# Patient Record
Sex: FEMALE | Race: WHITE | Employment: FULL TIME | URBAN - NONMETROPOLITAN AREA
[De-identification: names, ages, dates, MRNs, and addresses within clinical notes are randomized per-mention and may not be internally consistent; named-entity substitution may affect disease eponyms.]

---

## 2017-01-05 RX ORDER — QUINAPRIL 20 MG/1
TABLET ORAL
Qty: 30 TABLET | Refills: 11 | Status: SHIPPED | OUTPATIENT
Start: 2017-01-05 | End: 2017-10-24 | Stop reason: SDUPTHER

## 2017-02-15 ENCOUNTER — OFFICE VISIT (OUTPATIENT)
Dept: PRIMARY CARE CLINIC | Age: 47
End: 2017-02-15
Payer: COMMERCIAL

## 2017-02-15 VITALS
SYSTOLIC BLOOD PRESSURE: 130 MMHG | BODY MASS INDEX: 35.44 KG/M2 | HEIGHT: 63 IN | RESPIRATION RATE: 22 BRPM | HEART RATE: 110 BPM | OXYGEN SATURATION: 98 % | WEIGHT: 200 LBS | DIASTOLIC BLOOD PRESSURE: 82 MMHG

## 2017-02-15 DIAGNOSIS — F41.9 ANXIETY: ICD-10-CM

## 2017-02-15 DIAGNOSIS — Z76.0 MEDICATION REFILL: ICD-10-CM

## 2017-02-15 DIAGNOSIS — I10 ESSENTIAL HYPERTENSION: ICD-10-CM

## 2017-02-15 DIAGNOSIS — E11.9 TYPE 2 DIABETES MELLITUS WITHOUT COMPLICATION, WITHOUT LONG-TERM CURRENT USE OF INSULIN (HCC): ICD-10-CM

## 2017-02-15 DIAGNOSIS — E03.9 ACQUIRED HYPOTHYROIDISM: ICD-10-CM

## 2017-02-15 DIAGNOSIS — G56.03 BILATERAL CARPAL TUNNEL SYNDROME: Primary | ICD-10-CM

## 2017-02-15 LAB
ALBUMIN SERPL-MCNC: 4.1 G/DL (ref 3.5–5.2)
ALP BLD-CCNC: 91 U/L (ref 35–104)
ALT SERPL-CCNC: 36 U/L (ref 5–33)
ANION GAP SERPL CALCULATED.3IONS-SCNC: 17 MMOL/L (ref 7–19)
AST SERPL-CCNC: 106 U/L (ref 5–32)
BILIRUB SERPL-MCNC: 0.5 MG/DL (ref 0.2–1.2)
BILIRUBIN URINE: NEGATIVE
BLOOD, URINE: NEGATIVE
BUN BLDV-MCNC: 10 MG/DL (ref 6–20)
CALCIUM SERPL-MCNC: 9.3 MG/DL (ref 8.6–10)
CHLORIDE BLD-SCNC: 97 MMOL/L (ref 98–111)
CLARITY: CLEAR
CO2: 24 MMOL/L (ref 22–29)
COLOR: YELLOW
CREAT SERPL-MCNC: 0.5 MG/DL (ref 0.5–0.9)
CREATININE URINE: 71.1 MG/DL (ref 4.2–622)
GFR NON-AFRICAN AMERICAN: >60
GLOBULIN: 4.2 G/DL
GLUCOSE BLD-MCNC: 306 MG/DL (ref 74–109)
GLUCOSE URINE: >=1000 MG/DL
HBA1C MFR BLD: 9.9 %
HCT VFR BLD CALC: 36.5 % (ref 37–47)
HEMOGLOBIN: 12.4 G/DL (ref 12–16)
KETONES, URINE: NEGATIVE MG/DL
LEUKOCYTE ESTERASE, URINE: NEGATIVE
MCH RBC QN AUTO: 28.5 PG (ref 27–31)
MCHC RBC AUTO-ENTMCNC: 34 G/DL (ref 33–37)
MCV RBC AUTO: 83.9 FL (ref 81–99)
NITRITE, URINE: NEGATIVE
PDW BLD-RTO: 14 % (ref 11.5–14.5)
PH UA: 5
PLATELET # BLD: 189 K/UL (ref 130–400)
PMV BLD AUTO: 10.9 FL (ref 7.4–10.4)
POTASSIUM SERPL-SCNC: 4 MMOL/L (ref 3.5–5)
PROTEIN PROTEIN: 28 MG/DL (ref 15–45)
PROTEIN UA: NEGATIVE MG/DL
RBC # BLD: 4.35 M/UL (ref 4.2–5.4)
SODIUM BLD-SCNC: 138 MMOL/L (ref 136–145)
SPECIFIC GRAVITY UA: 1.03
TOTAL PROTEIN: 8.3 G/DL (ref 6.6–8.7)
TSH SERPL DL<=0.05 MIU/L-ACNC: 0.44 UIU/ML (ref 0.27–4.2)
URIC ACID, SERUM: 5.3 MG/DL (ref 2.4–5.7)
UROBILINOGEN, URINE: 0.2 E.U./DL
WBC # BLD: 8.8 K/UL (ref 4.8–10.8)

## 2017-02-15 PROCEDURE — 99214 OFFICE O/P EST MOD 30 MIN: CPT | Performed by: INTERNAL MEDICINE

## 2017-02-15 RX ORDER — MONTELUKAST SODIUM 10 MG/1
10 TABLET ORAL DAILY
Qty: 30 TABLET | Refills: 3 | Status: SHIPPED | OUTPATIENT
Start: 2017-02-15 | End: 2017-02-22 | Stop reason: SDUPTHER

## 2017-02-15 RX ORDER — TRAZODONE HYDROCHLORIDE 100 MG/1
100 TABLET ORAL NIGHTLY
Qty: 30 TABLET | Refills: 5 | Status: SHIPPED | OUTPATIENT
Start: 2017-02-15 | End: 2017-08-07 | Stop reason: SDUPTHER

## 2017-02-15 RX ORDER — CLONAZEPAM 0.5 MG/1
TABLET ORAL
Qty: 30 TABLET | Refills: 2 | Status: SHIPPED | OUTPATIENT
Start: 2017-02-15 | End: 2018-08-03 | Stop reason: SDUPTHER

## 2017-02-15 RX ORDER — FAMOTIDINE 20 MG/1
20 TABLET, FILM COATED ORAL 2 TIMES DAILY
Qty: 30 TABLET | Refills: 3 | Status: SHIPPED | OUTPATIENT
Start: 2017-02-15 | End: 2017-07-10 | Stop reason: SDUPTHER

## 2017-02-15 RX ORDER — GLIMEPIRIDE 1 MG/1
1 TABLET ORAL
Qty: 30 TABLET | Refills: 2 | Status: SHIPPED | OUTPATIENT
Start: 2017-02-15 | End: 2017-05-10 | Stop reason: SDUPTHER

## 2017-02-15 RX ORDER — VENLAFAXINE HYDROCHLORIDE 150 MG/1
150 CAPSULE, EXTENDED RELEASE ORAL DAILY
Qty: 30 CAPSULE | Refills: 5 | Status: SHIPPED | OUTPATIENT
Start: 2017-02-15 | End: 2017-08-07 | Stop reason: SDUPTHER

## 2017-02-15 RX ORDER — AMITRIPTYLINE HYDROCHLORIDE 25 MG/1
TABLET, FILM COATED ORAL
Qty: 30 TABLET | Refills: 5 | Status: CANCELLED | OUTPATIENT
Start: 2017-02-15

## 2017-02-15 ASSESSMENT — ENCOUNTER SYMPTOMS
DIARRHEA: 0
CONSTIPATION: 0
RHINORRHEA: 1
COUGH: 0
SORE THROAT: 1
BACK PAIN: 0
SHORTNESS OF BREATH: 0
NAUSEA: 0
VOMITING: 0

## 2017-04-18 RX ORDER — ATORVASTATIN CALCIUM 20 MG/1
TABLET, FILM COATED ORAL
Qty: 30 TABLET | Refills: 5 | Status: SHIPPED | OUTPATIENT
Start: 2017-04-18 | End: 2017-09-27 | Stop reason: SDUPTHER

## 2017-04-18 RX ORDER — MELOXICAM 15 MG/1
15 TABLET ORAL DAILY
Qty: 30 TABLET | Refills: 5 | Status: SHIPPED | OUTPATIENT
Start: 2017-04-18 | End: 2017-09-27 | Stop reason: SDUPTHER

## 2017-05-10 RX ORDER — GLIMEPIRIDE 1 MG/1
1 TABLET ORAL
Qty: 30 TABLET | Refills: 5 | Status: SHIPPED | OUTPATIENT
Start: 2017-05-10 | End: 2017-08-16 | Stop reason: ALTCHOICE

## 2017-05-15 ENCOUNTER — OFFICE VISIT (OUTPATIENT)
Dept: PRIMARY CARE CLINIC | Age: 47
End: 2017-05-15
Payer: COMMERCIAL

## 2017-05-15 VITALS
RESPIRATION RATE: 18 BRPM | HEART RATE: 105 BPM | WEIGHT: 202 LBS | BODY MASS INDEX: 35.79 KG/M2 | SYSTOLIC BLOOD PRESSURE: 103 MMHG | HEIGHT: 63 IN | OXYGEN SATURATION: 98 % | DIASTOLIC BLOOD PRESSURE: 84 MMHG

## 2017-05-15 DIAGNOSIS — R30.0 DYSURIA: ICD-10-CM

## 2017-05-15 DIAGNOSIS — I10 ESSENTIAL HYPERTENSION: ICD-10-CM

## 2017-05-15 DIAGNOSIS — E11.9 TYPE 2 DIABETES MELLITUS WITHOUT COMPLICATION, WITHOUT LONG-TERM CURRENT USE OF INSULIN (HCC): Primary | ICD-10-CM

## 2017-05-15 DIAGNOSIS — E03.9 ACQUIRED HYPOTHYROIDISM: ICD-10-CM

## 2017-05-15 PROCEDURE — 99214 OFFICE O/P EST MOD 30 MIN: CPT | Performed by: INTERNAL MEDICINE

## 2017-05-15 RX ORDER — LEVOTHYROXINE SODIUM 0.12 MG/1
TABLET ORAL
Qty: 30 TABLET | Refills: 0 | Status: SHIPPED | OUTPATIENT
Start: 2017-05-15 | End: 2017-05-15 | Stop reason: SDUPTHER

## 2017-05-15 RX ORDER — PHENAZOPYRIDINE HYDROCHLORIDE 100 MG/1
100 TABLET, FILM COATED ORAL 3 TIMES DAILY PRN
Qty: 9 TABLET | Refills: 0 | Status: SHIPPED | OUTPATIENT
Start: 2017-05-15 | End: 2017-05-18

## 2017-05-15 RX ORDER — METFORMIN HYDROCHLORIDE 1000 MG/1
1000 TABLET, FILM COATED, EXTENDED RELEASE ORAL 2 TIMES DAILY WITH MEALS
Qty: 60 TABLET | Refills: 5 | Status: CANCELLED | OUTPATIENT
Start: 2017-05-15

## 2017-05-15 RX ORDER — LEVOFLOXACIN 500 MG/1
500 TABLET, FILM COATED ORAL DAILY
Qty: 3 TABLET | Refills: 0 | Status: SHIPPED | OUTPATIENT
Start: 2017-05-15 | End: 2017-05-18

## 2017-05-15 RX ORDER — LEVOTHYROXINE SODIUM 0.12 MG/1
TABLET ORAL
Qty: 30 TABLET | Refills: 5 | Status: SHIPPED | OUTPATIENT
Start: 2017-05-15 | End: 2017-06-12 | Stop reason: SDUPTHER

## 2017-05-15 ASSESSMENT — ENCOUNTER SYMPTOMS
CONSTIPATION: 0
COUGH: 0
DIARRHEA: 0
SHORTNESS OF BREATH: 0
NAUSEA: 0
BACK PAIN: 1
VOMITING: 0

## 2017-06-07 ENCOUNTER — HOSPITAL ENCOUNTER (OUTPATIENT)
Dept: PREADMISSION TESTING | Age: 47
Setting detail: OUTPATIENT SURGERY
Discharge: HOME | End: 2017-06-07

## 2017-06-07 ENCOUNTER — HOSPITAL ENCOUNTER (OUTPATIENT)
Dept: NON INVASIVE DIAGNOSTICS | Age: 47
Discharge: HOME | End: 2017-06-07
Payer: COMMERCIAL

## 2017-06-07 ENCOUNTER — HOSPITAL ENCOUNTER (OUTPATIENT)
Dept: LAB | Age: 47
Discharge: HOME | End: 2017-06-07
Payer: COMMERCIAL

## 2017-06-07 ENCOUNTER — HOSPITAL ENCOUNTER (OUTPATIENT)
Dept: GENERAL RADIOLOGY | Age: 47
End: 2017-06-07
Payer: COMMERCIAL

## 2017-06-07 DIAGNOSIS — Z76.0 MEDICATION REFILL: ICD-10-CM

## 2017-06-07 LAB
ANION GAP SERPL CALCULATED.3IONS-SCNC: 15 MMOL/L (ref 7–19)
BUN BLDV-MCNC: 13 MG/DL (ref 6–20)
CALCIUM SERPL-MCNC: 9.7 MG/DL (ref 8.6–10)
CHLORIDE BLD-SCNC: 96 MMOL/L (ref 98–111)
CO2: 26 MMOL/L (ref 22–29)
CREAT SERPL-MCNC: 0.5 MG/DL (ref 0.5–0.9)
GFR NON-AFRICAN AMERICAN: >60
GLUCOSE BLD-MCNC: 303 MG/DL (ref 74–109)
POTASSIUM SERPL-SCNC: 4.3 MMOL/L (ref 3.5–5)
SODIUM BLD-SCNC: 137 MMOL/L (ref 136–145)

## 2017-06-07 PROCEDURE — 93005 ELECTROCARDIOGRAM TRACING: CPT

## 2017-06-07 RX ORDER — MONTELUKAST SODIUM 10 MG/1
TABLET ORAL
Qty: 30 TABLET | Refills: 11 | Status: SHIPPED | OUTPATIENT
Start: 2017-06-07 | End: 2018-02-23 | Stop reason: SDUPTHER

## 2017-06-12 ENCOUNTER — ANESTHESIA EVENT (OUTPATIENT)
Dept: OPERATING ROOM | Age: 47
End: 2017-06-12

## 2017-06-12 RX ORDER — LEVOTHYROXINE SODIUM 0.12 MG/1
TABLET ORAL
Qty: 30 TABLET | Refills: 11 | Status: SHIPPED | OUTPATIENT
Start: 2017-06-12 | End: 2018-06-17 | Stop reason: SDUPTHER

## 2017-06-16 ENCOUNTER — HOSPITAL ENCOUNTER (OUTPATIENT)
Age: 47
Setting detail: OUTPATIENT SURGERY
Discharge: HOME | End: 2017-06-16
Attending: ORTHOPAEDIC SURGERY | Admitting: ORTHOPAEDIC SURGERY

## 2017-06-16 ENCOUNTER — ANESTHESIA (OUTPATIENT)
Dept: OPERATING ROOM | Age: 47
End: 2017-06-16
Payer: COMMERCIAL

## 2017-06-16 VITALS
BODY MASS INDEX: 34.96 KG/M2 | HEIGHT: 62 IN | OXYGEN SATURATION: 94 % | HEART RATE: 107 BPM | DIASTOLIC BLOOD PRESSURE: 77 MMHG | RESPIRATION RATE: 18 BRPM | WEIGHT: 190 LBS | TEMPERATURE: 97.2 F | SYSTOLIC BLOOD PRESSURE: 120 MMHG

## 2017-06-16 VITALS
RESPIRATION RATE: 4 BRPM | SYSTOLIC BLOOD PRESSURE: 126 MMHG | OXYGEN SATURATION: 98 % | DIASTOLIC BLOOD PRESSURE: 81 MMHG

## 2017-06-16 PROCEDURE — 64721 CARPAL TUNNEL SURGERY: CPT

## 2017-06-16 PROCEDURE — G8916 PT W IV AB GIVEN ON TIME: HCPCS | Performed by: NURSE PRACTITIONER

## 2017-06-16 PROCEDURE — 01810 ANES PX NRV MUSC F/ARM WRST: CPT | Performed by: NURSE ANESTHETIST, CERTIFIED REGISTERED

## 2017-06-16 PROCEDURE — G8907 PT DOC NO EVENTS ON DISCHARG: HCPCS | Performed by: NURSE PRACTITIONER

## 2017-06-16 RX ORDER — LIDOCAINE HYDROCHLORIDE 20 MG/ML
INJECTION, SOLUTION EPIDURAL; INFILTRATION; INTRACAUDAL; PERINEURAL PRN
Status: DISCONTINUED | OUTPATIENT
Start: 2017-06-16 | End: 2017-06-16 | Stop reason: SDUPTHER

## 2017-06-16 RX ORDER — CEFAZOLIN SODIUM 1 G/3ML
INJECTION, POWDER, FOR SOLUTION INTRAMUSCULAR; INTRAVENOUS PRN
Status: DISCONTINUED | OUTPATIENT
Start: 2017-06-16 | End: 2017-06-16 | Stop reason: SDUPTHER

## 2017-06-16 RX ORDER — OXYCODONE HYDROCHLORIDE 5 MG/1
TABLET ORAL
Qty: 60 TABLET | Refills: 0 | Status: SHIPPED | OUTPATIENT
Start: 2017-06-16 | End: 2017-07-14

## 2017-06-16 RX ORDER — SODIUM CHLORIDE, SODIUM LACTATE, POTASSIUM CHLORIDE, CALCIUM CHLORIDE 600; 310; 30; 20 MG/100ML; MG/100ML; MG/100ML; MG/100ML
INJECTION, SOLUTION INTRAVENOUS CONTINUOUS
Status: DISCONTINUED | OUTPATIENT
Start: 2017-06-16 | End: 2017-06-16 | Stop reason: HOSPADM

## 2017-06-16 RX ORDER — ROPIVACAINE HYDROCHLORIDE 2 MG/ML
INJECTION, SOLUTION EPIDURAL; INFILTRATION; PERINEURAL PRN
Status: DISCONTINUED | OUTPATIENT
Start: 2017-06-16 | End: 2017-06-16 | Stop reason: HOSPADM

## 2017-06-16 RX ORDER — PROMETHAZINE HYDROCHLORIDE 25 MG/1
25 TABLET ORAL EVERY 8 HOURS PRN
Qty: 25 TABLET | Refills: 0 | Status: SHIPPED | OUTPATIENT
Start: 2017-06-16 | End: 2017-06-23

## 2017-06-16 RX ORDER — PROPOFOL 10 MG/ML
INJECTION, EMULSION INTRAVENOUS PRN
Status: DISCONTINUED | OUTPATIENT
Start: 2017-06-16 | End: 2017-06-16 | Stop reason: SDUPTHER

## 2017-06-16 RX ORDER — MIDAZOLAM HYDROCHLORIDE 1 MG/ML
INJECTION INTRAMUSCULAR; INTRAVENOUS PRN
Status: DISCONTINUED | OUTPATIENT
Start: 2017-06-16 | End: 2017-06-16 | Stop reason: SDUPTHER

## 2017-06-16 RX ORDER — HYDROCODONE BITARTRATE AND ACETAMINOPHEN 5; 325 MG/1; MG/1
2 TABLET ORAL EVERY 6 HOURS PRN
Status: DISCONTINUED | OUTPATIENT
Start: 2017-06-16 | End: 2017-06-16 | Stop reason: HOSPADM

## 2017-06-16 RX ORDER — ONDANSETRON 2 MG/ML
INJECTION INTRAMUSCULAR; INTRAVENOUS PRN
Status: DISCONTINUED | OUTPATIENT
Start: 2017-06-16 | End: 2017-06-16 | Stop reason: SDUPTHER

## 2017-06-16 RX ORDER — FENTANYL CITRATE 50 UG/ML
INJECTION, SOLUTION INTRAMUSCULAR; INTRAVENOUS PRN
Status: DISCONTINUED | OUTPATIENT
Start: 2017-06-16 | End: 2017-06-16 | Stop reason: SDUPTHER

## 2017-06-16 RX ADMIN — ONDANSETRON 4 MG: 2 INJECTION INTRAMUSCULAR; INTRAVENOUS at 08:53

## 2017-06-16 RX ADMIN — FENTANYL CITRATE 100 MCG: 50 INJECTION, SOLUTION INTRAMUSCULAR; INTRAVENOUS at 08:43

## 2017-06-16 RX ADMIN — SODIUM CHLORIDE, SODIUM LACTATE, POTASSIUM CHLORIDE, CALCIUM CHLORIDE: 600; 310; 30; 20 INJECTION, SOLUTION INTRAVENOUS at 08:40

## 2017-06-16 RX ADMIN — PROPOFOL 160 MG: 10 INJECTION, EMULSION INTRAVENOUS at 08:45

## 2017-06-16 RX ADMIN — SODIUM CHLORIDE, SODIUM LACTATE, POTASSIUM CHLORIDE, CALCIUM CHLORIDE: 600; 310; 30; 20 INJECTION, SOLUTION INTRAVENOUS at 08:21

## 2017-06-16 RX ADMIN — LIDOCAINE HYDROCHLORIDE 50 MG: 20 INJECTION, SOLUTION EPIDURAL; INFILTRATION; INTRACAUDAL; PERINEURAL at 08:44

## 2017-06-16 RX ADMIN — CEFAZOLIN SODIUM 2000 MG: 1 INJECTION, POWDER, FOR SOLUTION INTRAMUSCULAR; INTRAVENOUS at 08:40

## 2017-06-16 RX ADMIN — MIDAZOLAM HYDROCHLORIDE 2 MG: 1 INJECTION INTRAMUSCULAR; INTRAVENOUS at 08:40

## 2017-06-16 RX ADMIN — HYDROCODONE BITARTRATE AND ACETAMINOPHEN 2 TABLET: 5; 325 TABLET ORAL at 10:03

## 2017-06-16 ASSESSMENT — PAIN SCALES - GENERAL: PAINLEVEL_OUTOF10: 8

## 2017-07-10 ENCOUNTER — ANESTHESIA EVENT (OUTPATIENT)
Dept: OPERATING ROOM | Age: 47
End: 2017-07-10

## 2017-07-10 RX ORDER — FAMOTIDINE 20 MG/1
20 TABLET, FILM COATED ORAL 2 TIMES DAILY
Qty: 30 TABLET | Refills: 11 | Status: SHIPPED | OUTPATIENT
Start: 2017-07-10 | End: 2018-04-06 | Stop reason: SDUPTHER

## 2017-07-14 ENCOUNTER — HOSPITAL ENCOUNTER (OUTPATIENT)
Dept: PREADMISSION TESTING | Age: 47
Setting detail: OUTPATIENT SURGERY
Discharge: HOME | End: 2017-07-14

## 2017-07-14 VITALS — WEIGHT: 198 LBS | BODY MASS INDEX: 35.08 KG/M2 | HEIGHT: 63 IN

## 2017-07-14 RX ORDER — METOCLOPRAMIDE HYDROCHLORIDE 5 MG/ML
10 INJECTION INTRAMUSCULAR; INTRAVENOUS ONCE
Status: CANCELLED | OUTPATIENT
Start: 2017-07-14 | End: 2017-07-14

## 2017-07-14 RX ORDER — SCOLOPAMINE TRANSDERMAL SYSTEM 1 MG/1
1 PATCH, EXTENDED RELEASE TRANSDERMAL
Status: CANCELLED | OUTPATIENT
Start: 2017-07-14 | End: 2017-07-17

## 2017-07-21 ENCOUNTER — HOSPITAL ENCOUNTER (OUTPATIENT)
Age: 47
Setting detail: OUTPATIENT SURGERY
Discharge: HOME | End: 2017-07-21
Attending: ORTHOPAEDIC SURGERY | Admitting: ORTHOPAEDIC SURGERY

## 2017-07-21 ENCOUNTER — ANESTHESIA (OUTPATIENT)
Dept: OPERATING ROOM | Age: 47
End: 2017-07-21
Payer: COMMERCIAL

## 2017-07-21 VITALS
RESPIRATION RATE: 1 BRPM | SYSTOLIC BLOOD PRESSURE: 136 MMHG | OXYGEN SATURATION: 96 % | DIASTOLIC BLOOD PRESSURE: 79 MMHG

## 2017-07-21 VITALS
HEART RATE: 97 BPM | RESPIRATION RATE: 18 BRPM | TEMPERATURE: 97.1 F | SYSTOLIC BLOOD PRESSURE: 130 MMHG | OXYGEN SATURATION: 96 % | DIASTOLIC BLOOD PRESSURE: 91 MMHG

## 2017-07-21 PROCEDURE — 29848 WRIST ENDOSCOPY/SURGERY: CPT

## 2017-07-21 PROCEDURE — G8916 PT W IV AB GIVEN ON TIME: HCPCS

## 2017-07-21 PROCEDURE — 01810 ANES PX NRV MUSC F/ARM WRST: CPT | Performed by: NURSE ANESTHETIST, CERTIFIED REGISTERED

## 2017-07-21 PROCEDURE — G8907 PT DOC NO EVENTS ON DISCHARG: HCPCS

## 2017-07-21 RX ORDER — HYDROMORPHONE HCL 110MG/55ML
0.5 PATIENT CONTROLLED ANALGESIA SYRINGE INTRAVENOUS EVERY 5 MIN PRN
Status: DISCONTINUED | OUTPATIENT
Start: 2017-07-21 | End: 2017-07-21 | Stop reason: HOSPADM

## 2017-07-21 RX ORDER — FAMOTIDINE 20 MG/1
20 TABLET, FILM COATED ORAL 2 TIMES DAILY
Status: DISCONTINUED | OUTPATIENT
Start: 2017-07-21 | End: 2017-07-21 | Stop reason: HOSPADM

## 2017-07-21 RX ORDER — ONDANSETRON 2 MG/ML
4 INJECTION INTRAMUSCULAR; INTRAVENOUS
Status: DISCONTINUED | OUTPATIENT
Start: 2017-07-21 | End: 2017-07-21 | Stop reason: HOSPADM

## 2017-07-21 RX ORDER — LIDOCAINE HYDROCHLORIDE 10 MG/ML
1 INJECTION, SOLUTION EPIDURAL; INFILTRATION; INTRACAUDAL; PERINEURAL
Status: DISCONTINUED | OUTPATIENT
Start: 2017-07-21 | End: 2017-07-21 | Stop reason: HOSPADM

## 2017-07-21 RX ORDER — SODIUM CHLORIDE, SODIUM LACTATE, POTASSIUM CHLORIDE, CALCIUM CHLORIDE 600; 310; 30; 20 MG/100ML; MG/100ML; MG/100ML; MG/100ML
INJECTION, SOLUTION INTRAVENOUS CONTINUOUS
Status: DISCONTINUED | OUTPATIENT
Start: 2017-07-21 | End: 2017-07-21 | Stop reason: HOSPADM

## 2017-07-21 RX ORDER — ROPIVACAINE HYDROCHLORIDE 2 MG/ML
INJECTION, SOLUTION EPIDURAL; INFILTRATION; PERINEURAL PRN
Status: DISCONTINUED | OUTPATIENT
Start: 2017-07-21 | End: 2017-07-21 | Stop reason: HOSPADM

## 2017-07-21 RX ORDER — PROPOFOL 10 MG/ML
INJECTION, EMULSION INTRAVENOUS PRN
Status: DISCONTINUED | OUTPATIENT
Start: 2017-07-21 | End: 2017-07-21 | Stop reason: SDUPTHER

## 2017-07-21 RX ORDER — MIDAZOLAM HYDROCHLORIDE 1 MG/ML
INJECTION INTRAMUSCULAR; INTRAVENOUS PRN
Status: DISCONTINUED | OUTPATIENT
Start: 2017-07-21 | End: 2017-07-21 | Stop reason: SDUPTHER

## 2017-07-21 RX ORDER — FENTANYL CITRATE 50 UG/ML
50 INJECTION, SOLUTION INTRAMUSCULAR; INTRAVENOUS EVERY 5 MIN PRN
Status: DISCONTINUED | OUTPATIENT
Start: 2017-07-21 | End: 2017-07-21 | Stop reason: HOSPADM

## 2017-07-21 RX ORDER — FENTANYL CITRATE 50 UG/ML
INJECTION, SOLUTION INTRAMUSCULAR; INTRAVENOUS PRN
Status: DISCONTINUED | OUTPATIENT
Start: 2017-07-21 | End: 2017-07-21 | Stop reason: SDUPTHER

## 2017-07-21 RX ORDER — PROMETHAZINE HYDROCHLORIDE 25 MG/ML
12.5 INJECTION, SOLUTION INTRAMUSCULAR; INTRAVENOUS
Status: COMPLETED | OUTPATIENT
Start: 2017-07-21 | End: 2017-07-21

## 2017-07-21 RX ORDER — HYDROCODONE BITARTRATE AND ACETAMINOPHEN 7.5; 325 MG/1; MG/1
TABLET ORAL
Qty: 60 TABLET | Refills: 0 | Status: SHIPPED | OUTPATIENT
Start: 2017-07-21 | End: 2018-07-06

## 2017-07-21 RX ORDER — SCOLOPAMINE TRANSDERMAL SYSTEM 1 MG/1
1 PATCH, EXTENDED RELEASE TRANSDERMAL
Status: DISCONTINUED | OUTPATIENT
Start: 2017-07-21 | End: 2017-07-21 | Stop reason: HOSPADM

## 2017-07-21 RX ORDER — LABETALOL HYDROCHLORIDE 5 MG/ML
5 INJECTION, SOLUTION INTRAVENOUS EVERY 10 MIN PRN
Status: DISCONTINUED | OUTPATIENT
Start: 2017-07-21 | End: 2017-07-21 | Stop reason: HOSPADM

## 2017-07-21 RX ORDER — HYDROMORPHONE HCL 110MG/55ML
0.5 PATIENT CONTROLLED ANALGESIA SYRINGE INTRAVENOUS EVERY 5 MIN PRN
Status: COMPLETED | OUTPATIENT
Start: 2017-07-21 | End: 2017-07-21

## 2017-07-21 RX ORDER — PROMETHAZINE HYDROCHLORIDE 25 MG/ML
18.75 INJECTION, SOLUTION INTRAMUSCULAR; INTRAVENOUS ONCE
Status: COMPLETED | OUTPATIENT
Start: 2017-07-21 | End: 2017-07-21

## 2017-07-21 RX ORDER — ONDANSETRON 2 MG/ML
INJECTION INTRAMUSCULAR; INTRAVENOUS PRN
Status: DISCONTINUED | OUTPATIENT
Start: 2017-07-21 | End: 2017-07-21 | Stop reason: SDUPTHER

## 2017-07-21 RX ORDER — CEFAZOLIN SODIUM 1 G/3ML
INJECTION, POWDER, FOR SOLUTION INTRAMUSCULAR; INTRAVENOUS PRN
Status: DISCONTINUED | OUTPATIENT
Start: 2017-07-21 | End: 2017-07-21 | Stop reason: SDUPTHER

## 2017-07-21 RX ORDER — HYDRALAZINE HYDROCHLORIDE 20 MG/ML
5 INJECTION INTRAMUSCULAR; INTRAVENOUS EVERY 10 MIN PRN
Status: DISCONTINUED | OUTPATIENT
Start: 2017-07-21 | End: 2017-07-21 | Stop reason: HOSPADM

## 2017-07-21 RX ORDER — MEPERIDINE HYDROCHLORIDE 25 MG/ML
12.5 INJECTION INTRAMUSCULAR; INTRAVENOUS; SUBCUTANEOUS EVERY 5 MIN PRN
Status: DISCONTINUED | OUTPATIENT
Start: 2017-07-21 | End: 2017-07-21 | Stop reason: HOSPADM

## 2017-07-21 RX ORDER — METOCLOPRAMIDE 10 MG/1
10 TABLET ORAL
Status: DISCONTINUED | OUTPATIENT
Start: 2017-07-21 | End: 2017-07-21 | Stop reason: HOSPADM

## 2017-07-21 RX ORDER — DEXAMETHASONE SODIUM PHOSPHATE 4 MG/ML
INJECTION, SOLUTION INTRA-ARTICULAR; INTRALESIONAL; INTRAMUSCULAR; INTRAVENOUS; SOFT TISSUE PRN
Status: DISCONTINUED | OUTPATIENT
Start: 2017-07-21 | End: 2017-07-21 | Stop reason: SDUPTHER

## 2017-07-21 RX ORDER — ONDANSETRON 4 MG/1
8 TABLET, FILM COATED ORAL ONCE
Status: COMPLETED | OUTPATIENT
Start: 2017-07-21 | End: 2017-07-21

## 2017-07-21 RX ORDER — DIPHENHYDRAMINE HYDROCHLORIDE 50 MG/ML
12.5 INJECTION INTRAMUSCULAR; INTRAVENOUS
Status: DISCONTINUED | OUTPATIENT
Start: 2017-07-21 | End: 2017-07-21 | Stop reason: HOSPADM

## 2017-07-21 RX ORDER — METOCLOPRAMIDE HYDROCHLORIDE 5 MG/ML
10 INJECTION INTRAMUSCULAR; INTRAVENOUS ONCE
Status: DISCONTINUED | OUTPATIENT
Start: 2017-07-21 | End: 2017-07-21

## 2017-07-21 RX ORDER — HYDROMORPHONE HCL 110MG/55ML
0.25 PATIENT CONTROLLED ANALGESIA SYRINGE INTRAVENOUS EVERY 5 MIN PRN
Status: DISCONTINUED | OUTPATIENT
Start: 2017-07-21 | End: 2017-07-21 | Stop reason: HOSPADM

## 2017-07-21 RX ADMIN — FAMOTIDINE 20 MG: 20 TABLET, FILM COATED ORAL at 10:03

## 2017-07-21 RX ADMIN — PROPOFOL 100 MG: 10 INJECTION, EMULSION INTRAVENOUS at 10:41

## 2017-07-21 RX ADMIN — DEXAMETHASONE SODIUM PHOSPHATE 2 MG: 4 INJECTION, SOLUTION INTRA-ARTICULAR; INTRALESIONAL; INTRAMUSCULAR; INTRAVENOUS; SOFT TISSUE at 10:51

## 2017-07-21 RX ADMIN — MIDAZOLAM HYDROCHLORIDE 1 MG: 1 INJECTION INTRAMUSCULAR; INTRAVENOUS at 10:40

## 2017-07-21 RX ADMIN — CEFAZOLIN SODIUM 2000 MG: 1 INJECTION, POWDER, FOR SOLUTION INTRAMUSCULAR; INTRAVENOUS at 10:40

## 2017-07-21 RX ADMIN — Medication 0.5 MG: at 11:42

## 2017-07-21 RX ADMIN — ONDANSETRON 8 MG: 4 TABLET, FILM COATED ORAL at 11:43

## 2017-07-21 RX ADMIN — Medication 0.5 MG: at 11:49

## 2017-07-21 RX ADMIN — PROMETHAZINE HYDROCHLORIDE 18.75 MG: 25 INJECTION, SOLUTION INTRAMUSCULAR; INTRAVENOUS at 12:10

## 2017-07-21 RX ADMIN — FENTANYL CITRATE 50 MCG: 50 INJECTION, SOLUTION INTRAMUSCULAR; INTRAVENOUS at 10:40

## 2017-07-21 RX ADMIN — PROMETHAZINE HYDROCHLORIDE 6.25 MG: 25 INJECTION, SOLUTION INTRAMUSCULAR; INTRAVENOUS at 12:07

## 2017-07-21 RX ADMIN — SODIUM CHLORIDE, SODIUM LACTATE, POTASSIUM CHLORIDE, CALCIUM CHLORIDE: 600; 310; 30; 20 INJECTION, SOLUTION INTRAVENOUS at 10:09

## 2017-07-21 RX ADMIN — METOCLOPRAMIDE 10 MG: 10 TABLET ORAL at 10:03

## 2017-07-21 RX ADMIN — FENTANYL CITRATE 50 MCG: 50 INJECTION, SOLUTION INTRAMUSCULAR; INTRAVENOUS at 10:51

## 2017-07-21 RX ADMIN — Medication 0.5 MG: at 11:37

## 2017-07-21 RX ADMIN — Medication 0.5 MG: at 11:54

## 2017-07-21 RX ADMIN — DEXAMETHASONE SODIUM PHOSPHATE 2 MG: 4 INJECTION, SOLUTION INTRA-ARTICULAR; INTRALESIONAL; INTRAMUSCULAR; INTRAVENOUS; SOFT TISSUE at 10:45

## 2017-07-21 RX ADMIN — ONDANSETRON 2 MG: 2 INJECTION INTRAMUSCULAR; INTRAVENOUS at 10:51

## 2017-07-21 RX ADMIN — PROPOFOL 90 MG: 10 INJECTION, EMULSION INTRAVENOUS at 10:42

## 2017-07-21 RX ADMIN — ONDANSETRON 2 MG: 2 INJECTION INTRAMUSCULAR; INTRAVENOUS at 10:45

## 2017-07-21 ASSESSMENT — PAIN SCALES - GENERAL
PAINLEVEL_OUTOF10: 10

## 2017-08-07 DIAGNOSIS — E03.9 ACQUIRED HYPOTHYROIDISM: ICD-10-CM

## 2017-08-07 DIAGNOSIS — E11.9 TYPE 2 DIABETES MELLITUS WITHOUT COMPLICATION, WITHOUT LONG-TERM CURRENT USE OF INSULIN (HCC): ICD-10-CM

## 2017-08-07 DIAGNOSIS — I10 ESSENTIAL HYPERTENSION: ICD-10-CM

## 2017-08-07 LAB
ALBUMIN SERPL-MCNC: 3.8 G/DL (ref 3.5–5.2)
ALP BLD-CCNC: 88 U/L (ref 35–104)
ALT SERPL-CCNC: 29 U/L (ref 5–33)
ANION GAP SERPL CALCULATED.3IONS-SCNC: 19 MMOL/L (ref 7–19)
AST SERPL-CCNC: 60 U/L (ref 5–32)
BACTERIA: ABNORMAL /HPF
BILIRUB SERPL-MCNC: 0.4 MG/DL (ref 0.2–1.2)
BILIRUBIN URINE: NEGATIVE
BLOOD, URINE: ABNORMAL
BUN BLDV-MCNC: 7 MG/DL (ref 6–20)
CALCIUM SERPL-MCNC: 9.2 MG/DL (ref 8.6–10)
CHLORIDE BLD-SCNC: 97 MMOL/L (ref 98–111)
CLARITY: ABNORMAL
CO2: 22 MMOL/L (ref 22–29)
COLOR: YELLOW
CREAT SERPL-MCNC: 0.5 MG/DL (ref 0.5–0.9)
CREATININE URINE: 116.2 MG/DL (ref 4.2–622)
EPITHELIAL CELLS, UA: 11 /HPF (ref 0–5)
GFR NON-AFRICAN AMERICAN: >60
GLUCOSE BLD-MCNC: 324 MG/DL (ref 74–109)
GLUCOSE URINE: >=1000 MG/DL
HBA1C MFR BLD: 12.3 %
HCT VFR BLD CALC: 35.5 % (ref 37–47)
HEMOGLOBIN: 11.5 G/DL (ref 12–16)
HYALINE CASTS: 11 /HPF (ref 0–8)
KETONES, URINE: ABNORMAL MG/DL
LEUKOCYTE ESTERASE, URINE: ABNORMAL
MCH RBC QN AUTO: 28.8 PG (ref 27–31)
MCHC RBC AUTO-ENTMCNC: 32.4 G/DL (ref 33–37)
MCV RBC AUTO: 88.8 FL (ref 81–99)
NITRITE, URINE: NEGATIVE
PDW BLD-RTO: 14.1 % (ref 11.5–14.5)
PH UA: 5.5
PLATELET # BLD: 136 K/UL (ref 130–400)
PMV BLD AUTO: 10.7 FL (ref 9.4–12.3)
POTASSIUM SERPL-SCNC: 4.6 MMOL/L (ref 3.5–5)
PROTEIN PROTEIN: 35 MG/DL (ref 15–45)
PROTEIN UA: NEGATIVE MG/DL
RBC # BLD: 4 M/UL (ref 4.2–5.4)
RBC UA: 2 /HPF (ref 0–4)
SODIUM BLD-SCNC: 138 MMOL/L (ref 136–145)
SPECIFIC GRAVITY UA: 1.04
TOTAL PROTEIN: 7.9 G/DL (ref 6.6–8.7)
TSH SERPL DL<=0.05 MIU/L-ACNC: 1.09 UIU/ML (ref 0.27–4.2)
UROBILINOGEN, URINE: 1 E.U./DL
WBC # BLD: 7.5 K/UL (ref 4.8–10.8)
WBC UA: 159 /HPF (ref 0–5)

## 2017-08-07 RX ORDER — VENLAFAXINE HYDROCHLORIDE 150 MG/1
150 CAPSULE, EXTENDED RELEASE ORAL DAILY
Qty: 30 CAPSULE | Refills: 5 | Status: SHIPPED | OUTPATIENT
Start: 2017-08-07 | End: 2018-01-13 | Stop reason: SDUPTHER

## 2017-08-07 RX ORDER — TRAZODONE HYDROCHLORIDE 100 MG/1
100 TABLET ORAL NIGHTLY
Qty: 30 TABLET | Refills: 5 | Status: SHIPPED | OUTPATIENT
Start: 2017-08-07 | End: 2017-12-19 | Stop reason: SDUPTHER

## 2017-08-09 LAB — URINE CULTURE, ROUTINE: NORMAL

## 2017-08-15 ENCOUNTER — OFFICE VISIT (OUTPATIENT)
Dept: PRIMARY CARE CLINIC | Age: 47
End: 2017-08-15
Payer: COMMERCIAL

## 2017-08-15 VITALS
OXYGEN SATURATION: 98 % | DIASTOLIC BLOOD PRESSURE: 86 MMHG | HEART RATE: 108 BPM | SYSTOLIC BLOOD PRESSURE: 134 MMHG | WEIGHT: 202.6 LBS | TEMPERATURE: 97.5 F | BODY MASS INDEX: 35.9 KG/M2 | RESPIRATION RATE: 18 BRPM | HEIGHT: 63 IN

## 2017-08-15 DIAGNOSIS — E11.8 TYPE 2 DIABETES MELLITUS WITH COMPLICATION, WITHOUT LONG-TERM CURRENT USE OF INSULIN (HCC): ICD-10-CM

## 2017-08-15 DIAGNOSIS — I10 ESSENTIAL HYPERTENSION: Primary | ICD-10-CM

## 2017-08-15 DIAGNOSIS — Z23 NEED FOR PROPHYLACTIC VACCINATION AGAINST STREPTOCOCCUS PNEUMONIAE (PNEUMOCOCCUS): ICD-10-CM

## 2017-08-15 DIAGNOSIS — Z13.220 SCREENING FOR HYPERLIPIDEMIA: ICD-10-CM

## 2017-08-15 DIAGNOSIS — E03.9 ACQUIRED HYPOTHYROIDISM: ICD-10-CM

## 2017-08-15 PROCEDURE — 90471 IMMUNIZATION ADMIN: CPT | Performed by: INTERNAL MEDICINE

## 2017-08-15 PROCEDURE — 82044 UR ALBUMIN SEMIQUANTITATIVE: CPT | Performed by: INTERNAL MEDICINE

## 2017-08-15 PROCEDURE — 90732 PPSV23 VACC 2 YRS+ SUBQ/IM: CPT | Performed by: INTERNAL MEDICINE

## 2017-08-15 PROCEDURE — 99214 OFFICE O/P EST MOD 30 MIN: CPT | Performed by: INTERNAL MEDICINE

## 2017-08-15 RX ORDER — LANCETS 30 GAUGE
EACH MISCELLANEOUS
Qty: 100 EACH | Refills: 3 | Status: SHIPPED | OUTPATIENT
Start: 2017-08-15 | End: 2021-07-27 | Stop reason: ALTCHOICE

## 2017-08-15 ASSESSMENT — ENCOUNTER SYMPTOMS
NAUSEA: 0
DIARRHEA: 0
BACK PAIN: 1
CONSTIPATION: 0
COUGH: 0
SHORTNESS OF BREATH: 0
VOMITING: 0

## 2017-08-15 NOTE — MR AVS SNAPSHOT
These medications were sent to [address] [facility], [address] [facility] -  [phone] [address], [place] [address]     Phone:  [phone]     [facility] BLOOD GLUCOSE METER w/Device Kit    empagliflozin 10 MG tablet    glucose blood VI test strips strip    insulin glargine 100 UNIT/ML injection pen    Lancets Misc               Your Current Medications Are              empagliflozin (JARDIANCE) 10 MG tablet Take 1 tablet by mouth daily    insulin glargine (LANTUS SOLOSTAR) 100 UNIT/ML injection pen Inject 10 Units into the skin nightly    Blood Glucose Monitoring Suppl (ACURA BLOOD GLUCOSE METER) w/Device KIT 1 Units by Does not apply route 3 times daily    Lancets MISC Use three times a day. glucose blood VI test strips (ASCENSIA AUTODISC VI;ONE TOUCH ULTRA TEST VI) strip 1 each by In Vitro route daily As needed. traZODone (DESYREL) 100 MG tablet Take 1 tablet by mouth nightly    venlafaxine (EFFEXOR XR) 150 MG extended release capsule Take 1 capsule by mouth daily    HYDROcodone-acetaminophen (NORCO) 7.5-325 MG per tablet One to two po q 4 to 6 hours prn.    famotidine (PEPCID) 20 MG tablet Take 1 tablet by mouth 2 times daily    levothyroxine (SYNTHROID) 125 MCG tablet TAKE 1 TABLET BY MOUTH DAILY    montelukast (SINGULAIR) 10 MG tablet TAKE 1 TABLET BY MOUTH EVERY DAY    metFORMIN (GLUCOPHAGE) 1000 MG tablet Take 1 tablet by mouth 2 times daily (with meals)    glimepiride (AMARYL) 1 MG tablet Take 1 tablet by mouth every morning (before breakfast)    SITagliptin (JANUVIA) 100 MG tablet TAKE 1 TABLET BY MOUTH DAILY    atorvastatin (LIPITOR) 20 MG tablet TAKE 1 TABLET BY MOUTH DAILY    meloxicam (MOBIC) 15 MG tablet Take 1 tablet by mouth daily    amitriptyline (ELAVIL) 25 MG tablet TAKE 1 TABLET BY MOUTH EVERY NIGHT AT BEDTIME    clonazePAM (KLONOPIN) 0.5 MG tablet TAKE 1 TABLET BY MOUTH TWICE DAILY AS NEEDED.

## 2017-08-15 NOTE — PROGRESS NOTES
Family History   Problem Relation Age of Onset    High Blood Pressure Mother     High Blood Pressure Father     High Cholesterol Father     Diabetes Sister     High Blood Pressure Sister     High Blood Pressure Brother        Social History   Substance Use Topics    Smoking status: Never Smoker    Smokeless tobacco: Not on file    Alcohol use No      Current Outpatient Prescriptions   Medication Sig Dispense Refill    Blood Glucose Monitoring Suppl (ACURA BLOOD GLUCOSE METER) w/Device KIT 1 Units by Does not apply route 3 times daily 1 kit 0    Lancets MISC Use three times a day. 100 each 3    traZODone (DESYREL) 100 MG tablet Take 1 tablet by mouth nightly 30 tablet 5    venlafaxine (EFFEXOR XR) 150 MG extended release capsule Take 1 capsule by mouth daily 30 capsule 5    HYDROcodone-acetaminophen (NORCO) 7.5-325 MG per tablet One to two po q 4 to 6 hours prn. 60 tablet 0    famotidine (PEPCID) 20 MG tablet Take 1 tablet by mouth 2 times daily 30 tablet 11    levothyroxine (SYNTHROID) 125 MCG tablet TAKE 1 TABLET BY MOUTH DAILY 30 tablet 11    montelukast (SINGULAIR) 10 MG tablet TAKE 1 TABLET BY MOUTH EVERY DAY 30 tablet 11    metFORMIN (GLUCOPHAGE) 1000 MG tablet Take 1 tablet by mouth 2 times daily (with meals) 60 tablet 3    SITagliptin (JANUVIA) 100 MG tablet TAKE 1 TABLET BY MOUTH DAILY 30 tablet 5    amitriptyline (ELAVIL) 25 MG tablet TAKE 1 TABLET BY MOUTH EVERY NIGHT AT BEDTIME 30 tablet 11    clonazePAM (KLONOPIN) 0.5 MG tablet TAKE 1 TABLET BY MOUTH TWICE DAILY AS NEEDED.  30 tablet 2    quinapril (ACCUPRIL) 20 MG tablet TAKE 1 TABLET BY MOUTH EVERY NIGHT AT BEDTIME 30 tablet 11    ondansetron (ZOFRAN ODT) 4 MG disintegrating tablet Take 1 tablet by mouth every 8 hours as needed for Nausea or Vomiting 10 tablet 0    vitamin D (ERGOCALCIFEROL) 90094 UNITS CAPS capsule Take 1 capsule by mouth once a week 12 capsule 3    ASPIRIN LOW DOSE 81 MG EC tablet TAKE 1 TABLET BY MOUTH DAILY 30 tablet 0    atorvastatin (LIPITOR) 20 MG tablet TAKE 1 TABLET BY MOUTH DAILY 30 tablet 5    meloxicam (MOBIC) 15 MG tablet Take 1 tablet by mouth daily 30 tablet 5    canagliflozin (INVOKANA) 100 MG TABS tablet Take 1 tablet by mouth every morning (before breakfast) 30 tablet 5    insulin glargine (LANTUS SOLOSTAR) 100 UNIT/ML injection pen Inject 30 Units into the skin nightly 5 Pen 3    Insulin Pen Needle 29G X 12.7MM MISC 1 each by Does not apply route daily 100 each 3    glucose blood VI test strips (ASCENSIA AUTODISC VI;ONE TOUCH ULTRA TEST VI) strip 1 each by In Vitro route daily As needed. 100 each 3    glucose monitoring kit (FREESTYLE) monitoring kit 1 kit by Does not apply route daily 1 kit 0    insulin lispro (HUMALOG KWIKPEN) 200 UNIT/ML SOPN pen Inject 6 Units into the skin 3 times daily (with meals) 2 Pen 3    Insulin Pen Needle ([employer] PEN NEEDLES 31G) 31G X 8 MM MISC 1 each by Does not apply route daily 100 each 3    montelukast (SINGULAIR) 10 MG tablet Take 1 tablet by mouth nightly 30 tablet 0     No current facility-administered medications for this visit. Allergies   Allergen Reactions    Claritin-D 12 Hour [Loratadine-Pseudoephedrine Er]     Demerol Hcl [Meperidine]     Percocet [Oxycodone-Acetaminophen] Rash       Health Maintenance   Topic Date Due    Diabetic retinal exam  11/02/2016    Diabetic microalbuminuria test  05/27/2017    Lipid screen  05/27/2017    Diabetic hemoglobin A1C test  11/07/2017    Diabetic foot exam  08/15/2018    Cervical cancer screen  11/02/2018    DTaP/Tdap/Td vaccine (2 - Td) 04/14/2026    Flu vaccine  Completed    Pneumococcal med risk  Completed    HIV screen  Addressed       Subjective:      Review of Systems   Constitutional: Negative for activity change and fatigue. Respiratory: Negative for cough and shortness of breath. Cardiovascular: Negative for chest pain and leg swelling.    Gastrointestinal: Negative for constipation, diarrhea, nausea and vomiting. Genitourinary: Negative for difficulty urinating and dysuria. Musculoskeletal: Positive for back pain. Negative for arthralgias. Neurological: Negative for dizziness and headaches. Objective:     Physical Exam   Constitutional: She is oriented to person, place, and time. She appears well-developed and well-nourished. HENT:   Head: Normocephalic and atraumatic. Mouth/Throat: Oropharynx is clear and moist.   Eyes: Conjunctivae are normal. Pupils are equal, round, and reactive to light. Cardiovascular: Normal rate, regular rhythm and normal heart sounds. Pulmonary/Chest: Effort normal and breath sounds normal.   Abdominal: Soft. Musculoskeletal: Normal range of motion. Neurological: She is alert and oriented to person, place, and time. Skin: Skin is warm and dry. Vitals reviewed. /86  Pulse 108  Temp 97.5 °F (36.4 °C) (Temporal)   Resp 18  Ht 5' 3\" (1.6 m)  Wt 202 lb 9.6 oz (91.9 kg)  LMP  (LMP Unknown)  SpO2 98%  BMI 35.89 kg/m2    Assessment:      1. Essential hypertension     2. Screening for hyperlipidemia   DIABETES FOOT EXAM    Microalbumin / creatinine urine ratio    POCT microalbumin    Lipid Panel   3. Need for prophylactic vaccination against Streptococcus pneumoniae (pneumococcus)   DIABETES FOOT EXAM    Microalbumin / creatinine urine ratio    POCT microalbumin    Pneumococcal polysaccharide vaccine 23-valent PPSV23   4. Acquired hypothyroidism     5. Type 2 diabetes mellitus with complication, without long-term current use of insulin ([facility]               Plan:      Return in about 3 months (around 11/15/2017). Patient Instructions   Start Jardiance 10 mg once a day. Start Lantus 10 units at bedtime. Follow up in 6 weeks. Continue the remainder of your medications.       Orders Placed This Encounter   Procedures    Pneumococcal polysaccharide vaccine 23-valent PPSV23    Microalbumin / creatinine urine ratio Standing Status:   Future     Standing Expiration Date:   8/15/2018    Lipid Panel     Standing Status:   Future     Standing Expiration Date:   8/15/2018     Order Specific Question:   Is Patient Fasting?/# of Hours     Answer:   14 hours    POCT microalbumin    HM DIABETES FOOT EXAM     Orders Placed This Encounter   Medications    DISCONTD: empagliflozin (JARDIANCE) 10 MG tablet     Sig: Take 1 tablet by mouth daily     Dispense:  30 tablet     Refill:  3    DISCONTD: insulin glargine (LANTUS SOLOSTAR) 100 UNIT/ML injection pen     Sig: Inject 10 Units into the skin nightly     Dispense:  5 Pen     Refill:  3    Blood Glucose Monitoring Suppl (ACURA BLOOD GLUCOSE METER) w/Device KIT     Si Units by Does not apply route 3 times daily     Dispense:  1 kit     Refill:  0    Lancets MISC     Sig: Use three times a day. Dispense:  100 each     Refill:  3    DISCONTD: glucose blood VI test strips (ASCENSIA AUTODISC VI;ONE TOUCH ULTRA TEST VI) strip     Si each by In Vitro route daily As needed. Dispense:  100 each     Refill:  3       Patient given educational materials - see patient instructions. Discussed use, benefit, and side effects of prescribed medications. All patient questions answered. Pt voiced understanding. Reviewed health maintenance. Instructed to continue current medications, diet and exercise. Patient agreed with treatment plan. Follow up as directed.      Electronically signed by Bony Bonilla DO on 10/1/2017 at 1:19 PM

## 2017-08-15 NOTE — PATIENT INSTRUCTIONS
Start Jardiance 10 mg once a day. Start Lantus 10 units at bedtime. Follow up in 6 weeks. Continue the remainder of your medications.

## 2017-08-15 NOTE — PROGRESS NOTES
After obtaining consent, and per orders of Dr. Virgen Seo, injection of Pneumovax 23 given in Left deltoid by Kipp Kehr. Patient instructed to remain in clinic for 20 minutes afterwards, and to report any adverse reaction to me immediately.

## 2017-08-16 ENCOUNTER — OFFICE VISIT (OUTPATIENT)
Dept: PRIMARY CARE CLINIC | Age: 47
End: 2017-08-16
Payer: COMMERCIAL

## 2017-08-16 VITALS
SYSTOLIC BLOOD PRESSURE: 116 MMHG | DIASTOLIC BLOOD PRESSURE: 74 MMHG | HEART RATE: 115 BPM | HEIGHT: 63 IN | TEMPERATURE: 98 F | WEIGHT: 201.4 LBS | BODY MASS INDEX: 35.68 KG/M2 | OXYGEN SATURATION: 98 %

## 2017-08-16 DIAGNOSIS — E11.9 TYPE 2 DIABETES MELLITUS WITHOUT COMPLICATION, WITH LONG-TERM CURRENT USE OF INSULIN (HCC): ICD-10-CM

## 2017-08-16 DIAGNOSIS — Z79.4 TYPE 2 DIABETES MELLITUS WITHOUT COMPLICATION, WITH LONG-TERM CURRENT USE OF INSULIN (HCC): ICD-10-CM

## 2017-08-16 DIAGNOSIS — R11.0 NAUSEA: ICD-10-CM

## 2017-08-16 DIAGNOSIS — R73.9 HYPERGLYCEMIA: Primary | ICD-10-CM

## 2017-08-16 LAB — GLUCOSE BLD-MCNC: 450 MG/DL

## 2017-08-16 PROCEDURE — 99214 OFFICE O/P EST MOD 30 MIN: CPT | Performed by: NURSE PRACTITIONER

## 2017-08-16 PROCEDURE — 96372 THER/PROPH/DIAG INJ SC/IM: CPT | Performed by: NURSE PRACTITIONER

## 2017-08-16 PROCEDURE — 82962 GLUCOSE BLOOD TEST: CPT | Performed by: NURSE PRACTITIONER

## 2017-08-16 RX ORDER — ONDANSETRON 2 MG/ML
4 INJECTION INTRAMUSCULAR; INTRAVENOUS ONCE
Status: COMPLETED | OUTPATIENT
Start: 2017-08-16 | End: 2017-08-16

## 2017-08-16 RX ORDER — BLOOD-GLUCOSE METER
1 KIT MISCELLANEOUS DAILY
Qty: 1 KIT | Refills: 0 | Status: SHIPPED | OUTPATIENT
Start: 2017-08-16 | End: 2019-05-22

## 2017-08-16 RX ADMIN — ONDANSETRON 4 MG: 2 INJECTION INTRAMUSCULAR; INTRAVENOUS at 15:36

## 2017-08-16 ASSESSMENT — ENCOUNTER SYMPTOMS
NAUSEA: 1
VOMITING: 0
ABDOMINAL DISTENTION: 0
RESPIRATORY NEGATIVE: 1
ABDOMINAL PAIN: 0
DIARRHEA: 0
EYES NEGATIVE: 1

## 2017-08-17 ENCOUNTER — TELEPHONE (OUTPATIENT)
Dept: PRIMARY CARE CLINIC | Age: 47
End: 2017-08-17

## 2017-08-18 ENCOUNTER — OFFICE VISIT (OUTPATIENT)
Dept: PRIMARY CARE CLINIC | Age: 47
End: 2017-08-18
Payer: COMMERCIAL

## 2017-08-18 VITALS
WEIGHT: 199.8 LBS | OXYGEN SATURATION: 98 % | SYSTOLIC BLOOD PRESSURE: 132 MMHG | DIASTOLIC BLOOD PRESSURE: 86 MMHG | HEART RATE: 109 BPM | TEMPERATURE: 96.9 F | HEIGHT: 63 IN | BODY MASS INDEX: 35.4 KG/M2

## 2017-08-18 DIAGNOSIS — Z79.4 TYPE 2 DIABETES MELLITUS WITHOUT COMPLICATION, WITH LONG-TERM CURRENT USE OF INSULIN (HCC): Primary | ICD-10-CM

## 2017-08-18 DIAGNOSIS — E11.9 TYPE 2 DIABETES MELLITUS WITHOUT COMPLICATION, WITH LONG-TERM CURRENT USE OF INSULIN (HCC): Primary | ICD-10-CM

## 2017-08-18 DIAGNOSIS — R73.9 HYPERGLYCEMIA: ICD-10-CM

## 2017-08-18 PROCEDURE — 99214 OFFICE O/P EST MOD 30 MIN: CPT | Performed by: NURSE PRACTITIONER

## 2017-08-18 ASSESSMENT — ENCOUNTER SYMPTOMS
GASTROINTESTINAL NEGATIVE: 1
EYES NEGATIVE: 1
RESPIRATORY NEGATIVE: 1

## 2017-09-11 ENCOUNTER — TELEPHONE (OUTPATIENT)
Dept: PRIMARY CARE CLINIC | Age: 47
End: 2017-09-11

## 2017-09-27 ENCOUNTER — OFFICE VISIT (OUTPATIENT)
Dept: PRIMARY CARE CLINIC | Age: 47
End: 2017-09-27
Payer: COMMERCIAL

## 2017-09-27 VITALS
OXYGEN SATURATION: 98 % | BODY MASS INDEX: 35.37 KG/M2 | TEMPERATURE: 97.8 F | HEART RATE: 104 BPM | WEIGHT: 199.6 LBS | HEIGHT: 63 IN | SYSTOLIC BLOOD PRESSURE: 126 MMHG | DIASTOLIC BLOOD PRESSURE: 80 MMHG

## 2017-09-27 DIAGNOSIS — E11.9 TYPE 2 DIABETES MELLITUS WITHOUT COMPLICATION, WITH LONG-TERM CURRENT USE OF INSULIN (HCC): Primary | ICD-10-CM

## 2017-09-27 DIAGNOSIS — Z79.4 TYPE 2 DIABETES MELLITUS WITHOUT COMPLICATION, WITH LONG-TERM CURRENT USE OF INSULIN (HCC): Primary | ICD-10-CM

## 2017-09-27 DIAGNOSIS — M54.16 LUMBAR RADICULOPATHY: ICD-10-CM

## 2017-09-27 DIAGNOSIS — E03.9 ACQUIRED HYPOTHYROIDISM: ICD-10-CM

## 2017-09-27 DIAGNOSIS — I10 ESSENTIAL HYPERTENSION: ICD-10-CM

## 2017-09-27 PROCEDURE — 90688 IIV4 VACCINE SPLT 0.5 ML IM: CPT | Performed by: INTERNAL MEDICINE

## 2017-09-27 PROCEDURE — 99214 OFFICE O/P EST MOD 30 MIN: CPT | Performed by: INTERNAL MEDICINE

## 2017-09-27 PROCEDURE — 90471 IMMUNIZATION ADMIN: CPT | Performed by: INTERNAL MEDICINE

## 2017-09-27 RX ORDER — MELOXICAM 15 MG/1
15 TABLET ORAL DAILY
Qty: 30 TABLET | Refills: 5 | Status: SHIPPED | OUTPATIENT
Start: 2017-09-27 | End: 2018-04-15 | Stop reason: SDUPTHER

## 2017-09-27 RX ORDER — ATORVASTATIN CALCIUM 20 MG/1
TABLET, FILM COATED ORAL
Qty: 30 TABLET | Refills: 5 | Status: SHIPPED | OUTPATIENT
Start: 2017-09-27 | End: 2018-02-20 | Stop reason: SDUPTHER

## 2017-09-27 ASSESSMENT — ENCOUNTER SYMPTOMS
CONSTIPATION: 0
NAUSEA: 0
SHORTNESS OF BREATH: 0
BACK PAIN: 0
DIARRHEA: 0
COUGH: 0
VOMITING: 0

## 2017-09-29 ENCOUNTER — TELEPHONE (OUTPATIENT)
Dept: PRIMARY CARE CLINIC | Age: 47
End: 2017-09-29

## 2017-10-06 NOTE — TELEPHONE ENCOUNTER
I say we check with Dr Jennings Libman when he gets back. I dont see anything in the note to support either.   Thanks

## 2017-10-24 ENCOUNTER — OFFICE VISIT (OUTPATIENT)
Dept: PRIMARY CARE CLINIC | Age: 47
End: 2017-10-24
Payer: COMMERCIAL

## 2017-10-24 VITALS
TEMPERATURE: 98 F | WEIGHT: 199 LBS | RESPIRATION RATE: 20 BRPM | HEIGHT: 63 IN | DIASTOLIC BLOOD PRESSURE: 90 MMHG | SYSTOLIC BLOOD PRESSURE: 150 MMHG | OXYGEN SATURATION: 99 % | BODY MASS INDEX: 35.26 KG/M2 | HEART RATE: 96 BPM

## 2017-10-24 DIAGNOSIS — G62.9 NEUROPATHY: Primary | ICD-10-CM

## 2017-10-24 DIAGNOSIS — E11.9 TYPE 2 DIABETES MELLITUS WITHOUT COMPLICATION, WITH LONG-TERM CURRENT USE OF INSULIN (HCC): ICD-10-CM

## 2017-10-24 DIAGNOSIS — Z79.4 TYPE 2 DIABETES MELLITUS WITHOUT COMPLICATION, WITH LONG-TERM CURRENT USE OF INSULIN (HCC): ICD-10-CM

## 2017-10-24 PROCEDURE — 99213 OFFICE O/P EST LOW 20 MIN: CPT | Performed by: INTERNAL MEDICINE

## 2017-10-24 RX ORDER — QUINAPRIL 20 MG/1
TABLET ORAL
Qty: 30 TABLET | Refills: 11 | Status: SHIPPED | OUTPATIENT
Start: 2017-10-24 | End: 2017-12-18 | Stop reason: SDUPTHER

## 2017-10-24 ASSESSMENT — ENCOUNTER SYMPTOMS
VOMITING: 0
BACK PAIN: 0
SHORTNESS OF BREATH: 0
CONSTIPATION: 0
DIARRHEA: 0
NAUSEA: 0
COUGH: 0

## 2017-10-24 NOTE — PATIENT INSTRUCTIONS
Stop Invokana. Start Jardiance 25 mg once a day. Finish Lantus. Ask pharmacy to substitute with Arneta Button. Finish Humalog. Start Humulin R to replace Humalog; same regimen.

## 2017-10-25 ENCOUNTER — TELEPHONE (OUTPATIENT)
Dept: NEUROSURGERY | Age: 47
End: 2017-10-25

## 2017-10-25 NOTE — TELEPHONE ENCOUNTER
Received a referral from Dr. Singh Mon office for this patient. I have called and left patient a VM regarding when I have her scheduled an appointment with Dr. Suki Spaulding. Left on VM the appointment kenzie/date/location.

## 2017-11-28 ENCOUNTER — TELEPHONE (OUTPATIENT)
Dept: PRIMARY CARE CLINIC | Age: 47
End: 2017-11-28

## 2017-11-28 ENCOUNTER — OFFICE VISIT (OUTPATIENT)
Dept: PRIMARY CARE CLINIC | Age: 47
End: 2017-11-28
Payer: COMMERCIAL

## 2017-11-28 VITALS
WEIGHT: 196 LBS | HEART RATE: 105 BPM | OXYGEN SATURATION: 98 % | BODY MASS INDEX: 34.73 KG/M2 | TEMPERATURE: 97.7 F | SYSTOLIC BLOOD PRESSURE: 130 MMHG | HEIGHT: 63 IN | DIASTOLIC BLOOD PRESSURE: 78 MMHG

## 2017-11-28 DIAGNOSIS — E11.9 TYPE 2 DIABETES MELLITUS WITHOUT COMPLICATION, WITH LONG-TERM CURRENT USE OF INSULIN (HCC): Primary | ICD-10-CM

## 2017-11-28 DIAGNOSIS — Z79.4 TYPE 2 DIABETES MELLITUS WITHOUT COMPLICATION, WITH LONG-TERM CURRENT USE OF INSULIN (HCC): Primary | ICD-10-CM

## 2017-11-28 PROCEDURE — 99213 OFFICE O/P EST LOW 20 MIN: CPT | Performed by: INTERNAL MEDICINE

## 2017-11-28 RX ORDER — BLOOD SUGAR DIAGNOSTIC
1 STRIP MISCELLANEOUS DAILY
Qty: 100 EACH | Refills: 3 | Status: SHIPPED | OUTPATIENT
Start: 2017-11-28 | End: 2018-04-06 | Stop reason: SDUPTHER

## 2017-11-28 NOTE — PROGRESS NOTES
Subjective:      Patient ID: Woody Bolaños is a 41 y.o. female. HPI  Cynthia Luong comes in for problem visit. She still has very difficult to control blood sugars. They're consistently in the 500 range. She denies any fever or chills. Review of Systems   Constitutional: Negative for activity change and fatigue. Respiratory: Negative for cough and shortness of breath. Cardiovascular: Negative for chest pain and leg swelling. Gastrointestinal: Negative for constipation, diarrhea, nausea and vomiting. Genitourinary: Negative for difficulty urinating and dysuria. Musculoskeletal: Negative for arthralgias and back pain. Neurological: Negative for dizziness and headaches. Objective:   Physical Exam   Constitutional: She is oriented to person, place, and time. She appears well-developed and well-nourished. HENT:   Head: Normocephalic and atraumatic. Mouth/Throat: Oropharynx is clear and moist.   Eyes: Conjunctivae are normal. Pupils are equal, round, and reactive to light. Cardiovascular: Normal rate, regular rhythm and normal heart sounds. Pulmonary/Chest: Effort normal and breath sounds normal.   Abdominal: Soft. Musculoskeletal: Normal range of motion. Neurological: She is alert and oriented to person, place, and time. Skin: Skin is warm and dry. Vitals reviewed. Assessment:      1. Type 2 diabetes mellitus without complication, with long-term current use of insulin ([facility])             Plan:      Patient Instructions   Increase Lantus to 40 units twice a day. Continue metformin and Januvia. We will try to get Jardiance approved. We will have Everardo Archer meet with you. Follow up in one month.

## 2017-11-28 NOTE — TELEPHONE ENCOUNTER
Called patient to let her know that we are changing the lantus to [place]. We will also get her set up with Ines Nunez the DM educator. She also needs some insulin needles and syringes for the humulin.   Sent those in to [facility]

## 2017-12-18 DIAGNOSIS — Z79.4 TYPE 2 DIABETES MELLITUS WITHOUT COMPLICATION, WITH LONG-TERM CURRENT USE OF INSULIN (HCC): ICD-10-CM

## 2017-12-18 DIAGNOSIS — E11.9 TYPE 2 DIABETES MELLITUS WITHOUT COMPLICATION, WITH LONG-TERM CURRENT USE OF INSULIN (HCC): ICD-10-CM

## 2017-12-18 RX ORDER — QUINAPRIL 20 MG/1
TABLET ORAL
Qty: 30 TABLET | Refills: 11 | Status: SHIPPED | OUTPATIENT
Start: 2017-12-18 | End: 2018-12-21 | Stop reason: SDUPTHER

## 2017-12-19 RX ORDER — AMITRIPTYLINE HYDROCHLORIDE 25 MG/1
TABLET, FILM COATED ORAL
Qty: 30 TABLET | Refills: 0 | Status: SHIPPED | OUTPATIENT
Start: 2017-12-19 | End: 2017-12-29 | Stop reason: SDUPTHER

## 2017-12-19 RX ORDER — TRAZODONE HYDROCHLORIDE 100 MG/1
TABLET ORAL
Qty: 30 TABLET | Refills: 0 | Status: SHIPPED | OUTPATIENT
Start: 2017-12-19 | End: 2018-01-30 | Stop reason: SDUPTHER

## 2017-12-29 ENCOUNTER — OFFICE VISIT (OUTPATIENT)
Dept: NEUROLOGY | Age: 47
End: 2017-12-29
Payer: COMMERCIAL

## 2017-12-29 VITALS
DIASTOLIC BLOOD PRESSURE: 87 MMHG | SYSTOLIC BLOOD PRESSURE: 127 MMHG | HEIGHT: 63 IN | BODY MASS INDEX: 35.08 KG/M2 | WEIGHT: 198 LBS

## 2017-12-29 DIAGNOSIS — G62.9 NEUROPATHY: ICD-10-CM

## 2017-12-29 DIAGNOSIS — R20.0 NUMBNESS IN RIGHT LEG: Primary | ICD-10-CM

## 2017-12-29 DIAGNOSIS — M79.604 PAIN OF RIGHT LOWER EXTREMITY: ICD-10-CM

## 2017-12-29 DIAGNOSIS — Z86.39 HISTORY OF DIABETES MELLITUS: ICD-10-CM

## 2017-12-29 PROCEDURE — 99204 OFFICE O/P NEW MOD 45 MIN: CPT | Performed by: PSYCHIATRY & NEUROLOGY

## 2017-12-29 RX ORDER — AMITRIPTYLINE HYDROCHLORIDE 25 MG/1
TABLET, FILM COATED ORAL
Qty: 60 TABLET | Refills: 0 | Status: SHIPPED | OUTPATIENT
Start: 2017-12-29 | End: 2018-01-21 | Stop reason: SDUPTHER

## 2017-12-29 NOTE — PROGRESS NOTES
Hyperlipidemia     Hypertension     Hypothyroidism     Neuropathy (HCC)     Osteoarthritis     PONV (postoperative nausea and vomiting)     Type 2 diabetes mellitus without complication (HCC)        Past Surgical History:   Procedure Laterality Date    APPENDECTOMY       SECTION      CHOLECYSTECTOMY      HYSTERECTOMY      OR REVISE MEDIAN N/CARPAL TUNNEL SURG Left 2017    CARPAL TUNNEL RELEASE performed by Janie Waldrop MD at [address] N/CARPAL TUNNEL SURG Right 2017    CARPAL TUNNEL RELEASE performed by Janie Waldrop MD at [address]         Family History   Problem Relation Age of Onset    High Blood Pressure Mother     High Blood Pressure Father     High Cholesterol Father     Diabetes Sister     High Blood Pressure Sister     High Blood Pressure Brother        Allergies   Allergen Reactions    Claritin-D 12 Hour [Loratadine-Pseudoephedrine Er]     Demerol Hcl [Meperidine]     Percocet [Oxycodone-Acetaminophen] Rash       Social History     Social History    Marital status:      Spouse name: N/A    Number of children: N/A    Years of education: N/A     Occupational History    Not on file.      Social History Main Topics    Smoking status: Never Smoker    Smokeless tobacco: Never Used    Alcohol use No    Drug use: No    Sexual activity: Not on file     Other Topics Concern    Not on file     Social History Narrative    No narrative on file       Review of Systems  Constitutional: []Fever []Sweats []Chills [] Recent Injury   [x] Denies all unless marked  HENT:[]Headache  [] Head Injury  [] Sore Throat  [] Ear Pain  [] Dizziness [] Hearing Loss   [x] Denies all unless marked  Spine:  [] Neck pain  [] Back pain  [] Sciaticia  [x] Denies all unless marked  Cardiovascular:[]Chest Pain []Palpitations [] Heart Disease  [x] Denies all unless marked  Pulmonary: []Shortness of Breath []Cough   [x] Denies two po q 4 to 6 hours prn. 60 tablet 0    famotidine (PEPCID) 20 MG tablet Take 1 tablet by mouth 2 times daily 30 tablet 11    levothyroxine (SYNTHROID) 125 MCG tablet TAKE 1 TABLET BY MOUTH DAILY 30 tablet 11    montelukast (SINGULAIR) 10 MG tablet TAKE 1 TABLET BY MOUTH EVERY DAY 30 tablet 11    clonazePAM (KLONOPIN) 0.5 MG tablet TAKE 1 TABLET BY MOUTH TWICE DAILY AS NEEDED. 30 tablet 2    ondansetron (ZOFRAN ODT) 4 MG disintegrating tablet Take 1 tablet by mouth every 8 hours as needed for Nausea or Vomiting 10 tablet 0    vitamin D (ERGOCALCIFEROL) 11642 UNITS CAPS capsule Take 1 capsule by mouth once a week 12 capsule 3    ASPIRIN LOW DOSE 81 MG EC tablet TAKE 1 TABLET BY MOUTH DAILY 30 tablet 0       /87   Ht 5' 3\" (1.6 m)   Wt 198 lb (89.8 kg)   LMP  (LMP Unknown)   BMI 35.07 kg/m²       Constitutional  well developed, well nourished. Eyes  conjunctiva normal.  Pupils react to light  Ear, nose, throat -hearing intact to finger rub No scars, masses, or lesions over external nose or ears, no atrophy of tongue  Neck-symmetric, no masses noted, no jugular vein distension. No bruits noted. Respiration- chest wall appears symmetric, good expansion,   normal effort without use of accessory muscles  Cardiovascular- RRR  Musculoskeletal  no significant wasting of muscles noted, no bony deformities, gait no gross ataxia  Extremities-no clubbing, cyanosis or edema  Skin  warm, dry, and intact. No rash, erythema, or pallor. Psychiatric  mood, affect, and behavior appear normal.      Neurological exam  Awake, alert, fluent oriented x 3 appropriate affect  Attention and concentration appear appropriate  Recent and remote memory appears unremarkable  Speech normal without dysarthria  No clear issues with language of fund of knowledge    Cranial Nerve Exam   CN II- Visual fields grossly unremarkable. VA adequate.  Discs sharp  CN III, IV,VI- PERRLA, EOMI, No nystagmus, conjugate eye movements, no ptosis  CN V-sensation intact to LT over face  CN VII-no facial asymmetry  CN VIII-Hearing intact   CN IX and X- Palate elevates in midline  CN XI-good shoulder shrug  CN XII-Tongue midline with no fasciculations or fibrillations    Motor Exam  V/V throughout upper and lower extremities bilaterally, no cogwheeling, normal tone    Sensory Exam  Sensation intact to light touch , PP  upper and lower extremities bilaterally; normal vibration sense in LE's. Hypersensitive to pinprick in the right thigh    Reflexes   Absent in the arms. 2+ in the legs  No clonus ankles  No Gaines's sign bilateral hands. No Babinski sign. Tremors- no tremors in hands or head noted    Gait  Normal base and speed  No ataxia. No Romberg sign    Coordination  Finger to nose and EMMIE-unremarkable    Lab Results   Component Value Date    [id number] 522 05/27/2016     Lab Results   Component Value Date    WBC 7.5 08/07/2017    HGB 11.5 (L) 08/07/2017    HCT 35.5 (L) 08/07/2017    MCV 88.8 08/07/2017     08/07/2017     Lab Results   Component Value Date     08/07/2017    K 4.6 08/07/2017    CL 97 (L) 08/07/2017    CO2 22 08/07/2017    BUN 7 08/07/2017    CREATININE 0.5 08/07/2017    GLUCOSE 450 08/16/2017    CALCIUM 9.2 08/07/2017    PROT 7.9 08/07/2017    LABALBU 3.8 08/07/2017    BILITOT 0.4 08/07/2017    ALKPHOS 88 08/07/2017    AST 60 (H) 08/07/2017    ALT 29 08/07/2017    LABGLOM >60 08/07/2017    GLOB 4.2 02/15/2017           Assessment    ICD-10-CM ICD-9-CM    1. Numbness in right leg R20.0 782.0    2. Pain of right lower extremity M79.604 729.5    3. Neuropathy (HCC) G62.9 355.9    4. History of diabetes mellitus Z86.39 V12.29        This patient appears to have an unusual/odd neuralgia in the right thigh. It is not typical for any of the known ones . Symptoms do not sound suspicious for a lumbar radiculopathy. On clinical examination she does not have much of a neuropathy either.  I suspect that she may have had some

## 2018-01-01 ASSESSMENT — ENCOUNTER SYMPTOMS
CONSTIPATION: 0
DIARRHEA: 0
VOMITING: 0
SHORTNESS OF BREATH: 0
BACK PAIN: 0
COUGH: 0
NAUSEA: 0

## 2018-01-02 ENCOUNTER — OFFICE VISIT (OUTPATIENT)
Dept: PRIMARY CARE CLINIC | Age: 48
End: 2018-01-02
Payer: COMMERCIAL

## 2018-01-02 VITALS
SYSTOLIC BLOOD PRESSURE: 120 MMHG | OXYGEN SATURATION: 99 % | TEMPERATURE: 97.2 F | BODY MASS INDEX: 35.08 KG/M2 | WEIGHT: 198 LBS | HEART RATE: 89 BPM | HEIGHT: 63 IN | DIASTOLIC BLOOD PRESSURE: 76 MMHG

## 2018-01-02 DIAGNOSIS — Z79.4 TYPE 2 DIABETES MELLITUS WITHOUT COMPLICATION, WITH LONG-TERM CURRENT USE OF INSULIN (HCC): Primary | ICD-10-CM

## 2018-01-02 DIAGNOSIS — E11.9 TYPE 2 DIABETES MELLITUS WITHOUT COMPLICATION, WITH LONG-TERM CURRENT USE OF INSULIN (HCC): Primary | ICD-10-CM

## 2018-01-02 DIAGNOSIS — I10 ESSENTIAL HYPERTENSION: ICD-10-CM

## 2018-01-02 DIAGNOSIS — E03.9 ACQUIRED HYPOTHYROIDISM: ICD-10-CM

## 2018-01-02 LAB — HBA1C MFR BLD: 10.3 %

## 2018-01-02 PROCEDURE — 99213 OFFICE O/P EST LOW 20 MIN: CPT | Performed by: INTERNAL MEDICINE

## 2018-01-02 PROCEDURE — 83036 HEMOGLOBIN GLYCOSYLATED A1C: CPT | Performed by: INTERNAL MEDICINE

## 2018-01-02 ASSESSMENT — ENCOUNTER SYMPTOMS
BACK PAIN: 0
SINUS PAIN: 1
NAUSEA: 0
DIARRHEA: 0
SINUS PRESSURE: 1
SHORTNESS OF BREATH: 0
CONSTIPATION: 0
VOMITING: 0
COUGH: 0

## 2018-01-22 RX ORDER — AMITRIPTYLINE HYDROCHLORIDE 25 MG/1
TABLET, FILM COATED ORAL
Qty: 60 TABLET | Refills: 0 | Status: SHIPPED | OUTPATIENT
Start: 2018-01-22 | End: 2018-02-19 | Stop reason: SDUPTHER

## 2018-01-30 RX ORDER — TRAZODONE HYDROCHLORIDE 100 MG/1
TABLET ORAL
Qty: 30 TABLET | Refills: 2 | Status: SHIPPED | OUTPATIENT
Start: 2018-01-30 | End: 2018-06-11

## 2018-02-18 RX ORDER — HUMAN INSULIN 100 [IU]/ML
6 INJECTION, SOLUTION SUBCUTANEOUS SEE ADMIN INSTRUCTIONS
Qty: 10 ML | Refills: 0 | Status: SHIPPED | OUTPATIENT
Start: 2018-02-18 | End: 2018-03-14 | Stop reason: SDUPTHER

## 2018-03-01 ENCOUNTER — OFFICE VISIT (OUTPATIENT)
Dept: NEUROLOGY | Age: 48
End: 2018-03-01
Payer: COMMERCIAL

## 2018-03-01 VITALS
BODY MASS INDEX: 35.97 KG/M2 | HEIGHT: 63 IN | SYSTOLIC BLOOD PRESSURE: 118 MMHG | DIASTOLIC BLOOD PRESSURE: 70 MMHG | WEIGHT: 203 LBS

## 2018-03-01 DIAGNOSIS — M79.604 PAIN OF RIGHT LOWER EXTREMITY: ICD-10-CM

## 2018-03-01 DIAGNOSIS — R20.0 NUMBNESS IN RIGHT LEG: Primary | ICD-10-CM

## 2018-03-01 DIAGNOSIS — G62.9 NEUROPATHY: ICD-10-CM

## 2018-03-01 PROCEDURE — 99214 OFFICE O/P EST MOD 30 MIN: CPT | Performed by: PSYCHIATRY & NEUROLOGY

## 2018-04-01 RX ORDER — SITAGLIPTIN 100 MG/1
TABLET, FILM COATED ORAL
Qty: 30 TABLET | Refills: 0 | Status: SHIPPED | OUTPATIENT
Start: 2018-04-01 | End: 2018-04-02 | Stop reason: SDUPTHER

## 2018-04-02 ENCOUNTER — OFFICE VISIT (OUTPATIENT)
Dept: PRIMARY CARE CLINIC | Age: 48
End: 2018-04-02
Payer: COMMERCIAL

## 2018-04-02 VITALS
OXYGEN SATURATION: 97 % | SYSTOLIC BLOOD PRESSURE: 124 MMHG | WEIGHT: 199 LBS | HEIGHT: 63 IN | BODY MASS INDEX: 35.26 KG/M2 | DIASTOLIC BLOOD PRESSURE: 66 MMHG | HEART RATE: 92 BPM | TEMPERATURE: 96.2 F

## 2018-04-02 DIAGNOSIS — J02.9 PHARYNGITIS, UNSPECIFIED ETIOLOGY: ICD-10-CM

## 2018-04-02 DIAGNOSIS — R68.89 FLU-LIKE SYMPTOMS: Primary | ICD-10-CM

## 2018-04-02 DIAGNOSIS — R11.0 NAUSEA: ICD-10-CM

## 2018-04-02 DIAGNOSIS — R73.9 HYPERGLYCEMIA: ICD-10-CM

## 2018-04-02 LAB
HBA1C MFR BLD: 11.2 %
INFLUENZA A ANTIBODY: NORMAL
INFLUENZA B ANTIBODY: NORMAL
S PYO AG THROAT QL: NORMAL

## 2018-04-02 PROCEDURE — 99213 OFFICE O/P EST LOW 20 MIN: CPT | Performed by: NURSE PRACTITIONER

## 2018-04-02 PROCEDURE — 87804 INFLUENZA ASSAY W/OPTIC: CPT | Performed by: NURSE PRACTITIONER

## 2018-04-02 PROCEDURE — 87880 STREP A ASSAY W/OPTIC: CPT | Performed by: NURSE PRACTITIONER

## 2018-04-02 PROCEDURE — 83036 HEMOGLOBIN GLYCOSYLATED A1C: CPT | Performed by: NURSE PRACTITIONER

## 2018-04-02 RX ORDER — DEXTROMETHORPHAN HYDROBROMIDE AND PROMETHAZINE HYDROCHLORIDE 15; 6.25 MG/5ML; MG/5ML
5 SYRUP ORAL 4 TIMES DAILY PRN
Qty: 120 ML | Refills: 0 | Status: SHIPPED | OUTPATIENT
Start: 2018-04-02 | End: 2018-04-07

## 2018-04-02 RX ORDER — AMOXICILLIN 500 MG/1
500 CAPSULE ORAL 3 TIMES DAILY
Qty: 30 CAPSULE | Refills: 0 | Status: SHIPPED | OUTPATIENT
Start: 2018-04-02 | End: 2018-04-12

## 2018-04-02 ASSESSMENT — ENCOUNTER SYMPTOMS
STRIDOR: 0
APNEA: 0
RHINORRHEA: 1
WHEEZING: 0
SINUS PAIN: 1
SINUS PRESSURE: 1
SORE THROAT: 1
COUGH: 1
SHORTNESS OF BREATH: 0

## 2018-04-06 ENCOUNTER — OFFICE VISIT (OUTPATIENT)
Dept: PRIMARY CARE CLINIC | Age: 48
End: 2018-04-06
Payer: COMMERCIAL

## 2018-04-06 VITALS
BODY MASS INDEX: 35.54 KG/M2 | HEIGHT: 63 IN | TEMPERATURE: 96.7 F | WEIGHT: 200.6 LBS | SYSTOLIC BLOOD PRESSURE: 128 MMHG | HEART RATE: 93 BPM | OXYGEN SATURATION: 99 % | DIASTOLIC BLOOD PRESSURE: 80 MMHG

## 2018-04-06 DIAGNOSIS — I10 ESSENTIAL HYPERTENSION: ICD-10-CM

## 2018-04-06 DIAGNOSIS — E03.9 ACQUIRED HYPOTHYROIDISM: ICD-10-CM

## 2018-04-06 DIAGNOSIS — Z79.4 TYPE 2 DIABETES MELLITUS WITHOUT COMPLICATION, WITH LONG-TERM CURRENT USE OF INSULIN (HCC): Primary | ICD-10-CM

## 2018-04-06 DIAGNOSIS — E11.9 TYPE 2 DIABETES MELLITUS WITHOUT COMPLICATION, WITH LONG-TERM CURRENT USE OF INSULIN (HCC): Primary | ICD-10-CM

## 2018-04-06 PROCEDURE — 99213 OFFICE O/P EST LOW 20 MIN: CPT | Performed by: INTERNAL MEDICINE

## 2018-04-06 RX ORDER — ALBUTEROL SULFATE 90 UG/1
2 AEROSOL, METERED RESPIRATORY (INHALATION) EVERY 6 HOURS PRN
Qty: 1 INHALER | Refills: 3 | Status: SHIPPED | OUTPATIENT
Start: 2018-04-06 | End: 2018-07-06

## 2018-04-06 RX ORDER — BENZONATATE 100 MG/1
100 CAPSULE ORAL 3 TIMES DAILY PRN
Qty: 21 CAPSULE | Refills: 0 | Status: SHIPPED | OUTPATIENT
Start: 2018-04-06 | End: 2018-04-06

## 2018-04-06 RX ORDER — BLOOD SUGAR DIAGNOSTIC
1 STRIP MISCELLANEOUS DAILY
Qty: 100 EACH | Refills: 3 | Status: SHIPPED | OUTPATIENT
Start: 2018-04-06 | End: 2019-04-04 | Stop reason: SDUPTHER

## 2018-04-06 RX ORDER — FAMOTIDINE 20 MG/1
20 TABLET, FILM COATED ORAL 2 TIMES DAILY
Qty: 30 TABLET | Refills: 11 | Status: SHIPPED | OUTPATIENT
Start: 2018-04-06 | End: 2019-04-04 | Stop reason: SDUPTHER

## 2018-04-13 ENCOUNTER — TELEPHONE (OUTPATIENT)
Dept: PRIMARY CARE CLINIC | Age: 48
End: 2018-04-13

## 2018-04-22 ASSESSMENT — ENCOUNTER SYMPTOMS
SHORTNESS OF BREATH: 1
BACK PAIN: 0
COUGH: 1
NAUSEA: 0
VOMITING: 0
CONSTIPATION: 0
DIARRHEA: 0

## 2018-05-14 RX ORDER — TRAZODONE HYDROCHLORIDE 100 MG/1
TABLET ORAL
Qty: 30 TABLET | Refills: 0 | Status: SHIPPED | OUTPATIENT
Start: 2018-05-14 | End: 2018-06-11 | Stop reason: SDUPTHER

## 2018-06-11 RX ORDER — TRAZODONE HYDROCHLORIDE 100 MG/1
TABLET ORAL
Qty: 30 TABLET | Refills: 0 | Status: SHIPPED | OUTPATIENT
Start: 2018-06-11 | End: 2018-07-05 | Stop reason: SDUPTHER

## 2018-06-17 RX ORDER — LEVOTHYROXINE SODIUM 0.12 MG/1
TABLET ORAL
Qty: 30 TABLET | Refills: 0 | Status: SHIPPED | OUTPATIENT
Start: 2018-06-17 | End: 2018-07-06 | Stop reason: SDUPTHER

## 2018-07-05 RX ORDER — TRAZODONE HYDROCHLORIDE 100 MG/1
TABLET ORAL
Qty: 30 TABLET | Refills: 0 | Status: SHIPPED | OUTPATIENT
Start: 2018-07-05 | End: 2018-07-06 | Stop reason: SDUPTHER

## 2018-07-06 ENCOUNTER — TELEPHONE (OUTPATIENT)
Dept: PRIMARY CARE CLINIC | Age: 48
End: 2018-07-06

## 2018-07-06 ENCOUNTER — OFFICE VISIT (OUTPATIENT)
Dept: PRIMARY CARE CLINIC | Age: 48
End: 2018-07-06
Payer: COMMERCIAL

## 2018-07-06 VITALS
DIASTOLIC BLOOD PRESSURE: 82 MMHG | HEIGHT: 62 IN | TEMPERATURE: 97.1 F | HEART RATE: 97 BPM | OXYGEN SATURATION: 98 % | BODY MASS INDEX: 33.93 KG/M2 | SYSTOLIC BLOOD PRESSURE: 120 MMHG | WEIGHT: 184.4 LBS

## 2018-07-06 DIAGNOSIS — Z23 NEED FOR PROPHYLACTIC VACCINATION AGAINST STREPTOCOCCUS PNEUMONIAE (PNEUMOCOCCUS): ICD-10-CM

## 2018-07-06 DIAGNOSIS — E78.2 MIXED HYPERLIPIDEMIA: ICD-10-CM

## 2018-07-06 DIAGNOSIS — Z79.4 TYPE 2 DIABETES MELLITUS WITHOUT COMPLICATION, WITH LONG-TERM CURRENT USE OF INSULIN (HCC): Primary | ICD-10-CM

## 2018-07-06 DIAGNOSIS — I10 ESSENTIAL HYPERTENSION: ICD-10-CM

## 2018-07-06 DIAGNOSIS — Z79.899 DRUG THERAPY: ICD-10-CM

## 2018-07-06 DIAGNOSIS — R23.2 HOT FLASHES: Primary | ICD-10-CM

## 2018-07-06 DIAGNOSIS — E03.9 ACQUIRED HYPOTHYROIDISM: ICD-10-CM

## 2018-07-06 DIAGNOSIS — E55.9 VITAMIN D DEFICIENCY: ICD-10-CM

## 2018-07-06 DIAGNOSIS — Z13.220 SCREENING FOR HYPERLIPIDEMIA: ICD-10-CM

## 2018-07-06 DIAGNOSIS — F41.9 ANXIETY: ICD-10-CM

## 2018-07-06 DIAGNOSIS — E11.9 TYPE 2 DIABETES MELLITUS WITHOUT COMPLICATION, WITH LONG-TERM CURRENT USE OF INSULIN (HCC): Primary | ICD-10-CM

## 2018-07-06 LAB
ALBUMIN SERPL-MCNC: 4.2 G/DL (ref 3.5–5.2)
ALP BLD-CCNC: 96 U/L (ref 35–104)
ALT SERPL-CCNC: 40 U/L (ref 5–33)
AMPHETAMINE SCREEN, URINE: NORMAL
ANION GAP SERPL CALCULATED.3IONS-SCNC: 22 MMOL/L (ref 7–19)
AST SERPL-CCNC: 63 U/L (ref 5–32)
BARBITURATE SCREEN, URINE: NORMAL
BENZODIAZEPINE SCREEN, URINE: NORMAL
BILIRUB SERPL-MCNC: 0.4 MG/DL (ref 0.2–1.2)
BUN BLDV-MCNC: 12 MG/DL (ref 6–20)
CALCIUM SERPL-MCNC: 10.1 MG/DL (ref 8.6–10)
CHLORIDE BLD-SCNC: 93 MMOL/L (ref 98–111)
CHOLESTEROL, TOTAL: 216 MG/DL (ref 160–199)
CO2: 20 MMOL/L (ref 22–29)
COCAINE METABOLITE SCREEN URINE: NORMAL
CREAT SERPL-MCNC: 0.6 MG/DL (ref 0.5–0.9)
CREATININE URINE: 25 MG/DL (ref 4.2–622)
GFR NON-AFRICAN AMERICAN: >60
GLUCOSE BLD-MCNC: 356 MG/DL (ref 74–109)
HBA1C MFR BLD: 10.4 %
HCT VFR BLD CALC: 38.9 % (ref 37–47)
HDLC SERPL-MCNC: 24 MG/DL (ref 65–121)
HEMOGLOBIN: 12.7 G/DL (ref 12–16)
LDL CHOLESTEROL CALCULATED: ABNORMAL MG/DL
LDL CHOLESTEROL DIRECT: 64 MG/DL
MCH RBC QN AUTO: 27.7 PG (ref 27–31)
MCHC RBC AUTO-ENTMCNC: 32.6 G/DL (ref 33–37)
MCV RBC AUTO: 84.9 FL (ref 81–99)
MDMA URINE: NORMAL
METHADONE SCREEN, URINE: NORMAL
METHAMPHETAMINE, URINE: NORMAL
MICROALBUMIN UR-MCNC: 3.7 MG/DL (ref 0–19)
MICROALBUMIN/CREAT UR-RTO: 148 MG/G
OPIATE SCREEN URINE: NORMAL
OXYCODONE SCREEN URINE: NORMAL
PDW BLD-RTO: 14.6 % (ref 11.5–14.5)
PHENCYCLIDINE SCREEN URINE: NORMAL
PLATELET # BLD: 214 K/UL (ref 130–400)
PMV BLD AUTO: 9.9 FL (ref 9.4–12.3)
POTASSIUM SERPL-SCNC: 4.2 MMOL/L (ref 3.5–5)
PROPOXYPHENE SCREEN, URINE: NORMAL
RBC # BLD: 4.58 M/UL (ref 4.2–5.4)
SODIUM BLD-SCNC: 135 MMOL/L (ref 136–145)
THC: NORMAL
TOTAL PROTEIN: 8.3 G/DL (ref 6.6–8.7)
TRICYCLIC ANTIDEPRESSANTS, UR: NORMAL
TRIGL SERPL-MCNC: 1030 MG/DL (ref 0–149)
TSH SERPL DL<=0.05 MIU/L-ACNC: 2.8 UIU/ML (ref 0.27–4.2)
VITAMIN D 25-HYDROXY: 25.3 NG/ML
WBC # BLD: 7 K/UL (ref 4.8–10.8)

## 2018-07-06 PROCEDURE — 83036 HEMOGLOBIN GLYCOSYLATED A1C: CPT | Performed by: NURSE PRACTITIONER

## 2018-07-06 PROCEDURE — 99214 OFFICE O/P EST MOD 30 MIN: CPT | Performed by: NURSE PRACTITIONER

## 2018-07-06 PROCEDURE — 80305 DRUG TEST PRSMV DIR OPT OBS: CPT | Performed by: NURSE PRACTITIONER

## 2018-07-06 RX ORDER — CLONAZEPAM 0.5 MG/1
TABLET ORAL
Qty: 30 TABLET | Refills: 0 | Status: CANCELLED | OUTPATIENT
Start: 2018-07-06 | End: 2018-08-05

## 2018-07-06 RX ORDER — TOPIRAMATE 25 MG/1
25 TABLET ORAL NIGHTLY
Qty: 30 TABLET | Refills: 3 | Status: SHIPPED | OUTPATIENT
Start: 2018-07-06 | End: 2018-08-03 | Stop reason: SDUPTHER

## 2018-07-06 RX ORDER — ERGOCALCIFEROL (VITAMIN D2) 1250 MCG
50000 CAPSULE ORAL WEEKLY
Qty: 4 CAPSULE | Refills: 3 | Status: SHIPPED | OUTPATIENT
Start: 2018-07-06 | End: 2019-04-04 | Stop reason: ALTCHOICE

## 2018-07-06 RX ORDER — TRAZODONE HYDROCHLORIDE 100 MG/1
TABLET ORAL
Qty: 30 TABLET | Refills: 2 | Status: SHIPPED | OUTPATIENT
Start: 2018-07-06 | End: 2019-04-04 | Stop reason: SDUPTHER

## 2018-07-06 RX ORDER — ATORVASTATIN CALCIUM 20 MG/1
TABLET, FILM COATED ORAL
Qty: 30 TABLET | Refills: 5 | Status: SHIPPED | OUTPATIENT
Start: 2018-07-06 | End: 2019-01-10 | Stop reason: SDUPTHER

## 2018-07-06 RX ORDER — FLUCONAZOLE 150 MG/1
TABLET ORAL
Qty: 2 TABLET | Refills: 0 | Status: SHIPPED | OUTPATIENT
Start: 2018-07-06 | End: 2018-09-28

## 2018-07-06 RX ORDER — LEVOTHYROXINE SODIUM 0.12 MG/1
TABLET ORAL
Qty: 30 TABLET | Refills: 2 | Status: SHIPPED | OUTPATIENT
Start: 2018-07-06 | End: 2018-09-27 | Stop reason: SDUPTHER

## 2018-07-06 ASSESSMENT — PATIENT HEALTH QUESTIONNAIRE - PHQ9
SUM OF ALL RESPONSES TO PHQ9 QUESTIONS 1 & 2: 0
SUM OF ALL RESPONSES TO PHQ QUESTIONS 1-9: 0
2. FEELING DOWN, DEPRESSED OR HOPELESS: 0
1. LITTLE INTEREST OR PLEASURE IN DOING THINGS: 0

## 2018-07-06 NOTE — TELEPHONE ENCOUNTER
I spoke with the patient and she has not been taking her Lipitor for the last 3 months because she was out of it. I informed her that we sent this in today and she needs to start back on this medication. Pt voiced understanding. Pt was also informed about informed about the vitamin D as well. Pt asked about her Hot flashes, I have spoken with Sarah Beth Mccormack about this and she will order hormone levels on Tuesday for her to have done to see if she needs to be placed on hormones.

## 2018-07-06 NOTE — PROGRESS NOTES
canal normal.   Nose: Nose normal.   Mouth/Throat: Uvula is midline, oropharynx is clear and moist and mucous membranes are normal.   Eyes: Conjunctivae, EOM and lids are normal. Pupils are equal, round, and reactive to light. Neck: Trachea normal and normal range of motion. Neck supple. No thyroid mass and no thyromegaly present. Cardiovascular: Normal rate, regular rhythm, normal heart sounds and normal pulses. Pulmonary/Chest: Effort normal and breath sounds normal.   Abdominal: Soft. Normal appearance and bowel sounds are normal.   Musculoskeletal: Normal range of motion. Cervical back: Normal. She exhibits normal range of motion and no tenderness. Thoracic back: Normal. She exhibits normal range of motion and no tenderness. Lumbar back: Normal. She exhibits normal range of motion and no tenderness. Neurological: She is alert and oriented to person, place, and time. She has normal strength. Skin: Skin is warm, dry and intact. Psychiatric: She has a normal mood and affect. Her speech is normal and behavior is normal. Judgment and thought content normal. Cognition and memory are normal.   Nursing note and vitals reviewed. /82   Pulse 97   Temp 97.1 °F (36.2 °C)   Ht 5' 2\" (1.575 m)   Wt 184 lb 6.4 oz (83.6 kg)   LMP  (LMP Unknown)   SpO2 98%   BMI 33.73 kg/m²     Assessment:      Diagnosis Orders   1. Type 2 diabetes mellitus without complication, with long-term current use of insulin ([facility])  POCT glycosylated hemoglobin (Hb A1C)   2. Anxiety     3. Vitamin D deficiency  Vitamin D 25 Hydroxy   4. Acquired hypothyroidism  TSH without Reflex   5. Essential hypertension  CBC    Comprehensive Metabolic Panel   6. Mixed hyperlipidemia  Lipid Panel   7.  Drug therapy  POCT Rapid Drug Screen       Results for orders placed or performed in visit on 07/06/18   POCT glycosylated hemoglobin (Hb A1C)   Result Value Ref Range    Hemoglobin A1C 10.4 %   POCT Rapid Drug Screen Result Value Ref Range    Amphetamine Screen, Urine neg     Barbiturate Screen, Urine neg     Benzodiazepine Screen, Urine neg     COCAINE METABOLITE SCREEN URINE neg     THC neg     MDMA URINE neg     Methadone Screen, Urine neg     Opiate Scrn, Ur neg     Oxycodone Screen, Ur neg     PCP Scrn, Ur neg     Propoxyphene Screen, Urine neg     Tricyclic Antidepressants, Ur pos     Methamphetamine, Urine neg        Plan:     I am checking labs - we will call with these results. Farxiga 10 mg  Restarting Januvia 100 mg. RTC in 4 weeks to monitor blood sugars at home and start of Topamax for migraines. Return in about 4 weeks (around 8/3/2018) for Follow up, migraine.     Orders Placed This Encounter   Procedures    CBC     Standing Status:   Future     Number of Occurrences:   1     Standing Expiration Date:   7/6/2019    Comprehensive Metabolic Panel     Standing Status:   Future     Number of Occurrences:   1     Standing Expiration Date:   7/6/2019    Lipid Panel     Standing Status:   Future     Number of Occurrences:   1     Standing Expiration Date:   7/6/2019     Order Specific Question:   Is Patient Fasting?/# of Hours     Answer:   8 hours    TSH without Reflex     Standing Status:   Future     Number of Occurrences:   1     Standing Expiration Date:   7/6/2019    Vitamin D 25 Hydroxy     Standing Status:   Future     Number of Occurrences:   1     Standing Expiration Date:   7/6/2019    POCT glycosylated hemoglobin (Hb A1C)    POCT Rapid Drug Screen       Orders Placed This Encounter   Medications    atorvastatin (LIPITOR) 20 MG tablet     Sig: TAKE 1 TABLET BY MOUTH DAILY     Dispense:  30 tablet     Refill:  5    levothyroxine (SYNTHROID) 125 MCG tablet     Sig: TAKE 1 TABLET BY MOUTH DAILY     Dispense:  30 tablet     Refill:  2    traZODone (DESYREL) 100 MG tablet     Sig: TAKE 1 TABLET BY MOUTH EVERY NIGHT     Dispense:  30 tablet     Refill:  2    metFORMIN (GLUCOPHAGE) 1000 MG tablet

## 2018-07-09 ASSESSMENT — ENCOUNTER SYMPTOMS
RESPIRATORY NEGATIVE: 1
GASTROINTESTINAL NEGATIVE: 1
EYES NEGATIVE: 1

## 2018-08-03 ENCOUNTER — OFFICE VISIT (OUTPATIENT)
Dept: PRIMARY CARE CLINIC | Age: 48
End: 2018-08-03
Payer: COMMERCIAL

## 2018-08-03 VITALS
BODY MASS INDEX: 32.25 KG/M2 | TEMPERATURE: 97.2 F | HEART RATE: 88 BPM | HEIGHT: 63 IN | RESPIRATION RATE: 18 BRPM | WEIGHT: 182 LBS | DIASTOLIC BLOOD PRESSURE: 80 MMHG | OXYGEN SATURATION: 98 % | SYSTOLIC BLOOD PRESSURE: 122 MMHG

## 2018-08-03 DIAGNOSIS — Z79.4 TYPE 2 DIABETES MELLITUS WITHOUT COMPLICATION, WITH LONG-TERM CURRENT USE OF INSULIN (HCC): ICD-10-CM

## 2018-08-03 DIAGNOSIS — E11.9 TYPE 2 DIABETES MELLITUS WITHOUT COMPLICATION, WITH LONG-TERM CURRENT USE OF INSULIN (HCC): ICD-10-CM

## 2018-08-03 DIAGNOSIS — G43.909 MIGRAINE WITHOUT STATUS MIGRAINOSUS, NOT INTRACTABLE, UNSPECIFIED MIGRAINE TYPE: ICD-10-CM

## 2018-08-03 DIAGNOSIS — F41.9 ANXIETY: Primary | ICD-10-CM

## 2018-08-03 PROCEDURE — 99214 OFFICE O/P EST MOD 30 MIN: CPT | Performed by: NURSE PRACTITIONER

## 2018-08-03 NOTE — PROGRESS NOTES
Samples of this drug Tresiba 100 flex pen were given to the patient, quantity 1, Lot Number [id number] 11/2019    Samples of this drug Tresiba 100 flex pen were given to the patient, quantity 2, Lot Number [id number] 06/20

## 2018-08-03 NOTE — PROGRESS NOTES
topiramate (TOPAMAX) 50 MG tablet Take 2 tablets by mouth nightly 60 tablet 2    Insulin Degludec 200 UNIT/ML SOPN Inject 60 Units into the skin daily 6 pen 1    atorvastatin (LIPITOR) 20 MG tablet TAKE 1 TABLET BY MOUTH DAILY 30 tablet 5    levothyroxine (SYNTHROID) 125 MCG tablet TAKE 1 TABLET BY MOUTH DAILY 30 tablet 2    traZODone (DESYREL) 100 MG tablet TAKE 1 TABLET BY MOUTH EVERY NIGHT 30 tablet 2    metFORMIN (GLUCOPHAGE) 1000 MG tablet TAKE 1 TABLET BY MOUTH TWICE DAILY WITH MEALS 60 tablet 5    dapagliflozin (FARXIGA) 10 MG tablet Take 1 tablet by mouth every morning 30 tablet 3    SITagliptin (JANUVIA) 100 MG tablet TAKE 1 TABLET BY MOUTH DAILY 30 tablet 5    fluconazole (DIFLUCAN) 150 MG tablet Take 1 tablet today, then 1 tablet in 3-5 days 2 tablet 0    ergocalciferol (ERGOCALCIFEROL) 40851 units capsule Take 1 capsule by mouth once a week 4 capsule 3    meloxicam (MOBIC) 15 MG tablet TAKE 1 TABLET BY MOUTH DAILY 30 tablet 11    Insulin Syringe-Needle U-100 30G X 5/16\" 0.5 ML MISC 1 each by Does not apply route daily 100 each 3    famotidine (PEPCID) 20 MG tablet Take 1 tablet by mouth 2 times daily 30 tablet 11    insulin glargine (LANTUS) 100 UNIT/ML injection pen Inject 60 Units into the skin 2 times daily Substitute with Basaglar 5 pen 3    insulin regular (NOVOLIN R) 100 UNIT/ML injection Inject 6 Units into the skin See Admin Instructions 149-199=2 units,200-249=4 units,250-299=6 units,300-349=8 units,350-400=10 units.  10 mL 5    montelukast (SINGULAIR) 10 MG tablet TAKE 1 TABLET BY MOUTH EVERY DAY 30 tablet 11    amitriptyline (ELAVIL) 25 MG tablet TAKE 1 TO 2 TABLETS BY MOUTH IN THE EVENING 60 tablet 11    venlafaxine (EFFEXOR XR) 150 MG extended release capsule TAKE 1 CAPSULE BY MOUTH DAILY 30 capsule 11    quinapril (ACCUPRIL) 20 MG tablet TAKE 1 TABLET BY MOUTH EVERY NIGHT AT BEDTIME 30 tablet 11    glucose blood VI test strips (ASCENSIA AUTODISC VI;ONE TOUCH ULTRA TEST VI) Topamax up to 100 mg, but patient is to increase to 50 mg for 1 week then increase to 100 mg if migraines are still present. Return in about 4 weeks (around 8/31/2018) for Medication Follow Up, Diabetic Follow up. No orders of the defined types were placed in this encounter. Orders Placed This Encounter   Medications    clonazePAM (KLONOPIN) 0.5 MG tablet     Sig: TAKE 1 TABLET BY MOUTH DAILY AS NEEDED. Linford Calixto Dispense:  30 tablet     Refill:  0    topiramate (TOPAMAX) 50 MG tablet     Sig: Take 2 tablets by mouth nightly     Dispense:  60 tablet     Refill:  2    Insulin Degludec 200 UNIT/ML SOPN     Sig: Inject 60 Units into the skin daily     Dispense:  6 pen     Refill:  1        Patient given educational materials - see patient instructions. Discussed use, benefit, and side effects of prescribed medications. All patient questions answered. Pt voiced understanding. Reviewed health maintenance. Instructed to continue current medications, diet and exercise. Patient agreed with treatment plan. Follow up as directed.            Electronically signed by NICANOR Gabriel on 8/6/2018 at 10:15 PM

## 2018-08-06 ENCOUNTER — TELEPHONE (OUTPATIENT)
Dept: PRIMARY CARE CLINIC | Age: 48
End: 2018-08-06

## 2018-08-06 RX ORDER — CLONAZEPAM 0.5 MG/1
TABLET ORAL
Qty: 30 TABLET | Refills: 0 | Status: SHIPPED | OUTPATIENT
Start: 2018-08-06 | End: 2019-09-30 | Stop reason: SDUPTHER

## 2018-08-06 RX ORDER — TOPIRAMATE 50 MG/1
100 TABLET, FILM COATED ORAL NIGHTLY
Qty: 60 TABLET | Refills: 2 | Status: SHIPPED | OUTPATIENT
Start: 2018-08-06 | End: 2018-11-16 | Stop reason: SDUPTHER

## 2018-08-06 ASSESSMENT — ENCOUNTER SYMPTOMS
EYES NEGATIVE: 1
GASTROINTESTINAL NEGATIVE: 1
RESPIRATORY NEGATIVE: 1

## 2018-08-17 ENCOUNTER — OFFICE VISIT (OUTPATIENT)
Dept: PRIMARY CARE CLINIC | Age: 48
End: 2018-08-17
Payer: COMMERCIAL

## 2018-08-17 VITALS
TEMPERATURE: 97.4 F | BODY MASS INDEX: 32.67 KG/M2 | HEART RATE: 100 BPM | WEIGHT: 184.4 LBS | SYSTOLIC BLOOD PRESSURE: 124 MMHG | HEIGHT: 63 IN | DIASTOLIC BLOOD PRESSURE: 78 MMHG | OXYGEN SATURATION: 99 %

## 2018-08-17 DIAGNOSIS — Z79.4 TYPE 2 DIABETES MELLITUS WITHOUT COMPLICATION, WITH LONG-TERM CURRENT USE OF INSULIN (HCC): Primary | ICD-10-CM

## 2018-08-17 DIAGNOSIS — I10 ESSENTIAL HYPERTENSION: ICD-10-CM

## 2018-08-17 DIAGNOSIS — J01.40 ACUTE NON-RECURRENT PANSINUSITIS: ICD-10-CM

## 2018-08-17 DIAGNOSIS — E11.9 TYPE 2 DIABETES MELLITUS WITHOUT COMPLICATION, WITH LONG-TERM CURRENT USE OF INSULIN (HCC): Primary | ICD-10-CM

## 2018-08-17 PROCEDURE — 99214 OFFICE O/P EST MOD 30 MIN: CPT | Performed by: NURSE PRACTITIONER

## 2018-08-17 RX ORDER — AMOXICILLIN 500 MG/1
500 CAPSULE ORAL 2 TIMES DAILY
Qty: 20 CAPSULE | Refills: 0 | Status: SHIPPED | OUTPATIENT
Start: 2018-08-17 | End: 2018-08-27

## 2018-08-17 RX ORDER — TRIAMCINOLONE ACETONIDE 55 UG/1
2 SPRAY, METERED NASAL DAILY
Qty: 1 INHALER | Refills: 1 | Status: ON HOLD | OUTPATIENT
Start: 2018-08-17 | End: 2019-07-30

## 2018-08-17 ASSESSMENT — ENCOUNTER SYMPTOMS
RESPIRATORY NEGATIVE: 1
GASTROINTESTINAL NEGATIVE: 1
EYES NEGATIVE: 1

## 2018-08-17 NOTE — PROGRESS NOTES
[facility] PRIMARY CARE  [address]  [address] [facility] [address]  Dept: [phone]  Dept Fax: [phone]  Loc: [phone]    Reji Coffman is a 57 y.o. female who presents today for her medical conditions/complaints as noted below. Reji Coffman is c/o of Anxiety (2wk FU) and Otalgia (\"just feels sick\")      Chief Complaint   Patient presents with    Anxiety     2wk FU    Otalgia     \"just feels sick\"       HPI:     HPI   Patient here for follow up on change of medications. She was started on Tresiba and Topamax was increased. She states that blood sugars have improved. She is feeling sick. Having ear pain and congestion. She reports that her blood sugars are ranging 170-200s. She does feel bad now that her sugars are that low.      Past Medical History:   Diagnosis Date    Anxiety     Depression     Diabetes mellitus ([address]     DM (diabetes mellitus screen)     GERD (gastroesophageal reflux disease)     Headache     Hyperlipidemia     Hypertension     Hypothyroidism     Neuropathy     Osteoarthritis     PONV (postoperative nausea and vomiting)     Type 2 diabetes mellitus without complication (HCC)         Past Surgical History:   Procedure Laterality Date    APPENDECTOMY       SECTION      CHOLECYSTECTOMY      HYSTERECTOMY      HI REVISE MEDIAN N/CARPAL TUNNEL SURG Left 2017    CARPAL TUNNEL RELEASE performed by Danny Parham MD at [address]/CARPAL TUNNEL SURG Right 2017    CARPAL TUNNEL RELEASE performed by Danny Parham MD at [address]         Social History   Substance Use Topics    Smoking status: Never Smoker    Smokeless tobacco: Never Used    Alcohol use No        Current Outpatient Prescriptions   Medication Sig Dispense Refill    amoxicillin (AMOXIL) 500 MG capsule Take 1 capsule by mouth 2 times daily for 10 days 20 capsule 0    triamcinolone (NASACORT ALLERGY 24HR) 55 MCG/ACT nasal inhaler 2 sprays by Nasal route daily 1 Inhaler 1    clonazePAM (KLONOPIN) 0.5 MG tablet TAKE 1 TABLET BY MOUTH DAILY AS NEEDED. . 30 tablet 0    topiramate (TOPAMAX) 50 MG tablet Take 2 tablets by mouth nightly 60 tablet 2    Insulin Degludec 200 UNIT/ML SOPN Inject 60 Units into the skin daily 6 pen 1    atorvastatin (LIPITOR) 20 MG tablet TAKE 1 TABLET BY MOUTH DAILY 30 tablet 5    levothyroxine (SYNTHROID) 125 MCG tablet TAKE 1 TABLET BY MOUTH DAILY 30 tablet 2    traZODone (DESYREL) 100 MG tablet TAKE 1 TABLET BY MOUTH EVERY NIGHT 30 tablet 2    metFORMIN (GLUCOPHAGE) 1000 MG tablet TAKE 1 TABLET BY MOUTH TWICE DAILY WITH MEALS 60 tablet 5    dapagliflozin (FARXIGA) 10 MG tablet Take 1 tablet by mouth every morning 30 tablet 3    SITagliptin (JANUVIA) 100 MG tablet TAKE 1 TABLET BY MOUTH DAILY 30 tablet 5    fluconazole (DIFLUCAN) 150 MG tablet Take 1 tablet today, then 1 tablet in 3-5 days 2 tablet 0    ergocalciferol (ERGOCALCIFEROL) 40897 units capsule Take 1 capsule by mouth once a week 4 capsule 3    meloxicam (MOBIC) 15 MG tablet TAKE 1 TABLET BY MOUTH DAILY 30 tablet 11    Insulin Syringe-Needle U-100 30G X 5/16\" 0.5 ML MISC 1 each by Does not apply route daily 100 each 3    famotidine (PEPCID) 20 MG tablet Take 1 tablet by mouth 2 times daily 30 tablet 11    insulin regular (NOVOLIN R) 100 UNIT/ML injection Inject 6 Units into the skin See Admin Instructions 149-199=2 units,200-249=4 units,250-299=6 units,300-349=8 units,350-400=10 units.  10 mL 5    montelukast (SINGULAIR) 10 MG tablet TAKE 1 TABLET BY MOUTH EVERY DAY 30 tablet 11    amitriptyline (ELAVIL) 25 MG tablet TAKE 1 TO 2 TABLETS BY MOUTH IN THE EVENING 60 tablet 11    venlafaxine (EFFEXOR XR) 150 MG extended release capsule TAKE 1 CAPSULE BY MOUTH DAILY 30 capsule 11    quinapril (ACCUPRIL) 20 MG tablet TAKE 1 TABLET BY MOUTH EVERY NIGHT AT BEDTIME 30 tablet 11    glucose blood VI test strips (ASCENSIA AUTODISC VI;ONE TOUCH ULTRA TEST VI) strip 1 each by In Vitro route daily As needed. 100 each 5    Insulin Pen Needle 32G X 4 MM MISC 1 each by Does not apply route daily 100 each 3    Insulin Pen Needle 29G X 12.7MM MISC 1 each by Does not apply route daily 100 each 3    glucose monitoring kit (FREESTYLE) monitoring kit 1 kit by Does not apply route daily 1 kit 0    Insulin Pen Needle ([employer] PEN NEEDLES 31G) 31G X 8 MM MISC 1 each by Does not apply route daily 100 each 3    Blood Glucose Monitoring Suppl (ACURA BLOOD GLUCOSE METER) w/Device KIT 1 Units by Does not apply route 3 times daily 1 kit 0    Lancets MISC Use three times a day. 100 each 3    ondansetron (ZOFRAN ODT) 4 MG disintegrating tablet Take 1 tablet by mouth every 8 hours as needed for Nausea or Vomiting 10 tablet 0    ASPIRIN LOW DOSE 81 MG EC tablet TAKE 1 TABLET BY MOUTH DAILY 30 tablet 0     No current facility-administered medications for this visit. Allergies   Allergen Reactions    Claritin-D 12 Hour [Loratadine-Pseudoephedrine Er]     Demerol Hcl [Meperidine]     Percocet [Oxycodone-Acetaminophen] Rash       Family History   Problem Relation Age of Onset    High Blood Pressure Mother     High Blood Pressure Father     High Cholesterol Father     Diabetes Sister     High Blood Pressure Sister     High Blood Pressure Brother            Subjective:      Review of Systems   Constitutional: Positive for fatigue. HENT: Positive for congestion and ear pain. Eyes: Negative. Respiratory: Negative. Cardiovascular: Negative. Gastrointestinal: Negative. Endocrine: Negative. Genitourinary: Negative. Musculoskeletal: Negative. Skin: Negative. Neurological: Negative. Hematological: Negative. Psychiatric/Behavioral: Negative. Objective:     Physical Exam   Constitutional: She is oriented to person, place, and time.  Vital signs are normal. She appears well-developed and well-nourished. HENT:   Head: Normocephalic and atraumatic. Right Ear: Hearing, external ear and ear canal normal. A middle ear effusion is present. Left Ear: Hearing, external ear and ear canal normal. A middle ear effusion is present. Nose: Mucosal edema, rhinorrhea and sinus tenderness present. Right sinus exhibits maxillary sinus tenderness and frontal sinus tenderness. Left sinus exhibits maxillary sinus tenderness and frontal sinus tenderness. Mouth/Throat: Uvula is midline and mucous membranes are normal. Posterior oropharyngeal erythema present. No oropharyngeal exudate or posterior oropharyngeal edema. Eyes: Pupils are equal, round, and reactive to light. Conjunctivae, EOM and lids are normal.   Neck: Trachea normal and normal range of motion. Neck supple. No thyroid mass and no thyromegaly present. Cardiovascular: Normal rate, regular rhythm, normal heart sounds and normal pulses. Pulmonary/Chest: Effort normal and breath sounds normal.   Abdominal: Soft. Normal appearance and bowel sounds are normal.   Musculoskeletal: Normal range of motion. Cervical back: Normal. She exhibits normal range of motion and no tenderness. Thoracic back: Normal. She exhibits normal range of motion and no tenderness. Lumbar back: Normal. She exhibits normal range of motion and no tenderness. Neurological: She is alert and oriented to person, place, and time. She has normal strength. Skin: Skin is warm, dry and intact. Psychiatric: She has a normal mood and affect. Her speech is normal and behavior is normal. Judgment and thought content normal. Cognition and memory are normal.   Nursing note and vitals reviewed. /78   Pulse 100   Temp 97.4 °F (36.3 °C)   Ht 5' 2.5\" (1.588 m)   Wt 184 lb 6.4 oz (83.6 kg)   LMP  (LMP Unknown)   SpO2 99%   BMI 33.19 kg/m²     Assessment:      Diagnosis Orders   1.  Type 2 diabetes mellitus without complication, with

## 2018-09-16 RX ORDER — HUMAN INSULIN 100 [IU]/ML
INJECTION, SOLUTION SUBCUTANEOUS
Qty: 10 ML | Refills: 0 | Status: SHIPPED | OUTPATIENT
Start: 2018-09-16 | End: 2018-10-19 | Stop reason: SDUPTHER

## 2018-09-27 RX ORDER — LEVOTHYROXINE SODIUM 0.12 MG/1
TABLET ORAL
Qty: 30 TABLET | Refills: 11 | Status: SHIPPED | OUTPATIENT
Start: 2018-09-27 | End: 2019-04-04 | Stop reason: SDUPTHER

## 2018-09-28 ENCOUNTER — OFFICE VISIT (OUTPATIENT)
Dept: PRIMARY CARE CLINIC | Age: 48
End: 2018-09-28
Payer: COMMERCIAL

## 2018-09-28 ENCOUNTER — HOSPITAL ENCOUNTER (OUTPATIENT)
Dept: GENERAL RADIOLOGY | Age: 48
Discharge: HOME | End: 2018-09-28
Payer: COMMERCIAL

## 2018-09-28 VITALS
HEART RATE: 101 BPM | SYSTOLIC BLOOD PRESSURE: 122 MMHG | TEMPERATURE: 97.6 F | BODY MASS INDEX: 34.89 KG/M2 | DIASTOLIC BLOOD PRESSURE: 74 MMHG | HEIGHT: 62 IN | OXYGEN SATURATION: 98 % | WEIGHT: 189.6 LBS

## 2018-09-28 DIAGNOSIS — R30.0 DYSURIA: ICD-10-CM

## 2018-09-28 DIAGNOSIS — M25.561 ACUTE PAIN OF RIGHT KNEE: ICD-10-CM

## 2018-09-28 DIAGNOSIS — E11.9 TYPE 2 DIABETES MELLITUS WITHOUT COMPLICATION, WITH LONG-TERM CURRENT USE OF INSULIN (HCC): Primary | ICD-10-CM

## 2018-09-28 DIAGNOSIS — R25.2 BILATERAL LEG CRAMPS: ICD-10-CM

## 2018-09-28 DIAGNOSIS — Z79.4 TYPE 2 DIABETES MELLITUS WITHOUT COMPLICATION, WITH LONG-TERM CURRENT USE OF INSULIN (HCC): Primary | ICD-10-CM

## 2018-09-28 LAB
ALBUMIN SERPL-MCNC: 4.1 G/DL (ref 3.5–5.2)
ALP BLD-CCNC: 84 U/L (ref 35–104)
ALT SERPL-CCNC: 30 U/L (ref 5–33)
ANION GAP SERPL CALCULATED.3IONS-SCNC: 12 MMOL/L (ref 7–19)
AST SERPL-CCNC: 67 U/L (ref 5–32)
BILIRUB SERPL-MCNC: 0.4 MG/DL (ref 0.2–1.2)
BILIRUBIN URINE: NEGATIVE
BLOOD, URINE: NEGATIVE
BUN BLDV-MCNC: 12 MG/DL (ref 6–20)
CALCIUM SERPL-MCNC: 9.4 MG/DL (ref 8.6–10)
CHLORIDE BLD-SCNC: 102 MMOL/L (ref 98–111)
CLARITY: ABNORMAL
CO2: 23 MMOL/L (ref 22–29)
COLOR: YELLOW
CREAT SERPL-MCNC: 0.5 MG/DL (ref 0.5–0.9)
GFR NON-AFRICAN AMERICAN: >60
GLUCOSE BLD-MCNC: 283 MG/DL (ref 74–109)
GLUCOSE URINE: >=1000 MG/DL
HBA1C MFR BLD: 10.9 %
HCT VFR BLD CALC: 36.4 % (ref 37–47)
HEMOGLOBIN: 11.8 G/DL (ref 12–16)
KETONES, URINE: ABNORMAL MG/DL
LEUKOCYTE ESTERASE, URINE: NEGATIVE
MAGNESIUM: 1.2 MG/DL (ref 1.6–2.6)
MCH RBC QN AUTO: 27.7 PG (ref 27–31)
MCHC RBC AUTO-ENTMCNC: 32.4 G/DL (ref 33–37)
MCV RBC AUTO: 85.4 FL (ref 81–99)
NITRITE, URINE: NEGATIVE
PDW BLD-RTO: 14.1 % (ref 11.5–14.5)
PH UA: 7
PLATELET # BLD: 152 K/UL (ref 130–400)
PMV BLD AUTO: 10 FL (ref 9.4–12.3)
POTASSIUM SERPL-SCNC: 4.3 MMOL/L (ref 3.5–5)
PROTEIN UA: NEGATIVE MG/DL
RBC # BLD: 4.26 M/UL (ref 4.2–5.4)
SODIUM BLD-SCNC: 137 MMOL/L (ref 136–145)
SPECIFIC GRAVITY UA: 1.02
TOTAL PROTEIN: 7.8 G/DL (ref 6.6–8.7)
UROBILINOGEN, URINE: 1 E.U./DL
WBC # BLD: 9.2 K/UL (ref 4.8–10.8)

## 2018-09-28 PROCEDURE — 99214 OFFICE O/P EST MOD 30 MIN: CPT | Performed by: NURSE PRACTITIONER

## 2018-09-28 PROCEDURE — 73562 X-RAY EXAM OF KNEE 3: CPT

## 2018-09-28 PROCEDURE — 83036 HEMOGLOBIN GLYCOSYLATED A1C: CPT | Performed by: NURSE PRACTITIONER

## 2018-09-28 ASSESSMENT — ENCOUNTER SYMPTOMS
EYES NEGATIVE: 1
RESPIRATORY NEGATIVE: 1
GASTROINTESTINAL NEGATIVE: 1

## 2018-09-28 NOTE — PROGRESS NOTES
[facility] PRIMARY CARE  [facility] [facility] [address] [facility]  [address] [place] [address]  Dept: [phone]  Dept Fax: [phone]  Loc: [phone]    Candice Hudson is a 57 y.o. female who presents today for her medical conditions/complaints as noted below. Candice Hudson is c/o of Diabetes (6wk FU); Joint Swelling (right); and Foot Pain (numbness and tingling)      Chief Complaint   Patient presents with    Diabetes     6wk FU    Joint Swelling     right    Foot Pain     numbness and tingling       HPI:     HPI  Patient is here for 6 week follow-up on diabetes. Last visit patient was noted to have overall malaise related to blood sugars ranging from 170-200. She was originally having sugars greater than 400 when her medications were adjusted earlier this year. Patient is also having complaints of intermittent joint swelling on her right side. She is also having numbness and tingling in her foot. Patient does have a known history of neuropathy. Patient is also complaining of increased leg cramps that are worse at night.      Past Medical History:   Diagnosis Date    Anxiety     Depression     Diabetes mellitus ([facility] [address]     DM (diabetes mellitus screen)     GERD (gastroesophageal reflux disease)     Headache     Hyperlipidemia     Hypertension     Hypothyroidism     Neuropathy     Osteoarthritis     PONV (postoperative nausea and vomiting)     Type 2 diabetes mellitus without complication (HCC)         Past Surgical History:   Procedure Laterality Date    APPENDECTOMY       SECTION      CHOLECYSTECTOMY      HYSTERECTOMY      MS REVISE MEDIAN N/CARPAL TUNNEL SURG Left 2017    CARPAL TUNNEL RELEASE performed by Peña James MD at [address]/CARPAL TUNNEL SURG Right 2017    CARPAL TUNNEL RELEASE performed by Peña James MD at [address]         Social History   Substance Use Topics    BEDTIME 30 tablet 11    glucose blood VI test strips (ASCENSIA AUTODISC VI;ONE TOUCH ULTRA TEST VI) strip 1 each by In Vitro route daily As needed. 100 each 5    Insulin Pen Needle 32G X 4 MM MISC 1 each by Does not apply route daily 100 each 3    Insulin Pen Needle 29G X 12.7MM MISC 1 each by Does not apply route daily 100 each 3    glucose monitoring kit (FREESTYLE) monitoring kit 1 kit by Does not apply route daily 1 kit 0    Insulin Pen Needle ([employer] PEN NEEDLES 31G) 31G X 8 MM MISC 1 each by Does not apply route daily 100 each 3    Blood Glucose Monitoring Suppl (ACURA BLOOD GLUCOSE METER) w/Device KIT 1 Units by Does not apply route 3 times daily 1 kit 0    Lancets MISC Use three times a day. 100 each 3    ondansetron (ZOFRAN ODT) 4 MG disintegrating tablet Take 1 tablet by mouth every 8 hours as needed for Nausea or Vomiting 10 tablet 0    ASPIRIN LOW DOSE 81 MG EC tablet TAKE 1 TABLET BY MOUTH DAILY 30 tablet 0    clonazePAM (KLONOPIN) 0.5 MG tablet TAKE 1 TABLET BY MOUTH DAILY AS NEEDED. . 30 tablet 0    topiramate (TOPAMAX) 50 MG tablet Take 2 tablets by mouth nightly 60 tablet 2     No current facility-administered medications for this visit. Allergies   Allergen Reactions    Claritin-D 12 Hour [Loratadine-Pseudoephedrine Er]     Demerol Hcl [Meperidine]     Percocet [Oxycodone-Acetaminophen] Rash       Family History   Problem Relation Age of Onset    High Blood Pressure Mother     High Blood Pressure Father     High Cholesterol Father     Diabetes Sister     High Blood Pressure Sister     High Blood Pressure Brother                Subjective:      Review of Systems   Constitutional: Negative. HENT: Negative. Dry mouth   Eyes: Negative. Respiratory: Negative. Cardiovascular: Negative. Gastrointestinal: Negative. Endocrine: Negative. Genitourinary: Negative. Musculoskeletal: Positive for gait problem (right knee pain).    Skin: Positive for rash glycosylated hemoglobin (Hb A1C)    Continuous Blood Gluc  (DEXCOM  KIT) YOUSUF    Continuous Glucose Monitor Sup MISC   2. Acute pain of right knee  XR KNEE RIGHT (3 VIEWS)   3. Bilateral leg cramps  CBC    Comprehensive Metabolic Panel    Magnesium   4. Dysuria  Urinalysis       Results for orders placed or performed in visit on 09/28/18   POCT glycosylated hemoglobin (Hb A1C)   Result Value Ref Range    Hemoglobin A1C 10.9 %       Plan:     I am checking labs - we will call with these results once completed. X-ray of right knee due to crepitus and acute pain. I do believe pain is related to arthritis of the knee. Order placed for Dexcom continuous glucose meter. I'm uncertain if insurance will cover this or not, I did explain to the patient. We did discuss adjusting medications for A1c has increased, but patient declined at this time. She does state that it is dietary causes of her increase. She is wanting to adjust diet prior to adjusting medications. I explained to her that it will not improve her overall number significantly enough without medication changes, but again she does wish to wait. Return in about 4 weeks (around 10/26/2018) for Follow up.     Orders Placed This Encounter   Procedures    XR KNEE RIGHT (3 VIEWS)     Standing Status:   Future     Number of Occurrences:   1     Standing Expiration Date:   9/28/2019    CBC     Standing Status:   Future     Number of Occurrences:   1     Standing Expiration Date:   9/28/2019    Comprehensive Metabolic Panel     Standing Status:   Future     Number of Occurrences:   1     Standing Expiration Date:   9/28/2019    Magnesium     Standing Status:   Future     Number of Occurrences:   1     Standing Expiration Date:   9/28/2019    Urinalysis     Standing Status:   Future     Number of Occurrences:   1     Standing Expiration Date:   9/28/2019    POCT glycosylated hemoglobin (Hb A1C)       Orders Placed This Encounter   Medications    Continuous Blood Gluc  (DEXCOM  KIT) YOUSUF     Si kit  Dx: DM type 2     Dispense:  1 Device     Refill:  0    Continuous Glucose Monitor Sup MISC     Sig: Device for continuous monitoring kit     Dispense:  4 each     Refill:  1        Patient given educational materials - see patient instructions. Discussed use, benefit, and side effects of prescribed medications. All patient questions answered. Pt voiced understanding. Reviewed health maintenance. Instructed to continue current medications, diet and exercise. Patient agreed with treatment plan. Follow up as directed.            Electronically signed by NICANOR Jeffrey on 10/2/2018 at 4:06 PM

## 2018-10-01 ENCOUNTER — TELEPHONE (OUTPATIENT)
Dept: PRIMARY CARE CLINIC | Age: 48
End: 2018-10-01

## 2018-10-03 ENCOUNTER — TELEPHONE (OUTPATIENT)
Dept: PRIMARY CARE CLINIC | Age: 48
End: 2018-10-03

## 2018-10-19 RX ORDER — HUMAN INSULIN 100 [IU]/ML
INJECTION, SOLUTION SUBCUTANEOUS
Qty: 10 ML | Refills: 0 | Status: SHIPPED | OUTPATIENT
Start: 2018-10-19 | End: 2019-04-04 | Stop reason: SDUPTHER

## 2018-10-26 ENCOUNTER — OFFICE VISIT (OUTPATIENT)
Dept: PRIMARY CARE CLINIC | Age: 48
End: 2018-10-26
Payer: COMMERCIAL

## 2018-10-26 VITALS
HEIGHT: 62 IN | DIASTOLIC BLOOD PRESSURE: 74 MMHG | SYSTOLIC BLOOD PRESSURE: 124 MMHG | TEMPERATURE: 96.9 F | WEIGHT: 180 LBS | BODY MASS INDEX: 33.13 KG/M2 | OXYGEN SATURATION: 99 % | HEART RATE: 120 BPM | RESPIRATION RATE: 17 BRPM

## 2018-10-26 DIAGNOSIS — Z79.4 TYPE 2 DIABETES MELLITUS WITHOUT COMPLICATION, WITH LONG-TERM CURRENT USE OF INSULIN (HCC): ICD-10-CM

## 2018-10-26 DIAGNOSIS — R19.7 DIARRHEA, UNSPECIFIED TYPE: ICD-10-CM

## 2018-10-26 DIAGNOSIS — R05.9 COUGH: ICD-10-CM

## 2018-10-26 DIAGNOSIS — R19.7 DIARRHEA, UNSPECIFIED TYPE: Primary | ICD-10-CM

## 2018-10-26 DIAGNOSIS — E11.9 TYPE 2 DIABETES MELLITUS WITHOUT COMPLICATION, WITH LONG-TERM CURRENT USE OF INSULIN (HCC): ICD-10-CM

## 2018-10-26 DIAGNOSIS — R11.0 NAUSEA: ICD-10-CM

## 2018-10-26 LAB
ALBUMIN SERPL-MCNC: 4.4 G/DL (ref 3.5–5.2)
ALP BLD-CCNC: 106 U/L (ref 35–104)
ALT SERPL-CCNC: 47 U/L (ref 5–33)
ANION GAP SERPL CALCULATED.3IONS-SCNC: 19 MMOL/L (ref 7–19)
AST SERPL-CCNC: 102 U/L (ref 5–32)
BILIRUB SERPL-MCNC: 1 MG/DL (ref 0.2–1.2)
BUN BLDV-MCNC: 14 MG/DL (ref 6–20)
CALCIUM SERPL-MCNC: 9.8 MG/DL (ref 8.6–10)
CHLORIDE BLD-SCNC: 97 MMOL/L (ref 98–111)
CO2: 20 MMOL/L (ref 22–29)
CREAT SERPL-MCNC: 0.7 MG/DL (ref 0.5–0.9)
GFR NON-AFRICAN AMERICAN: >60
GLUCOSE BLD-MCNC: 229 MG/DL (ref 74–109)
HCT VFR BLD CALC: 45.1 % (ref 37–47)
HEMOGLOBIN: 14.7 G/DL (ref 12–16)
MCH RBC QN AUTO: 27.6 PG (ref 27–31)
MCHC RBC AUTO-ENTMCNC: 32.6 G/DL (ref 33–37)
MCV RBC AUTO: 84.8 FL (ref 81–99)
PDW BLD-RTO: 14 % (ref 11.5–14.5)
PLATELET # BLD: 241 K/UL (ref 130–400)
PMV BLD AUTO: 10 FL (ref 9.4–12.3)
POTASSIUM SERPL-SCNC: 4.1 MMOL/L (ref 3.5–5)
RBC # BLD: 5.32 M/UL (ref 4.2–5.4)
SODIUM BLD-SCNC: 136 MMOL/L (ref 136–145)
TOTAL PROTEIN: 9.1 G/DL (ref 6.6–8.7)
WBC # BLD: 13.4 K/UL (ref 4.8–10.8)

## 2018-10-26 PROCEDURE — 99214 OFFICE O/P EST MOD 30 MIN: CPT | Performed by: NURSE PRACTITIONER

## 2018-10-26 RX ORDER — DEXTROMETHORPHAN HYDROBROMIDE AND PROMETHAZINE HYDROCHLORIDE 15; 6.25 MG/5ML; MG/5ML
SYRUP ORAL
Qty: 120 ML | Refills: 0 | Status: SHIPPED | OUTPATIENT
Start: 2018-10-26 | End: 2019-02-15 | Stop reason: SDUPTHER

## 2018-10-26 RX ORDER — AZITHROMYCIN 250 MG/1
TABLET, FILM COATED ORAL
Qty: 1 PACKET | Refills: 0 | Status: SHIPPED | OUTPATIENT
Start: 2018-10-26 | End: 2018-11-05

## 2018-10-26 RX ORDER — PROMETHAZINE HYDROCHLORIDE 25 MG/1
25 TABLET ORAL EVERY 6 HOURS PRN
Qty: 30 TABLET | Refills: 1 | Status: SHIPPED | OUTPATIENT
Start: 2018-10-26 | End: 2018-11-02

## 2018-10-26 RX ORDER — DICYCLOMINE HCL 20 MG
20 TABLET ORAL 3 TIMES DAILY PRN
Qty: 90 TABLET | Refills: 1 | Status: SHIPPED | OUTPATIENT
Start: 2018-10-26 | End: 2019-01-22 | Stop reason: ALTCHOICE

## 2018-10-26 ASSESSMENT — ENCOUNTER SYMPTOMS
VOMITING: 1
ABDOMINAL PAIN: 1
NAUSEA: 1
EYES NEGATIVE: 1
RESPIRATORY NEGATIVE: 1
DIARRHEA: 1

## 2018-10-30 ENCOUNTER — TELEPHONE (OUTPATIENT)
Dept: PRIMARY CARE CLINIC | Age: 48
End: 2018-10-30

## 2018-11-09 ENCOUNTER — TELEPHONE (OUTPATIENT)
Dept: PRIMARY CARE CLINIC | Age: 48
End: 2018-11-09

## 2018-11-11 ENCOUNTER — HOSPITAL ENCOUNTER (EMERGENCY)
Age: 48
Discharge: HOME | End: 2018-11-11
Payer: COMMERCIAL

## 2018-11-11 ENCOUNTER — APPOINTMENT (OUTPATIENT)
Dept: CT IMAGING | Age: 48
End: 2018-11-11
Payer: COMMERCIAL

## 2018-11-11 VITALS
SYSTOLIC BLOOD PRESSURE: 140 MMHG | RESPIRATION RATE: 20 BRPM | TEMPERATURE: 98 F | BODY MASS INDEX: 32.94 KG/M2 | DIASTOLIC BLOOD PRESSURE: 82 MMHG | HEIGHT: 62 IN | OXYGEN SATURATION: 98 % | WEIGHT: 179 LBS | HEART RATE: 68 BPM

## 2018-11-11 DIAGNOSIS — L03.213 PRESEPTAL CELLULITIS OF RIGHT EYE: Primary | ICD-10-CM

## 2018-11-11 DIAGNOSIS — L02.01 ABSCESS OF FACE: ICD-10-CM

## 2018-11-11 DIAGNOSIS — K59.00 CONSTIPATION, UNSPECIFIED CONSTIPATION TYPE: ICD-10-CM

## 2018-11-11 LAB
ALBUMIN SERPL-MCNC: 4.1 G/DL (ref 3.5–5.2)
ALP BLD-CCNC: 98 U/L (ref 35–104)
ALT SERPL-CCNC: 44 U/L (ref 5–33)
ANION GAP SERPL CALCULATED.3IONS-SCNC: 14 MMOL/L (ref 7–19)
AST SERPL-CCNC: 94 U/L (ref 5–32)
BASOPHILS ABSOLUTE: 0 K/UL (ref 0–0.2)
BASOPHILS RELATIVE PERCENT: 0.4 % (ref 0–1)
BILIRUB SERPL-MCNC: 0.6 MG/DL (ref 0.2–1.2)
BUN BLDV-MCNC: 10 MG/DL (ref 6–20)
CALCIUM SERPL-MCNC: 9.4 MG/DL (ref 8.6–10)
CHLORIDE BLD-SCNC: 102 MMOL/L (ref 98–111)
CO2: 21 MMOL/L (ref 22–29)
CREAT SERPL-MCNC: 0.5 MG/DL (ref 0.5–0.9)
EOSINOPHILS ABSOLUTE: 0.2 K/UL (ref 0–0.6)
EOSINOPHILS RELATIVE PERCENT: 2 % (ref 0–5)
GFR NON-AFRICAN AMERICAN: >60
GLUCOSE BLD-MCNC: 208 MG/DL (ref 74–109)
HCT VFR BLD CALC: 36 % (ref 37–47)
HEMOGLOBIN: 11.8 G/DL (ref 12–16)
LACTIC ACID: 1.8 MMOL/L (ref 0.5–1.9)
LIPASE: 37 U/L (ref 13–60)
LYMPHOCYTES ABSOLUTE: 2.6 K/UL (ref 1.1–4.5)
LYMPHOCYTES RELATIVE PERCENT: 24.7 % (ref 20–40)
MCH RBC QN AUTO: 27.6 PG (ref 27–31)
MCHC RBC AUTO-ENTMCNC: 32.8 G/DL (ref 33–37)
MCV RBC AUTO: 84.1 FL (ref 81–99)
MONOCYTES ABSOLUTE: 0.6 K/UL (ref 0–0.9)
MONOCYTES RELATIVE PERCENT: 5.4 % (ref 0–10)
NEUTROPHILS ABSOLUTE: 7.1 K/UL (ref 1.5–7.5)
NEUTROPHILS RELATIVE PERCENT: 67.2 % (ref 50–65)
PDW BLD-RTO: 13.9 % (ref 11.5–14.5)
PLATELET # BLD: 156 K/UL (ref 130–400)
PMV BLD AUTO: 9.6 FL (ref 9.4–12.3)
POTASSIUM REFLEX MAGNESIUM: 4 MMOL/L (ref 3.5–5)
RBC # BLD: 4.28 M/UL (ref 4.2–5.4)
SODIUM BLD-SCNC: 137 MMOL/L (ref 136–145)
TOTAL PROTEIN: 8.1 G/DL (ref 6.6–8.7)
WBC # BLD: 10.6 K/UL (ref 4.8–10.8)

## 2018-11-11 PROCEDURE — 80053 COMPREHEN METABOLIC PANEL: CPT

## 2018-11-11 PROCEDURE — 99284 EMERGENCY DEPT VISIT MOD MDM: CPT | Performed by: NURSE PRACTITIONER

## 2018-11-11 PROCEDURE — 2580000003 HC RX 258: Performed by: NURSE PRACTITIONER

## 2018-11-11 PROCEDURE — 6360000004 HC RX CONTRAST MEDICATION: Performed by: NURSE PRACTITIONER

## 2018-11-11 PROCEDURE — 99284 EMERGENCY DEPT VISIT MOD MDM: CPT

## 2018-11-11 PROCEDURE — 70487 CT MAXILLOFACIAL W/DYE: CPT

## 2018-11-11 PROCEDURE — 83690 ASSAY OF LIPASE: CPT

## 2018-11-11 PROCEDURE — 36415 COLL VENOUS BLD VENIPUNCTURE: CPT

## 2018-11-11 PROCEDURE — 85025 COMPLETE CBC W/AUTO DIFF WBC: CPT

## 2018-11-11 PROCEDURE — 74177 CT ABD & PELVIS W/CONTRAST: CPT

## 2018-11-11 PROCEDURE — 83605 ASSAY OF LACTIC ACID: CPT

## 2018-11-11 RX ORDER — MUPIROCIN CALCIUM 20 MG/G
CREAM TOPICAL
Qty: 30 G | Refills: 0 | Status: SHIPPED | OUTPATIENT
Start: 2018-11-11 | End: 2018-11-13 | Stop reason: SDUPTHER

## 2018-11-11 RX ORDER — HYDROCODONE BITARTRATE AND ACETAMINOPHEN 5; 325 MG/1; MG/1
1 TABLET ORAL EVERY 6 HOURS PRN
Qty: 12 TABLET | Refills: 0 | Status: SHIPPED | OUTPATIENT
Start: 2018-11-11 | End: 2018-11-14

## 2018-11-11 RX ORDER — CEPHALEXIN 500 MG/1
500 CAPSULE ORAL 4 TIMES DAILY
Qty: 28 CAPSULE | Refills: 0 | Status: SHIPPED | OUTPATIENT
Start: 2018-11-11 | End: 2018-11-18

## 2018-11-11 RX ORDER — ONDANSETRON 4 MG/1
4 TABLET, FILM COATED ORAL EVERY 8 HOURS PRN
Qty: 15 TABLET | Refills: 0 | Status: SHIPPED | OUTPATIENT
Start: 2018-11-11 | End: 2018-11-13

## 2018-11-11 RX ORDER — PROMETHAZINE HYDROCHLORIDE 25 MG/1
25 TABLET ORAL EVERY 6 HOURS PRN
Qty: 15 TABLET | Refills: 0 | Status: SHIPPED | OUTPATIENT
Start: 2018-11-11 | End: 2018-11-18

## 2018-11-11 RX ORDER — SULFAMETHOXAZOLE AND TRIMETHOPRIM 800; 160 MG/1; MG/1
1 TABLET ORAL 2 TIMES DAILY
Qty: 20 TABLET | Refills: 0 | Status: SHIPPED | OUTPATIENT
Start: 2018-11-11 | End: 2018-11-21

## 2018-11-11 RX ORDER — 0.9 % SODIUM CHLORIDE 0.9 %
1000 INTRAVENOUS SOLUTION INTRAVENOUS ONCE
Status: COMPLETED | OUTPATIENT
Start: 2018-11-11 | End: 2018-11-11

## 2018-11-11 RX ORDER — MAGNESIUM CITRATE
150 SOLUTION, ORAL ORAL ONCE
Qty: 296 ML | Refills: 0 | Status: SHIPPED | OUTPATIENT
Start: 2018-11-11 | End: 2018-11-11

## 2018-11-11 RX ADMIN — IOPAMIDOL 90 ML: 755 INJECTION, SOLUTION INTRAVENOUS at 17:37

## 2018-11-11 RX ADMIN — SODIUM CHLORIDE 1000 ML: 9 INJECTION, SOLUTION INTRAVENOUS at 16:49

## 2018-11-11 ASSESSMENT — PAIN SCALES - GENERAL
PAINLEVEL_OUTOF10: 10
PAINLEVEL_OUTOF10: 0

## 2018-11-11 ASSESSMENT — ENCOUNTER SYMPTOMS
FACIAL SWELLING: 1
CONSTIPATION: 1
ABDOMINAL PAIN: 1
NAUSEA: 1
VOMITING: 0
DIARRHEA: 1

## 2018-11-11 NOTE — ED PROVIDER NOTES
mild enhancement of the mucosa of   the more distal ileum. I suspect this may represent an ongoing   enteritis. Inflammatory bowel disease is also a differential but   considered less likely. No evidence of focal bowel wall thickening. 3. The patient's undergone prior appendectomy and hysterectomy as well   as cholecystectomy. 4. Small hiatal hernia. 5. Coronary calcifications in the LAD distribution. .    6. 2 small cysts or follicles associated with the left ovary. The   right ovary is unremarkable. Signed by Dr Bert Santos on 11/11/2018 6:10 PM      CT FACIAL BONES W CONTRAST   Final Result   . 1. Preseptal cellulitis of the right orbit with periorbital soft   tissue swelling. The globes, extraocular muscles and optic nerves are   intact. There is no evidence of stranding of the retroconal fat. 2. Mildly prominent jugulodigastric node on the left-likely reactive   in nature. Some small shotty nodes are noted within the jugular chains   bilaterally. 3. No evidence of acute or chronic sinusitis. No acute bony   abnormality are present. Signed by Dr Bert Santos on 11/11/2018 6:03 PM          LABS:  Labs Reviewed   COMPREHENSIVE METABOLIC PANEL W/ REFLEX TO MG FOR LOW K - Abnormal; Notable for the following:        Result Value    CO2 21 (*)     Glucose 208 (*)     ALT 44 (*)     AST 94 (*)     All other components within normal limits   CBC WITH AUTO DIFFERENTIAL - Abnormal; Notable for the following:     Hemoglobin 11.8 (*)     Hematocrit 36.0 (*)     MCHC 32.8 (*)     Neutrophils % 67.2 (*)     All other components within normal limits   LACTIC ACID, PLASMA   LIPASE   URINE RT REFLEX TO CULTURE       All other labs were within normal range or notreturned as of this dictation.     RE-ASSESSMENT          EMERGENCY DEPARTMENT COURSE and DIFFERENTIAL DIAGNOSIS/MDM:   Vitals:    Vitals:    11/11/18 1602 11/11/18 1837 11/11/18 1853 11/11/18 1929   BP: 116/87 126/78 (!) 140/82    Pulse: 100 88 79 68 (BACTROBAN) 2 % cream Apply topically 3 times daily. , Disp-30 g, R-0, Print      cephALEXin (KEFLEX) 500 MG capsule Take 1 capsule by mouth 4 times daily for 7 days, Disp-28 capsule, R-0Print      HYDROcodone-acetaminophen (NORCO) 5-325 MG per tablet Take 1 tablet by mouth every 6 hours as needed for Pain for up to 3 days.  . Take lowest dose possible to manage pain., Disp-12 tablet, R-0Print      ondansetron (ZOFRAN) 4 MG tablet Take 1 tablet by mouth every 8 hours as needed for Nausea or Vomiting, Disp-15 tablet, R-0Print      magnesium citrate (CITROMA) SOLN Take 150 mLs by mouth once for 1 dose, Disp-296 mL, R-0Print             (Please note that portions of this note were completed with a voice recognition program.  Efforts were made to edit the dictations but occasionallywords are mis-transcribed.)    NICANOR Breaux APRN  11/11/18 [id number]

## 2018-11-13 ENCOUNTER — OFFICE VISIT (OUTPATIENT)
Dept: PRIMARY CARE CLINIC | Age: 48
End: 2018-11-13
Payer: COMMERCIAL

## 2018-11-13 VITALS
OXYGEN SATURATION: 99 % | DIASTOLIC BLOOD PRESSURE: 86 MMHG | SYSTOLIC BLOOD PRESSURE: 138 MMHG | WEIGHT: 184.4 LBS | HEIGHT: 62 IN | BODY MASS INDEX: 33.93 KG/M2 | TEMPERATURE: 97.7 F | HEART RATE: 115 BPM | RESPIRATION RATE: 14 BRPM

## 2018-11-13 DIAGNOSIS — K59.00 CONSTIPATION, UNSPECIFIED CONSTIPATION TYPE: ICD-10-CM

## 2018-11-13 DIAGNOSIS — L03.211 CELLULITIS OF FACE: ICD-10-CM

## 2018-11-13 DIAGNOSIS — Z86.19 HISTORY OF INFECTION DUE TO ESBL ESCHERICHIA COLI: ICD-10-CM

## 2018-11-13 DIAGNOSIS — R19.7 DIARRHEA, UNSPECIFIED TYPE: ICD-10-CM

## 2018-11-13 DIAGNOSIS — Z09 HOSPITAL DISCHARGE FOLLOW-UP: Primary | ICD-10-CM

## 2018-11-13 PROCEDURE — 99214 OFFICE O/P EST MOD 30 MIN: CPT | Performed by: NURSE PRACTITIONER

## 2018-11-13 PROCEDURE — 1111F DSCHRG MED/CURRENT MED MERGE: CPT | Performed by: NURSE PRACTITIONER

## 2018-11-13 RX ORDER — MUPIROCIN CALCIUM 20 MG/G
CREAM TOPICAL
Qty: 30 G | Refills: 2 | Status: SHIPPED | OUTPATIENT
Start: 2018-11-13 | End: 2018-12-13

## 2018-11-13 ASSESSMENT — ENCOUNTER SYMPTOMS
RESPIRATORY NEGATIVE: 1
CONSTIPATION: 1
EYES NEGATIVE: 1
DIARRHEA: 1

## 2018-11-13 NOTE — PROGRESS NOTES
atorvastatin (LIPITOR) 20 MG tablet TAKE 1 TABLET BY MOUTH DAILY 30 tablet 5    traZODone (DESYREL) 100 MG tablet TAKE 1 TABLET BY MOUTH EVERY NIGHT 30 tablet 2    metFORMIN (GLUCOPHAGE) 1000 MG tablet TAKE 1 TABLET BY MOUTH TWICE DAILY WITH MEALS 60 tablet 5    dapagliflozin (FARXIGA) 10 MG tablet Take 1 tablet by mouth every morning 30 tablet 3    SITagliptin (JANUVIA) 100 MG tablet TAKE 1 TABLET BY MOUTH DAILY 30 tablet 5    ergocalciferol (ERGOCALCIFEROL) 12155 units capsule Take 1 capsule by mouth once a week 4 capsule 3    meloxicam (MOBIC) 15 MG tablet TAKE 1 TABLET BY MOUTH DAILY 30 tablet 11    Insulin Syringe-Needle U-100 30G X 5/16\" 0.5 ML MISC 1 each by Does not apply route daily 100 each 3    famotidine (PEPCID) 20 MG tablet Take 1 tablet by mouth 2 times daily 30 tablet 11    montelukast (SINGULAIR) 10 MG tablet TAKE 1 TABLET BY MOUTH EVERY DAY 30 tablet 11    amitriptyline (ELAVIL) 25 MG tablet TAKE 1 TO 2 TABLETS BY MOUTH IN THE EVENING 60 tablet 11    venlafaxine (EFFEXOR XR) 150 MG extended release capsule TAKE 1 CAPSULE BY MOUTH DAILY 30 capsule 11    quinapril (ACCUPRIL) 20 MG tablet TAKE 1 TABLET BY MOUTH EVERY NIGHT AT BEDTIME 30 tablet 11    glucose blood VI test strips (ASCENSIA AUTODISC VI;ONE TOUCH ULTRA TEST VI) strip 1 each by In Vitro route daily As needed. 100 each 5    Insulin Pen Needle 32G X 4 MM MISC 1 each by Does not apply route daily 100 each 3    Insulin Pen Needle 29G X 12.7MM MISC 1 each by Does not apply route daily 100 each 3    glucose monitoring kit (FREESTYLE) monitoring kit 1 kit by Does not apply route daily 1 kit 0    Insulin Pen Needle ([employer]OG[employer] PEN NEEDLES 31G) 31G X 8 MM MISC 1 each by Does not apply route daily 100 each 3    Blood Glucose Monitoring Suppl (ACURA BLOOD GLUCOSE METER) w/Device KIT 1 Units by Does not apply route 3 times daily 1 kit 0    Lancets MISC Use three times a day.  100 each 3    ondansetron (ZOFRAN ODT) 4 MG disintegrating tablet Take 1 tablet by mouth every 8 hours as needed for Nausea or Vomiting 10 tablet 0    ASPIRIN LOW DOSE 81 MG EC tablet TAKE 1 TABLET BY MOUTH DAILY 30 tablet 0    clonazePAM (KLONOPIN) 0.5 MG tablet TAKE 1 TABLET BY MOUTH DAILY AS NEEDED. . 30 tablet 0    topiramate (TOPAMAX) 50 MG tablet Take 2 tablets by mouth nightly 60 tablet 2     No current facility-administered medications for this visit. Allergies   Allergen Reactions    Claritin-D 12 Hour [Loratadine-Pseudoephedrine Er]     Demerol Hcl [Meperidine]     Percocet [Oxycodone-Acetaminophen] Rash       Family History   Problem Relation Age of Onset    High Blood Pressure Mother     High Blood Pressure Father     High Cholesterol Father     Diabetes Sister     High Blood Pressure Sister     High Blood Pressure Brother            Subjective:      Review of Systems   Constitutional: Negative. HENT: Negative. Eyes: Negative. Respiratory: Negative. Cardiovascular: Negative. Gastrointestinal: Positive for constipation and diarrhea. Endocrine: Negative. Genitourinary: Negative. Musculoskeletal: Negative. Skin: Positive for wound (above right eye). Neurological: Negative. Hematological: Negative. Psychiatric/Behavioral: Negative. Objective:     Physical Exam   Constitutional: She is oriented to person, place, and time. She appears well-developed and well-nourished. HENT:   Head: Normocephalic and atraumatic. Head is with right periorbital erythema. Right Ear: External ear normal.   Left Ear: External ear normal.   Eyes: Pupils are equal, round, and reactive to light. Conjunctivae and EOM are normal.   Neck: Normal range of motion. Neck supple. Cardiovascular: Normal rate, regular rhythm, normal heart sounds and intact distal pulses. Pulmonary/Chest: Effort normal and breath sounds normal.       Abdominal: Soft.  Bowel sounds are normal.   Neurological: She is alert and oriented to person, materials - see patient instructions. Discussed use, benefit, and side effects of prescribed medications. All patient questions answered. Pt voiced understanding. Reviewed health maintenance. Instructed tocontinue current medications, diet and exercise. Patient agreed with treatment plan. Follow up as directed.            Electronically signed by NICANOR Bassett on 11/13/2018 at 4:24 PM

## 2018-11-14 ENCOUNTER — TELEPHONE (OUTPATIENT)
Dept: PRIMARY CARE CLINIC | Age: 48
End: 2018-11-14

## 2018-11-14 PROBLEM — L08.9 SKIN INFECTION: Status: ACTIVE | Noted: 2018-11-14

## 2018-11-16 ENCOUNTER — HOSPITAL ENCOUNTER (OUTPATIENT)
Dept: INFUSION THERAPY | Age: 48
Setting detail: INFUSION SERIES
Discharge: HOME | End: 2018-11-16
Payer: COMMERCIAL

## 2018-11-16 VITALS
HEART RATE: 92 BPM | OXYGEN SATURATION: 100 % | DIASTOLIC BLOOD PRESSURE: 87 MMHG | RESPIRATION RATE: 18 BRPM | TEMPERATURE: 98.3 F | SYSTOLIC BLOOD PRESSURE: 133 MMHG

## 2018-11-16 DIAGNOSIS — L08.9 SKIN INFECTION: ICD-10-CM

## 2018-11-16 PROCEDURE — 2580000003 HC RX 258: Performed by: NURSE PRACTITIONER

## 2018-11-16 PROCEDURE — 6360000002 HC RX W HCPCS: Performed by: NURSE PRACTITIONER

## 2018-11-16 PROCEDURE — 96361 HYDRATE IV INFUSION ADD-ON: CPT

## 2018-11-16 PROCEDURE — 96365 THER/PROPH/DIAG IV INF INIT: CPT

## 2018-11-16 RX ORDER — TOPIRAMATE 50 MG/1
100 TABLET, FILM COATED ORAL NIGHTLY
Qty: 60 TABLET | Refills: 2 | Status: SHIPPED | OUTPATIENT
Start: 2018-11-16 | End: 2019-03-14 | Stop reason: SDUPTHER

## 2018-11-16 RX ADMIN — ORITAVANCIN 1200 MG: 400 INJECTION, POWDER, LYOPHILIZED, FOR SOLUTION INTRAVENOUS at 09:46

## 2018-12-05 ENCOUNTER — OFFICE VISIT (OUTPATIENT)
Dept: PRIMARY CARE CLINIC | Age: 48
End: 2018-12-05
Payer: COMMERCIAL

## 2018-12-05 VITALS
TEMPERATURE: 97 F | RESPIRATION RATE: 14 BRPM | SYSTOLIC BLOOD PRESSURE: 130 MMHG | WEIGHT: 189 LBS | HEART RATE: 97 BPM | HEIGHT: 63 IN | DIASTOLIC BLOOD PRESSURE: 84 MMHG | BODY MASS INDEX: 33.49 KG/M2 | OXYGEN SATURATION: 99 %

## 2018-12-05 DIAGNOSIS — L03.211 CELLULITIS OF FACE: Primary | ICD-10-CM

## 2018-12-05 PROCEDURE — 99214 OFFICE O/P EST MOD 30 MIN: CPT | Performed by: NURSE PRACTITIONER

## 2018-12-05 RX ORDER — CHLORHEXIDINE GLUCONATE 4 G/100ML
SOLUTION TOPICAL
Qty: 946 ML | Refills: 1 | Status: SHIPPED | OUTPATIENT
Start: 2018-12-05 | End: 2018-12-19

## 2018-12-05 RX ORDER — CLINDAMYCIN HYDROCHLORIDE 300 MG/1
300 CAPSULE ORAL 3 TIMES DAILY
Qty: 30 CAPSULE | Refills: 0 | Status: SHIPPED | OUTPATIENT
Start: 2018-12-05 | End: 2018-12-15

## 2018-12-05 ASSESSMENT — ENCOUNTER SYMPTOMS
EYES NEGATIVE: 1
GASTROINTESTINAL NEGATIVE: 1
RESPIRATORY NEGATIVE: 1

## 2018-12-05 NOTE — PROGRESS NOTES
[facility] PRIMARY [facility]  [address]  [address] [place] [address]  Dept: [phone]  Dept Fax: [phone]  Loc: [phone]    Delroy Del Valle is a 57 y.o. female who presents today for her medical conditions/complaints as noted below. Delroy Del Valle is c/o of Cellulitis (between eyes-since saturday)      Chief Complaint   Patient presents with    Cellulitis     between eyes-since saturday       HPI:     HPI   Patient here with complaints of cellulitis on her face that started on Saturday. This is her second round of facial cellulitis. She just recently had [facility] outpatient due to facial cellulitis. Patient is demanding referral to infectious disease. She denies any visual issues. Past Medical History:   Diagnosis Date    Anxiety     Depression     Diabetes mellitus ([address]     DM (diabetes mellitus screen)     GERD (gastroesophageal reflux disease)     Headache     Hyperlipidemia     Hypertension     Hypothyroidism     Neuropathy     Osteoarthritis     PONV (postoperative nausea and vomiting)     Staph infection 2018    Type 2 diabetes mellitus without complication (HCC)         Past Surgical History:   Procedure Laterality Date    APPENDECTOMY       SECTION      CHOLECYSTECTOMY      HYSTERECTOMY      NH REVISE MEDIAN N/CARPAL TUNNEL SURG Left 2017    CARPAL TUNNEL RELEASE performed by Gaurav Soliz MD at [address] N/CARPAL TUNNEL SURG Right 2017    CARPAL TUNNEL RELEASE performed by Gaurav Soliz MD at [address]         Social History   Substance Use Topics    Smoking status: Never Smoker    Smokeless tobacco: Never Used    Alcohol use No        Current Outpatient Prescriptions   Medication Sig Dispense Refill    chlorhexidine (HIBICLENS) 4 % external liquid Apply topically daily as needed.  946 mL 1    mupirocin (BACTROBAN) 2 % ointment Apply 1 TO 2 TABLETS BY MOUTH IN THE EVENING 60 tablet 11    venlafaxine (EFFEXOR XR) 150 MG extended release capsule TAKE 1 CAPSULE BY MOUTH DAILY 30 capsule 11    quinapril (ACCUPRIL) 20 MG tablet TAKE 1 TABLET BY MOUTH EVERY NIGHT AT BEDTIME 30 tablet 11    glucose blood VI test strips (ASCENSIA AUTODISC VI;ONE TOUCH ULTRA TEST VI) strip 1 each by In Vitro route daily As needed. 100 each 5    Insulin Pen Needle 32G X 4 MM MISC 1 each by Does not apply route daily 100 each 3    Insulin Pen Needle 29G X 12.7MM MISC 1 each by Does not apply route daily 100 each 3    glucose monitoring kit (FREESTYLE) monitoring kit 1 kit by Does not apply route daily 1 kit 0    Insulin Pen Needle (KROGER PEN NEEDLES 31G) 31G X 8 MM MISC 1 each by Does not apply route daily 100 each 3    Blood Glucose Monitoring Suppl (ACURA BLOOD GLUCOSE METER) w/Device KIT 1 Units by Does not apply route 3 times daily 1 kit 0    Lancets MISC Use three times a day. 100 each 3    ondansetron (ZOFRAN ODT) 4 MG disintegrating tablet Take 1 tablet by mouth every 8 hours as needed for Nausea or Vomiting 10 tablet 0    ASPIRIN LOW DOSE 81 MG EC tablet TAKE 1 TABLET BY MOUTH DAILY 30 tablet 0    clonazePAM (KLONOPIN) 0.5 MG tablet TAKE 1 TABLET BY MOUTH DAILY AS NEEDED. . 30 tablet 0     No current facility-administered medications for this visit. Allergies   Allergen Reactions    Claritin-D 12 Hour [Loratadine-Pseudoephedrine Er]     Demerol Hcl [Meperidine]     Percocet [Oxycodone-Acetaminophen] Rash       Family History   Problem Relation Age of Onset    High Blood Pressure Mother     High Blood Pressure Father     High Cholesterol Father     Diabetes Sister     High Blood Pressure Sister     High Blood Pressure Brother                Subjective:      Review of Systems   Constitutional: Negative. HENT: Negative. Eyes: Negative. Respiratory: Negative. Cardiovascular: Negative. Gastrointestinal: Negative.     Endocrine:

## 2018-12-18 ENCOUNTER — APPOINTMENT (OUTPATIENT)
Dept: CT IMAGING | Age: 48
End: 2018-12-18
Payer: COMMERCIAL

## 2018-12-18 ENCOUNTER — HOSPITAL ENCOUNTER (EMERGENCY)
Age: 48
Discharge: HOME | End: 2018-12-18
Attending: FAMILY MEDICINE
Payer: COMMERCIAL

## 2018-12-18 VITALS
RESPIRATION RATE: 16 BRPM | HEIGHT: 63 IN | HEART RATE: 112 BPM | SYSTOLIC BLOOD PRESSURE: 108 MMHG | DIASTOLIC BLOOD PRESSURE: 73 MMHG | WEIGHT: 179 LBS | OXYGEN SATURATION: 100 % | TEMPERATURE: 97.4 F | BODY MASS INDEX: 31.71 KG/M2

## 2018-12-18 DIAGNOSIS — R55 SYNCOPE AND COLLAPSE: Primary | ICD-10-CM

## 2018-12-18 DIAGNOSIS — R73.9 HYPERGLYCEMIA: ICD-10-CM

## 2018-12-18 LAB
ALBUMIN SERPL-MCNC: 4.2 G/DL (ref 3.5–5.2)
ALP BLD-CCNC: 96 U/L (ref 35–104)
ALT SERPL-CCNC: 30 U/L (ref 5–33)
ANION GAP SERPL CALCULATED.3IONS-SCNC: 10 MMOL/L (ref 7–19)
AST SERPL-CCNC: 61 U/L (ref 5–32)
BASOPHILS ABSOLUTE: 0.1 K/UL (ref 0–0.2)
BASOPHILS RELATIVE PERCENT: 0.7 % (ref 0–1)
BILIRUB SERPL-MCNC: 0.4 MG/DL (ref 0.2–1.2)
BILIRUBIN URINE: NEGATIVE
BLOOD, URINE: NEGATIVE
BUN BLDV-MCNC: 12 MG/DL (ref 6–20)
CALCIUM SERPL-MCNC: 9.4 MG/DL (ref 8.6–10)
CHLORIDE BLD-SCNC: 98 MMOL/L (ref 98–111)
CHP ED QC CHECK: NORMAL
CLARITY: CLEAR
CO2: 26 MMOL/L (ref 22–29)
COLOR: YELLOW
CREAT SERPL-MCNC: 0.6 MG/DL (ref 0.5–0.9)
EOSINOPHILS ABSOLUTE: 0.2 K/UL (ref 0–0.6)
EOSINOPHILS RELATIVE PERCENT: 2.6 % (ref 0–5)
GFR NON-AFRICAN AMERICAN: >60
GLUCOSE BLD-MCNC: 392 MG/DL
GLUCOSE BLD-MCNC: 392 MG/DL (ref 70–99)
GLUCOSE BLD-MCNC: 399 MG/DL (ref 74–109)
GLUCOSE URINE: >=1000 MG/DL
HCT VFR BLD CALC: 34.5 % (ref 37–47)
HEMOGLOBIN: 11.4 G/DL (ref 12–16)
KETONES, URINE: NEGATIVE MG/DL
LEUKOCYTE ESTERASE, URINE: NEGATIVE
LYMPHOCYTES ABSOLUTE: 1.9 K/UL (ref 1.1–4.5)
LYMPHOCYTES RELATIVE PERCENT: 26.3 % (ref 20–40)
MCH RBC QN AUTO: 28.1 PG (ref 27–31)
MCHC RBC AUTO-ENTMCNC: 33 G/DL (ref 33–37)
MCV RBC AUTO: 85.2 FL (ref 81–99)
MONOCYTES ABSOLUTE: 0.4 K/UL (ref 0–0.9)
MONOCYTES RELATIVE PERCENT: 6.1 % (ref 0–10)
NEUTROPHILS ABSOLUTE: 4.6 K/UL (ref 1.5–7.5)
NEUTROPHILS RELATIVE PERCENT: 63.9 % (ref 50–65)
NITRITE, URINE: NEGATIVE
PDW BLD-RTO: 14.6 % (ref 11.5–14.5)
PERFORMED ON: ABNORMAL
PH UA: 6.5
PLATELET # BLD: 136 K/UL (ref 130–400)
PMV BLD AUTO: 9.9 FL (ref 9.4–12.3)
POTASSIUM REFLEX MAGNESIUM: 4 MMOL/L (ref 3.5–5)
PROTEIN UA: NEGATIVE MG/DL
RBC # BLD: 4.05 M/UL (ref 4.2–5.4)
SODIUM BLD-SCNC: 134 MMOL/L (ref 136–145)
SPECIFIC GRAVITY UA: 1.03
TOTAL PROTEIN: 8.1 G/DL (ref 6.6–8.7)
TROPONIN: <0.01 NG/ML (ref 0–0.03)
UROBILINOGEN, URINE: 0.2 E.U./DL
WBC # BLD: 7.3 K/UL (ref 4.8–10.8)

## 2018-12-18 PROCEDURE — 99284 EMERGENCY DEPT VISIT MOD MDM: CPT

## 2018-12-18 PROCEDURE — 84484 ASSAY OF TROPONIN QUANT: CPT

## 2018-12-18 PROCEDURE — 36415 COLL VENOUS BLD VENIPUNCTURE: CPT

## 2018-12-18 PROCEDURE — 80053 COMPREHEN METABOLIC PANEL: CPT

## 2018-12-18 PROCEDURE — 99284 EMERGENCY DEPT VISIT MOD MDM: CPT | Performed by: FAMILY MEDICINE

## 2018-12-18 PROCEDURE — 6370000000 HC RX 637 (ALT 250 FOR IP): Performed by: FAMILY MEDICINE

## 2018-12-18 PROCEDURE — 2580000003 HC RX 258: Performed by: FAMILY MEDICINE

## 2018-12-18 PROCEDURE — 70450 CT HEAD/BRAIN W/O DYE: CPT

## 2018-12-18 PROCEDURE — 85025 COMPLETE CBC W/AUTO DIFF WBC: CPT

## 2018-12-18 PROCEDURE — 81003 URINALYSIS AUTO W/O SCOPE: CPT

## 2018-12-18 PROCEDURE — 93005 ELECTROCARDIOGRAM TRACING: CPT

## 2018-12-18 PROCEDURE — 82948 REAGENT STRIP/BLOOD GLUCOSE: CPT

## 2018-12-18 RX ORDER — 0.9 % SODIUM CHLORIDE 0.9 %
1000 INTRAVENOUS SOLUTION INTRAVENOUS ONCE
Status: COMPLETED | OUTPATIENT
Start: 2018-12-18 | End: 2018-12-18

## 2018-12-18 RX ADMIN — SODIUM CHLORIDE 1000 ML: 9 INJECTION, SOLUTION INTRAVENOUS at 15:12

## 2018-12-18 RX ADMIN — INSULIN HUMAN 8 UNITS: 100 INJECTION, SOLUTION PARENTERAL at 15:56

## 2018-12-18 ASSESSMENT — ENCOUNTER SYMPTOMS
ABDOMINAL DISTENTION: 0
DIARRHEA: 0
WHEEZING: 0
CONSTIPATION: 0
TROUBLE SWALLOWING: 0
VOMITING: 0
COUGH: 0
SORE THROAT: 0
ABDOMINAL PAIN: 0
CHEST TIGHTNESS: 0
BACK PAIN: 0
APNEA: 0
SHORTNESS OF BREATH: 0

## 2018-12-21 RX ORDER — QUINAPRIL 20 MG/1
TABLET ORAL
Qty: 30 TABLET | Refills: 0 | Status: SHIPPED | OUTPATIENT
Start: 2018-12-21 | End: 2019-01-22 | Stop reason: SDUPTHER

## 2018-12-24 LAB
EKG P AXIS: 29 DEGREES
EKG P-R INTERVAL: 162 MS
EKG Q-T INTERVAL: 362 MS
EKG QRS DURATION: 92 MS
EKG QTC CALCULATION (BAZETT): 409 MS
EKG T AXIS: 0 DEGREES

## 2019-01-10 RX ORDER — ATORVASTATIN CALCIUM 20 MG/1
TABLET, FILM COATED ORAL
Qty: 30 TABLET | Refills: 2 | Status: SHIPPED | OUTPATIENT
Start: 2019-01-10 | End: 2019-03-31 | Stop reason: SDUPTHER

## 2019-01-18 ENCOUNTER — TELEPHONE (OUTPATIENT)
Dept: PRIMARY CARE CLINIC | Age: 49
End: 2019-01-18

## 2019-01-22 ENCOUNTER — OFFICE VISIT (OUTPATIENT)
Dept: GASTROENTEROLOGY | Age: 49
End: 2019-01-22
Payer: COMMERCIAL

## 2019-01-22 VITALS
HEART RATE: 101 BPM | SYSTOLIC BLOOD PRESSURE: 120 MMHG | BODY MASS INDEX: 33.13 KG/M2 | OXYGEN SATURATION: 98 % | WEIGHT: 187 LBS | HEIGHT: 63 IN | DIASTOLIC BLOOD PRESSURE: 70 MMHG

## 2019-01-22 DIAGNOSIS — E11.9 TYPE 2 DIABETES MELLITUS WITHOUT COMPLICATION, WITH LONG-TERM CURRENT USE OF INSULIN (HCC): ICD-10-CM

## 2019-01-22 DIAGNOSIS — R12 CHRONIC HEARTBURN: ICD-10-CM

## 2019-01-22 DIAGNOSIS — K59.09 CHRONIC CONSTIPATION: Primary | ICD-10-CM

## 2019-01-22 DIAGNOSIS — R13.10 DYSPHAGIA, UNSPECIFIED TYPE: ICD-10-CM

## 2019-01-22 DIAGNOSIS — R10.30 LOWER ABDOMINAL PAIN: ICD-10-CM

## 2019-01-22 DIAGNOSIS — K62.5 BRBPR (BRIGHT RED BLOOD PER RECTUM): ICD-10-CM

## 2019-01-22 DIAGNOSIS — Z79.4 TYPE 2 DIABETES MELLITUS WITHOUT COMPLICATION, WITH LONG-TERM CURRENT USE OF INSULIN (HCC): ICD-10-CM

## 2019-01-22 DIAGNOSIS — Z83.71 FAMILY HISTORY OF POLYPS IN THE COLON: ICD-10-CM

## 2019-01-22 PROBLEM — Z83.719 FAMILY HISTORY OF POLYPS IN THE COLON: Status: ACTIVE | Noted: 2019-01-22

## 2019-01-22 PROCEDURE — 99213 OFFICE O/P EST LOW 20 MIN: CPT | Performed by: NURSE PRACTITIONER

## 2019-01-22 RX ORDER — QUINAPRIL 20 MG/1
TABLET ORAL
Qty: 30 TABLET | Refills: 0 | Status: SHIPPED | OUTPATIENT
Start: 2019-01-22 | End: 2019-02-19 | Stop reason: SDUPTHER

## 2019-01-22 ASSESSMENT — ENCOUNTER SYMPTOMS
COUGH: 1
TROUBLE SWALLOWING: 1
VOMITING: 0
ABDOMINAL DISTENTION: 0
SORE THROAT: 0
BACK PAIN: 1
DIARRHEA: 0
SHORTNESS OF BREATH: 1
VOICE CHANGE: 0
RECTAL PAIN: 0
ANAL BLEEDING: 1
BLOOD IN STOOL: 0
CONSTIPATION: 1
ABDOMINAL PAIN: 1
NAUSEA: 0

## 2019-01-31 ENCOUNTER — LAB REQUISITION (OUTPATIENT)
Dept: LAB | Facility: HOSPITAL | Age: 49
End: 2019-01-31

## 2019-01-31 DIAGNOSIS — Z00.00 ENCOUNTER FOR GENERAL ADULT MEDICAL EXAMINATION WITHOUT ABNORMAL FINDINGS: ICD-10-CM

## 2019-01-31 PROCEDURE — 87070 CULTURE OTHR SPECIMN AEROBIC: CPT | Performed by: INTERNAL MEDICINE

## 2019-01-31 PROCEDURE — 87186 SC STD MICRODIL/AGAR DIL: CPT | Performed by: INTERNAL MEDICINE

## 2019-01-31 PROCEDURE — 87205 SMEAR GRAM STAIN: CPT | Performed by: INTERNAL MEDICINE

## 2019-01-31 PROCEDURE — 87185 SC STD ENZYME DETCJ PER NZM: CPT | Performed by: INTERNAL MEDICINE

## 2019-01-31 PROCEDURE — 87147 CULTURE TYPE IMMUNOLOGIC: CPT | Performed by: INTERNAL MEDICINE

## 2019-02-02 LAB
B-LACTAMASE USUAL SUSC ISLT: POSITIVE
BACTERIA SPEC AEROBE CULT: ABNORMAL
GRAM STN SPEC: ABNORMAL
GRAM STN SPEC: ABNORMAL

## 2019-02-15 DIAGNOSIS — R05.9 COUGH: ICD-10-CM

## 2019-02-15 RX ORDER — DEXTROMETHORPHAN HYDROBROMIDE AND PROMETHAZINE HYDROCHLORIDE 15; 6.25 MG/5ML; MG/5ML
SYRUP ORAL
Qty: 120 ML | Refills: 0 | Status: SHIPPED | OUTPATIENT
Start: 2019-02-15 | End: 2019-02-22

## 2019-02-19 RX ORDER — QUINAPRIL 20 MG/1
TABLET ORAL
Qty: 30 TABLET | Refills: 0 | Status: SHIPPED | OUTPATIENT
Start: 2019-02-19 | End: 2019-03-19 | Stop reason: SDUPTHER

## 2019-03-14 RX ORDER — TOPIRAMATE 50 MG/1
100 TABLET, FILM COATED ORAL NIGHTLY
Qty: 60 TABLET | Refills: 2 | Status: SHIPPED | OUTPATIENT
Start: 2019-03-14 | End: 2019-04-04 | Stop reason: SDUPTHER

## 2019-03-29 DIAGNOSIS — F41.9 ANXIETY: Primary | ICD-10-CM

## 2019-03-29 RX ORDER — VENLAFAXINE HYDROCHLORIDE 150 MG/1
150 CAPSULE, EXTENDED RELEASE ORAL DAILY
Qty: 30 CAPSULE | Refills: 11 | Status: SHIPPED | OUTPATIENT
Start: 2019-03-29 | End: 2020-03-05

## 2019-04-01 RX ORDER — ATORVASTATIN CALCIUM 20 MG/1
TABLET, FILM COATED ORAL
Qty: 30 TABLET | Refills: 0 | Status: SHIPPED | OUTPATIENT
Start: 2019-04-09 | End: 2019-04-04 | Stop reason: SDUPTHER

## 2019-04-04 ENCOUNTER — OFFICE VISIT (OUTPATIENT)
Dept: PRIMARY CARE CLINIC | Age: 49
End: 2019-04-04
Payer: COMMERCIAL

## 2019-04-04 ENCOUNTER — HOSPITAL ENCOUNTER (OUTPATIENT)
Dept: GENERAL RADIOLOGY | Age: 49
Discharge: HOME | End: 2019-04-04
Payer: COMMERCIAL

## 2019-04-04 ENCOUNTER — TELEPHONE (OUTPATIENT)
Dept: PRIMARY CARE CLINIC | Age: 49
End: 2019-04-04

## 2019-04-04 VITALS
BODY MASS INDEX: 34.23 KG/M2 | DIASTOLIC BLOOD PRESSURE: 72 MMHG | HEIGHT: 62 IN | HEART RATE: 104 BPM | TEMPERATURE: 96.3 F | SYSTOLIC BLOOD PRESSURE: 122 MMHG | OXYGEN SATURATION: 99 % | WEIGHT: 186 LBS

## 2019-04-04 DIAGNOSIS — F41.9 ANXIETY: ICD-10-CM

## 2019-04-04 DIAGNOSIS — E78.5 HYPERLIPIDEMIA, UNSPECIFIED HYPERLIPIDEMIA TYPE: ICD-10-CM

## 2019-04-04 DIAGNOSIS — Z76.0 MEDICATION REFILL: ICD-10-CM

## 2019-04-04 DIAGNOSIS — J40 BRONCHITIS: ICD-10-CM

## 2019-04-04 DIAGNOSIS — E03.9 HYPOTHYROIDISM, UNSPECIFIED TYPE: ICD-10-CM

## 2019-04-04 DIAGNOSIS — Z79.4 TYPE 2 DIABETES MELLITUS WITHOUT COMPLICATION, WITH LONG-TERM CURRENT USE OF INSULIN (HCC): Chronic | ICD-10-CM

## 2019-04-04 DIAGNOSIS — E11.9 TYPE 2 DIABETES MELLITUS WITHOUT COMPLICATION, WITH LONG-TERM CURRENT USE OF INSULIN (HCC): Chronic | ICD-10-CM

## 2019-04-04 DIAGNOSIS — I10 WELL-CONTROLLED HYPERTENSION: Primary | ICD-10-CM

## 2019-04-04 PROCEDURE — 71046 X-RAY EXAM CHEST 2 VIEWS: CPT

## 2019-04-04 PROCEDURE — 99215 OFFICE O/P EST HI 40 MIN: CPT | Performed by: NURSE PRACTITIONER

## 2019-04-04 RX ORDER — TOPIRAMATE 50 MG/1
100 TABLET, FILM COATED ORAL NIGHTLY
Qty: 60 TABLET | Refills: 2 | Status: SHIPPED | OUTPATIENT
Start: 2019-04-04 | End: 2019-06-23 | Stop reason: SDUPTHER

## 2019-04-04 RX ORDER — AZITHROMYCIN 250 MG/1
TABLET, FILM COATED ORAL
Qty: 1 PACKET | Refills: 0 | Status: SHIPPED | OUTPATIENT
Start: 2019-04-04 | End: 2019-04-14

## 2019-04-04 RX ORDER — BLOOD SUGAR DIAGNOSTIC
1 STRIP MISCELLANEOUS DAILY
Qty: 100 EACH | Refills: 3 | Status: SHIPPED | OUTPATIENT
Start: 2019-04-04

## 2019-04-04 RX ORDER — MELOXICAM 15 MG/1
15 TABLET ORAL DAILY
Qty: 30 TABLET | Refills: 11 | Status: SHIPPED | OUTPATIENT
Start: 2019-04-04 | End: 2019-09-30

## 2019-04-04 RX ORDER — ATORVASTATIN CALCIUM 20 MG/1
20 TABLET, FILM COATED ORAL DAILY
Qty: 30 TABLET | Refills: 5 | Status: ON HOLD | OUTPATIENT
Start: 2019-04-04 | End: 2019-10-20 | Stop reason: SDUPTHER

## 2019-04-04 RX ORDER — DEXTROMETHORPHAN HYDROBROMIDE AND PROMETHAZINE HYDROCHLORIDE 15; 6.25 MG/5ML; MG/5ML
5 SYRUP ORAL 4 TIMES DAILY PRN
Qty: 120 ML | Refills: 0 | Status: SHIPPED | OUTPATIENT
Start: 2019-04-04 | End: 2019-04-09

## 2019-04-04 RX ORDER — FAMOTIDINE 20 MG/1
20 TABLET, FILM COATED ORAL 2 TIMES DAILY
Qty: 30 TABLET | Refills: 11 | Status: SHIPPED | OUTPATIENT
Start: 2019-04-04 | End: 2020-03-23 | Stop reason: SDUPTHER

## 2019-04-04 RX ORDER — LEVOTHYROXINE SODIUM 0.12 MG/1
TABLET ORAL
Qty: 30 TABLET | Refills: 11 | Status: SHIPPED | OUTPATIENT
Start: 2019-04-04 | End: 2020-03-23 | Stop reason: SDUPTHER

## 2019-04-04 RX ORDER — TRAZODONE HYDROCHLORIDE 100 MG/1
TABLET ORAL
Qty: 30 TABLET | Refills: 2 | Status: SHIPPED | OUTPATIENT
Start: 2019-04-04 | End: 2019-06-01 | Stop reason: SDUPTHER

## 2019-04-04 RX ORDER — MONTELUKAST SODIUM 10 MG/1
TABLET ORAL
Qty: 30 TABLET | Refills: 1 | Status: SHIPPED | OUTPATIENT
Start: 2019-04-04 | End: 2019-05-28 | Stop reason: SDUPTHER

## 2019-04-04 ASSESSMENT — PATIENT HEALTH QUESTIONNAIRE - PHQ9
1. LITTLE INTEREST OR PLEASURE IN DOING THINGS: 0
2. FEELING DOWN, DEPRESSED OR HOPELESS: 0
SUM OF ALL RESPONSES TO PHQ QUESTIONS 1-9: 0
SUM OF ALL RESPONSES TO PHQ QUESTIONS 1-9: 0
SUM OF ALL RESPONSES TO PHQ9 QUESTIONS 1 & 2: 0

## 2019-04-04 NOTE — PROGRESS NOTES
2019     Chante Weinstein (:  1970) is a 54 y.o. female, here for evaluation of the following medical concerns:  Chief Complaint   Patient presents with    Medication Refill    Cough     coughing up yellow/green mucus    Chest Congestion     Started over the past weekend with Wheezing    Headache     HPI  Treatment Adherence:   Medication compliance:  compliant all of the time  Diet compliance:  compliant most of the time  Weight trend: stable  Current exercise: no regular exercise  Barriers: lack of motivation    Diabetes Mellitus Type 2: Current symptoms/problems include none. Home blood sugar records: fasting range: 100-170, when testing! However today her A1c is greater than 14. Any episodes of hypoglycemia? no  Eye exam current (within one year): yes  Tobacco history: She  reports that she has never smoked. She has never used smokeless tobacco.   Daily Aspirin? No:    Hypertension:  Home blood pressure monitoring: No.  She is adherent to a low sodium diet. Patient denies chest pain, lightheadedness and palpitations. Antihypertensive medication side effects: no medication side effects noted. Use of agents associated with hypertension: none. Hyperlipidemia:  No new myalgias or GI upset on atorvastatin (Lipitor). Lab Results   Component Value Date    LABA1C 10.9 2018    LABA1C 10.4 2018    LABA1C 11.2 2018     Lab Results   Component Value Date    LABMICR 3.70 2018    CREATININE 0.6 2018     Lab Results   Component Value Date    ALT 30 2018    AST 61 (H) 2018     Lab Results   Component Value Date    CHOL 216 (H) 2018    TRIG 1,030 (H) 2018    HDL 24 (L) 2018    LDLCALC see below 2018    LDLDIRECT 64 (L) 2018        Hypothyroidism: Recent symptoms: none. She denies none. Patient is  taking her medication consistently on an empty stomach.     No results found for: [facility]  Lab Results   Component Value Medications    Medication Sig Taking?  Authorizing Provider   SITagliptin (JANUVIA) 100 MG tablet TAKE 1 TABLET BY MOUTH DAILY Yes NICANOR Isaac   atorvastatin (LIPITOR) 20 MG tablet Take 1 tablet by mouth daily Yes NICANOR Isaac   famotidine (PEPCID) 20 MG tablet Take 1 tablet by mouth 2 times daily Yes NICANOR Isaac   Insulin Degludec 200 UNIT/ML SOPN Inject 60 Units into the skin daily Yes NICANOR Isaac   Insulin Pen Needle 32G X 4 MM MISC 1 each by Does not apply route daily Yes NICANOR Isaac   levothyroxine (SYNTHROID) 125 MCG tablet TAKE 1 TABLET BY MOUTH DAILY Yes NICANOR Isaac   Insulin Syringe-Needle U-100 30G X 5/16\" 0.5 ML MISC 1 each by Does not apply route daily Yes NICANOR Isaac   Insulin Pen Needle 29G X 12.7MM MISC 1 each by Does not apply route daily Yes NICANOR Isaac   meloxicam (MOBIC) 15 MG tablet Take 1 tablet by mouth daily Yes NICANOR Isaac   metFORMIN (GLUCOPHAGE) 1000 MG tablet TAKE 1 TABLET BY MOUTH TWICE DAILY WITH MEALS Yes NICANOR Isaac   montelukast (SINGULAIR) 10 MG tablet TAKE 1 TABLET BY MOUTH EVERY DAY Yes NICANOR Isaac   insulin regular (NOVOLIN R) 100 UNIT/ML injection INJECT 2 UNITS UNDER THE SKIN FOR BLOOD SUGAR 149-199, 4 UNITS -299, 6 UNITS -349, 10 UNITS -400 Yes NICANOR Isaac   topiramate (TOPAMAX) 50 MG tablet Take 2 tablets by mouth nightly Yes NICANOR Isaac   traZODone (DESYREL) 100 MG tablet TAKE 1 TABLET BY MOUTH EVERY NIGHT Yes NICANOR Isaac   azithromycin (ZITHROMAX Z-NICK) 250 MG tablet Take as directed Yes NICANOR Isaac   promethazine-dextromethorphan (PROMETHAZINE-DM) 6.25-15 MG/5ML syrup Take 5 mLs by mouth 4 times daily as needed for Cough Yes NICANOR Isaac   venlafaxine (EFFEXOR XR) 150 MG extended release capsule Take 1 capsule by mouth daily Yes Tarri Backbone, APRN   quinapril (ACCUPRIL) 20 MG tablet TAKE 1 TABLET BY MOUTH EVERY NIGHT AT BEDTIME Head: Normocephalic and atraumatic. Eyes: Pupils are equal, round, and reactive to light. Neck: Normal range of motion. Neck supple. Cardiovascular: Normal rate, regular rhythm, normal heart sounds and intact distal pulses. Pulmonary/Chest: Effort normal and breath sounds normal.   Abdominal: Soft. Bowel sounds are normal.   Musculoskeletal: Normal range of motion. Neurological: She is alert and oriented to person, place, and time. Skin: Skin is warm and dry. Psychiatric: She has a normal mood and affect. Her behavior is normal.   Nursing note and vitals reviewed. ASSESSMENT/PLAN:    Lukasz Haji was seen today for medication refill, cough, chest congestion and headache. Diagnoses and all orders for this visit:    Well-controlled hypertension  -     Comprehensive Metabolic Panel, Fasting; Future    Type 2 diabetes mellitus without complication, with long-term current use of insulin ([facility] [address]  Comments: With the patient's medication list make sure she is taking diabetic medication as prescribed. In reality I'm not sure she was taking it appropriately. Orders:  -     SITagliptin (JANUVIA) 100 MG tablet; TAKE 1 TABLET BY MOUTH DAILY  -     Insulin Degludec 200 UNIT/ML SOPN; Inject 60 Units into the skin daily  -     Insulin Pen Needle 32G X 4 MM MISC; 1 each by Does not apply route daily  -     Insulin Syringe-Needle U-100 30G X 5/16\" 0.5 ML MISC; 1 each by Does not apply route daily  -     Insulin Pen Needle 29G X 12.7MM MISC; 1 each by Does not apply route daily  -     insulin regular (NOVOLIN R) 100 UNIT/ML injection; INJECT 2 UNITS UNDER THE SKIN FOR BLOOD SUGAR 149-199, 4 UNITS -299, 6 UNITS -349, 10 UNITS -400  -     CBC; Future  -     Comprehensive Metabolic Panel, Fasting; Future  -     Hemoglobin A1C; Future    Medication refill  -     famotidine (PEPCID) 20 MG tablet; Take 1 tablet by mouth 2 times daily  -     meloxicam (MOBIC) 15 MG tablet;  Take 1 tablet by mouth daily  -     montelukast (SINGULAIR) 10 MG tablet; TAKE 1 TABLET BY MOUTH EVERY DAY  -     topiramate (TOPAMAX) 50 MG tablet; Take 2 tablets by mouth nightly  -     traZODone (DESYREL) 100 MG tablet; TAKE 1 TABLET BY MOUTH EVERY NIGHT    Anxiety  -     traZODone (DESYREL) 100 MG tablet; TAKE 1 TABLET BY MOUTH EVERY NIGHT    Hyperlipidemia, unspecified hyperlipidemia type  -     atorvastatin (LIPITOR) 20 MG tablet; Take 1 tablet by mouth daily  -     Lipid Panel; Future    Bronchitis  -     XR CHEST STANDARD (2 VW); Future  -     azithromycin (ZITHROMAX Z-NICK) 250 MG tablet; Take as directed  -     promethazine-dextromethorphan (PROMETHAZINE-DM) 6.25-15 MG/5ML syrup; Take 5 mLs by mouth 4 times daily as needed for Cough    Hypothyroidism, unspecified type  -     levothyroxine (SYNTHROID) 125 MCG tablet; TAKE 1 TABLET BY MOUTH DAILY  -     TSH without Reflex; Future  -     T4; Future    Other orders  -     metFORMIN (GLUCOPHAGE) 1000 MG tablet; TAKE 1 TABLET BY MOUTH TWICE DAILY WITH MEALS      I will have her check her blood glucose thoroughly fasting and around meals as well as periodic postprandials. Reordered lab work including an A1c to ensure that is correct. Also provided patient information on a strict diabetic diet. He is to bring her blood glucose readings with her to a follow-up appointment with her PCP and have lab work completed before that appointment. I have advised her if she is taking her medications exactly as directed and all of her current diabetic medications and her blood sugar is still consistently running high to contact the office sooner. Did thoroughly discussed with the patient the importance of glycemic control, diet and exercise. This was a 40 minute visit with at least 50% spent counseling and medication review.       Diabetic Care/AT HOME INSTRUCTIONS    Special At Home Instructions for Patients with Diabetes:    HOME CARE INSTRUCTIONS:   Test your blood sugar   Follow a healthy meal plan   Follow your health-care providers advice for exercise   Take your medications   Keep good records of medications and blood glucose monitoring.  Keep follow-up appointment with your doctor   Try to manage stress   Know how to manage Diabetes on sick days   Know causes, ymptoms and treatment of high/low blood sugar.  Know how to test for ketones (Type I)    TAKING YOUR MEDICINES:   Take your medicines at the time your doctor ordered.  Do not skip a dose of your medicines  If you miss a dose of medicine, take it as soon as possible,   but DO NOT DOUBLE A DOSE   Read your medicine information when you get home  o Know all side effects of your medicines  o Call your doctors office if you have any side effects   Plan ahead so you do not run out of medicine.  Be sure all your doctors know medicines and herbs that you take (including cold, flu, and herbal medicines)   Carry a list of your medicines with you. LIFE STYLE CHANGES:   Follow your meal plan as directed.  Break the smoking habit.  Do not skip meals   Take all your medications   Wear Diabetes ID Bracelet   Stay active (Follow doctors recommendations for starting exercise program)   Monitor blood sugar and report to physician blood sugars that are out of target range   Get yearly dilated eye exam   Get dental exam every six months.    Do daily foot exam    QUIT SMOKING/TOBACCO USE:   It is important for you to quit smoking   Please talk with your doctor and ask about different ways for you to stop    DIABETES PATIENTS: Seek care immediately if:  o You are having trouble staying awake or focusing on thing  o You are shaking or sweating  o You have blurred or double vision  o Your breath has a fruity sweet smell or your breathing is shallow (not deep)  o Your heartbeat is fast and weak  o Blood surgar is above 240 mg/dl or less then 70 mg.dl.  o Unrelieved shortness of breath  o Chest pain or discomfort      Return in

## 2019-04-04 NOTE — TELEPHONE ENCOUNTER
----- Message from NICANOR North sent at 4/4/2019  4:04 PM CDT -----  Please notify patient of normal results.

## 2019-04-04 NOTE — TELEPHONE ENCOUNTER
My Chart message sent    Please notify patient of normal results. Result Notes for XR CHEST STANDARD (2 VW)     Notes recorded by NICANOR Rojas on 4/4/2019 at 4:04 PM CDT  Please notify patient of normal results. XR CHEST STANDARD (2 VW)   Order: [id number]   Status:  Final result   Visible to patient:  Yes (MyChart) Dx:  Bronchitis   Details     Reading Physician Reading Date Result Priority   Vipin Reina MD 4/4/2019       Narrative     Examination. XR CHEST (2 VW)  History: History of bronchitis. Frontal and lateral views of the chest are compared with the previous  study dated 12/5/2016. The lungs are poorly inflated. There is no evidence of recent infiltrate, pleural effusion, pulmonary  congestion or pneumothorax. The heart size in the normal range. No  bony abnormality. Impression     No active cardiopulmonary disease.   Signed by Dr Tammy George on 4/4/2019 3:13 PM             Last Resulted: 04/04/19 15:13        Order Details       View Encounter       Lab and Collection Details       Routing       Result History

## 2019-04-08 ASSESSMENT — ENCOUNTER SYMPTOMS
CONSTIPATION: 0
NAUSEA: 0
APNEA: 0
VOMITING: 0
COUGH: 1
DIARRHEA: 0
CHEST TIGHTNESS: 0
RHINORRHEA: 1
WHEEZING: 1
SHORTNESS OF BREATH: 0
ABDOMINAL DISTENTION: 0
BACK PAIN: 0
COLOR CHANGE: 0
ABDOMINAL PAIN: 0

## 2019-04-22 ENCOUNTER — TELEPHONE (OUTPATIENT)
Dept: PRIMARY CARE CLINIC | Age: 49
End: 2019-04-22

## 2019-04-22 NOTE — TELEPHONE ENCOUNTER
Pt called stated that she was out of her [facility] and was needing a PA completed in order to get anymore refills.     1 Sample given    LOT [id number]  EXP 07/2020

## 2019-04-22 NOTE — TELEPHONE ENCOUNTER
Patient came to  the sample. She had a question about when she runs out of the sample, what does she do next? I called the nurse line and Erika helped me with the question. Erika told me that they will call the insurance tomorrow because they are currently closed today and that is all we can do at this point. Also, we told the patient that PA's can take anywhere between 7-12 days to get done. The patient was not pleased with this for she needs the insulin and medication she received during her last visit to keep her blood sugar down. We told her to keep take the sample and then continue to take the medication after that. The patient said that won't work, the medication alone makes her blood sugar go way up. We kept going back and forth with her on all we can do. None of it was good for her. She was being very uncompliant. Finally, she stated that she wants a referral to a specialist since we can't seem to take care of her here. I told her to get that done, we would need to make an appointment here. She said ok, but somehow the subject changed back to the PA. She then ended with, can someone call her tomorrow in regards to the PA after we call her insurance company. I told her yes and that if she wants to make a referral later, to give us a call, so we can make an appointment. Patient stated understanding.

## 2019-04-25 ENCOUNTER — TELEPHONE (OUTPATIENT)
Dept: PRIMARY CARE CLINIC | Age: 49
End: 2019-04-25

## 2019-04-25 NOTE — TELEPHONE ENCOUNTER
This [facility] called the patients insurance after her calling very upset because she came here to the office on 04/22/2019, stating that she was told a PA was being done months ago on her tresiba. She spoke with Stas Ch about it and was told that a PA would be started and that someone would call her within a day or 2. Patient had not heard back and feels like she is not getting the care that she needs. The sample that she was given on 04/22/2019 is gone and her BS are extremely high. THis [facility] called the insurance and did the PA as urgent over the phone. Ishmaelomkar stated that they will give us a answer within 24 hours. This [facility] called the patient back and explained what is going on and that someone from our office will call her tomorrow and keep her updated. She was told that if we can not get it approved, that we would try toujeo. Patient stated understanding.

## 2019-04-29 ENCOUNTER — TELEPHONE (OUTPATIENT)
Dept: PRIMARY CARE CLINIC | Age: 49
End: 2019-04-29

## 2019-04-29 DIAGNOSIS — E11.00 TYPE 2 DIABETES MELLITUS WITH HYPEROSMOLARITY WITHOUT COMA, WITHOUT LONG-TERM CURRENT USE OF INSULIN (HCC): Primary | ICD-10-CM

## 2019-04-29 DIAGNOSIS — I10 ESSENTIAL HYPERTENSION: ICD-10-CM

## 2019-04-29 NOTE — TELEPHONE ENCOUNTER
William [facility] was denied again. I will start an appeal, but patient has a sample of toujeo in the fridge due to it being the closes to [facility]. Patient wants a referral to endocrinology, so this [facility] has started that process also.

## 2019-04-29 NOTE — TELEPHONE ENCOUNTER
This [facility] received denial for her tresiba. She is going to come and  some Toujeo since that is the closes to [facility] and we are out of [facility] samples. Patient has asked for a referral to Endocrinologist, so this [facility] has started the process.

## 2019-05-01 ENCOUNTER — TELEPHONE (OUTPATIENT)
Dept: PRIMARY CARE CLINIC | Age: 49
End: 2019-05-01

## 2019-05-01 NOTE — TELEPHONE ENCOUNTER
Cover my meds called,spoke with Danish Azevedo and they have put a new form in for patients PA for [facility] with Key reference [id number] and to call with questions at [phone]  Information given to me and PA initiated  Erika had previously done a PA through cover my meds,came back denied. Cyndi Martins reviewed next phone call from patient and did PA over phone with insurance company and is pending peer to peer.

## 2019-05-06 RX ORDER — AMITRIPTYLINE HYDROCHLORIDE 25 MG/1
TABLET, FILM COATED ORAL
Qty: 60 TABLET | Refills: 11 | Status: SHIPPED | OUTPATIENT
Start: 2019-05-06 | End: 2020-04-06

## 2019-05-08 ENCOUNTER — TELEPHONE (OUTPATIENT)
Dept: PRIMARY CARE CLINIC | Age: 49
End: 2019-05-08

## 2019-05-08 NOTE — TELEPHONE ENCOUNTER
We need to find out what insulin will be covered under her INS. She has to be on something for her DM. If we have a sample we can get her this until we figure out what is going on with her INS. This has been ongoing for multiple weeks and the patient does not have resolution yet.

## 2019-05-08 NOTE — TELEPHONE ENCOUNTER
Patient called and reports Keny Sahu was given Toujeo sample,told to take once daily and now her blood sugars are running in the 400's and is out of insulin. Patient reports Rachel Farideh has been working on [place] for [facility]. Informed may come to office for Toujeo sample  Patient reports has not heard on endocrinology appointment yet.    Called and spoke with Dina Crain and she reports they never received referral  (fax confirmation in chart) refaxed

## 2019-05-09 NOTE — TELEPHONE ENCOUNTER
Spoke with Insurance and levemir or lantus are covered.    Called patient,discussed and new rx sent to pharmacy

## 2019-05-10 ENCOUNTER — TELEPHONE (OUTPATIENT)
Dept: PRIMARY CARE CLINIC | Age: 49
End: 2019-05-10

## 2019-05-10 NOTE — TELEPHONE ENCOUNTER
Called [facility] [employer]mark related to PA through cover my meds was not approved. Spoke with Michelle Begum and information given,PA initiated and office should hear from [facility] with approval or denial with in 24 hours.

## 2019-05-12 ENCOUNTER — OFFICE VISIT (OUTPATIENT)
Dept: URGENT CARE | Age: 49
End: 2019-05-12
Payer: COMMERCIAL

## 2019-05-12 VITALS
WEIGHT: 192 LBS | TEMPERATURE: 98.4 F | SYSTOLIC BLOOD PRESSURE: 118 MMHG | OXYGEN SATURATION: 99 % | RESPIRATION RATE: 18 BRPM | BODY MASS INDEX: 35.12 KG/M2 | DIASTOLIC BLOOD PRESSURE: 76 MMHG | HEART RATE: 102 BPM

## 2019-05-12 DIAGNOSIS — M25.512 ACUTE PAIN OF LEFT SHOULDER: ICD-10-CM

## 2019-05-12 DIAGNOSIS — S46.912A STRAIN OF LEFT SHOULDER, INITIAL ENCOUNTER: ICD-10-CM

## 2019-05-12 PROCEDURE — 99213 OFFICE O/P EST LOW 20 MIN: CPT | Performed by: NURSE PRACTITIONER

## 2019-05-12 RX ORDER — NAPROXEN SODIUM 550 MG/1
550 TABLET ORAL 2 TIMES DAILY WITH MEALS
Qty: 60 TABLET | Refills: 3 | Status: SHIPPED | OUTPATIENT
Start: 2019-05-12 | End: 2019-09-30 | Stop reason: SDUPTHER

## 2019-05-12 ASSESSMENT — ENCOUNTER SYMPTOMS
EYE DISCHARGE: 0
SORE THROAT: 0
VOMITING: 0
DIARRHEA: 0
WHEEZING: 0
EYE REDNESS: 0
COUGH: 1

## 2019-05-12 NOTE — PATIENT INSTRUCTIONS
Patient Education        Shoulder Pain: Care Instructions  Your Care Instructions    You can hurt your shoulder by using it too much during an activity, such as fishing or baseball. It can also happen as part of the everyday wear and tear of getting older. Shoulder injuries can be slow to heal, but your shoulder should get better with time. Your doctor may recommend a sling to rest your shoulder. If you have injured your shoulder, you may need testing and treatment. Follow-up care is a key part of your treatment and safety. Be sure to make and go to all appointments, and call your doctor if you are having problems. It's also a good idea to know your test results and keep a list of the medicines you take. How can you care for yourself at home? · Take pain medicines exactly as directed. ? If the doctor gave you a prescription medicine for pain, take it as prescribed. ? If you are not taking a prescription pain medicine, ask your doctor if you can take an over-the-counter medicine. ? Do not take two or more pain medicines at the same time unless the doctor told you to. Many pain medicines contain acetaminophen, which is Tylenol. Too much acetaminophen (Tylenol) can be harmful. · If your doctor recommends that you wear a sling, use it as directed. Do not take it off before your doctor tells you to. · Put ice or a cold pack on the sore area for 10 to 20 minutes at a time. Put a thin cloth between the ice and your skin. · If there is no swelling, you can put moist heat, a heating pad, or a warm cloth on your shoulder. Some doctors suggest alternating between hot and cold. · Rest your shoulder for a few days. If your doctor recommends it, you can then begin gentle exercise of the shoulder, but do not lift anything heavy. When should you call for help? Call 911 anytime you think you may need emergency care. For example, call if:    · You have chest pain or pressure.  This may occur with:  ? Sweating. ? Shortness of breath. ? Nausea or vomiting. ? Pain that spreads from the chest to the neck, jaw, or one or both shoulders or arms. ? Dizziness or lightheadedness. ? A fast or uneven pulse. After calling 911, chew 1 adult-strength aspirin. Wait for an ambulance. Do not try to drive yourself.     · Your arm or hand is cool or pale or changes color.    Call your doctor now or seek immediate medical care if:    · You have signs of infection, such as:  ? Increased pain, swelling, warmth, or redness in your shoulder. ? Red streaks leading from a place on your shoulder. ? Pus draining from an area of your shoulder. ? Swollen lymph nodes in your neck, armpits, or groin. ? A fever.    Watch closely for changes in your health, and be sure to contact your doctor if:    · You cannot use your shoulder.     · Your shoulder does not get better as expected. Where can you learn more? Go to https://virtual tweens ltd.[employer]. org and sign in to your [employer] account. Enter N273 in the [employer] box to learn more about \"Shoulder Pain: Care Instructions. \"     If you do not have an account, please click on the \"Sign Up Now\" link. Current as of: September 20, 2018  Content Version: 12.0  © 5252-3136 Healthwise, Incorporated. Care instructions adapted under license by [facility] ([facility]). If you have questions about a medical condition or this instruction, always ask your healthcare professional. [place] [address] any warranty or liability for your use of this information. 1. Take Anaprox  twice a day with food. Do not take Mobic or Naproxen. 2. Apply warm pads to left shoulder twice a day. 3. Call Layne VICK in the morning for an appointment.

## 2019-05-12 NOTE — PROGRESS NOTES
[address] [facility]  [address] [facility]  [address] [place] [id number]  Dept: [phone]  Loc: [phone]     Ele Jamil is a 54 y.o. female who presents today for her medical conditions/complaintsas noted below. Ele Jamil is c/o of Shoulder Pain (Left shoulder pain. Patient reports started yesterday while in shower and felt a pop )        HPI:   Patient reports that while she was showering she felt a pull in her left shoulder when reaching over her head. The left shoulder has been tender and it is painful to lift her arm over her head. She has been using a warm pad and has been taking Mobic. She does not remember any injury. Shoulder Pain    The pain is present in the left shoulder. This is a new problem. The current episode started yesterday. There has been no history of extremity trauma. The problem occurs daily. The problem has been unchanged. The quality of the pain is described as aching. The pain is at a severity of 2/10. The pain is mild. Associated symptoms include a limited range of motion. Pertinent negatives include no fever or numbness. She has tried NSAIDS for the symptoms. The treatment provided mild relief.           Past Medical History:   Diagnosis Date    Anxiety     Depression     Diabetes mellitus ([address]     DM (diabetes mellitus screen)     GERD (gastroesophageal reflux disease)     Headache     Hyperlipidemia     Hypertension     Hypothyroidism     Neuropathy     Osteoarthritis     PONV (postoperative nausea and vomiting)     Staph infection 2018    Type 2 diabetes mellitus without complication (HCC)       Past Surgical History:   Procedure Laterality Date    APPENDECTOMY       SECTION      CHOLECYSTECTOMY      COLONOSCOPY      more than 15 + yrs ago    HYSTERECTOMY      [place] REVISE MEDIAN N/CARPAL TUNNEL SURG Left 2017    CARPAL TUNNEL RELEASE performed by Pawan Horan MD at [address] [facility] REVISE MEDIAN N/CARPAL TUNNEL SURG Right 7/21/2017    CARPAL TUNNEL RELEASE performed by Bev Milligan MD at [address]         Family History   Problem Relation Age of Onset    High Blood Pressure Mother     High Blood Pressure Father     High Cholesterol Father     Colon Polyps Father     Diabetes Sister     High Blood Pressure Sister     High Blood Pressure Brother     Colon Cancer Maternal Aunt     Colon Cancer Paternal Uncle     Colon Cancer Paternal Grandfather     Esophageal Cancer Neg Hx     Liver Cancer Neg Hx     Liver Disease Neg Hx     Rectal Cancer Neg Hx     Stomach Cancer Neg Hx        Social History     Tobacco Use    Smoking status: Never Smoker    Smokeless tobacco: Never Used   Substance Use Topics    Alcohol use: No     Alcohol/week: 0.0 oz      Current Outpatient Medications   Medication Sig Dispense Refill    naproxen sodium (ANAPROX) 550 MG tablet Take 1 tablet by mouth 2 times daily (with meals) 60 tablet 3    insulin glargine (LANTUS SOLOSTAR) 100 UNIT/ML injection pen Inject 65 Units into the skin 2 times daily 5 pen 3    amitriptyline (ELAVIL) 25 MG tablet TAKE 1 TO 2 TABLETS BY MOUTH IN THE EVENING 60 tablet 11    SITagliptin (JANUVIA) 100 MG tablet TAKE 1 TABLET BY MOUTH DAILY 30 tablet 2    famotidine (PEPCID) 20 MG tablet Take 1 tablet by mouth 2 times daily 30 tablet 11    Insulin Degludec 200 UNIT/ML SOPN Inject 60 Units into the skin daily 6 pen 1    Insulin Pen Needle 32G X 4 MM MISC 1 each by Does not apply route daily 100 each 3    levothyroxine (SYNTHROID) 125 MCG tablet TAKE 1 TABLET BY MOUTH DAILY 30 tablet 11    Insulin Syringe-Needle U-100 30G X 5/16\" 0.5 ML MISC 1 each by Does not apply route daily 100 each 3    Insulin Pen Needle 29G X 12.7MM MISC 1 each by Does not apply route daily 100 each 3    meloxicam (MOBIC) 15 MG tablet Take 1 tablet by mouth daily 30 tablet 11    metFORMIN (GLUCOPHAGE) 1000 MG tablet TAKE 1 TABLET BY MOUTH TWICE DAILY WITH MEALS 60 tablet 5    montelukast (SINGULAIR) 10 MG tablet TAKE 1 TABLET BY MOUTH EVERY DAY 30 tablet 1    insulin regular (NOVOLIN R) 100 UNIT/ML injection INJECT 2 UNITS UNDER THE SKIN FOR BLOOD SUGAR 149-199, 4 UNITS -299, 6 UNITS -349, 10 UNITS -400 10 mL 2    topiramate (TOPAMAX) 50 MG tablet Take 2 tablets by mouth nightly 60 tablet 2    traZODone (DESYREL) 100 MG tablet TAKE 1 TABLET BY MOUTH EVERY NIGHT 30 tablet 2    venlafaxine (EFFEXOR XR) 150 MG extended release capsule Take 1 capsule by mouth daily 30 capsule 11    quinapril (ACCUPRIL) 20 MG tablet TAKE 1 TABLET BY MOUTH EVERY NIGHT AT BEDTIME 30 tablet 2    ONE TOUCH ULTRA TEST strip USE ONCE DAILY AS DIRECTED 100 strip 0    linaclotide (LINZESS) 145 MCG capsule Take 1 capsule by mouth every morning (before breakfast) 30 capsule 11    Continuous Blood Gluc  (DEXCOM  KIT) YOUSUF 1 kit  Dx: DM type 2 1 Device 0    Continuous Glucose Monitor Sup MISC Device for continuous monitoring kit 4 each 1    triamcinolone (NASACORT ALLERGY 24HR) 55 MCG/ACT nasal inhaler 2 sprays by Nasal route daily 1 Inhaler 1    glucose monitoring kit (FREESTYLE) monitoring kit 1 kit by Does not apply route daily 1 kit 0    Blood Glucose Monitoring Suppl (ACURA BLOOD GLUCOSE METER) w/Device KIT 1 Units by Does not apply route 3 times daily 1 kit 0    Lancets MISC Use three times a day. 100 each 3    ondansetron (ZOFRAN ODT) 4 MG disintegrating tablet Take 1 tablet by mouth every 8 hours as needed for Nausea or Vomiting 10 tablet 0    ASPIRIN LOW DOSE 81 MG EC tablet TAKE 1 TABLET BY MOUTH DAILY 30 tablet 0    atorvastatin (LIPITOR) 20 MG tablet Take 1 tablet by mouth daily 30 tablet 5    clonazePAM (KLONOPIN) 0.5 MG tablet TAKE 1 TABLET BY MOUTH DAILY AS NEEDED. . 30 tablet 0     No current facility-administered medications for this visit.       Allergies   Allergen Reactions    Claritin-D 12 Hour [Loratadine-Pseudoephedrine Er]     Demerol Hcl [Meperidine]     Percocet [Oxycodone-Acetaminophen] Rash       Health Maintenance   Topic Date Due    Hepatitis B Vaccine (1 of 3 - Risk 3-dose series) 02/26/1991    Diabetic retinal exam  11/02/2016    Diabetic foot exam  08/15/2018    Cervical cancer screen  11/02/2018    A1C test (Diabetic or Prediabetic)  12/28/2018    Diabetic microalbuminuria test  07/06/2019    Lipid screen  07/06/2019    TSH testing  07/06/2019    Flu vaccine (Season Ended) 09/01/2019    Potassium monitoring  12/18/2019    Creatinine monitoring  12/18/2019    DTaP/Tdap/Td vaccine (2 - Td) 04/14/2026    Pneumococcal 0-64 years Vaccine  Completed    HIV screen  Addressed       Subjective:     Review of Systems   Constitutional: Negative for appetite change and fever. HENT: Negative for congestion and sore throat. Eyes: Negative for discharge and redness. Respiratory: Positive for cough. Negative for wheezing. Gastrointestinal: Negative for diarrhea and vomiting. Musculoskeletal: Positive for myalgias. Skin: Negative for rash. Neurological: Negative for numbness. Objective:     Physical Exam   Constitutional: She is oriented to person, place, and time. Vital signs are normal. She appears well-developed and well-nourished. She is cooperative. HENT:   Head: Normocephalic and atraumatic. Right Ear: Hearing, tympanic membrane, external ear and ear canal normal.   Left Ear: Hearing, tympanic membrane, external ear and ear canal normal.   Nose: No rhinorrhea. Mouth/Throat: Uvula is midline and oropharynx is clear and moist.   Eyes: Pupils are equal, round, and reactive to light. Conjunctivae are normal. Right eye exhibits no discharge. Left eye exhibits no discharge. Neck: Normal range of motion. Neck supple. Cardiovascular: Normal rate, regular rhythm and normal heart sounds.    Pulmonary/Chest: Effort normal and breath sounds normal. you are having problems. It's also a good idea to know your test results and keep a list of the medicines you take. How can you care for yourself at home? · Take pain medicines exactly as directed. ? If the doctor gave you a prescription medicine for pain, take it as prescribed. ? If you are not taking a prescription pain medicine, ask your doctor if you can take an over-the-counter medicine. ? Do not take two or more pain medicines at the same time unless the doctor told you to. Many pain medicines contain acetaminophen, which is Tylenol. Too much acetaminophen (Tylenol) can be harmful. · If your doctor recommends that you wear a sling, use it as directed. Do not take it off before your doctor tells you to. · Put ice or a cold pack on the sore area for 10 to 20 minutes at a time. Put a thin cloth between the ice and your skin. · If there is no swelling, you can put moist heat, a heating pad, or a warm cloth on your shoulder. Some doctors suggest alternating between hot and cold. · Rest your shoulder for a few days. If your doctor recommends it, you can then begin gentle exercise of the shoulder, but do not lift anything heavy. When should you call for help? Call 911 anytime you think you may need emergency care. For example, call if:    · You have chest pain or pressure. This may occur with:  ? Sweating. ? Shortness of breath. ? Nausea or vomiting. ? Pain that spreads from the chest to the neck, jaw, or one or both shoulders or arms. ? Dizziness or lightheadedness. ? A fast or uneven pulse. After calling 911, chew 1 adult-strength aspirin. Wait for an ambulance. Do not try to drive yourself.     · Your arm or hand is cool or pale or changes color.    Call your doctor now or seek immediate medical care if:    · You have signs of infection, such as:  ? Increased pain, swelling, warmth, or redness in your shoulder. ? Red streaks leading from a place on your shoulder.   ? Pus draining from an area of your shoulder. ? Swollen lymph nodes in your neck, armpits, or groin. ? A fever.    Watch closely for changes in your health, and be sure to contact your doctor if:    · You cannot use your shoulder.     · Your shoulder does not get better as expected. Where can you learn more? Go to https://chpepiceweb.[employer]. org and sign in to your [employer] account. Enter S193 in the [facility] box to learn more about \"Shoulder Pain: Care Instructions. \"     If you do not have an account, please click on the \"Sign Up Now\" link. Current as of: September 20, 2018  Content Version: 12.0  © 2403-3702 Healthwise, [employer]. Care instructions adapted under license by [facility] ([facility]). If you have questions about a medical condition or this instruction, always ask your healthcare professional. [place] [address] any warranty or liability for your use of this information. 1. Take Anaprox  twice a day with food. Do not take Mobic or Naproxen. 2. Apply warm pads to left shoulder twice a day. 3. Call Layne VICK in the morning for an appointment.         Electronically signed by NICANOR Roach NP on 5/12/2019 at 2:54 PM

## 2019-05-13 ENCOUNTER — HOSPITAL ENCOUNTER (OUTPATIENT)
Dept: GENERAL RADIOLOGY | Age: 49
Discharge: HOME | End: 2019-05-13
Payer: COMMERCIAL

## 2019-05-13 DIAGNOSIS — S46.912A STRAIN OF LEFT SHOULDER, INITIAL ENCOUNTER: Primary | ICD-10-CM

## 2019-05-13 DIAGNOSIS — S46.912A STRAIN OF LEFT SHOULDER, INITIAL ENCOUNTER: ICD-10-CM

## 2019-05-13 DIAGNOSIS — M25.512 ACUTE PAIN OF LEFT SHOULDER: ICD-10-CM

## 2019-05-13 PROCEDURE — 73030 X-RAY EXAM OF SHOULDER: CPT

## 2019-05-14 ENCOUNTER — TELEPHONE (OUTPATIENT)
Dept: PRIMARY CARE CLINIC | Age: 49
End: 2019-05-14

## 2019-05-22 ENCOUNTER — OFFICE VISIT (OUTPATIENT)
Dept: PRIMARY CARE CLINIC | Age: 49
End: 2019-05-22
Payer: COMMERCIAL

## 2019-05-22 VITALS
DIASTOLIC BLOOD PRESSURE: 70 MMHG | TEMPERATURE: 98 F | RESPIRATION RATE: 18 BRPM | SYSTOLIC BLOOD PRESSURE: 118 MMHG | OXYGEN SATURATION: 98 % | HEART RATE: 84 BPM | HEIGHT: 62 IN | WEIGHT: 195.4 LBS | BODY MASS INDEX: 35.96 KG/M2

## 2019-05-22 DIAGNOSIS — L03.211 CELLULITIS OF FACE: ICD-10-CM

## 2019-05-22 DIAGNOSIS — Z79.4 TYPE 2 DIABETES MELLITUS WITHOUT COMPLICATION, WITH LONG-TERM CURRENT USE OF INSULIN (HCC): Primary | ICD-10-CM

## 2019-05-22 DIAGNOSIS — E11.9 TYPE 2 DIABETES MELLITUS WITHOUT COMPLICATION, WITH LONG-TERM CURRENT USE OF INSULIN (HCC): Primary | ICD-10-CM

## 2019-05-22 DIAGNOSIS — M19.012 ARTHRITIS OF LEFT SHOULDER REGION: ICD-10-CM

## 2019-05-22 DIAGNOSIS — R30.0 DYSURIA: ICD-10-CM

## 2019-05-22 LAB
APPEARANCE FLUID: NORMAL
BILIRUBIN, POC: NORMAL
BLOOD URINE, POC: NORMAL
CLARITY, POC: CLEAR
COLOR, POC: YELLOW
GLUCOSE URINE, POC: NORMAL
KETONES, POC: NORMAL
LEUKOCYTE EST, POC: NORMAL
NITRITE, POC: NORMAL
PH, POC: 7
PROTEIN, POC: NORMAL
SPECIFIC GRAVITY, POC: 1.02
UROBILINOGEN, POC: 1

## 2019-05-22 PROCEDURE — 81002 URINALYSIS NONAUTO W/O SCOPE: CPT | Performed by: NURSE PRACTITIONER

## 2019-05-22 PROCEDURE — 99214 OFFICE O/P EST MOD 30 MIN: CPT | Performed by: NURSE PRACTITIONER

## 2019-05-22 RX ORDER — TIZANIDINE 2 MG/1
2 TABLET ORAL EVERY 8 HOURS PRN
Qty: 60 TABLET | Refills: 0 | Status: SHIPPED | OUTPATIENT
Start: 2019-05-22 | End: 2020-07-15 | Stop reason: SDUPTHER

## 2019-05-22 RX ORDER — CLINDAMYCIN HYDROCHLORIDE 300 MG/1
300 CAPSULE ORAL 2 TIMES DAILY
Qty: 14 CAPSULE | Refills: 0 | Status: SHIPPED | OUTPATIENT
Start: 2019-05-22 | End: 2019-05-29

## 2019-05-22 ASSESSMENT — ENCOUNTER SYMPTOMS
RESPIRATORY NEGATIVE: 1
EYES NEGATIVE: 1
GASTROINTESTINAL NEGATIVE: 1

## 2019-05-22 NOTE — PROGRESS NOTES
[facility]  [address] [facility]  [address] [facility] [address]  Dept: [phone]  Dept Fax: [phone]  Loc: [phone]    Twila Alexander is a 54 y.o. female who presents today for her medical conditions/complaints as noted below. Twila Alexander is c/o of Diabetes (1 mo follow up)      Chief Complaint   Patient presents with    Diabetes     1 mo follow up       HPI:     HPI  Patient here her follow up on DM. Patient was noted to have a1c greater than 14 during last office visit with Griselda Crimes, APRN. She has been monitoring blood sugars at home. They were greater than 500, but she has gotten some improvement. She is using Novolin as well. She has been monitoring levels at home and they have been improving.      Past Medical History:   Diagnosis Date    Anxiety     Depression     Diabetes mellitus ([address]     DM (diabetes mellitus screen)     GERD (gastroesophageal reflux disease)     Headache     Hyperlipidemia     Hypertension     Hypothyroidism     Neuropathy     Osteoarthritis     PONV (postoperative nausea and vomiting)     Staph infection 2018    Type 2 diabetes mellitus without complication (HCC)         Past Surgical History:   Procedure Laterality Date    APPENDECTOMY       SECTION      CHOLECYSTECTOMY      COLONOSCOPY      more than 15 + yrs ago    HYSTERECTOMY      LA REVISE MEDIAN N/CARPAL TUNNEL SURG Left 2017    CARPAL TUNNEL RELEASE performed by Bev Milligan MD at [address] N/CARPAL TUNNEL SURG Right 2017    CARPAL TUNNEL RELEASE performed by Bev Milligan MD at [address]         Social History     Tobacco Use    Smoking status: Never Smoker    Smokeless tobacco: Never Used   Substance Use Topics    Alcohol use: No     Alcohol/week: 0.0 oz        Current Outpatient Medications   Medication Sig Dispense Refill    clindamycin (CLEOCIN) 300 MG capsule Take 1 capsule by mouth 2 times daily for 7 days 14 capsule 0    tiZANidine (ZANAFLEX) 2 MG tablet Take 1 tablet by mouth every 8 hours as needed (spasm) 60 tablet 0    naproxen sodium (ANAPROX) 550 MG tablet Take 1 tablet by mouth 2 times daily (with meals) 60 tablet 3    amitriptyline (ELAVIL) 25 MG tablet TAKE 1 TO 2 TABLETS BY MOUTH IN THE EVENING 60 tablet 11    SITagliptin (JANUVIA) 100 MG tablet TAKE 1 TABLET BY MOUTH DAILY 30 tablet 2    atorvastatin (LIPITOR) 20 MG tablet Take 1 tablet by mouth daily 30 tablet 5    famotidine (PEPCID) 20 MG tablet Take 1 tablet by mouth 2 times daily 30 tablet 11    Insulin Degludec 200 UNIT/ML SOPN Inject 60 Units into the skin daily 6 pen 1    Insulin Pen Needle 32G X 4 MM MISC 1 each by Does not apply route daily 100 each 3    levothyroxine (SYNTHROID) 125 MCG tablet TAKE 1 TABLET BY MOUTH DAILY 30 tablet 11    Insulin Syringe-Needle U-100 30G X 5/16\" 0.5 ML MISC 1 each by Does not apply route daily 100 each 3    meloxicam (MOBIC) 15 MG tablet Take 1 tablet by mouth daily 30 tablet 11    metFORMIN (GLUCOPHAGE) 1000 MG tablet TAKE 1 TABLET BY MOUTH TWICE DAILY WITH MEALS 60 tablet 5    montelukast (SINGULAIR) 10 MG tablet TAKE 1 TABLET BY MOUTH EVERY DAY 30 tablet 1    insulin regular (NOVOLIN R) 100 UNIT/ML injection INJECT 2 UNITS UNDER THE SKIN FOR BLOOD SUGAR 149-199, 4 UNITS -299, 6 UNITS -349, 10 UNITS -400 10 mL 2    topiramate (TOPAMAX) 50 MG tablet Take 2 tablets by mouth nightly 60 tablet 2    traZODone (DESYREL) 100 MG tablet TAKE 1 TABLET BY MOUTH EVERY NIGHT 30 tablet 2    venlafaxine (EFFEXOR XR) 150 MG extended release capsule Take 1 capsule by mouth daily 30 capsule 11    quinapril (ACCUPRIL) 20 MG tablet TAKE 1 TABLET BY MOUTH EVERY NIGHT AT BEDTIME 30 tablet 2    ONE TOUCH ULTRA TEST strip USE ONCE DAILY AS DIRECTED 100 strip 0    linaclotide (LINZESS) 145 MCG capsule Take 1 capsule by mouth every membrane, external ear and ear canal normal.   Left Ear: Hearing, tympanic membrane, external ear and ear canal normal.   Nose: Nose normal.   Mouth/Throat: Uvula is midline, oropharynx is clear and moist and mucous membranes are normal.   Eyes: Pupils are equal, round, and reactive to light. Conjunctivae, EOM and lids are normal.   Neck: Trachea normal and normal range of motion. Neck supple. No thyroid mass and no thyromegaly present. Cardiovascular: Normal rate, regular rhythm, normal heart sounds and intact distal pulses. Pulmonary/Chest: Effort normal and breath sounds normal.   Abdominal: Soft. Normal appearance and bowel sounds are normal.   Musculoskeletal:        Left shoulder: She exhibits decreased range of motion and tenderness. Cervical back: Normal. She exhibits normal range of motion and no tenderness. Thoracic back: Normal. She exhibits normal range of motion and no tenderness. Lumbar back: Normal. She exhibits normal range of motion and no tenderness. Neurological: She is alert and oriented to person, place, and time. She has normal strength. Skin: Skin is warm, dry and intact. Psychiatric: She has a normal mood and affect. Her speech is normal and behavior is normal. Judgment and thought content normal. Cognition and memory are normal.   Nursing note and vitals reviewed. /70   Pulse 84   Temp 98 °F (36.7 °C) (Temporal)   Resp 18   Ht 5' 2\" (1.575 m)   Wt 195 lb 6.4 oz (88.6 kg)   LMP  (LMP Unknown)   SpO2 98%   BMI 35.74 kg/m²     Assessment:      Diagnosis Orders   1. Type 2 diabetes mellitus without complication, with long-term current use of insulin ([address]     2. Dysuria  POCT Urinalysis no Micro   3. Cellulitis of face  clindamycin (CLEOCIN) 300 MG capsule   4.  Arthritis of left shoulder region  tiZANidine (ZANAFLEX) 2 MG tablet       Results for orders placed or performed in visit on 05/22/19   POCT Urinalysis no Micro   Result Value Ref Range Color, UA Yellow     Clarity, UA Clear     Glucose, UA POC Neg     Bilirubin, UA Neg     Ketones, UA Neg     Spec Grav, UA 1.025     Blood, UA POC Neg     pH, UA 7.0     Protein, UA POC Neg     Urobilinogen, UA 1.0     Leukocytes, UA Neg     Nitrite, UA Neg     Appearance, Fluid  Clear, Slightly Cloudy       Plan: We will continue Blaise has ordered. Clindamycin due to repeat cellulitis of the face. UA was negative. Likely chronic aches and pains on low back discomfort. Return to clinic in 3 months to monitor A1c, she is to continue monitoring diet as discussed. Return in about 3 months (around 8/22/2019) for Diabetic Follow up, POCT A1c. Orders Placed This Encounter   Procedures    POCT Urinalysis no Micro       Orders Placed This Encounter   Medications    clindamycin (CLEOCIN) 300 MG capsule     Sig: Take 1 capsule by mouth 2 times daily for 7 days     Dispense:  14 capsule     Refill:  0    tiZANidine (ZANAFLEX) 2 MG tablet     Sig: Take 1 tablet by mouth every 8 hours as needed (spasm)     Dispense:  60 tablet     Refill:  0        Patient offered educational handouts and has had all questions answered. Patient voices understanding and agrees to plans along with risks and benefits of plan. Patient is instructed to continue prior meds, diet, and exercise plans as instructed. Patient agrees to follow up as instructed and sooner if needed. Patient agrees to go to ER if condition becomes emergent. EMR Dragon/transcription disclaimer: Some of this encounter note is an electronic transcription/translation of spoken language to printed text. The electronic translation of spoken language may permit erroneous, or at times, nonsensical words or phrases to be inadvertently transcribed.  Although I have reviewed the note for such errors, some may still exist.    Electronically signed by NICANOR Mckenzie on 5/22/2019 at 4:25 PM

## 2019-05-24 DIAGNOSIS — R23.2 HOT FLASHES: ICD-10-CM

## 2019-05-24 DIAGNOSIS — E78.5 HYPERLIPIDEMIA, UNSPECIFIED HYPERLIPIDEMIA TYPE: ICD-10-CM

## 2019-05-24 DIAGNOSIS — Z79.4 TYPE 2 DIABETES MELLITUS WITHOUT COMPLICATION, WITH LONG-TERM CURRENT USE OF INSULIN (HCC): Chronic | ICD-10-CM

## 2019-05-24 DIAGNOSIS — E11.9 TYPE 2 DIABETES MELLITUS WITHOUT COMPLICATION, WITH LONG-TERM CURRENT USE OF INSULIN (HCC): Chronic | ICD-10-CM

## 2019-05-24 DIAGNOSIS — I10 WELL-CONTROLLED HYPERTENSION: ICD-10-CM

## 2019-05-24 DIAGNOSIS — E03.9 HYPOTHYROIDISM, UNSPECIFIED TYPE: ICD-10-CM

## 2019-05-24 LAB
ESTRADIOL LEVEL: 191.5 PG/ML
FOLLICLE STIMULATING HORMONE: 4.7 MIU/ML
HBA1C MFR BLD: 9.1 % (ref 4–6)
HCT VFR BLD CALC: 38.8 % (ref 37–47)
HEMOGLOBIN: 12 G/DL (ref 12–16)
MCH RBC QN AUTO: 27 PG (ref 27–31)
MCHC RBC AUTO-ENTMCNC: 30.9 G/DL (ref 33–37)
MCV RBC AUTO: 87.4 FL (ref 81–99)
PDW BLD-RTO: 14.2 % (ref 11.5–14.5)
PLATELET # BLD: 210 K/UL (ref 130–400)
PMV BLD AUTO: 9.8 FL (ref 9.4–12.3)
PROGESTERONE LEVEL: 0.84 NG/ML
RBC # BLD: 4.44 M/UL (ref 4.2–5.4)
WBC # BLD: 9.9 K/UL (ref 4.8–10.8)

## 2019-05-25 LAB
ALBUMIN SERPL-MCNC: 4 G/DL (ref 3.5–5.2)
ALP BLD-CCNC: 94 U/L (ref 35–104)
ALT SERPL-CCNC: 19 U/L (ref 5–33)
ANION GAP SERPL CALCULATED.3IONS-SCNC: 14 MMOL/L (ref 7–19)
AST SERPL-CCNC: 35 U/L (ref 5–32)
BILIRUB SERPL-MCNC: 0.3 MG/DL (ref 0.2–1.2)
BUN BLDV-MCNC: 15 MG/DL (ref 6–20)
CALCIUM SERPL-MCNC: 9.5 MG/DL (ref 8.6–10)
CHLORIDE BLD-SCNC: 106 MMOL/L (ref 98–111)
CHOLESTEROL, TOTAL: 111 MG/DL (ref 160–199)
CO2: 19 MMOL/L (ref 22–29)
CREAT SERPL-MCNC: 0.6 MG/DL (ref 0.5–0.9)
GFR NON-AFRICAN AMERICAN: >60
GLUCOSE FASTING: 82 MG/DL (ref 74–109)
HDLC SERPL-MCNC: 29 MG/DL (ref 65–121)
LDL CHOLESTEROL CALCULATED: 39 MG/DL
POTASSIUM SERPL-SCNC: 4.8 MMOL/L (ref 3.5–5)
SODIUM BLD-SCNC: 139 MMOL/L (ref 136–145)
T4 TOTAL: 6.8 UG/DL (ref 4.5–11.7)
TOTAL PROTEIN: 8.1 G/DL (ref 6.6–8.7)
TRIGL SERPL-MCNC: 213 MG/DL (ref 0–149)
TSH SERPL DL<=0.05 MIU/L-ACNC: 1.23 UIU/ML (ref 0.27–4.2)

## 2019-05-28 DIAGNOSIS — Z76.0 MEDICATION REFILL: ICD-10-CM

## 2019-05-28 LAB
DHEA: 1.21 NG/ML (ref 0.63–4.7)
TESTOSTERONE, FEMALES/CHILDREN: 20 NG/DL (ref 9–55)

## 2019-05-28 RX ORDER — MONTELUKAST SODIUM 10 MG/1
TABLET ORAL
Qty: 30 TABLET | Refills: 0 | Status: SHIPPED | OUTPATIENT
Start: 2019-05-28 | End: 2019-06-23 | Stop reason: SDUPTHER

## 2019-05-31 ENCOUNTER — TELEPHONE (OUTPATIENT)
Dept: PRIMARY CARE CLINIC | Age: 49
End: 2019-05-31

## 2019-05-31 DIAGNOSIS — S46.912A STRAIN OF LEFT SHOULDER, INITIAL ENCOUNTER: Primary | ICD-10-CM

## 2019-05-31 NOTE — TELEPHONE ENCOUNTER
results. I recommend referral to orthopedic [place] she does have some degenerative arthritis of her shoulder. I would also recommend physical therapy if the patient is willing to do that.  Anti-inflammatories are also beneficial

## 2019-06-06 ENCOUNTER — TELEPHONE (OUTPATIENT)
Dept: PRIMARY CARE CLINIC | Age: 49
End: 2019-06-06

## 2019-06-06 NOTE — TELEPHONE ENCOUNTER
My chart message      Please notify patient of abnormal results. Focus on diet and exercise primarily for HDL    Associated Results     Result Notes for Lipid Panel     Notes recorded by NICANOR Rosales on 5/29/2019 at 11:50 AM CDT  Please notify patient of abnormal results. Focus on diet and exercise primarily for HDL   Contains abnormal data Lipid Panel   Order: [id number]   Status:  Final result   Visible to patient:  Yes (MyChart) Dx:  Hyperlipidemia, unspecified hyperlipi. .. Ref Range & Units 13d ago 11mo ago 3yr ago   Cholesterol, Total 160 - 199 mg/dL 111Low   216High  CM 120Low  CM   Comment: <160 MG/DL=OPTIMAL     160-199 MG/DL= DESIRABLE     200-239 MG/DL=BORDERLINE-INCREASED RISK OF ATHEROSCLEROTIC   CARDIOVASCULAR DISEASE     > OR = 240 MG/DL-ASSOCIATED WITH AN INCREASED RISK OF   ATHROSCLEROTIC CARDIOVASCULAR DISEASE    Triglycerides 0 - 149 mg/dL 213High   1,030High   272High  R   HDL 65 - 121 mg/dL 29Low   24Low  CM 33Low  CM   Comment: VALUES>60 MG/DL ARE ASSOCIATED WITH A DECREASED RISK OF   ATHEROSCLEROTIC CARDIOVASCULAR DISEASE    LDL Calculated <100 mg/dL 39  see below CM 33 CM   Comment: <100 MG/DL=OPITIMAL     100-129 MG/DL=DESIRABLE     130-159 MG/DL BORDERLINE=INCREASED RISK OF ATHEROSCLEROTIC   CARDIOVASCULAR DISEASE     > OR = 160 MG/DL=ASSOCIATED WITH AN INCREASE RISK OF   ATHEROSCLEROTIC CARDIOVASCULAR DISEASE    Resulting Agency  [address] [place] [address] Lab         Specimen Collected: 05/24/19 07:46 Last Resulted: 05/25/19 03:19         Lab Flowsheet       Order Details       View Encounter       Lab and Collection Details       Routing       Result History         CM=Additional comments  R=Reference range differs from displayed range           Result Notes for TSH without Reflex     Notes recorded by NICANOR Rosales on 5/29/2019 at 11:50 AM CDT  Please notify patient of abnormal results.   Focus on diet and exercise primarily for HDL   TSH without Reflex   Order: [id number]   Status:  Final result   Visible to patient:  Yes (Finn) Dx:  Hypothyroidism, unspecified type    Ref Range & Units 13d ago 11mo ago 1yr ago   TSH 0.270 - 4.200 uIU/mL 1.230  2.800  1.09 R   Resulting Agency  [facility] Lab         Specimen Collected: 05/24/19 07:46 Last Resulted: 05/25/19 03:04         Lab Flowsheet       Order Details       View Encounter       Lab and Collection Details       Routing       Result History         R=Reference range differs from displayed range           Result Notes for T4     Notes recorded by NICANOR Rogers on 5/29/2019 at 11:50 AM CDT  Please notify patient of abnormal results. Focus on diet and exercise primarily for HDL   T4   Order: [id number]   Status:  Final result   Visible to patient:  Yes (Finn) Dx:  Hypothyroidism, unspecified type    Ref Range & Units 13d ago   T4, Total 4.5 - 11.7 ug/dL 6.8    Resulting Agency  [facility] Lab         Specimen Collected: 05/24/19 07:46 Last Resulted: 05/25/19 03:04         Lab Flowsheet       Order Details       View Encounter       Lab and Collection Details       Routing       Result History               Result Notes for Comprehensive Metabolic Panel, Fasting     Notes recorded by NICANOR Rogers on 5/29/2019 at 11:50 AM CDT  Please notify patient of abnormal results. Focus on diet and exercise primarily for HDL   Contains abnormal data Comprehensive Metabolic Panel, Fasting   Order: [id number]   Status:  Final result   Visible to patient:  Yes (Finn) Dx:  Well-controlled hypertension; Type 2 ...     Ref Range & Units 13d ago 5mo ago 6mo ago   Sodium 136 - 145 mmol/L 139  134Low   137    Potassium 3.5 - 5.0 mmol/L 4.8  4.0  4.0    Chloride 98 - 111 mmol/L 106  98  102    CO2 22 - 29 mmol/L 19Low   26  21Low     Anion Gap 7 - 19 mmol/L 14  10  14    Glucose, Fasting 74 - 109 mg/dL 82      BUN 6 - 20 mg/dL 15  12  10    CREATININE 0.5 - 0.9 mg/dL 0.6  0.6  0.5    GFR Non-African American >60 >60  >60 CM >60 CM   Comment: This calculation may be inaccurate for patients under the age of 25 years. For ages 25 and older, a GFR >60 mL/min/1.73m2 (not corrected for weight) is   valid for stable renal function. Calcium 8.6 - 10.0 mg/dL 9.5  9.4  9.4    Total Protein 6.6 - 8.7 g/dL 8.1  8.1  8.1    Alb 3.5 - 5.2 g/dL 4.0  4.2  4.1    Total Bilirubin 0.2 - 1.2 mg/dL 0.3  0.4  0.6    Alkaline Phosphatase 35 - 104 U/L 94  96  98    ALT 5 - 33 U/L 19  30  44High     AST 5 - 32 U/L 35High   61High   94High     Resulting Agency  [address] [place] [address] Lab         Specimen Collected: 05/24/19 07:46 Last Resulted: 05/25/19 02:52         Lab Flowsheet       Order Details       View Encounter       Lab and Collection Details       Routing       Result History         CM=Additional comments           Result Notes for Hemoglobin A1C     Notes recorded by NICANOR Gamez on 5/29/2019 at 11:50 AM CDT  Please notify patient of abnormal results. Focus on diet and exercise primarily for HDL   Contains abnormal data Hemoglobin A1C   Order: [id number]   Status:  Final result   Visible to patient:  Yes (MyChart) Dx:  Type 2 diabetes mellitus without comp. .. Ref Range & Units 13d ago 8mo ago 11mo ago   Hemoglobin A1C 4.0 - 6.0 % 9.1High   10.9 R 10.4 R   Comment: HbA1c levels >6% are an indication of hyperglycemia during the preceding 2   to 3 months or longer. HbA1c levels may reach 20% or higher in poorly controlled diabetes. Therapeutic action is suggested at levels above 8%. Diabetes patients with HbA1c levels below 7% meet the goal of the American   Diabetes Association.      HbA1c levels below the established reference range may indicate recent   episodes of hypoglycemia, the presence of Hb variants, or shortened lifetime   of erythrocytes.     Resulting Agency [facility] Lab           Specimen Collected: 05/24/19 07:46 Last Resulted: 05/24/19 10:16         Lab Flowsheet       Order Details       View Encounter       Lab and Collection Details       Routing       Result History         R=Reference range differs from displayed range           Result Notes for CBC     Notes recorded by NICANOR Reinoso on 5/29/2019 at 11:50 AM CDT  Please notify patient of abnormal results. Focus on diet and exercise primarily for HDL   Contains abnormal data CBC   Order: [id number]   Status:  Final result   Visible to patient:  Yes (MyChart) Dx:  Type 2 diabetes mellitus without comp. .. Ref Range & Units 13d ago 5mo ago 6mo ago   WBC 4.8 - 10.8 K/uL 9.9  7.3  10.6    RBC 4.20 - 5.40 M/uL 4.44  4.05Low   4.28    Hemoglobin 12.0 - 16.0 g/dL 12.0  11.4Low   11.8Low     Hematocrit 37.0 - 47.0 % 38.8  34.5Low   36. 0Low     MCV 81.0 - 99.0 fL 87.4  85.2  84.1    MCH 27.0 - 31.0 pg 27.0  28.1  27.6    MCHC 33.0 - 37.0 g/dL 30.9Low   33.0  32.8Low     RDW 11.5 - 14.5 % 14.2  14.6High   13.9    Platelets 001 - 609 K/uL 210  136  156    MPV 9.4 - 12.3 fL 9.8  9.9  9.6    Neutrophils %   63.9  67.2High     Basophils #   0.10  0.00    Lymphocytes %   26.3  24.7    Monocytes %   6.1  5.4    Eosinophils %   2.6  2.0    Basophils %   0.7  0.4    Neutrophils #   4.6  7.1    Lymphocytes #   1.9  2.6    Monocytes #   0.40  0.60    Eosinophils #   0.20  0.20    Resulting Agency  [address] [place] [address] Lab         Specimen Collected: 05/24/19 07:46 Last Resulted: 05/24/19 08:51         Lab Flowsheet       Order Details       View Encounter       Lab and Collection Details       Routing       Result History

## 2019-06-14 RX ORDER — QUINAPRIL 20 MG/1
TABLET ORAL
Qty: 30 TABLET | Refills: 0 | Status: SHIPPED | OUTPATIENT
Start: 2019-06-14 | End: 2019-07-13 | Stop reason: SDUPTHER

## 2019-06-23 DIAGNOSIS — Z76.0 MEDICATION REFILL: ICD-10-CM

## 2019-06-24 RX ORDER — MONTELUKAST SODIUM 10 MG/1
TABLET ORAL
Qty: 30 TABLET | Refills: 0 | Status: SHIPPED | OUTPATIENT
Start: 2019-06-24 | End: 2019-07-21 | Stop reason: SDUPTHER

## 2019-06-24 RX ORDER — TOPIRAMATE 50 MG/1
TABLET, FILM COATED ORAL
Qty: 60 TABLET | Refills: 0 | Status: SHIPPED | OUTPATIENT
Start: 2019-06-24 | End: 2019-09-30 | Stop reason: SDUPTHER

## 2019-06-24 NOTE — TELEPHONE ENCOUNTER
No future appointments.        Requested Prescriptions     Signed Prescriptions Disp Refills    montelukast (SINGULAIR) 10 MG tablet 30 tablet 0     Sig: TAKE 1 TABLET BY MOUTH EVERY DAY     Authorizing Provider: HUMPHREY FERMIN     Ordering User: JONO QUINN    topiramate (TOPAMAX) 50 MG tablet 60 tablet 0     Sig: TAKE 2 TABLETS BY MOUTH EVERY NIGHT     Authorizing Provider: NEW, [address]     Ordering User: Chris Somers

## 2019-07-11 RX ORDER — GLUCOSAMINE HCL/CHONDROITIN SU 500-400 MG
CAPSULE ORAL
Qty: 100 STRIP | Refills: 1 | Status: SHIPPED | OUTPATIENT
Start: 2019-07-11 | End: 2019-09-03 | Stop reason: SDUPTHER

## 2019-07-21 DIAGNOSIS — Z76.0 MEDICATION REFILL: ICD-10-CM

## 2019-07-22 RX ORDER — MONTELUKAST SODIUM 10 MG/1
TABLET ORAL
Qty: 30 TABLET | Refills: 0 | Status: SHIPPED | OUTPATIENT
Start: 2019-07-22 | End: 2019-08-18 | Stop reason: SDUPTHER

## 2019-07-23 RX ORDER — TOPIRAMATE 50 MG/1
TABLET, FILM COATED ORAL
Qty: 60 TABLET | Refills: 0 | Status: SHIPPED | OUTPATIENT
Start: 2019-07-23 | End: 2019-09-30 | Stop reason: SDUPTHER

## 2019-07-30 ENCOUNTER — ANESTHESIA EVENT (OUTPATIENT)
Dept: ENDOSCOPY | Age: 49
End: 2019-07-30
Payer: COMMERCIAL

## 2019-07-30 ENCOUNTER — HOSPITAL ENCOUNTER (OUTPATIENT)
Age: 49
Setting detail: OUTPATIENT SURGERY
Discharge: HOME | End: 2019-07-30
Attending: INTERNAL MEDICINE | Admitting: INTERNAL MEDICINE
Payer: COMMERCIAL

## 2019-07-30 ENCOUNTER — ANESTHESIA (OUTPATIENT)
Dept: ENDOSCOPY | Age: 49
End: 2019-07-30
Payer: COMMERCIAL

## 2019-07-30 VITALS
OXYGEN SATURATION: 98 % | RESPIRATION RATE: 20 BRPM | TEMPERATURE: 96.9 F | SYSTOLIC BLOOD PRESSURE: 114 MMHG | HEART RATE: 83 BPM | HEIGHT: 62 IN | WEIGHT: 185 LBS | DIASTOLIC BLOOD PRESSURE: 70 MMHG | BODY MASS INDEX: 34.04 KG/M2

## 2019-07-30 VITALS
OXYGEN SATURATION: 97 % | DIASTOLIC BLOOD PRESSURE: 75 MMHG | SYSTOLIC BLOOD PRESSURE: 111 MMHG | RESPIRATION RATE: 23 BRPM

## 2019-07-30 LAB
GLUCOSE BLD-MCNC: 127 MG/DL (ref 70–99)
GLUCOSE BLD-MCNC: 59 MG/DL (ref 70–99)
GLUCOSE BLD-MCNC: 71 MG/DL (ref 70–99)
PERFORMED ON: ABNORMAL
PERFORMED ON: ABNORMAL
PERFORMED ON: NORMAL

## 2019-07-30 PROCEDURE — 45378 DIAGNOSTIC COLONOSCOPY: CPT | Performed by: INTERNAL MEDICINE

## 2019-07-30 PROCEDURE — 3609009500 HC COLONOSCOPY DIAGNOSTIC OR SCREENING: Performed by: INTERNAL MEDICINE

## 2019-07-30 PROCEDURE — 3700000001 HC ADD 15 MINUTES (ANESTHESIA): Performed by: INTERNAL MEDICINE

## 2019-07-30 PROCEDURE — 82948 REAGENT STRIP/BLOOD GLUCOSE: CPT

## 2019-07-30 PROCEDURE — 2580000003 HC RX 258: Performed by: INTERNAL MEDICINE

## 2019-07-30 PROCEDURE — 3609012700 HC EGD DILATION SAVORY: Performed by: INTERNAL MEDICINE

## 2019-07-30 PROCEDURE — 6360000002 HC RX W HCPCS: Performed by: NURSE ANESTHETIST, CERTIFIED REGISTERED

## 2019-07-30 PROCEDURE — 2709999900 HC NON-CHARGEABLE SUPPLY: Performed by: INTERNAL MEDICINE

## 2019-07-30 PROCEDURE — 3700000000 HC ANESTHESIA ATTENDED CARE: Performed by: INTERNAL MEDICINE

## 2019-07-30 PROCEDURE — 43239 EGD BIOPSY SINGLE/MULTIPLE: CPT | Performed by: INTERNAL MEDICINE

## 2019-07-30 PROCEDURE — 7100000010 HC PHASE II RECOVERY - FIRST 15 MIN: Performed by: INTERNAL MEDICINE

## 2019-07-30 PROCEDURE — 88305 TISSUE EXAM BY PATHOLOGIST: CPT

## 2019-07-30 PROCEDURE — 2500000003 HC RX 250 WO HCPCS: Performed by: NURSE ANESTHETIST, CERTIFIED REGISTERED

## 2019-07-30 PROCEDURE — 7100000011 HC PHASE II RECOVERY - ADDTL 15 MIN: Performed by: INTERNAL MEDICINE

## 2019-07-30 PROCEDURE — 2580000003 HC RX 258: Performed by: ANESTHESIOLOGY

## 2019-07-30 PROCEDURE — 3609012400 HC EGD TRANSORAL BIOPSY SINGLE/MULTIPLE: Performed by: INTERNAL MEDICINE

## 2019-07-30 RX ORDER — MIDAZOLAM HYDROCHLORIDE 1 MG/ML
INJECTION INTRAMUSCULAR; INTRAVENOUS PRN
Status: DISCONTINUED | OUTPATIENT
Start: 2019-07-30 | End: 2019-07-30 | Stop reason: SDUPTHER

## 2019-07-30 RX ORDER — LIDOCAINE HYDROCHLORIDE 10 MG/ML
1 INJECTION, SOLUTION EPIDURAL; INFILTRATION; INTRACAUDAL; PERINEURAL ONCE
Status: DISCONTINUED | OUTPATIENT
Start: 2019-07-30 | End: 2019-07-30 | Stop reason: HOSPADM

## 2019-07-30 RX ORDER — FENTANYL CITRATE 50 UG/ML
INJECTION, SOLUTION INTRAMUSCULAR; INTRAVENOUS PRN
Status: DISCONTINUED | OUTPATIENT
Start: 2019-07-30 | End: 2019-07-30 | Stop reason: SDUPTHER

## 2019-07-30 RX ORDER — LIDOCAINE HYDROCHLORIDE 20 MG/ML
INJECTION, SOLUTION INFILTRATION; PERINEURAL PRN
Status: DISCONTINUED | OUTPATIENT
Start: 2019-07-30 | End: 2019-07-30 | Stop reason: SDUPTHER

## 2019-07-30 RX ORDER — SODIUM CHLORIDE, SODIUM LACTATE, POTASSIUM CHLORIDE, CALCIUM CHLORIDE 600; 310; 30; 20 MG/100ML; MG/100ML; MG/100ML; MG/100ML
INJECTION, SOLUTION INTRAVENOUS CONTINUOUS
Status: DISCONTINUED | OUTPATIENT
Start: 2019-07-30 | End: 2019-07-30 | Stop reason: HOSPADM

## 2019-07-30 RX ORDER — PROPOFOL 10 MG/ML
INJECTION, EMULSION INTRAVENOUS PRN
Status: DISCONTINUED | OUTPATIENT
Start: 2019-07-30 | End: 2019-07-30 | Stop reason: SDUPTHER

## 2019-07-30 RX ORDER — DEXTROSE MONOHYDRATE 25 G/50ML
12.5 INJECTION, SOLUTION INTRAVENOUS ONCE
Status: COMPLETED | OUTPATIENT
Start: 2019-07-30 | End: 2019-07-30

## 2019-07-30 RX ADMIN — FENTANYL CITRATE 50 MCG: 50 INJECTION INTRAMUSCULAR; INTRAVENOUS at 12:21

## 2019-07-30 RX ADMIN — SODIUM CHLORIDE, POTASSIUM CHLORIDE, SODIUM LACTATE AND CALCIUM CHLORIDE: 600; 310; 30; 20 INJECTION, SOLUTION INTRAVENOUS at 11:56

## 2019-07-30 RX ADMIN — PROPOFOL 50 MG: 10 INJECTION, EMULSION INTRAVENOUS at 12:48

## 2019-07-30 RX ADMIN — LIDOCAINE HYDROCHLORIDE 40 MG: 20 INJECTION, SOLUTION INFILTRATION; PERINEURAL at 12:20

## 2019-07-30 RX ADMIN — DEXTROSE 50 % IN WATER (D50W) INTRAVENOUS SYRINGE 12.5 G: at 12:00

## 2019-07-30 RX ADMIN — MIDAZOLAM 2 MG: 1 INJECTION INTRAMUSCULAR; INTRAVENOUS at 12:19

## 2019-07-30 RX ADMIN — PROPOFOL 30 MG: 10 INJECTION, EMULSION INTRAVENOUS at 12:28

## 2019-07-30 RX ADMIN — PROPOFOL 30 MG: 10 INJECTION, EMULSION INTRAVENOUS at 12:35

## 2019-07-30 RX ADMIN — PROPOFOL 30 MG: 10 INJECTION, EMULSION INTRAVENOUS at 12:41

## 2019-07-30 RX ADMIN — PROPOFOL 20 MG: 10 INJECTION, EMULSION INTRAVENOUS at 12:53

## 2019-07-30 ASSESSMENT — PAIN - FUNCTIONAL ASSESSMENT: PAIN_FUNCTIONAL_ASSESSMENT: 0-10

## 2019-07-30 NOTE — H&P
Never Used   Substance Use Topics    Alcohol use: No     Alcohol/week: 0.0 standard drinks    Drug use: No       Vital Signs: There were no vitals filed for this visit. Physical Exam:  Cardiac:  [x]WNL  []Comments:  Pulmonary:  [x]WNL   []Comments:  Neuro/Mental Status:  [x]WNL  []Comments:  Abdominal:  [x]WNL    []Comments:  Other:   []WNL  []Comments:    Informed Consent:  The risks and benefits of the procedure have been discussed with either the patient or if they cannot consent, their representative. Assessment:  Patient examined and appropriate for planned sedation and procedure. Plan:  Proceed with planned sedation and procedure as above.          Emiliano Laguerre MD

## 2019-07-30 NOTE — OP NOTE
Patient: Brigid Stevens : 1970  Med Rec#: [id number] Acc#: [id number]   Primary Care Provider NICANOR Ortiz    Date of Procedure:  2019    Endoscopist: Uriel Mai MD    Referring Provider: NICANOR Ortiz,     Operation Performed: Colonoscopy up to the Cecum    Indications: Lower abdominal pain, chronic constipation, family hx of polyps    Anesthesia:  Sedation was administered by anesthesia who monitored the patient during the procedure. I met with Brigid Stevens prior to procedure. We discussed the procedure itself, and I have discussed the risks of endoscopy (including-- but not limited to-- pain, discomfort, bleeding potentially requiring second endoscopic procedure and/or blood transfusion, organ perforation requiring operative repair, damage to organs near the colon, infection, aspiration, cardiopulmonary/allergic reaction), benefits, indications to endoscopy. Additionally, we discussed options other than colonoscopy. The patient expressed understanding. All questions answered. The patient decided to proceed with the procedure. Signed informed consent was placed on the chart. Blood Loss: minimal    Withdrawal time: >9 mins  Bowel Prep: Fair to poor with thick solid and semisolid stool scattered in segments throughout the colon obscuring the underlying mucosa. Small lesions including polyps may have been missed. Complications: no immediate complications    DESCRIPTION OF PROCEDURE:     A time out was performed. After written informed consent was obtained, the patient was placed in the left lateral position. The perianal area was inspected, and a digital rectal exam was performed. A rectal exam was performed: normal tone, no palpable lesions. At this point, a forward viewing Olympus colonoscope was inserted into the anus and carefully advanced to the Cecum. The cecum was identified by the ileocecal valve and the appendiceal orifice.  The colonoscope was
patient will be contacted in 7-10 days with biopsy results. 2.  Will proceed with colonoscopy as planned  3. If her dysphagia persists, may need esophageal manometry    The results were discussed with the patient and family. A copy of the images obtained were given to the patient.      Alma Romero MD  7/30/2019  12:33 PM

## 2019-08-06 DIAGNOSIS — Z79.4 TYPE 2 DIABETES MELLITUS WITHOUT COMPLICATION, WITH LONG-TERM CURRENT USE OF INSULIN (HCC): Primary | ICD-10-CM

## 2019-08-06 DIAGNOSIS — E11.9 TYPE 2 DIABETES MELLITUS WITHOUT COMPLICATION, WITH LONG-TERM CURRENT USE OF INSULIN (HCC): Primary | ICD-10-CM

## 2019-08-06 RX ORDER — LANCETS 23 GAUGE
1 EACH MISCELLANEOUS 3 TIMES DAILY
Qty: 100 EACH | Refills: 0 | Status: SHIPPED | OUTPATIENT
Start: 2019-08-06 | End: 2019-09-03 | Stop reason: SDUPTHER

## 2019-08-06 RX ORDER — BLOOD-GLUCOSE METER
1 KIT MISCELLANEOUS DAILY
Qty: 1 KIT | Refills: 0 | Status: SHIPPED | OUTPATIENT
Start: 2019-08-06 | End: 2021-07-27 | Stop reason: ALTCHOICE

## 2019-08-14 DIAGNOSIS — Z79.4 TYPE 2 DIABETES MELLITUS WITHOUT COMPLICATION, WITH LONG-TERM CURRENT USE OF INSULIN (HCC): Chronic | ICD-10-CM

## 2019-08-14 DIAGNOSIS — E11.9 TYPE 2 DIABETES MELLITUS WITHOUT COMPLICATION, WITH LONG-TERM CURRENT USE OF INSULIN (HCC): Chronic | ICD-10-CM

## 2019-08-15 RX ORDER — INSULIN DEGLUDEC 200 U/ML
INJECTION, SOLUTION SUBCUTANEOUS
Qty: 4 PEN | Refills: 5 | Status: SHIPPED | OUTPATIENT
Start: 2019-08-15 | End: 2021-07-27

## 2019-08-18 DIAGNOSIS — Z76.0 MEDICATION REFILL: ICD-10-CM

## 2019-08-19 RX ORDER — MONTELUKAST SODIUM 10 MG/1
TABLET ORAL
Qty: 30 TABLET | Refills: 0 | Status: SHIPPED | OUTPATIENT
Start: 2019-08-19 | End: 2019-09-15 | Stop reason: SDUPTHER

## 2019-09-03 DIAGNOSIS — E11.9 TYPE 2 DIABETES MELLITUS WITHOUT COMPLICATION, WITH LONG-TERM CURRENT USE OF INSULIN (HCC): ICD-10-CM

## 2019-09-03 DIAGNOSIS — Z79.4 TYPE 2 DIABETES MELLITUS WITHOUT COMPLICATION, WITH LONG-TERM CURRENT USE OF INSULIN (HCC): ICD-10-CM

## 2019-09-03 RX ORDER — BLOOD SUGAR DIAGNOSTIC
STRIP MISCELLANEOUS
Qty: 100 STRIP | Refills: 0 | Status: SHIPPED | OUTPATIENT
Start: 2019-09-03 | End: 2019-10-01 | Stop reason: SDUPTHER

## 2019-09-03 RX ORDER — LANCETS
EACH MISCELLANEOUS
Qty: 100 EACH | Refills: 0 | Status: SHIPPED | OUTPATIENT
Start: 2019-09-03 | End: 2019-10-01 | Stop reason: SDUPTHER

## 2019-09-03 NOTE — TELEPHONE ENCOUNTER
Asha Rodríguez called to request a refill on her medication. Last office visit : 5/22/2019   Next office visit : Visit date not found     Requested Prescriptions     Pending Prescriptions Disp Refills    ACCU-CHEK Tiigi 34 [Pharmacy Med Name: [address] [place] 100'S]  0     Sig: TEST BLOOD SUGAR THREE TIMES DAILY AS DIRECTED    ACCU-CHEK GAVI PLUS strip [Pharmacy Med Name: ACCU-CHEK GAVI PLUS STRIPS 100'S] 100 strip 0     Sig: TEST BLOOD SUGAR THREE TIMES DAILY AS NEEDED       CALLED AND LEFT A MESSAGE FOR HER TO CALL AND SEE [place] [id number].       Brody [place], [place]

## 2019-09-06 ENCOUNTER — OFFICE VISIT (OUTPATIENT)
Dept: PRIMARY CARE CLINIC | Age: 49
End: 2019-09-06
Payer: COMMERCIAL

## 2019-09-06 VITALS
HEART RATE: 113 BPM | SYSTOLIC BLOOD PRESSURE: 120 MMHG | WEIGHT: 202.6 LBS | DIASTOLIC BLOOD PRESSURE: 70 MMHG | OXYGEN SATURATION: 98 % | BODY MASS INDEX: 37.06 KG/M2 | TEMPERATURE: 98.5 F

## 2019-09-06 DIAGNOSIS — Z71.3 WEIGHT LOSS COUNSELING, ENCOUNTER FOR: ICD-10-CM

## 2019-09-06 DIAGNOSIS — Z79.899 DRUG THERAPY: ICD-10-CM

## 2019-09-06 DIAGNOSIS — Z79.4 TYPE 2 DIABETES MELLITUS WITHOUT COMPLICATION, WITH LONG-TERM CURRENT USE OF INSULIN (HCC): Primary | ICD-10-CM

## 2019-09-06 DIAGNOSIS — E11.9 TYPE 2 DIABETES MELLITUS WITHOUT COMPLICATION, WITH LONG-TERM CURRENT USE OF INSULIN (HCC): Primary | ICD-10-CM

## 2019-09-06 DIAGNOSIS — F41.9 ANXIETY: ICD-10-CM

## 2019-09-06 DIAGNOSIS — B36.9 FUNGAL SKIN DISEASE: ICD-10-CM

## 2019-09-06 DIAGNOSIS — G62.9 NEUROPATHY: ICD-10-CM

## 2019-09-06 DIAGNOSIS — Z76.0 MEDICATION REFILL: ICD-10-CM

## 2019-09-06 LAB
AMPHETAMINE SCREEN, URINE: NORMAL
BARBITURATE SCREEN, URINE: NORMAL
BENZODIAZEPINE SCREEN, URINE: NORMAL
BUPRENORPHINE URINE: NORMAL
COCAINE METABOLITE SCREEN URINE: NORMAL
GABAPENTIN SCREEN, URINE: NORMAL
HBA1C MFR BLD: 7.3 %
MDMA URINE: NORMAL
METHADONE SCREEN, URINE: NORMAL
METHAMPHETAMINE, URINE: NORMAL
OPIATE SCREEN URINE: NORMAL
OXYCODONE SCREEN URINE: NORMAL
PHENCYCLIDINE SCREEN URINE: NORMAL
PROPOXYPHENE SCREEN, URINE: NORMAL
THC SCREEN, URINE: NORMAL
TRICYCLIC ANTIDEPRESSANTS, UR: NORMAL

## 2019-09-06 PROCEDURE — 80305 DRUG TEST PRSMV DIR OPT OBS: CPT | Performed by: NURSE PRACTITIONER

## 2019-09-06 PROCEDURE — 83036 HEMOGLOBIN GLYCOSYLATED A1C: CPT | Performed by: NURSE PRACTITIONER

## 2019-09-06 PROCEDURE — 99214 OFFICE O/P EST MOD 30 MIN: CPT | Performed by: NURSE PRACTITIONER

## 2019-09-06 PROCEDURE — 99401 PREV MED CNSL INDIV APPRX 15: CPT | Performed by: NURSE PRACTITIONER

## 2019-09-06 RX ORDER — GABAPENTIN 100 MG/1
100 CAPSULE ORAL 2 TIMES DAILY
Qty: 60 CAPSULE | Refills: 3 | Status: SHIPPED | OUTPATIENT
Start: 2019-09-06 | End: 2020-02-21 | Stop reason: SDUPTHER

## 2019-09-06 RX ORDER — NYSTATIN 100000 [USP'U]/G
POWDER TOPICAL
Qty: 60 G | Refills: 2 | Status: ON HOLD | OUTPATIENT
Start: 2019-09-06 | End: 2019-10-16

## 2019-09-06 RX ORDER — TRAZODONE HYDROCHLORIDE 100 MG/1
TABLET ORAL
Qty: 30 TABLET | Refills: 5 | Status: SHIPPED | OUTPATIENT
Start: 2019-09-06 | End: 2020-07-19 | Stop reason: SDUPTHER

## 2019-09-06 RX ORDER — NYSTATIN 100000 U/G
CREAM TOPICAL
Qty: 30 G | Refills: 2 | Status: ON HOLD | OUTPATIENT
Start: 2019-09-06 | End: 2019-10-16

## 2019-09-06 ASSESSMENT — ENCOUNTER SYMPTOMS
RESPIRATORY NEGATIVE: 1
GASTROINTESTINAL NEGATIVE: 1
EYES NEGATIVE: 1

## 2019-09-06 NOTE — PROGRESS NOTES
[id number] [id number]       Social History     Tobacco Use    Smoking status: Never Smoker    Smokeless tobacco: Never Used   Substance Use Topics    Alcohol use: No     Alcohol/week: 0.0 standard drinks        Current Outpatient Medications   Medication Sig Dispense Refill    traZODone (DESYREL) 100 MG tablet TAKE 1 TABLET BY MOUTH EVERY NIGHT 30 tablet 5    nystatin (MYCOSTATIN) 214116 UNIT/GM powder Apply 3 times daily. 60 g 2    nystatin (MYCOSTATIN) 999485 UNIT/GM cream Apply topically 2 times daily. 30 g 2    Liraglutide (VICTOZA) 18 MG/3ML SOPN SC injection Inject 1.2 mg into the skin daily 2 pen 3    gabapentin (NEURONTIN) 100 MG capsule Take 1 capsule by mouth 2 times daily for 30 days.  60 capsule 3    ACCU-CHEK SOFTCLIX LANCETS MISC TEST BLOOD SUGAR THREE TIMES DAILY AS DIRECTED 100 each 0    ACCU-CHEK GAVI PLUS strip TEST BLOOD SUGAR THREE TIMES DAILY AS NEEDED 100 strip 0    montelukast (SINGULAIR) 10 MG tablet TAKE 1 TABLET BY MOUTH EVERY DAY 30 tablet 0    TRESIBA FLEXTOUCH 200 UNIT/ML SOPN INJECT 60 UNITS INTO THE SKIN DAILY AS DIRECTED 4 pen 5    glucose monitoring kit (FREESTYLE) monitoring kit 1 kit by Does not apply route daily 1 kit 0    topiramate (TOPAMAX) 50 MG tablet TAKE 2 TABLETS BY MOUTH EVERY NIGHT 60 tablet 0    quinapril (ACCUPRIL) 20 MG tablet TAKE 1 TABLET BY MOUTH EVERY NIGHT AT BEDTIME 30 tablet 5    blood glucose monitor kit and supplies Use to check sugar  E11.9 1 kit 0    topiramate (TOPAMAX) 50 MG tablet TAKE 2 TABLETS BY MOUTH EVERY NIGHT 60 tablet 0    tiZANidine (ZANAFLEX) 2 MG tablet Take 1 tablet by mouth every 8 hours as needed (spasm) 60 tablet 0    naproxen sodium (ANAPROX) 550 MG tablet Take 1 tablet by mouth 2 times daily (with meals) 60 tablet 3    famotidine (PEPCID) 20 MG tablet Take 1 tablet by mouth 2 times daily 30 tablet 11    Insulin Pen Needle 32G X 4 MM MISC 1 each by Does not apply route daily 100 each 3    levothyroxine (SYNTHROID) 125 MCG tablet TAKE 1 TABLET BY MOUTH DAILY 30 tablet 11    Insulin Syringe-Needle U-100 30G X 5/16\" 0.5 ML MISC 1 each by Does not apply route daily 100 each 3    meloxicam (MOBIC) 15 MG tablet Take 1 tablet by mouth daily 30 tablet 11    metFORMIN (GLUCOPHAGE) 1000 MG tablet TAKE 1 TABLET BY MOUTH TWICE DAILY WITH MEALS 60 tablet 5    insulin regular (NOVOLIN R) 100 UNIT/ML injection INJECT 2 UNITS UNDER THE SKIN FOR BLOOD SUGAR 149-199, 4 UNITS -299, 6 UNITS -349, 10 UNITS -400 10 mL 2    venlafaxine (EFFEXOR XR) 150 MG extended release capsule Take 1 capsule by mouth daily 30 capsule 11    linaclotide (LINZESS) 145 MCG capsule Take 1 capsule by mouth every morning (before breakfast) 30 capsule 11    Lancets MISC Use three times a day. 100 each 3    ondansetron (ZOFRAN ODT) 4 MG disintegrating tablet Take 1 tablet by mouth every 8 hours as needed for Nausea or Vomiting 10 tablet 0    ASPIRIN LOW DOSE 81 MG EC tablet TAKE 1 TABLET BY MOUTH DAILY 30 tablet 0    amitriptyline (ELAVIL) 25 MG tablet TAKE 1 TO 2 TABLETS BY MOUTH IN THE EVENING 60 tablet 11    atorvastatin (LIPITOR) 20 MG tablet Take 1 tablet by mouth daily 30 tablet 5    clonazePAM (KLONOPIN) 0.5 MG tablet TAKE 1 TABLET BY MOUTH DAILY AS NEEDED. . 30 tablet 0     No current facility-administered medications for this visit.         Allergies   Allergen Reactions    Claritin-D 12 Hour [Loratadine-Pseudoephedrine Er]     Demerol Hcl [Meperidine]     Percocet [Oxycodone-Acetaminophen] Rash       Family History   Problem Relation Age of Onset    High Blood Pressure Mother     High Blood Pressure Father     High Cholesterol Father     Colon Polyps Father     Diabetes Sister     High Blood Pressure Sister     High Blood Pressure Brother     Colon Cancer Maternal Aunt     Colon Cancer Paternal Uncle     Colon Cancer Paternal Grandfather     Esophageal Cancer Neg Hx     Liver Cancer Neg Hx     Liver Disease Neg Hx    

## 2019-09-30 ENCOUNTER — OFFICE VISIT (OUTPATIENT)
Dept: PRIMARY CARE CLINIC | Age: 49
End: 2019-09-30
Payer: COMMERCIAL

## 2019-09-30 ENCOUNTER — TELEPHONE (OUTPATIENT)
Dept: PRIMARY CARE CLINIC | Age: 49
End: 2019-09-30

## 2019-09-30 VITALS
DIASTOLIC BLOOD PRESSURE: 64 MMHG | WEIGHT: 188 LBS | HEIGHT: 62 IN | BODY MASS INDEX: 34.6 KG/M2 | OXYGEN SATURATION: 100 % | TEMPERATURE: 98.2 F | SYSTOLIC BLOOD PRESSURE: 110 MMHG | HEART RATE: 100 BPM

## 2019-09-30 DIAGNOSIS — R35.0 URINARY FREQUENCY: Primary | ICD-10-CM

## 2019-09-30 DIAGNOSIS — K52.9 AGE (ACUTE GASTROENTERITIS): ICD-10-CM

## 2019-09-30 DIAGNOSIS — F41.9 ANXIETY: ICD-10-CM

## 2019-09-30 DIAGNOSIS — R10.84 GENERALIZED ABDOMINAL PAIN: ICD-10-CM

## 2019-09-30 DIAGNOSIS — Z76.0 MEDICATION REFILL: ICD-10-CM

## 2019-09-30 DIAGNOSIS — R19.7 DIARRHEA, UNSPECIFIED TYPE: ICD-10-CM

## 2019-09-30 LAB
ALBUMIN SERPL-MCNC: 4.3 G/DL (ref 3.5–5.2)
ALP BLD-CCNC: 90 U/L (ref 35–104)
ALT SERPL-CCNC: 21 U/L (ref 5–33)
AMYLASE: 64 U/L (ref 28–100)
ANION GAP SERPL CALCULATED.3IONS-SCNC: 14 MMOL/L (ref 7–19)
AST SERPL-CCNC: 42 U/L (ref 5–32)
BILIRUB SERPL-MCNC: 0.5 MG/DL (ref 0.2–1.2)
BUN BLDV-MCNC: 18 MG/DL (ref 6–20)
CALCIUM SERPL-MCNC: 9.5 MG/DL (ref 8.6–10)
CHLORIDE BLD-SCNC: 109 MMOL/L (ref 98–111)
CO2: 19 MMOL/L (ref 22–29)
CREAT SERPL-MCNC: 0.7 MG/DL (ref 0.5–0.9)
GFR NON-AFRICAN AMERICAN: >60
GLUCOSE BLD-MCNC: 59 MG/DL (ref 74–109)
HCT VFR BLD CALC: 38.9 % (ref 37–47)
HEMOGLOBIN: 12.5 G/DL (ref 12–16)
LIPASE: 57 U/L (ref 13–60)
MCH RBC QN AUTO: 27.2 PG (ref 27–31)
MCHC RBC AUTO-ENTMCNC: 32.1 G/DL (ref 33–37)
MCV RBC AUTO: 84.6 FL (ref 81–99)
PDW BLD-RTO: 14.6 % (ref 11.5–14.5)
PLATELET # BLD: 235 K/UL (ref 130–400)
PMV BLD AUTO: 9.8 FL (ref 9.4–12.3)
POTASSIUM SERPL-SCNC: 3.8 MMOL/L (ref 3.5–5)
RBC # BLD: 4.6 M/UL (ref 4.2–5.4)
SODIUM BLD-SCNC: 142 MMOL/L (ref 136–145)
TOTAL PROTEIN: 8.3 G/DL (ref 6.6–8.7)
WBC # BLD: 14.3 K/UL (ref 4.8–10.8)

## 2019-09-30 PROCEDURE — 81002 URINALYSIS NONAUTO W/O SCOPE: CPT | Performed by: NURSE PRACTITIONER

## 2019-09-30 PROCEDURE — 99214 OFFICE O/P EST MOD 30 MIN: CPT | Performed by: NURSE PRACTITIONER

## 2019-09-30 RX ORDER — NAPROXEN SODIUM 550 MG/1
550 TABLET ORAL 2 TIMES DAILY WITH MEALS
Qty: 60 TABLET | Refills: 3 | Status: SHIPPED | OUTPATIENT
Start: 2019-09-30 | End: 2020-03-23 | Stop reason: SDUPTHER

## 2019-09-30 RX ORDER — TOPIRAMATE 50 MG/1
TABLET, FILM COATED ORAL
Qty: 60 TABLET | Refills: 0 | Status: SHIPPED | OUTPATIENT
Start: 2019-09-30 | End: 2019-10-24 | Stop reason: SDUPTHER

## 2019-09-30 RX ORDER — CLONAZEPAM 0.5 MG/1
TABLET ORAL
Qty: 30 TABLET | Refills: 0 | Status: SHIPPED | OUTPATIENT
Start: 2019-09-30 | End: 2019-10-24 | Stop reason: SDUPTHER

## 2019-09-30 RX ORDER — PROMETHAZINE HYDROCHLORIDE 25 MG/1
25 TABLET ORAL EVERY 6 HOURS PRN
Qty: 40 TABLET | Refills: 0 | Status: SHIPPED | OUTPATIENT
Start: 2019-09-30 | End: 2019-10-10

## 2019-09-30 ASSESSMENT — ENCOUNTER SYMPTOMS
ABDOMINAL PAIN: 1
BLOOD IN STOOL: 0
ABDOMINAL DISTENTION: 0
CONSTIPATION: 0
ANAL BLEEDING: 0
DIARRHEA: 1
NAUSEA: 1
VOMITING: 1
RECTAL PAIN: 0

## 2019-09-30 NOTE — LETTER
[facility]  Phone: [phone]  Fax: [facility]        September 30, 2019     Patient: Shirley Mcclelland   YOB: 1970   Date of Visit: 9/30/2019       To Whom it May Concern:    Vita Zeng was seen in my clinic on 9/30/2019. She may be excused from work today and tomorrow. May return to work on [address] [facility]. If you have any questions or concerns, please don't hesitate to call.     Sincerely,         NICANOR Zepeda

## 2019-09-30 NOTE — TELEPHONE ENCOUNTER
15:05   Amylase 28 - 100 U/L 64    Resulting Agency  [facility]         Specimen Collected: 09/30/19 15:05 Last Resulted: 09/30/19 16:19         Lab Flowsheet       Order Details       View Encounter       Lab and Collection Details       Routing       Result History               Result Notes for Comprehensive Metabolic Panel     Notes recorded by NICANOR Cox on 9/30/2019 at 4:22 PM CDT  Please notify patient of abnormal results. Not look like pancreatitis or white blood cell count is elevated so it probably is stomach flu.  Sugar slightly low make sure she is drinking plenty of fluids and gradually increase to a bland diet.  Needs to keep an eye on her blood glucoses closely.  F/U [place] symptoms worsen or persist   Contains abnormal data Comprehensive Metabolic Panel   Order: [id number]   Status:  Final result   Visible to patient:  Yes (MyChart) Dx:  Generalized abdominal pain; AGE (acut. .. Ref Range & Units 15:05 4mo ago 9mo ago   Sodium 136 - 145 mmol/L 142  139  134Low     Potassium 3.5 - 5.0 mmol/L 3.8  4.8  4.0    Chloride 98 - 111 mmol/L 109  106  98    CO2 22 - 29 mmol/L 19Low   19Low   26    Anion Gap 7 - 19 mmol/L 14  14  10    Glucose 74 - 109 mg/dL 59Low    399High     BUN 6 - 20 mg/dL 18  15  12    CREATININE 0.5 - 0.9 mg/dL 0.7  0.6  0.6    GFR Non-African American >60 >60  >60 CM >60 CM   Comment: This calculation may be inaccurate for patients under the age of 25 years. For ages 25 and older, a GFR >60 mL/min/1.73m2 (not corrected for weight) is   valid for stable renal function.     Calcium 8.6 - 10.0 mg/dL 9.5  9.5  9.4    Total Protein 6.6 - 8.7 g/dL 8.3  8.1  8.1    Alb 3.5 - 5.2 g/dL 4.3  4.0  4.2    Total Bilirubin 0.2 - 1.2 mg/dL 0.5  0.3  0.4    Alkaline Phosphatase 35 - 104 U/L 90  94  96    ALT 5 - 33 U/L 21  19  30    AST 5 - 32 U/L 42High   35High   61High     Glucose, Fasting   82     Resulting Agency  [facility]

## 2019-09-30 NOTE — PROGRESS NOTES
(MYCOSTATIN) 874149 UNIT/GM powder Apply 3 times daily. 60 g 2    nystatin (MYCOSTATIN) 327858 UNIT/GM cream Apply topically 2 times daily. 30 g 2    Liraglutide (VICTOZA) 18 MG/3ML SOPN SC injection Inject 1.2 mg into the skin daily 2 pen 3    gabapentin (NEURONTIN) 100 MG capsule Take 1 capsule by mouth 2 times daily for 30 days.  60 capsule 3    ACCU-CHEK SOFTCLIX LANCETS MISC TEST BLOOD SUGAR THREE TIMES DAILY AS DIRECTED 100 each 0    ACCU-CHEK GAVI PLUS strip TEST BLOOD SUGAR THREE TIMES DAILY AS NEEDED 100 strip 0    TRESIBA FLEXTOUCH 200 UNIT/ML SOPN INJECT 60 UNITS INTO THE SKIN DAILY AS DIRECTED 4 pen 5    glucose monitoring kit (FREESTYLE) monitoring kit 1 kit by Does not apply route daily 1 kit 0    quinapril (ACCUPRIL) 20 MG tablet TAKE 1 TABLET BY MOUTH EVERY NIGHT AT BEDTIME 30 tablet 5    blood glucose monitor kit and supplies Use to check sugar  E11.9 1 kit 0    tiZANidine (ZANAFLEX) 2 MG tablet Take 1 tablet by mouth every 8 hours as needed (spasm) 60 tablet 0    amitriptyline (ELAVIL) 25 MG tablet TAKE 1 TO 2 TABLETS BY MOUTH IN THE EVENING 60 tablet 11    atorvastatin (LIPITOR) 20 MG tablet Take 1 tablet by mouth daily 30 tablet 5    famotidine (PEPCID) 20 MG tablet Take 1 tablet by mouth 2 times daily 30 tablet 11    Insulin Pen Needle 32G X 4 MM MISC 1 each by Does not apply route daily 100 each 3    levothyroxine (SYNTHROID) 125 MCG tablet TAKE 1 TABLET BY MOUTH DAILY 30 tablet 11    Insulin Syringe-Needle U-100 30G X 5/16\" 0.5 ML MISC 1 each by Does not apply route daily 100 each 3    metFORMIN (GLUCOPHAGE) 1000 MG tablet TAKE 1 TABLET BY MOUTH TWICE DAILY WITH MEALS 60 tablet 5    insulin regular (NOVOLIN R) 100 UNIT/ML injection INJECT 2 UNITS UNDER THE SKIN FOR BLOOD SUGAR 149-199, 4 UNITS -299, 6 UNITS -349, 10 UNITS -400 10 mL 2    venlafaxine (EFFEXOR XR) 150 MG extended release capsule Take 1 capsule by mouth daily 30 capsule 11    linaclotide

## 2019-10-01 DIAGNOSIS — E11.9 TYPE 2 DIABETES MELLITUS WITHOUT COMPLICATION, WITH LONG-TERM CURRENT USE OF INSULIN (HCC): ICD-10-CM

## 2019-10-01 DIAGNOSIS — Z79.4 TYPE 2 DIABETES MELLITUS WITHOUT COMPLICATION, WITH LONG-TERM CURRENT USE OF INSULIN (HCC): ICD-10-CM

## 2019-10-01 RX ORDER — BLOOD SUGAR DIAGNOSTIC
STRIP MISCELLANEOUS
Qty: 100 STRIP | Refills: 0 | Status: SHIPPED | OUTPATIENT
Start: 2019-10-01 | End: 2019-10-26 | Stop reason: SDUPTHER

## 2019-10-01 RX ORDER — LANCETS
EACH MISCELLANEOUS
Qty: 100 EACH | Refills: 0 | Status: SHIPPED | OUTPATIENT
Start: 2019-10-01 | End: 2019-10-26 | Stop reason: SDUPTHER

## 2019-10-04 LAB
BILIRUBIN, POC: NORMAL
BLOOD URINE, POC: NORMAL
CLARITY, POC: NORMAL
COLOR, POC: YELLOW
GLUCOSE URINE, POC: NORMAL
KETONES, POC: NORMAL
LEUKOCYTE EST, POC: NORMAL
NITRITE, POC: NORMAL
PH, POC: 5.5
PROTEIN, POC: NORMAL
SPECIFIC GRAVITY, POC: 1.02
UROBILINOGEN, POC: 0.2

## 2019-10-15 ENCOUNTER — APPOINTMENT (OUTPATIENT)
Dept: CT IMAGING | Age: 49
DRG: 690 | End: 2019-10-15
Payer: COMMERCIAL

## 2019-10-15 ENCOUNTER — HOSPITAL ENCOUNTER (INPATIENT)
Age: 49
LOS: 5 days | Discharge: HOME | DRG: 690 | End: 2019-10-21
Attending: FAMILY MEDICINE | Admitting: FAMILY MEDICINE
Payer: COMMERCIAL

## 2019-10-15 ENCOUNTER — APPOINTMENT (OUTPATIENT)
Dept: GENERAL RADIOLOGY | Age: 49
DRG: 690 | End: 2019-10-15
Payer: COMMERCIAL

## 2019-10-15 DIAGNOSIS — G44.89 OTHER HEADACHE SYNDROME: ICD-10-CM

## 2019-10-15 DIAGNOSIS — N30.00 ACUTE CYSTITIS WITHOUT HEMATURIA: ICD-10-CM

## 2019-10-15 DIAGNOSIS — D72.825 BANDEMIA: Primary | ICD-10-CM

## 2019-10-15 DIAGNOSIS — R55 NEAR SYNCOPE: ICD-10-CM

## 2019-10-15 DIAGNOSIS — R11.0 NAUSEA: ICD-10-CM

## 2019-10-15 LAB
ALBUMIN SERPL-MCNC: 3.7 G/DL (ref 3.5–5.2)
ALP BLD-CCNC: 118 U/L (ref 35–104)
ALT SERPL-CCNC: 23 U/L (ref 5–33)
ANION GAP SERPL CALCULATED.3IONS-SCNC: 14 MMOL/L (ref 7–19)
AST SERPL-CCNC: 36 U/L (ref 5–32)
BANDED NEUTROPHILS RELATIVE PERCENT: 11 % (ref 0–5)
BASOPHILS ABSOLUTE: 0 K/UL (ref 0–0.2)
BASOPHILS RELATIVE PERCENT: 0 % (ref 0–1)
BILIRUB SERPL-MCNC: 0.4 MG/DL (ref 0.2–1.2)
BUN BLDV-MCNC: 18 MG/DL (ref 6–20)
CALCIUM SERPL-MCNC: 8.7 MG/DL (ref 8.6–10)
CHLORIDE BLD-SCNC: 102 MMOL/L (ref 98–111)
CHP ED QC CHECK: NORMAL
CO2: 20 MMOL/L (ref 22–29)
CREAT SERPL-MCNC: 0.7 MG/DL (ref 0.5–0.9)
EOSINOPHILS ABSOLUTE: 0.29 K/UL (ref 0–0.6)
EOSINOPHILS RELATIVE PERCENT: 4 % (ref 0–5)
GFR NON-AFRICAN AMERICAN: >60
GLUCOSE BLD-MCNC: 162 MG/DL
GLUCOSE BLD-MCNC: 162 MG/DL (ref 74–109)
GLUCOSE BLD-MCNC: 169 MG/DL (ref 70–99)
HCT VFR BLD CALC: 37.9 % (ref 37–47)
HEMOGLOBIN: 12.4 G/DL (ref 12–16)
HYPOCHROMIA: ABNORMAL
IMMATURE GRANULOCYTES #: 0 K/UL
LYMPHOCYTES ABSOLUTE: 1.6 K/UL (ref 1.1–4.5)
LYMPHOCYTES RELATIVE PERCENT: 20 % (ref 20–40)
MCH RBC QN AUTO: 27 PG (ref 27–31)
MCHC RBC AUTO-ENTMCNC: 32.7 G/DL (ref 33–37)
MCV RBC AUTO: 82.6 FL (ref 81–99)
MONOCYTES ABSOLUTE: 0 K/UL (ref 0–0.9)
MONOCYTES RELATIVE PERCENT: 0 % (ref 0–10)
NEUTROPHILS ABSOLUTE: 5.3 K/UL (ref 1.5–7.5)
NEUTROPHILS RELATIVE PERCENT: 63 % (ref 50–65)
PDW BLD-RTO: 14.4 % (ref 11.5–14.5)
PERFORMED ON: ABNORMAL
PLATELET # BLD: 160 K/UL (ref 130–400)
PMV BLD AUTO: 9.7 FL (ref 9.4–12.3)
POTASSIUM SERPL-SCNC: 3.8 MMOL/L (ref 3.5–5)
RBC # BLD: 4.59 M/UL (ref 4.2–5.4)
SODIUM BLD-SCNC: 136 MMOL/L (ref 136–145)
TOTAL PROTEIN: 7.9 G/DL (ref 6.6–8.7)
WBC # BLD: 7.2 K/UL (ref 4.8–10.8)

## 2019-10-15 PROCEDURE — 85025 COMPLETE CBC W/AUTO DIFF WBC: CPT

## 2019-10-15 PROCEDURE — 96374 THER/PROPH/DIAG INJ IV PUSH: CPT

## 2019-10-15 PROCEDURE — 80053 COMPREHEN METABOLIC PANEL: CPT

## 2019-10-15 PROCEDURE — 6370000000 HC RX 637 (ALT 250 FOR IP): Performed by: FAMILY MEDICINE

## 2019-10-15 PROCEDURE — 82948 REAGENT STRIP/BLOOD GLUCOSE: CPT

## 2019-10-15 PROCEDURE — 99285 EMERGENCY DEPT VISIT HI MDM: CPT

## 2019-10-15 PROCEDURE — 70450 CT HEAD/BRAIN W/O DYE: CPT

## 2019-10-15 PROCEDURE — 93005 ELECTROCARDIOGRAM TRACING: CPT | Performed by: EMERGENCY MEDICINE

## 2019-10-15 PROCEDURE — 36415 COLL VENOUS BLD VENIPUNCTURE: CPT

## 2019-10-15 PROCEDURE — 2580000003 HC RX 258: Performed by: FAMILY MEDICINE

## 2019-10-15 PROCEDURE — 6360000002 HC RX W HCPCS: Performed by: FAMILY MEDICINE

## 2019-10-15 PROCEDURE — 71046 X-RAY EXAM CHEST 2 VIEWS: CPT

## 2019-10-15 RX ORDER — ALPRAZOLAM 0.25 MG/1
0.25 TABLET ORAL ONCE
Status: COMPLETED | OUTPATIENT
Start: 2019-10-15 | End: 2019-10-15

## 2019-10-15 RX ORDER — MORPHINE SULFATE 4 MG/ML
4 INJECTION, SOLUTION INTRAMUSCULAR; INTRAVENOUS ONCE
Status: DISCONTINUED | OUTPATIENT
Start: 2019-10-15 | End: 2019-10-21 | Stop reason: HOSPADM

## 2019-10-15 RX ORDER — ONDANSETRON 2 MG/ML
4 INJECTION INTRAMUSCULAR; INTRAVENOUS ONCE
Status: COMPLETED | OUTPATIENT
Start: 2019-10-15 | End: 2019-10-15

## 2019-10-15 RX ORDER — ACETAMINOPHEN 325 MG/1
650 TABLET ORAL ONCE
Status: COMPLETED | OUTPATIENT
Start: 2019-10-15 | End: 2019-10-15

## 2019-10-15 RX ORDER — 0.9 % SODIUM CHLORIDE 0.9 %
1000 INTRAVENOUS SOLUTION INTRAVENOUS ONCE
Status: COMPLETED | OUTPATIENT
Start: 2019-10-15 | End: 2019-10-15

## 2019-10-15 RX ADMIN — ONDANSETRON 4 MG: 2 INJECTION INTRAMUSCULAR; INTRAVENOUS at 21:32

## 2019-10-15 RX ADMIN — ACETAMINOPHEN 650 MG: 325 TABLET ORAL at 21:32

## 2019-10-15 RX ADMIN — ALPRAZOLAM 0.25 MG: 0.25 TABLET ORAL at 21:32

## 2019-10-15 RX ADMIN — SODIUM CHLORIDE 1000 ML: 9 INJECTION, SOLUTION INTRAVENOUS at 21:32

## 2019-10-15 ASSESSMENT — PAIN SCALES - GENERAL
PAINLEVEL_OUTOF10: 10
PAINLEVEL_OUTOF10: 10
PAINLEVEL_OUTOF10: 2

## 2019-10-15 ASSESSMENT — PAIN DESCRIPTION - LOCATION: LOCATION: HEAD

## 2019-10-15 ASSESSMENT — PAIN DESCRIPTION - PAIN TYPE
TYPE: ACUTE PAIN
TYPE: ACUTE PAIN

## 2019-10-16 ENCOUNTER — LAB REQUISITION (OUTPATIENT)
Dept: LAB | Facility: HOSPITAL | Age: 49
End: 2019-10-16

## 2019-10-16 ENCOUNTER — APPOINTMENT (OUTPATIENT)
Dept: CT IMAGING | Age: 49
DRG: 690 | End: 2019-10-16
Payer: COMMERCIAL

## 2019-10-16 DIAGNOSIS — Z00.00 ROUTINE GENERAL MEDICAL EXAMINATION AT A HEALTH CARE FACILITY: ICD-10-CM

## 2019-10-16 PROBLEM — D72.825 BANDEMIA: Status: ACTIVE | Noted: 2019-10-16

## 2019-10-16 PROBLEM — G62.9 NEUROPATHY: Status: ACTIVE | Noted: 2019-10-16

## 2019-10-16 PROBLEM — K62.5 BRBPR (BRIGHT RED BLOOD PER RECTUM): Status: RESOLVED | Noted: 2019-01-22 | Resolved: 2019-10-16

## 2019-10-16 PROBLEM — M25.512 ACUTE PAIN OF LEFT SHOULDER: Status: RESOLVED | Noted: 2019-05-12 | Resolved: 2019-10-16

## 2019-10-16 PROBLEM — S46.912A STRAIN OF LEFT SHOULDER: Status: RESOLVED | Noted: 2019-05-12 | Resolved: 2019-10-16

## 2019-10-16 PROBLEM — R13.10 DYSPHAGIA: Status: RESOLVED | Noted: 2019-01-22 | Resolved: 2019-10-16

## 2019-10-16 LAB
BACTERIA BLD CULT: ABNORMAL
BACTERIA ID TEST ISLT QL CULT: ABNORMAL
BACTERIA: ABNORMAL /HPF
BILIRUBIN URINE: NEGATIVE
BLOOD, URINE: NEGATIVE
BOTTLE TYPE: ABNORMAL
CLARITY: CLEAR
COLOR: YELLOW
EKG P AXIS: 32 DEGREES
EKG P-R INTERVAL: 158 MS
EKG Q-T INTERVAL: 296 MS
EKG QRS DURATION: 82 MS
EKG QTC CALCULATION (BAZETT): 404 MS
EKG T AXIS: -102 DEGREES
GLUCOSE BLD-MCNC: 153 MG/DL (ref 70–99)
GLUCOSE BLD-MCNC: 157 MG/DL (ref 70–99)
GLUCOSE BLD-MCNC: 69 MG/DL (ref 70–99)
GLUCOSE BLD-MCNC: 75 MG/DL (ref 70–99)
GLUCOSE BLD-MCNC: 89 MG/DL (ref 70–99)
GLUCOSE URINE: NEGATIVE MG/DL
HCG(URINE) PREGNANCY TEST: NEGATIVE
KETONES, URINE: NEGATIVE MG/DL
LEUKOCYTE ESTERASE, URINE: ABNORMAL
Lab: NORMAL
NITRITE, URINE: POSITIVE
PERFORMED ON: ABNORMAL
PERFORMED ON: NORMAL
PERFORMED ON: NORMAL
PH UA: 7.5 (ref 5–8)
PROTEIN UA: NEGATIVE MG/DL
RBC UA: ABNORMAL /HPF (ref 0–2)
REPORT: NORMAL
SPECIFIC GRAVITY UA: 1 (ref 1–1.03)
THIS TEST SENT TO: NORMAL
URINE REFLEX TO CULTURE: YES
UROBILINOGEN, URINE: 2 E.U./DL
WBC UA: ABNORMAL /HPF (ref 0–5)

## 2019-10-16 PROCEDURE — 6360000004 HC RX CONTRAST MEDICATION: Performed by: INTERNAL MEDICINE

## 2019-10-16 PROCEDURE — 87086 URINE CULTURE/COLONY COUNT: CPT

## 2019-10-16 PROCEDURE — 6360000002 HC RX W HCPCS: Performed by: EMERGENCY MEDICINE

## 2019-10-16 PROCEDURE — 6370000000 HC RX 637 (ALT 250 FOR IP): Performed by: HOSPITALIST

## 2019-10-16 PROCEDURE — 87150 DNA/RNA AMPLIFIED PROBE: CPT

## 2019-10-16 PROCEDURE — 84703 CHORIONIC GONADOTROPIN ASSAY: CPT

## 2019-10-16 PROCEDURE — 82948 REAGENT STRIP/BLOOD GLUCOSE: CPT

## 2019-10-16 PROCEDURE — 93010 ELECTROCARDIOGRAM REPORT: CPT | Performed by: INTERNAL MEDICINE

## 2019-10-16 PROCEDURE — 81001 URINALYSIS AUTO W/SCOPE: CPT

## 2019-10-16 PROCEDURE — 2580000003 HC RX 258: Performed by: EMERGENCY MEDICINE

## 2019-10-16 PROCEDURE — 87077 CULTURE AEROBIC IDENTIFY: CPT

## 2019-10-16 PROCEDURE — 87186 SC STD MICRODIL/AGAR DIL: CPT

## 2019-10-16 PROCEDURE — 36415 COLL VENOUS BLD VENIPUNCTURE: CPT

## 2019-10-16 PROCEDURE — 74177 CT ABD & PELVIS W/CONTRAST: CPT

## 2019-10-16 PROCEDURE — 6360000002 HC RX W HCPCS: Performed by: HOSPITALIST

## 2019-10-16 PROCEDURE — 2500000003 HC RX 250 WO HCPCS: Performed by: INTERNAL MEDICINE

## 2019-10-16 PROCEDURE — 87040 BLOOD CULTURE FOR BACTERIA: CPT

## 2019-10-16 PROCEDURE — 1210000000 HC MED SURG R&B

## 2019-10-16 PROCEDURE — 2580000003 HC RX 258: Performed by: HOSPITALIST

## 2019-10-16 PROCEDURE — 96375 TX/PRO/DX INJ NEW DRUG ADDON: CPT

## 2019-10-16 RX ORDER — TIZANIDINE 4 MG/1
2 TABLET ORAL EVERY 8 HOURS PRN
Status: DISCONTINUED | OUTPATIENT
Start: 2019-10-16 | End: 2019-10-21 | Stop reason: HOSPADM

## 2019-10-16 RX ORDER — ONDANSETRON 4 MG/1
4 TABLET, ORALLY DISINTEGRATING ORAL EVERY 8 HOURS PRN
Qty: 15 TABLET | Refills: 0 | Status: SHIPPED | OUTPATIENT
Start: 2019-10-16 | End: 2019-10-25

## 2019-10-16 RX ORDER — TOPIRAMATE 50 MG/1
100 TABLET, FILM COATED ORAL NIGHTLY
Status: DISCONTINUED | OUTPATIENT
Start: 2019-10-16 | End: 2019-10-21 | Stop reason: HOSPADM

## 2019-10-16 RX ORDER — POTASSIUM CHLORIDE 7.45 MG/ML
10 INJECTION INTRAVENOUS PRN
Status: DISCONTINUED | OUTPATIENT
Start: 2019-10-16 | End: 2019-10-21 | Stop reason: HOSPADM

## 2019-10-16 RX ORDER — SODIUM CHLORIDE, SODIUM LACTATE, POTASSIUM CHLORIDE, AND CALCIUM CHLORIDE .6; .31; .03; .02 G/100ML; G/100ML; G/100ML; G/100ML
1000 INJECTION, SOLUTION INTRAVENOUS ONCE
Status: DISCONTINUED | OUTPATIENT
Start: 2019-10-16 | End: 2019-10-21 | Stop reason: HOSPADM

## 2019-10-16 RX ORDER — MONTELUKAST SODIUM 10 MG/1
10 TABLET ORAL DAILY
Status: DISCONTINUED | OUTPATIENT
Start: 2019-10-16 | End: 2019-10-21 | Stop reason: HOSPADM

## 2019-10-16 RX ORDER — FAMOTIDINE 20 MG/1
20 TABLET, FILM COATED ORAL 2 TIMES DAILY
Status: DISCONTINUED | OUTPATIENT
Start: 2019-10-16 | End: 2019-10-19

## 2019-10-16 RX ORDER — NICOTINE POLACRILEX 4 MG
15 LOZENGE BUCCAL PRN
Status: DISCONTINUED | OUTPATIENT
Start: 2019-10-16 | End: 2019-10-21 | Stop reason: HOSPADM

## 2019-10-16 RX ORDER — LEVOTHYROXINE SODIUM 0.12 MG/1
125 TABLET ORAL DAILY
Status: DISCONTINUED | OUTPATIENT
Start: 2019-10-16 | End: 2019-10-21 | Stop reason: HOSPADM

## 2019-10-16 RX ORDER — SODIUM CHLORIDE, SODIUM LACTATE, POTASSIUM CHLORIDE, AND CALCIUM CHLORIDE .6; .31; .03; .02 G/100ML; G/100ML; G/100ML; G/100ML
1000 INJECTION, SOLUTION INTRAVENOUS ONCE
Status: COMPLETED | OUTPATIENT
Start: 2019-10-16 | End: 2019-10-16

## 2019-10-16 RX ORDER — MORPHINE SULFATE 4 MG/ML
4 INJECTION, SOLUTION INTRAMUSCULAR; INTRAVENOUS ONCE
Status: COMPLETED | OUTPATIENT
Start: 2019-10-16 | End: 2019-10-16

## 2019-10-16 RX ORDER — ONDANSETRON 2 MG/ML
4 INJECTION INTRAMUSCULAR; INTRAVENOUS EVERY 6 HOURS PRN
Status: DISCONTINUED | OUTPATIENT
Start: 2019-10-16 | End: 2019-10-21 | Stop reason: HOSPADM

## 2019-10-16 RX ORDER — POTASSIUM CHLORIDE 20 MEQ/1
40 TABLET, EXTENDED RELEASE ORAL PRN
Status: DISCONTINUED | OUTPATIENT
Start: 2019-10-16 | End: 2019-10-21 | Stop reason: HOSPADM

## 2019-10-16 RX ORDER — TRAZODONE HYDROCHLORIDE 50 MG/1
100 TABLET ORAL NIGHTLY
Status: DISCONTINUED | OUTPATIENT
Start: 2019-10-16 | End: 2019-10-21 | Stop reason: HOSPADM

## 2019-10-16 RX ORDER — MAGNESIUM SULFATE 1 G/100ML
1 INJECTION INTRAVENOUS PRN
Status: DISCONTINUED | OUTPATIENT
Start: 2019-10-16 | End: 2019-10-21 | Stop reason: HOSPADM

## 2019-10-16 RX ORDER — VENLAFAXINE HYDROCHLORIDE 75 MG/1
150 CAPSULE, EXTENDED RELEASE ORAL DAILY
Status: DISCONTINUED | OUTPATIENT
Start: 2019-10-16 | End: 2019-10-21 | Stop reason: HOSPADM

## 2019-10-16 RX ORDER — LISINOPRIL 10 MG/1
20 TABLET ORAL DAILY
Status: DISCONTINUED | OUTPATIENT
Start: 2019-10-16 | End: 2019-10-21 | Stop reason: HOSPADM

## 2019-10-16 RX ORDER — SODIUM CHLORIDE, SODIUM LACTATE, POTASSIUM CHLORIDE, CALCIUM CHLORIDE 600; 310; 30; 20 MG/100ML; MG/100ML; MG/100ML; MG/100ML
INJECTION, SOLUTION INTRAVENOUS CONTINUOUS
Status: DISCONTINUED | OUTPATIENT
Start: 2019-10-16 | End: 2019-10-21 | Stop reason: HOSPADM

## 2019-10-16 RX ORDER — NALOXONE HYDROCHLORIDE 0.4 MG/ML
0.4 INJECTION, SOLUTION INTRAMUSCULAR; INTRAVENOUS; SUBCUTANEOUS PRN
Status: DISCONTINUED | OUTPATIENT
Start: 2019-10-16 | End: 2019-10-21 | Stop reason: HOSPADM

## 2019-10-16 RX ORDER — SODIUM CHLORIDE 0.9 % (FLUSH) 0.9 %
10 SYRINGE (ML) INJECTION EVERY 12 HOURS SCHEDULED
Status: DISCONTINUED | OUTPATIENT
Start: 2019-10-16 | End: 2019-10-21 | Stop reason: HOSPADM

## 2019-10-16 RX ORDER — ATORVASTATIN CALCIUM 20 MG/1
20 TABLET, FILM COATED ORAL NIGHTLY
Status: DISCONTINUED | OUTPATIENT
Start: 2019-10-16 | End: 2019-10-21 | Stop reason: HOSPADM

## 2019-10-16 RX ORDER — SODIUM CHLORIDE 0.9 % (FLUSH) 0.9 %
10 SYRINGE (ML) INJECTION PRN
Status: DISCONTINUED | OUTPATIENT
Start: 2019-10-16 | End: 2019-10-21 | Stop reason: HOSPADM

## 2019-10-16 RX ORDER — DEXTROSE MONOHYDRATE 50 MG/ML
100 INJECTION, SOLUTION INTRAVENOUS PRN
Status: DISCONTINUED | OUTPATIENT
Start: 2019-10-16 | End: 2019-10-21 | Stop reason: HOSPADM

## 2019-10-16 RX ORDER — CLONAZEPAM 1 MG/1
0.5 TABLET ORAL DAILY PRN
Status: DISCONTINUED | OUTPATIENT
Start: 2019-10-16 | End: 2019-10-21 | Stop reason: HOSPADM

## 2019-10-16 RX ORDER — POLYETHYLENE GLYCOL 3350 17 G/17G
17 POWDER, FOR SOLUTION ORAL DAILY PRN
Status: DISCONTINUED | OUTPATIENT
Start: 2019-10-16 | End: 2019-10-17

## 2019-10-16 RX ORDER — AMITRIPTYLINE HYDROCHLORIDE 25 MG/1
25 TABLET, FILM COATED ORAL NIGHTLY
Status: DISCONTINUED | OUTPATIENT
Start: 2019-10-16 | End: 2019-10-21 | Stop reason: HOSPADM

## 2019-10-16 RX ORDER — ASPIRIN 81 MG/1
81 TABLET ORAL DAILY
Status: DISCONTINUED | OUTPATIENT
Start: 2019-10-16 | End: 2019-10-21 | Stop reason: HOSPADM

## 2019-10-16 RX ORDER — GABAPENTIN 100 MG/1
100 CAPSULE ORAL 2 TIMES DAILY
Status: DISCONTINUED | OUTPATIENT
Start: 2019-10-16 | End: 2019-10-21 | Stop reason: HOSPADM

## 2019-10-16 RX ORDER — DEXTROSE MONOHYDRATE 25 G/50ML
12.5 INJECTION, SOLUTION INTRAVENOUS PRN
Status: DISCONTINUED | OUTPATIENT
Start: 2019-10-16 | End: 2019-10-21 | Stop reason: HOSPADM

## 2019-10-16 RX ORDER — ACETAMINOPHEN 325 MG/1
650 TABLET ORAL EVERY 4 HOURS PRN
Status: DISCONTINUED | OUTPATIENT
Start: 2019-10-16 | End: 2019-10-21 | Stop reason: HOSPADM

## 2019-10-16 RX ADMIN — LINACLOTIDE 145 MCG: 145 CAPSULE, GELATIN COATED ORAL at 09:35

## 2019-10-16 RX ADMIN — AMITRIPTYLINE HYDROCHLORIDE 25 MG: 25 TABLET, FILM COATED ORAL at 20:36

## 2019-10-16 RX ADMIN — SODIUM CHLORIDE, SODIUM LACTATE, POTASSIUM CHLORIDE, AND CALCIUM CHLORIDE: 600; 310; 30; 20 INJECTION, SOLUTION INTRAVENOUS at 07:00

## 2019-10-16 RX ADMIN — CEFEPIME HYDROCHLORIDE 2 G: 2 INJECTION, POWDER, FOR SOLUTION INTRAVENOUS at 03:05

## 2019-10-16 RX ADMIN — FAMOTIDINE 20 MG: 20 TABLET ORAL at 09:35

## 2019-10-16 RX ADMIN — ASPIRIN 81 MG: 81 TABLET, COATED ORAL at 09:35

## 2019-10-16 RX ADMIN — ENOXAPARIN SODIUM 40 MG: 40 INJECTION SUBCUTANEOUS at 09:36

## 2019-10-16 RX ADMIN — GABAPENTIN 100 MG: 100 CAPSULE ORAL at 09:34

## 2019-10-16 RX ADMIN — VENLAFAXINE HYDROCHLORIDE 150 MG: 75 CAPSULE, EXTENDED RELEASE ORAL at 09:34

## 2019-10-16 RX ADMIN — MONTELUKAST 10 MG: 10 TABLET, FILM COATED ORAL at 09:34

## 2019-10-16 RX ADMIN — CEFEPIME HYDROCHLORIDE 2 G: 2 INJECTION, POWDER, FOR SOLUTION INTRAVENOUS at 15:29

## 2019-10-16 RX ADMIN — CLONAZEPAM 0.5 MG: 1 TABLET ORAL at 22:49

## 2019-10-16 RX ADMIN — MORPHINE SULFATE 4 MG: 4 INJECTION INTRAVENOUS at 06:53

## 2019-10-16 RX ADMIN — IOPAMIDOL 90 ML: 755 INJECTION, SOLUTION INTRAVENOUS at 17:18

## 2019-10-16 RX ADMIN — METRONIDAZOLE 500 MG: 500 INJECTION, SOLUTION INTRAVENOUS at 18:56

## 2019-10-16 RX ADMIN — LEVOTHYROXINE SODIUM 125 MCG: 125 TABLET ORAL at 06:53

## 2019-10-16 RX ADMIN — ATORVASTATIN CALCIUM 20 MG: 20 TABLET, FILM COATED ORAL at 20:36

## 2019-10-16 RX ADMIN — ACETAMINOPHEN 650 MG: 325 TABLET ORAL at 22:49

## 2019-10-16 RX ADMIN — SODIUM CHLORIDE, POTASSIUM CHLORIDE, SODIUM LACTATE AND CALCIUM CHLORIDE 1000 ML: 600; 310; 30; 20 INJECTION, SOLUTION INTRAVENOUS at 02:32

## 2019-10-16 RX ADMIN — LISINOPRIL 20 MG: 10 TABLET ORAL at 09:57

## 2019-10-16 RX ADMIN — LINACLOTIDE 145 MCG: 145 CAPSULE, GELATIN COATED ORAL at 06:53

## 2019-10-16 ASSESSMENT — ENCOUNTER SYMPTOMS
TROUBLE SWALLOWING: 0
SHORTNESS OF BREATH: 0
WHEEZING: 0
ABDOMINAL DISTENTION: 0
CHEST TIGHTNESS: 0
SORE THROAT: 0
BACK PAIN: 0
DIARRHEA: 0
COUGH: 0
VOMITING: 0
CONSTIPATION: 0
ABDOMINAL PAIN: 0
APNEA: 0

## 2019-10-16 ASSESSMENT — PAIN DESCRIPTION - PAIN TYPE
TYPE: ACUTE PAIN
TYPE: ACUTE PAIN

## 2019-10-16 ASSESSMENT — PAIN DESCRIPTION - LOCATION
LOCATION: HEAD
LOCATION: HEAD

## 2019-10-16 ASSESSMENT — PAIN SCALES - GENERAL
PAINLEVEL_OUTOF10: 3
PAINLEVEL_OUTOF10: 8
PAINLEVEL_OUTOF10: 9

## 2019-10-16 ASSESSMENT — PAIN SCALES - WONG BAKER: WONGBAKER_NUMERICALRESPONSE: 0

## 2019-10-17 PROBLEM — R78.81 BACTEREMIA: Status: ACTIVE | Noted: 2019-10-17

## 2019-10-17 LAB
ANION GAP SERPL CALCULATED.3IONS-SCNC: 10 MMOL/L (ref 7–19)
BASOPHILS ABSOLUTE: 0.1 K/UL (ref 0–0.2)
BASOPHILS RELATIVE PERCENT: 1 % (ref 0–1)
BUN BLDV-MCNC: 10 MG/DL (ref 6–20)
CALCIUM SERPL-MCNC: 8.4 MG/DL (ref 8.6–10)
CHLORIDE BLD-SCNC: 111 MMOL/L (ref 98–111)
CO2: 22 MMOL/L (ref 22–29)
CREAT SERPL-MCNC: 0.6 MG/DL (ref 0.5–0.9)
EOSINOPHILS ABSOLUTE: 0.17 K/UL (ref 0–0.6)
EOSINOPHILS RELATIVE PERCENT: 3 % (ref 0–5)
GFR NON-AFRICAN AMERICAN: >60
GLUCOSE BLD-MCNC: 104 MG/DL (ref 70–99)
GLUCOSE BLD-MCNC: 119 MG/DL (ref 74–109)
GLUCOSE BLD-MCNC: 151 MG/DL (ref 70–99)
GLUCOSE BLD-MCNC: 196 MG/DL (ref 70–99)
GLUCOSE BLD-MCNC: 302 MG/DL (ref 70–99)
HCT VFR BLD CALC: 32.1 % (ref 37–47)
HEMOGLOBIN: 9.9 G/DL (ref 12–16)
HYPOCHROMIA: ABNORMAL
LYMPHOCYTES ABSOLUTE: 2.5 K/UL (ref 1.1–4.5)
LYMPHOCYTES RELATIVE PERCENT: 44 % (ref 20–40)
MCH RBC QN AUTO: 26.6 PG (ref 27–31)
MCHC RBC AUTO-ENTMCNC: 30.8 G/DL (ref 33–37)
MCV RBC AUTO: 86.3 FL (ref 81–99)
MONOCYTES ABSOLUTE: 0.7 K/UL (ref 0–0.9)
MONOCYTES RELATIVE PERCENT: 12 % (ref 0–10)
NEUTROPHILS ABSOLUTE: 2.3 K/UL (ref 1.5–7.5)
NEUTROPHILS RELATIVE PERCENT: 40 % (ref 50–65)
OVALOCYTES: ABNORMAL
PDW BLD-RTO: 14.5 % (ref 11.5–14.5)
PERFORMED ON: ABNORMAL
PLATELET # BLD: 116 K/UL (ref 130–400)
PLATELET SLIDE REVIEW: ABNORMAL
PMV BLD AUTO: 10.3 FL (ref 9.4–12.3)
POTASSIUM REFLEX MAGNESIUM: 4.1 MMOL/L (ref 3.5–5)
RBC # BLD: 3.72 M/UL (ref 4.2–5.4)
SODIUM BLD-SCNC: 143 MMOL/L (ref 136–145)
WBC # BLD: 5.7 K/UL (ref 4.8–10.8)

## 2019-10-17 PROCEDURE — 6360000002 HC RX W HCPCS: Performed by: FAMILY MEDICINE

## 2019-10-17 PROCEDURE — 2580000003 HC RX 258: Performed by: EMERGENCY MEDICINE

## 2019-10-17 PROCEDURE — 82948 REAGENT STRIP/BLOOD GLUCOSE: CPT

## 2019-10-17 PROCEDURE — 6360000002 HC RX W HCPCS: Performed by: EMERGENCY MEDICINE

## 2019-10-17 PROCEDURE — 1210000000 HC MED SURG R&B

## 2019-10-17 PROCEDURE — 85007 BL SMEAR W/DIFF WBC COUNT: CPT

## 2019-10-17 PROCEDURE — 36415 COLL VENOUS BLD VENIPUNCTURE: CPT

## 2019-10-17 PROCEDURE — 6360000002 HC RX W HCPCS: Performed by: HOSPITALIST

## 2019-10-17 PROCEDURE — 2580000003 HC RX 258: Performed by: HOSPITALIST

## 2019-10-17 PROCEDURE — 6370000000 HC RX 637 (ALT 250 FOR IP): Performed by: INTERNAL MEDICINE

## 2019-10-17 PROCEDURE — 2500000003 HC RX 250 WO HCPCS: Performed by: INTERNAL MEDICINE

## 2019-10-17 PROCEDURE — 80048 BASIC METABOLIC PNL TOTAL CA: CPT

## 2019-10-17 PROCEDURE — 6370000000 HC RX 637 (ALT 250 FOR IP): Performed by: HOSPITALIST

## 2019-10-17 PROCEDURE — 85027 COMPLETE CBC AUTOMATED: CPT

## 2019-10-17 PROCEDURE — 6370000000 HC RX 637 (ALT 250 FOR IP): Performed by: FAMILY MEDICINE

## 2019-10-17 RX ORDER — POLYETHYLENE GLYCOL 3350 17 G/17G
17 POWDER, FOR SOLUTION ORAL 2 TIMES DAILY
Status: DISCONTINUED | OUTPATIENT
Start: 2019-10-17 | End: 2019-10-21 | Stop reason: HOSPADM

## 2019-10-17 RX ORDER — KETOROLAC TROMETHAMINE 30 MG/ML
30 INJECTION, SOLUTION INTRAMUSCULAR; INTRAVENOUS ONCE
Status: COMPLETED | OUTPATIENT
Start: 2019-10-17 | End: 2019-10-17

## 2019-10-17 RX ORDER — DEXAMETHASONE SODIUM PHOSPHATE 4 MG/ML
10 INJECTION, SOLUTION INTRA-ARTICULAR; INTRALESIONAL; INTRAMUSCULAR; INTRAVENOUS; SOFT TISSUE ONCE
Status: COMPLETED | OUTPATIENT
Start: 2019-10-17 | End: 2019-10-17

## 2019-10-17 RX ORDER — METOCLOPRAMIDE HYDROCHLORIDE 5 MG/ML
10 INJECTION INTRAMUSCULAR; INTRAVENOUS ONCE
Status: COMPLETED | OUTPATIENT
Start: 2019-10-17 | End: 2019-10-17

## 2019-10-17 RX ADMIN — INSULIN LISPRO 1 UNITS: 100 INJECTION, SOLUTION INTRAVENOUS; SUBCUTANEOUS at 16:55

## 2019-10-17 RX ADMIN — BISACODYL 5 MG: 5 TABLET, COATED ORAL at 16:54

## 2019-10-17 RX ADMIN — AMITRIPTYLINE HYDROCHLORIDE 25 MG: 25 TABLET, FILM COATED ORAL at 20:16

## 2019-10-17 RX ADMIN — FAMOTIDINE 20 MG: 20 TABLET ORAL at 09:34

## 2019-10-17 RX ADMIN — VENLAFAXINE HYDROCHLORIDE 150 MG: 75 CAPSULE, EXTENDED RELEASE ORAL at 09:35

## 2019-10-17 RX ADMIN — GABAPENTIN 100 MG: 100 CAPSULE ORAL at 09:34

## 2019-10-17 RX ADMIN — LINACLOTIDE 145 MCG: 145 CAPSULE, GELATIN COATED ORAL at 07:03

## 2019-10-17 RX ADMIN — ACETAMINOPHEN 650 MG: 325 TABLET ORAL at 11:10

## 2019-10-17 RX ADMIN — CEFEPIME HYDROCHLORIDE 2 G: 2 INJECTION, POWDER, FOR SOLUTION INTRAVENOUS at 15:20

## 2019-10-17 RX ADMIN — LEVOTHYROXINE SODIUM 125 MCG: 125 TABLET ORAL at 07:03

## 2019-10-17 RX ADMIN — TOPIRAMATE 100 MG: 50 TABLET, FILM COATED ORAL at 20:13

## 2019-10-17 RX ADMIN — TRAZODONE HYDROCHLORIDE 100 MG: 50 TABLET ORAL at 20:35

## 2019-10-17 RX ADMIN — GABAPENTIN 100 MG: 100 CAPSULE ORAL at 20:14

## 2019-10-17 RX ADMIN — Medication 10 ML: at 09:35

## 2019-10-17 RX ADMIN — ASPIRIN 81 MG: 81 TABLET, COATED ORAL at 09:35

## 2019-10-17 RX ADMIN — ATORVASTATIN CALCIUM 20 MG: 20 TABLET, FILM COATED ORAL at 20:14

## 2019-10-17 RX ADMIN — METOCLOPRAMIDE 10 MG: 5 INJECTION, SOLUTION INTRAMUSCULAR; INTRAVENOUS at 12:45

## 2019-10-17 RX ADMIN — ENOXAPARIN SODIUM 40 MG: 40 INJECTION SUBCUTANEOUS at 09:34

## 2019-10-17 RX ADMIN — INSULIN LISPRO 1 UNITS: 100 INJECTION, SOLUTION INTRAVENOUS; SUBCUTANEOUS at 12:03

## 2019-10-17 RX ADMIN — KETOROLAC TROMETHAMINE 30 MG: 30 INJECTION, SOLUTION INTRAMUSCULAR at 12:45

## 2019-10-17 RX ADMIN — INSULIN LISPRO 2 UNITS: 100 INJECTION, SOLUTION INTRAVENOUS; SUBCUTANEOUS at 20:23

## 2019-10-17 RX ADMIN — METRONIDAZOLE 500 MG: 500 INJECTION, SOLUTION INTRAVENOUS at 09:33

## 2019-10-17 RX ADMIN — MONTELUKAST 10 MG: 10 TABLET, FILM COATED ORAL at 09:35

## 2019-10-17 RX ADMIN — METRONIDAZOLE 500 MG: 500 INJECTION, SOLUTION INTRAVENOUS at 04:06

## 2019-10-17 RX ADMIN — SODIUM CHLORIDE, SODIUM LACTATE, POTASSIUM CHLORIDE, AND CALCIUM CHLORIDE 1000 ML: 600; 310; 30; 20 INJECTION, SOLUTION INTRAVENOUS at 12:52

## 2019-10-17 RX ADMIN — DEXAMETHASONE SODIUM PHOSPHATE 10 MG: 4 INJECTION, SOLUTION INTRAMUSCULAR; INTRAVENOUS at 12:45

## 2019-10-17 RX ADMIN — LISINOPRIL 20 MG: 10 TABLET ORAL at 09:34

## 2019-10-17 RX ADMIN — POLYETHYLENE GLYCOL 3350 17 G: 17 POWDER, FOR SOLUTION ORAL at 20:13

## 2019-10-17 RX ADMIN — CEFEPIME HYDROCHLORIDE 2 G: 2 INJECTION, POWDER, FOR SOLUTION INTRAVENOUS at 01:17

## 2019-10-17 ASSESSMENT — PAIN DESCRIPTION - ONSET: ONSET: GRADUAL

## 2019-10-17 ASSESSMENT — PAIN SCALES - GENERAL
PAINLEVEL_OUTOF10: 0
PAINLEVEL_OUTOF10: 8

## 2019-10-17 ASSESSMENT — PAIN DESCRIPTION - DESCRIPTORS: DESCRIPTORS: HEADACHE

## 2019-10-17 ASSESSMENT — ENCOUNTER SYMPTOMS
COUGH: 0
ABDOMINAL PAIN: 0
EYE REDNESS: 0
NAUSEA: 0
SHORTNESS OF BREATH: 0
TROUBLE SWALLOWING: 0
SINUS PRESSURE: 1
WHEEZING: 0
VOMITING: 0
SORE THROAT: 0
COLOR CHANGE: 0
DIARRHEA: 0

## 2019-10-17 ASSESSMENT — PAIN DESCRIPTION - LOCATION: LOCATION: HAND

## 2019-10-17 ASSESSMENT — PAIN SCALES - WONG BAKER: WONGBAKER_NUMERICALRESPONSE: 0

## 2019-10-17 ASSESSMENT — PAIN DESCRIPTION - PAIN TYPE: TYPE: ACUTE PAIN

## 2019-10-18 LAB
ANION GAP SERPL CALCULATED.3IONS-SCNC: 12 MMOL/L (ref 7–19)
BLOOD CULTURE, ROUTINE: ABNORMAL
BUN BLDV-MCNC: 16 MG/DL (ref 6–20)
CALCIUM SERPL-MCNC: 8.3 MG/DL (ref 8.6–10)
CHLORIDE BLD-SCNC: 106 MMOL/L (ref 98–111)
CO2: 20 MMOL/L (ref 22–29)
CREAT SERPL-MCNC: 0.7 MG/DL (ref 0.5–0.9)
GFR NON-AFRICAN AMERICAN: >60
GLUCOSE BLD-MCNC: 173 MG/DL (ref 70–99)
GLUCOSE BLD-MCNC: 188 MG/DL (ref 70–99)
GLUCOSE BLD-MCNC: 227 MG/DL (ref 70–99)
GLUCOSE BLD-MCNC: 256 MG/DL (ref 70–99)
GLUCOSE BLD-MCNC: 295 MG/DL (ref 74–109)
HCT VFR BLD CALC: 30 % (ref 37–47)
HEMOGLOBIN: 9.6 G/DL (ref 12–16)
MCH RBC QN AUTO: 27.1 PG (ref 27–31)
MCHC RBC AUTO-ENTMCNC: 32 G/DL (ref 33–37)
MCV RBC AUTO: 84.7 FL (ref 81–99)
ORGANISM: ABNORMAL
ORGANISM: ABNORMAL
PDW BLD-RTO: 14.2 % (ref 11.5–14.5)
PERFORMED ON: ABNORMAL
PLATELET # BLD: 120 K/UL (ref 130–400)
PMV BLD AUTO: 10.9 FL (ref 9.4–12.3)
POTASSIUM REFLEX MAGNESIUM: 4.2 MMOL/L (ref 3.5–5)
RBC # BLD: 3.54 M/UL (ref 4.2–5.4)
SODIUM BLD-SCNC: 138 MMOL/L (ref 136–145)
URINE CULTURE, ROUTINE: ABNORMAL
URINE CULTURE, ROUTINE: ABNORMAL
WBC # BLD: 8.2 K/UL (ref 4.8–10.8)

## 2019-10-18 PROCEDURE — 1210000000 HC MED SURG R&B

## 2019-10-18 PROCEDURE — 82948 REAGENT STRIP/BLOOD GLUCOSE: CPT

## 2019-10-18 PROCEDURE — 6370000000 HC RX 637 (ALT 250 FOR IP): Performed by: INTERNAL MEDICINE

## 2019-10-18 PROCEDURE — 85027 COMPLETE CBC AUTOMATED: CPT

## 2019-10-18 PROCEDURE — 6360000002 HC RX W HCPCS: Performed by: EMERGENCY MEDICINE

## 2019-10-18 PROCEDURE — 2580000003 HC RX 258: Performed by: HOSPITALIST

## 2019-10-18 PROCEDURE — 6370000000 HC RX 637 (ALT 250 FOR IP): Performed by: FAMILY MEDICINE

## 2019-10-18 PROCEDURE — 2580000003 HC RX 258: Performed by: EMERGENCY MEDICINE

## 2019-10-18 PROCEDURE — 6360000002 HC RX W HCPCS: Performed by: INTERNAL MEDICINE

## 2019-10-18 PROCEDURE — 6360000002 HC RX W HCPCS: Performed by: HOSPITALIST

## 2019-10-18 PROCEDURE — 6370000000 HC RX 637 (ALT 250 FOR IP): Performed by: HOSPITALIST

## 2019-10-18 PROCEDURE — 80048 BASIC METABOLIC PNL TOTAL CA: CPT

## 2019-10-18 PROCEDURE — 2580000003 HC RX 258: Performed by: FAMILY MEDICINE

## 2019-10-18 PROCEDURE — 36415 COLL VENOUS BLD VENIPUNCTURE: CPT

## 2019-10-18 RX ADMIN — MONTELUKAST 10 MG: 10 TABLET, FILM COATED ORAL at 08:31

## 2019-10-18 RX ADMIN — TOPIRAMATE 100 MG: 50 TABLET, FILM COATED ORAL at 20:02

## 2019-10-18 RX ADMIN — CEFEPIME HYDROCHLORIDE 2 G: 2 INJECTION, POWDER, FOR SOLUTION INTRAVENOUS at 15:15

## 2019-10-18 RX ADMIN — Medication 10 ML: at 20:02

## 2019-10-18 RX ADMIN — LINACLOTIDE 145 MCG: 145 CAPSULE, GELATIN COATED ORAL at 05:20

## 2019-10-18 RX ADMIN — POLYETHYLENE GLYCOL 3350 17 G: 17 POWDER, FOR SOLUTION ORAL at 08:31

## 2019-10-18 RX ADMIN — AMITRIPTYLINE HYDROCHLORIDE 25 MG: 25 TABLET, FILM COATED ORAL at 20:03

## 2019-10-18 RX ADMIN — TRAZODONE HYDROCHLORIDE 100 MG: 50 TABLET ORAL at 20:02

## 2019-10-18 RX ADMIN — GABAPENTIN 100 MG: 100 CAPSULE ORAL at 20:03

## 2019-10-18 RX ADMIN — BISACODYL 5 MG: 5 TABLET, COATED ORAL at 08:31

## 2019-10-18 RX ADMIN — FAMOTIDINE 20 MG: 20 TABLET ORAL at 08:30

## 2019-10-18 RX ADMIN — SODIUM CHLORIDE, SODIUM LACTATE, POTASSIUM CHLORIDE, AND CALCIUM CHLORIDE: 600; 310; 30; 20 INJECTION, SOLUTION INTRAVENOUS at 12:59

## 2019-10-18 RX ADMIN — ASPIRIN 81 MG: 81 TABLET, COATED ORAL at 08:31

## 2019-10-18 RX ADMIN — CEFEPIME HYDROCHLORIDE 2 G: 2 INJECTION, POWDER, FOR SOLUTION INTRAVENOUS at 02:31

## 2019-10-18 RX ADMIN — LEVOTHYROXINE SODIUM 125 MCG: 125 TABLET ORAL at 05:20

## 2019-10-18 RX ADMIN — FAMOTIDINE 20 MG: 20 TABLET ORAL at 20:03

## 2019-10-18 RX ADMIN — LISINOPRIL 20 MG: 10 TABLET ORAL at 08:30

## 2019-10-18 RX ADMIN — MAGNESIUM CITRATE 296 ML: 1.75 LIQUID ORAL at 20:02

## 2019-10-18 RX ADMIN — ATORVASTATIN CALCIUM 20 MG: 20 TABLET, FILM COATED ORAL at 20:03

## 2019-10-18 RX ADMIN — Medication 10 ML: at 08:33

## 2019-10-18 RX ADMIN — GABAPENTIN 100 MG: 100 CAPSULE ORAL at 08:30

## 2019-10-18 RX ADMIN — Medication 2 G: at 18:35

## 2019-10-18 RX ADMIN — VENLAFAXINE HYDROCHLORIDE 150 MG: 75 CAPSULE, EXTENDED RELEASE ORAL at 08:30

## 2019-10-18 RX ADMIN — INSULIN LISPRO 1 UNITS: 100 INJECTION, SOLUTION INTRAVENOUS; SUBCUTANEOUS at 16:53

## 2019-10-18 RX ADMIN — ENOXAPARIN SODIUM 40 MG: 40 INJECTION SUBCUTANEOUS at 08:30

## 2019-10-18 RX ADMIN — ACETAMINOPHEN 650 MG: 325 TABLET ORAL at 15:27

## 2019-10-18 RX ADMIN — POLYETHYLENE GLYCOL 3350 17 G: 17 POWDER, FOR SOLUTION ORAL at 20:03

## 2019-10-18 RX ADMIN — INSULIN LISPRO 2 UNITS: 100 INJECTION, SOLUTION INTRAVENOUS; SUBCUTANEOUS at 20:23

## 2019-10-18 ASSESSMENT — ENCOUNTER SYMPTOMS
SHORTNESS OF BREATH: 0
WHEEZING: 0
DIARRHEA: 0
COUGH: 0
SORE THROAT: 0
EYE REDNESS: 0
TROUBLE SWALLOWING: 0
VOMITING: 0
NAUSEA: 0
ABDOMINAL PAIN: 0
COLOR CHANGE: 0
SINUS PRESSURE: 1

## 2019-10-18 ASSESSMENT — PAIN DESCRIPTION - ONSET: ONSET: GRADUAL

## 2019-10-18 ASSESSMENT — PAIN SCALES - GENERAL
PAINLEVEL_OUTOF10: 8
PAINLEVEL_OUTOF10: 0

## 2019-10-18 ASSESSMENT — PAIN DESCRIPTION - DESCRIPTORS: DESCRIPTORS: HEADACHE

## 2019-10-18 ASSESSMENT — PAIN DESCRIPTION - PAIN TYPE: TYPE: ACUTE PAIN

## 2019-10-18 ASSESSMENT — PAIN - FUNCTIONAL ASSESSMENT: PAIN_FUNCTIONAL_ASSESSMENT: PREVENTS OR INTERFERES SOME ACTIVE ACTIVITIES AND ADLS

## 2019-10-18 ASSESSMENT — PAIN SCALES - WONG BAKER: WONGBAKER_NUMERICALRESPONSE: 0

## 2019-10-18 ASSESSMENT — PAIN DESCRIPTION - LOCATION: LOCATION: HEAD

## 2019-10-19 ENCOUNTER — APPOINTMENT (OUTPATIENT)
Dept: GENERAL RADIOLOGY | Age: 49
DRG: 690 | End: 2019-10-19
Payer: COMMERCIAL

## 2019-10-19 LAB
ANION GAP SERPL CALCULATED.3IONS-SCNC: 12 MMOL/L (ref 7–19)
BUN BLDV-MCNC: 12 MG/DL (ref 6–20)
CALCIUM SERPL-MCNC: 8.8 MG/DL (ref 8.6–10)
CHLORIDE BLD-SCNC: 110 MMOL/L (ref 98–111)
CO2: 23 MMOL/L (ref 22–29)
CREAT SERPL-MCNC: 0.6 MG/DL (ref 0.5–0.9)
GFR NON-AFRICAN AMERICAN: >60
GLUCOSE BLD-MCNC: 108 MG/DL (ref 70–99)
GLUCOSE BLD-MCNC: 109 MG/DL (ref 70–99)
GLUCOSE BLD-MCNC: 129 MG/DL (ref 70–99)
GLUCOSE BLD-MCNC: 197 MG/DL (ref 74–109)
GLUCOSE BLD-MCNC: 62 MG/DL (ref 70–99)
HCT VFR BLD CALC: 28.5 % (ref 37–47)
HEMOGLOBIN: 9.2 G/DL (ref 12–16)
MCH RBC QN AUTO: 27.2 PG (ref 27–31)
MCHC RBC AUTO-ENTMCNC: 32.3 G/DL (ref 33–37)
MCV RBC AUTO: 84.3 FL (ref 81–99)
PDW BLD-RTO: 14.3 % (ref 11.5–14.5)
PERFORMED ON: ABNORMAL
PLATELET # BLD: 168 K/UL (ref 130–400)
PMV BLD AUTO: 10.1 FL (ref 9.4–12.3)
POTASSIUM REFLEX MAGNESIUM: 3.8 MMOL/L (ref 3.5–5)
RBC # BLD: 3.38 M/UL (ref 4.2–5.4)
SODIUM BLD-SCNC: 145 MMOL/L (ref 136–145)
WBC # BLD: 9.1 K/UL (ref 4.8–10.8)

## 2019-10-19 PROCEDURE — 82948 REAGENT STRIP/BLOOD GLUCOSE: CPT

## 2019-10-19 PROCEDURE — 6370000000 HC RX 637 (ALT 250 FOR IP): Performed by: FAMILY MEDICINE

## 2019-10-19 PROCEDURE — 6360000002 HC RX W HCPCS: Performed by: INTERNAL MEDICINE

## 2019-10-19 PROCEDURE — 6360000002 HC RX W HCPCS: Performed by: FAMILY MEDICINE

## 2019-10-19 PROCEDURE — 6370000000 HC RX 637 (ALT 250 FOR IP): Performed by: HOSPITALIST

## 2019-10-19 PROCEDURE — 1210000000 HC MED SURG R&B

## 2019-10-19 PROCEDURE — 36415 COLL VENOUS BLD VENIPUNCTURE: CPT

## 2019-10-19 PROCEDURE — 6360000002 HC RX W HCPCS: Performed by: HOSPITALIST

## 2019-10-19 PROCEDURE — 80048 BASIC METABOLIC PNL TOTAL CA: CPT

## 2019-10-19 PROCEDURE — 6370000000 HC RX 637 (ALT 250 FOR IP): Performed by: INTERNAL MEDICINE

## 2019-10-19 PROCEDURE — 74018 RADEX ABDOMEN 1 VIEW: CPT

## 2019-10-19 PROCEDURE — 2580000003 HC RX 258: Performed by: FAMILY MEDICINE

## 2019-10-19 PROCEDURE — C9113 INJ PANTOPRAZOLE SODIUM, VIA: HCPCS | Performed by: FAMILY MEDICINE

## 2019-10-19 PROCEDURE — 85027 COMPLETE CBC AUTOMATED: CPT

## 2019-10-19 PROCEDURE — 2580000003 HC RX 258: Performed by: HOSPITALIST

## 2019-10-19 RX ORDER — PANTOPRAZOLE SODIUM 40 MG/10ML
40 INJECTION, POWDER, LYOPHILIZED, FOR SOLUTION INTRAVENOUS DAILY
Status: DISCONTINUED | OUTPATIENT
Start: 2019-10-19 | End: 2019-10-21 | Stop reason: HOSPADM

## 2019-10-19 RX ORDER — KETOROLAC TROMETHAMINE 30 MG/ML
15 INJECTION, SOLUTION INTRAMUSCULAR; INTRAVENOUS EVERY 6 HOURS PRN
Status: DISCONTINUED | OUTPATIENT
Start: 2019-10-19 | End: 2019-10-21 | Stop reason: HOSPADM

## 2019-10-19 RX ORDER — 0.9 % SODIUM CHLORIDE 0.9 %
10 VIAL (ML) INJECTION DAILY
Status: DISCONTINUED | OUTPATIENT
Start: 2019-10-19 | End: 2019-10-21 | Stop reason: HOSPADM

## 2019-10-19 RX ORDER — SODIUM PHOSPHATE, DIBASIC AND SODIUM PHOSPHATE, MONOBASIC 7; 19 G/133ML; G/133ML
1 ENEMA RECTAL
Status: DISPENSED | OUTPATIENT
Start: 2019-10-19 | End: 2019-10-19

## 2019-10-19 RX ADMIN — TRAZODONE HYDROCHLORIDE 100 MG: 50 TABLET ORAL at 20:44

## 2019-10-19 RX ADMIN — METFORMIN HYDROCHLORIDE 1000 MG: 500 TABLET ORAL at 08:24

## 2019-10-19 RX ADMIN — Medication 2 G: at 22:00

## 2019-10-19 RX ADMIN — Medication 10 ML: at 20:44

## 2019-10-19 RX ADMIN — GABAPENTIN 100 MG: 100 CAPSULE ORAL at 20:44

## 2019-10-19 RX ADMIN — KETOROLAC TROMETHAMINE 15 MG: 30 INJECTION, SOLUTION INTRAMUSCULAR at 20:53

## 2019-10-19 RX ADMIN — KETOROLAC TROMETHAMINE 15 MG: 30 INJECTION, SOLUTION INTRAMUSCULAR at 10:44

## 2019-10-19 RX ADMIN — ENOXAPARIN SODIUM 40 MG: 40 INJECTION SUBCUTANEOUS at 08:26

## 2019-10-19 RX ADMIN — ASPIRIN 81 MG: 81 TABLET, COATED ORAL at 08:25

## 2019-10-19 RX ADMIN — LINACLOTIDE 145 MCG: 145 CAPSULE, GELATIN COATED ORAL at 06:09

## 2019-10-19 RX ADMIN — Medication 10 ML: at 18:40

## 2019-10-19 RX ADMIN — TOPIRAMATE 100 MG: 50 TABLET, FILM COATED ORAL at 20:44

## 2019-10-19 RX ADMIN — ACETAMINOPHEN 650 MG: 325 TABLET ORAL at 20:53

## 2019-10-19 RX ADMIN — GABAPENTIN 100 MG: 100 CAPSULE ORAL at 08:25

## 2019-10-19 RX ADMIN — Medication 2 G: at 10:27

## 2019-10-19 RX ADMIN — ATORVASTATIN CALCIUM 20 MG: 20 TABLET, FILM COATED ORAL at 20:44

## 2019-10-19 RX ADMIN — ACETAMINOPHEN 650 MG: 325 TABLET ORAL at 08:36

## 2019-10-19 RX ADMIN — FAMOTIDINE 20 MG: 20 TABLET ORAL at 08:25

## 2019-10-19 RX ADMIN — POLYETHYLENE GLYCOL 3350 17 G: 17 POWDER, FOR SOLUTION ORAL at 20:43

## 2019-10-19 RX ADMIN — AMITRIPTYLINE HYDROCHLORIDE 25 MG: 25 TABLET, FILM COATED ORAL at 20:44

## 2019-10-19 RX ADMIN — Medication 2 G: at 02:38

## 2019-10-19 RX ADMIN — LEVOTHYROXINE SODIUM 125 MCG: 125 TABLET ORAL at 06:09

## 2019-10-19 RX ADMIN — LISINOPRIL 20 MG: 10 TABLET ORAL at 08:25

## 2019-10-19 RX ADMIN — POLYETHYLENE GLYCOL 3350 17 G: 17 POWDER, FOR SOLUTION ORAL at 08:24

## 2019-10-19 RX ADMIN — MONTELUKAST 10 MG: 10 TABLET, FILM COATED ORAL at 08:25

## 2019-10-19 RX ADMIN — VENLAFAXINE HYDROCHLORIDE 150 MG: 75 CAPSULE, EXTENDED RELEASE ORAL at 08:24

## 2019-10-19 RX ADMIN — PANTOPRAZOLE SODIUM 40 MG: 40 INJECTION, POWDER, FOR SOLUTION INTRAVENOUS at 18:24

## 2019-10-19 RX ADMIN — BISACODYL 5 MG: 5 TABLET, COATED ORAL at 08:25

## 2019-10-19 ASSESSMENT — PAIN DESCRIPTION - FREQUENCY: FREQUENCY: CONTINUOUS

## 2019-10-19 ASSESSMENT — ENCOUNTER SYMPTOMS
ABDOMINAL PAIN: 1
COUGH: 0
VOMITING: 0
SHORTNESS OF BREATH: 0
EYE REDNESS: 0
NAUSEA: 0
WHEEZING: 0
DIARRHEA: 0
COLOR CHANGE: 0
TROUBLE SWALLOWING: 0
SORE THROAT: 0

## 2019-10-19 ASSESSMENT — PAIN DESCRIPTION - PROGRESSION: CLINICAL_PROGRESSION: GRADUALLY WORSENING

## 2019-10-19 ASSESSMENT — PAIN SCALES - GENERAL
PAINLEVEL_OUTOF10: 7
PAINLEVEL_OUTOF10: 10
PAINLEVEL_OUTOF10: 3
PAINLEVEL_OUTOF10: 0
PAINLEVEL_OUTOF10: 10

## 2019-10-19 ASSESSMENT — PAIN DESCRIPTION - PAIN TYPE: TYPE: ACUTE PAIN

## 2019-10-19 ASSESSMENT — PAIN DESCRIPTION - ONSET: ONSET: GRADUAL

## 2019-10-19 ASSESSMENT — PAIN DESCRIPTION - DESCRIPTORS: DESCRIPTORS: HEADACHE

## 2019-10-19 ASSESSMENT — PAIN DESCRIPTION - LOCATION: LOCATION: HEAD

## 2019-10-19 ASSESSMENT — PAIN - FUNCTIONAL ASSESSMENT: PAIN_FUNCTIONAL_ASSESSMENT: PREVENTS OR INTERFERES SOME ACTIVE ACTIVITIES AND ADLS

## 2019-10-20 DIAGNOSIS — E78.5 HYPERLIPIDEMIA, UNSPECIFIED HYPERLIPIDEMIA TYPE: ICD-10-CM

## 2019-10-20 LAB
ANION GAP SERPL CALCULATED.3IONS-SCNC: 9 MMOL/L (ref 7–19)
BUN BLDV-MCNC: 11 MG/DL (ref 6–20)
CALCIUM SERPL-MCNC: 8.5 MG/DL (ref 8.6–10)
CHLORIDE BLD-SCNC: 110 MMOL/L (ref 98–111)
CO2: 24 MMOL/L (ref 22–29)
CREAT SERPL-MCNC: 0.7 MG/DL (ref 0.5–0.9)
GFR NON-AFRICAN AMERICAN: >60
GLUCOSE BLD-MCNC: 103 MG/DL (ref 70–99)
GLUCOSE BLD-MCNC: 192 MG/DL (ref 70–99)
GLUCOSE BLD-MCNC: 71 MG/DL (ref 70–99)
GLUCOSE BLD-MCNC: 80 MG/DL (ref 70–99)
GLUCOSE BLD-MCNC: 89 MG/DL (ref 74–109)
HCT VFR BLD CALC: 31.5 % (ref 37–47)
HEMOGLOBIN: 9.9 G/DL (ref 12–16)
MCH RBC QN AUTO: 26.8 PG (ref 27–31)
MCHC RBC AUTO-ENTMCNC: 31.4 G/DL (ref 33–37)
MCV RBC AUTO: 85.4 FL (ref 81–99)
PDW BLD-RTO: 14.1 % (ref 11.5–14.5)
PERFORMED ON: ABNORMAL
PERFORMED ON: ABNORMAL
PERFORMED ON: NORMAL
PERFORMED ON: NORMAL
PLATELET # BLD: 201 K/UL (ref 130–400)
PMV BLD AUTO: 9.7 FL (ref 9.4–12.3)
POTASSIUM REFLEX MAGNESIUM: 4 MMOL/L (ref 3.5–5)
RBC # BLD: 3.69 M/UL (ref 4.2–5.4)
SODIUM BLD-SCNC: 143 MMOL/L (ref 136–145)
WBC # BLD: 8.4 K/UL (ref 4.8–10.8)

## 2019-10-20 PROCEDURE — 85027 COMPLETE CBC AUTOMATED: CPT

## 2019-10-20 PROCEDURE — 6360000002 HC RX W HCPCS: Performed by: FAMILY MEDICINE

## 2019-10-20 PROCEDURE — 6370000000 HC RX 637 (ALT 250 FOR IP): Performed by: HOSPITALIST

## 2019-10-20 PROCEDURE — 6360000002 HC RX W HCPCS: Performed by: INTERNAL MEDICINE

## 2019-10-20 PROCEDURE — 6370000000 HC RX 637 (ALT 250 FOR IP): Performed by: INTERNAL MEDICINE

## 2019-10-20 PROCEDURE — 80048 BASIC METABOLIC PNL TOTAL CA: CPT

## 2019-10-20 PROCEDURE — C9113 INJ PANTOPRAZOLE SODIUM, VIA: HCPCS | Performed by: FAMILY MEDICINE

## 2019-10-20 PROCEDURE — 82948 REAGENT STRIP/BLOOD GLUCOSE: CPT

## 2019-10-20 PROCEDURE — 2580000003 HC RX 258: Performed by: HOSPITALIST

## 2019-10-20 PROCEDURE — 36415 COLL VENOUS BLD VENIPUNCTURE: CPT

## 2019-10-20 PROCEDURE — 2580000003 HC RX 258: Performed by: FAMILY MEDICINE

## 2019-10-20 PROCEDURE — 1210000000 HC MED SURG R&B

## 2019-10-20 PROCEDURE — 6360000002 HC RX W HCPCS: Performed by: HOSPITALIST

## 2019-10-20 PROCEDURE — 6370000000 HC RX 637 (ALT 250 FOR IP): Performed by: FAMILY MEDICINE

## 2019-10-20 RX ORDER — SENNA PLUS 8.6 MG/1
1 TABLET ORAL 2 TIMES DAILY
Status: DISCONTINUED | OUTPATIENT
Start: 2019-10-20 | End: 2019-10-21 | Stop reason: HOSPADM

## 2019-10-20 RX ORDER — SODIUM PHOSPHATE, DIBASIC AND SODIUM PHOSPHATE, MONOBASIC 7; 19 G/133ML; G/133ML
1 ENEMA RECTAL 2 TIMES DAILY
Status: DISCONTINUED | OUTPATIENT
Start: 2019-10-20 | End: 2019-10-21 | Stop reason: HOSPADM

## 2019-10-20 RX ADMIN — KETOROLAC TROMETHAMINE 15 MG: 30 INJECTION, SOLUTION INTRAMUSCULAR at 17:07

## 2019-10-20 RX ADMIN — TOPIRAMATE 100 MG: 50 TABLET, FILM COATED ORAL at 20:50

## 2019-10-20 RX ADMIN — LINACLOTIDE 145 MCG: 145 CAPSULE, GELATIN COATED ORAL at 05:54

## 2019-10-20 RX ADMIN — Medication 2 G: at 14:10

## 2019-10-20 RX ADMIN — BISACODYL 5 MG: 5 TABLET, COATED ORAL at 08:06

## 2019-10-20 RX ADMIN — Medication 2 G: at 05:54

## 2019-10-20 RX ADMIN — METFORMIN HYDROCHLORIDE 1000 MG: 500 TABLET ORAL at 08:05

## 2019-10-20 RX ADMIN — LEVOTHYROXINE SODIUM 125 MCG: 125 TABLET ORAL at 05:54

## 2019-10-20 RX ADMIN — BISACODYL 5 MG: 5 TABLET, COATED ORAL at 08:11

## 2019-10-20 RX ADMIN — ENOXAPARIN SODIUM 40 MG: 40 INJECTION SUBCUTANEOUS at 08:06

## 2019-10-20 RX ADMIN — AMITRIPTYLINE HYDROCHLORIDE 25 MG: 25 TABLET, FILM COATED ORAL at 20:50

## 2019-10-20 RX ADMIN — TIZANIDINE 2 MG: 4 TABLET ORAL at 17:07

## 2019-10-20 RX ADMIN — Medication 10 ML: at 08:11

## 2019-10-20 RX ADMIN — MONTELUKAST 10 MG: 10 TABLET, FILM COATED ORAL at 08:06

## 2019-10-20 RX ADMIN — VENLAFAXINE HYDROCHLORIDE 150 MG: 75 CAPSULE, EXTENDED RELEASE ORAL at 08:06

## 2019-10-20 RX ADMIN — Medication 2 G: at 21:51

## 2019-10-20 RX ADMIN — SENNOSIDES 8.6 MG: 8.6 TABLET, FILM COATED ORAL at 20:50

## 2019-10-20 RX ADMIN — POLYETHYLENE GLYCOL 3350 17 G: 17 POWDER, FOR SOLUTION ORAL at 20:50

## 2019-10-20 RX ADMIN — ASPIRIN 81 MG: 81 TABLET, COATED ORAL at 08:06

## 2019-10-20 RX ADMIN — GABAPENTIN 100 MG: 100 CAPSULE ORAL at 08:07

## 2019-10-20 RX ADMIN — POLYETHYLENE GLYCOL 3350 17 G: 17 POWDER, FOR SOLUTION ORAL at 08:10

## 2019-10-20 RX ADMIN — SENNOSIDES 8.6 MG: 8.6 TABLET, FILM COATED ORAL at 14:09

## 2019-10-20 RX ADMIN — PANTOPRAZOLE SODIUM 40 MG: 40 INJECTION, POWDER, FOR SOLUTION INTRAVENOUS at 08:06

## 2019-10-20 RX ADMIN — GABAPENTIN 100 MG: 100 CAPSULE ORAL at 20:50

## 2019-10-20 RX ADMIN — ATORVASTATIN CALCIUM 20 MG: 20 TABLET, FILM COATED ORAL at 20:50

## 2019-10-20 RX ADMIN — SODIUM CHLORIDE, SODIUM LACTATE, POTASSIUM CHLORIDE, AND CALCIUM CHLORIDE: 600; 310; 30; 20 INJECTION, SOLUTION INTRAVENOUS at 23:16

## 2019-10-20 RX ADMIN — KETOROLAC TROMETHAMINE 15 MG: 30 INJECTION, SOLUTION INTRAMUSCULAR at 08:05

## 2019-10-20 RX ADMIN — LISINOPRIL 20 MG: 10 TABLET ORAL at 08:06

## 2019-10-20 RX ADMIN — TIZANIDINE 2 MG: 4 TABLET ORAL at 08:04

## 2019-10-20 RX ADMIN — TRAZODONE HYDROCHLORIDE 100 MG: 50 TABLET ORAL at 20:50

## 2019-10-20 ASSESSMENT — ENCOUNTER SYMPTOMS
WHEEZING: 0
NAUSEA: 0
SORE THROAT: 0
COLOR CHANGE: 0
EYE REDNESS: 0
TROUBLE SWALLOWING: 0
DIARRHEA: 0
VOMITING: 0
COUGH: 0
ABDOMINAL PAIN: 1
SHORTNESS OF BREATH: 0

## 2019-10-20 ASSESSMENT — PAIN DESCRIPTION - DESCRIPTORS
DESCRIPTORS: DISCOMFORT
DESCRIPTORS: DISCOMFORT

## 2019-10-20 ASSESSMENT — PAIN DESCRIPTION - ORIENTATION
ORIENTATION: RIGHT
ORIENTATION: RIGHT

## 2019-10-20 ASSESSMENT — PAIN DESCRIPTION - LOCATION
LOCATION: ABDOMEN
LOCATION: ABDOMEN

## 2019-10-20 ASSESSMENT — PAIN SCALES - GENERAL
PAINLEVEL_OUTOF10: 0
PAINLEVEL_OUTOF10: 10
PAINLEVEL_OUTOF10: 0
PAINLEVEL_OUTOF10: 9
PAINLEVEL_OUTOF10: 0

## 2019-10-20 ASSESSMENT — PAIN DESCRIPTION - PAIN TYPE
TYPE: ACUTE PAIN
TYPE: ACUTE PAIN

## 2019-10-20 ASSESSMENT — PAIN DESCRIPTION - FREQUENCY
FREQUENCY: INTERMITTENT
FREQUENCY: INTERMITTENT

## 2019-10-20 ASSESSMENT — PAIN DESCRIPTION - PROGRESSION
CLINICAL_PROGRESSION: NOT CHANGED
CLINICAL_PROGRESSION: NOT CHANGED

## 2019-10-20 ASSESSMENT — PAIN - FUNCTIONAL ASSESSMENT
PAIN_FUNCTIONAL_ASSESSMENT: ACTIVITIES ARE NOT PREVENTED
PAIN_FUNCTIONAL_ASSESSMENT: ACTIVITIES ARE NOT PREVENTED

## 2019-10-21 VITALS
TEMPERATURE: 98.9 F | DIASTOLIC BLOOD PRESSURE: 82 MMHG | SYSTOLIC BLOOD PRESSURE: 145 MMHG | OXYGEN SATURATION: 94 % | HEIGHT: 62 IN | WEIGHT: 217 LBS | HEART RATE: 70 BPM | BODY MASS INDEX: 39.93 KG/M2 | RESPIRATION RATE: 18 BRPM

## 2019-10-21 LAB
ANION GAP SERPL CALCULATED.3IONS-SCNC: 11 MMOL/L (ref 7–19)
BUN BLDV-MCNC: 12 MG/DL (ref 6–20)
CALCIUM SERPL-MCNC: 8.3 MG/DL (ref 8.6–10)
CHLORIDE BLD-SCNC: 107 MMOL/L (ref 98–111)
CO2: 22 MMOL/L (ref 22–29)
CREAT SERPL-MCNC: 0.7 MG/DL (ref 0.5–0.9)
CULTURE, BLOOD 2: ABNORMAL
CULTURE, BLOOD 2: ABNORMAL
GFR NON-AFRICAN AMERICAN: >60
GLUCOSE BLD-MCNC: 101 MG/DL (ref 74–109)
GLUCOSE BLD-MCNC: 105 MG/DL (ref 70–99)
GLUCOSE BLD-MCNC: 73 MG/DL (ref 70–99)
HCT VFR BLD CALC: 29.1 % (ref 37–47)
HEMOGLOBIN: 9.3 G/DL (ref 12–16)
MCH RBC QN AUTO: 27.2 PG (ref 27–31)
MCHC RBC AUTO-ENTMCNC: 32 G/DL (ref 33–37)
MCV RBC AUTO: 85.1 FL (ref 81–99)
ORGANISM: ABNORMAL
ORGANISM: ABNORMAL
PDW BLD-RTO: 13.9 % (ref 11.5–14.5)
PERFORMED ON: ABNORMAL
PERFORMED ON: NORMAL
PLATELET # BLD: 205 K/UL (ref 130–400)
PMV BLD AUTO: 9.2 FL (ref 9.4–12.3)
POTASSIUM REFLEX MAGNESIUM: 4.1 MMOL/L (ref 3.5–5)
RBC # BLD: 3.42 M/UL (ref 4.2–5.4)
SODIUM BLD-SCNC: 140 MMOL/L (ref 136–145)
WBC # BLD: 9 K/UL (ref 4.8–10.8)

## 2019-10-21 PROCEDURE — 2580000003 HC RX 258: Performed by: FAMILY MEDICINE

## 2019-10-21 PROCEDURE — 6370000000 HC RX 637 (ALT 250 FOR IP): Performed by: INTERNAL MEDICINE

## 2019-10-21 PROCEDURE — 6360000002 HC RX W HCPCS: Performed by: HOSPITALIST

## 2019-10-21 PROCEDURE — 82948 REAGENT STRIP/BLOOD GLUCOSE: CPT

## 2019-10-21 PROCEDURE — C9113 INJ PANTOPRAZOLE SODIUM, VIA: HCPCS | Performed by: FAMILY MEDICINE

## 2019-10-21 PROCEDURE — 6370000000 HC RX 637 (ALT 250 FOR IP): Performed by: FAMILY MEDICINE

## 2019-10-21 PROCEDURE — 36415 COLL VENOUS BLD VENIPUNCTURE: CPT

## 2019-10-21 PROCEDURE — 85027 COMPLETE CBC AUTOMATED: CPT

## 2019-10-21 PROCEDURE — 6370000000 HC RX 637 (ALT 250 FOR IP): Performed by: HOSPITALIST

## 2019-10-21 PROCEDURE — 80048 BASIC METABOLIC PNL TOTAL CA: CPT

## 2019-10-21 PROCEDURE — 6360000002 HC RX W HCPCS: Performed by: INTERNAL MEDICINE

## 2019-10-21 PROCEDURE — 6360000002 HC RX W HCPCS: Performed by: FAMILY MEDICINE

## 2019-10-21 RX ORDER — ATORVASTATIN CALCIUM 20 MG/1
20 TABLET, FILM COATED ORAL DAILY
Qty: 30 TABLET | Refills: 0 | Status: SHIPPED | OUTPATIENT
Start: 2019-10-21 | End: 2019-10-24 | Stop reason: SDUPTHER

## 2019-10-21 RX ORDER — POLYETHYLENE GLYCOL 3350 17 G/17G
17 POWDER, FOR SOLUTION ORAL 2 TIMES DAILY
Qty: 527 G | Refills: 1 | Status: SHIPPED | OUTPATIENT
Start: 2019-10-21 | End: 2019-11-20

## 2019-10-21 RX ORDER — LEVOFLOXACIN 750 MG/1
750 TABLET ORAL DAILY
Qty: 4 TABLET | Refills: 0 | Status: SHIPPED | OUTPATIENT
Start: 2019-10-21 | End: 2019-10-25

## 2019-10-21 RX ADMIN — Medication 10 ML: at 08:33

## 2019-10-21 RX ADMIN — SENNOSIDES 8.6 MG: 8.6 TABLET, FILM COATED ORAL at 08:32

## 2019-10-21 RX ADMIN — LEVOTHYROXINE SODIUM 125 MCG: 125 TABLET ORAL at 05:30

## 2019-10-21 RX ADMIN — MONTELUKAST 10 MG: 10 TABLET, FILM COATED ORAL at 08:33

## 2019-10-21 RX ADMIN — ASPIRIN 81 MG: 81 TABLET, COATED ORAL at 08:33

## 2019-10-21 RX ADMIN — POLYETHYLENE GLYCOL 3350 17 G: 17 POWDER, FOR SOLUTION ORAL at 08:33

## 2019-10-21 RX ADMIN — Medication 2 G: at 05:30

## 2019-10-21 RX ADMIN — PANTOPRAZOLE SODIUM 40 MG: 40 INJECTION, POWDER, FOR SOLUTION INTRAVENOUS at 08:33

## 2019-10-21 RX ADMIN — GABAPENTIN 100 MG: 100 CAPSULE ORAL at 08:32

## 2019-10-21 RX ADMIN — LISINOPRIL 20 MG: 10 TABLET ORAL at 08:32

## 2019-10-21 RX ADMIN — SODIUM CHLORIDE, SODIUM LACTATE, POTASSIUM CHLORIDE, AND CALCIUM CHLORIDE: 600; 310; 30; 20 INJECTION, SOLUTION INTRAVENOUS at 12:42

## 2019-10-21 RX ADMIN — VENLAFAXINE HYDROCHLORIDE 150 MG: 75 CAPSULE, EXTENDED RELEASE ORAL at 08:32

## 2019-10-21 RX ADMIN — ENOXAPARIN SODIUM 40 MG: 40 INJECTION SUBCUTANEOUS at 08:33

## 2019-10-21 RX ADMIN — BISACODYL 5 MG: 5 TABLET, COATED ORAL at 08:33

## 2019-10-21 RX ADMIN — SODIUM CHLORIDE, SODIUM LACTATE, POTASSIUM CHLORIDE, AND CALCIUM CHLORIDE: 600; 310; 30; 20 INJECTION, SOLUTION INTRAVENOUS at 08:30

## 2019-10-21 RX ADMIN — LINACLOTIDE 145 MCG: 145 CAPSULE, GELATIN COATED ORAL at 05:30

## 2019-10-21 ASSESSMENT — PAIN SCALES - GENERAL: PAINLEVEL_OUTOF10: 0

## 2019-10-22 ENCOUNTER — TELEPHONE (OUTPATIENT)
Dept: INTERNAL MEDICINE | Age: 49
End: 2019-10-22

## 2019-10-24 ENCOUNTER — HOSPITAL ENCOUNTER (OUTPATIENT)
Dept: CT IMAGING | Age: 49
Discharge: HOME | End: 2019-10-24
Payer: COMMERCIAL

## 2019-10-24 ENCOUNTER — APPOINTMENT (OUTPATIENT)
Dept: GENERAL RADIOLOGY | Facility: HOSPITAL | Age: 49
End: 2019-10-24

## 2019-10-24 ENCOUNTER — OFFICE VISIT (OUTPATIENT)
Dept: PRIMARY CARE CLINIC | Age: 49
End: 2019-10-24
Payer: COMMERCIAL

## 2019-10-24 ENCOUNTER — HOSPITAL ENCOUNTER (EMERGENCY)
Facility: HOSPITAL | Age: 49
Discharge: HOME | End: 2019-10-24
Admitting: EMERGENCY MEDICINE

## 2019-10-24 ENCOUNTER — HOSPITAL ENCOUNTER (EMERGENCY)
Age: 49
Discharge: LEFT BEFORE DISCHARGE | End: 2019-10-24
Payer: COMMERCIAL

## 2019-10-24 ENCOUNTER — TELEPHONE (OUTPATIENT)
Dept: PRIMARY CARE CLINIC | Age: 49
End: 2019-10-24

## 2019-10-24 VITALS
HEART RATE: 92 BPM | OXYGEN SATURATION: 100 % | DIASTOLIC BLOOD PRESSURE: 68 MMHG | SYSTOLIC BLOOD PRESSURE: 106 MMHG | TEMPERATURE: 98 F | RESPIRATION RATE: 20 BRPM

## 2019-10-24 VITALS
DIASTOLIC BLOOD PRESSURE: 76 MMHG | TEMPERATURE: 98 F | WEIGHT: 199.2 LBS | OXYGEN SATURATION: 98 % | BODY MASS INDEX: 36.66 KG/M2 | HEART RATE: 110 BPM | SYSTOLIC BLOOD PRESSURE: 110 MMHG | HEIGHT: 62 IN

## 2019-10-24 VITALS
SYSTOLIC BLOOD PRESSURE: 119 MMHG | RESPIRATION RATE: 18 BRPM | TEMPERATURE: 97.7 F | WEIGHT: 199 LBS | OXYGEN SATURATION: 99 % | DIASTOLIC BLOOD PRESSURE: 69 MMHG | HEIGHT: 62 IN | BODY MASS INDEX: 36.62 KG/M2 | HEART RATE: 78 BPM

## 2019-10-24 DIAGNOSIS — Z09 HOSPITAL DISCHARGE FOLLOW-UP: Primary | ICD-10-CM

## 2019-10-24 DIAGNOSIS — E78.5 HYPERLIPIDEMIA, UNSPECIFIED HYPERLIPIDEMIA TYPE: ICD-10-CM

## 2019-10-24 DIAGNOSIS — R06.00 DYSPNEA, UNSPECIFIED TYPE: Primary | ICD-10-CM

## 2019-10-24 DIAGNOSIS — R00.0 TACHYCARDIA: ICD-10-CM

## 2019-10-24 DIAGNOSIS — F41.9 ANXIETY: ICD-10-CM

## 2019-10-24 DIAGNOSIS — R06.02 SOB (SHORTNESS OF BREATH): ICD-10-CM

## 2019-10-24 DIAGNOSIS — R07.9 CHEST PAIN, UNSPECIFIED TYPE: ICD-10-CM

## 2019-10-24 DIAGNOSIS — Z76.0 MEDICATION REFILL: ICD-10-CM

## 2019-10-24 LAB
ALBUMIN SERPL-MCNC: 3.8 G/DL (ref 3.5–5.2)
ALBUMIN SERPL-MCNC: 3.9 G/DL (ref 3.5–5.2)
ALBUMIN/GLOB SERPL: 1 G/DL
ALP BLD-CCNC: 92 U/L (ref 35–104)
ALP SERPL-CCNC: 89 U/L (ref 39–117)
ALT SERPL W P-5'-P-CCNC: 17 U/L (ref 1–33)
ALT SERPL-CCNC: 16 U/L (ref 5–33)
ANION GAP SERPL CALCULATED.3IONS-SCNC: 13 MMOL/L (ref 7–19)
ANION GAP SERPL CALCULATED.3IONS-SCNC: 15 MMOL/L (ref 5–15)
AST SERPL-CCNC: 35 U/L (ref 5–32)
AST SERPL-CCNC: 50 U/L (ref 1–32)
BASOPHILS # BLD AUTO: 0.05 10*3/MM3 (ref 0–0.2)
BASOPHILS ABSOLUTE: 0.1 K/UL (ref 0–0.2)
BASOPHILS NFR BLD AUTO: 0.5 % (ref 0–1.5)
BASOPHILS RELATIVE PERCENT: 0.8 % (ref 0–1)
BILIRUB SERPL-MCNC: 0.3 MG/DL (ref 0.2–1.2)
BILIRUB SERPL-MCNC: 0.3 MG/DL (ref 0.2–1.2)
BUN BLD-MCNC: 10 MG/DL (ref 6–20)
BUN BLDV-MCNC: 11 MG/DL (ref 6–20)
BUN/CREAT SERPL: 16.9 (ref 7–25)
CALCIUM SERPL-MCNC: 9.2 MG/DL (ref 8.6–10)
CALCIUM SPEC-SCNC: 8.9 MG/DL (ref 8.6–10.5)
CHLORIDE BLD-SCNC: 108 MMOL/L (ref 98–111)
CHLORIDE SERPL-SCNC: 103 MMOL/L (ref 98–107)
CO2 SERPL-SCNC: 21 MMOL/L (ref 22–29)
CO2: 21 MMOL/L (ref 22–29)
CREAT BLD-MCNC: 0.59 MG/DL (ref 0.57–1)
CREAT SERPL-MCNC: 0.7 MG/DL (ref 0.5–0.9)
D DIMER: 1.13 UG/ML FEU (ref 0–0.48)
DEPRECATED RDW RBC AUTO: 39.6 FL (ref 37–54)
EOSINOPHIL # BLD AUTO: 0.17 10*3/MM3 (ref 0–0.4)
EOSINOPHIL NFR BLD AUTO: 1.7 % (ref 0.3–6.2)
EOSINOPHILS ABSOLUTE: 0.3 K/UL (ref 0–0.6)
EOSINOPHILS RELATIVE PERCENT: 3 % (ref 0–5)
ERYTHROCYTE [DISTWIDTH] IN BLOOD BY AUTOMATED COUNT: 13.9 % (ref 12.3–15.4)
GFR NON-AFRICAN AMERICAN: >60
GFR NON-AFRICAN AMERICAN: >60
GFR SERPL CREATININE-BSD FRML MDRD: 109 ML/MIN/1.73
GLOBULIN UR ELPH-MCNC: 4.1 GM/DL
GLUCOSE BLD-MCNC: 59 MG/DL (ref 65–99)
GLUCOSE BLD-MCNC: 64 MG/DL (ref 74–109)
GLUCOSE BLDC GLUCOMTR-MCNC: 50 MG/DL (ref 70–130)
HCT VFR BLD AUTO: 30.6 % (ref 34–46.6)
HCT VFR BLD CALC: 31.9 % (ref 37–47)
HEMOGLOBIN: 10.3 G/DL (ref 12–16)
HGB BLD-MCNC: 10.4 G/DL (ref 12–15.9)
IMM GRANULOCYTES # BLD AUTO: 0.22 10*3/MM3 (ref 0–0.05)
IMM GRANULOCYTES NFR BLD AUTO: 2.2 % (ref 0–0.5)
IMMATURE GRANULOCYTES #: 0.2 K/UL
LYMPHOCYTES # BLD AUTO: 2.29 10*3/MM3 (ref 0.7–3.1)
LYMPHOCYTES ABSOLUTE: 2.3 K/UL (ref 1.1–4.5)
LYMPHOCYTES NFR BLD AUTO: 22.9 % (ref 19.6–45.3)
LYMPHOCYTES RELATIVE PERCENT: 26.6 % (ref 20–40)
MCH RBC QN AUTO: 27.2 PG (ref 26.6–33)
MCH RBC QN AUTO: 27.3 PG (ref 27–31)
MCHC RBC AUTO-ENTMCNC: 32.3 G/DL (ref 33–37)
MCHC RBC AUTO-ENTMCNC: 34 G/DL (ref 31.5–35.7)
MCV RBC AUTO: 80.1 FL (ref 79–97)
MCV RBC AUTO: 84.6 FL (ref 81–99)
MONOCYTES # BLD AUTO: 0.56 10*3/MM3 (ref 0.1–0.9)
MONOCYTES ABSOLUTE: 0.6 K/UL (ref 0–0.9)
MONOCYTES NFR BLD AUTO: 5.6 % (ref 5–12)
MONOCYTES RELATIVE PERCENT: 7.1 % (ref 0–10)
NEUTROPHILS # BLD AUTO: 6.69 10*3/MM3 (ref 1.7–7)
NEUTROPHILS ABSOLUTE: 5.2 K/UL (ref 1.5–7.5)
NEUTROPHILS NFR BLD AUTO: 67.1 % (ref 42.7–76)
NEUTROPHILS RELATIVE PERCENT: 59.7 % (ref 50–65)
NRBC BLD AUTO-RTO: 0 /100 WBC (ref 0–0.2)
NT-PROBNP SERPL-MCNC: 54.1 PG/ML (ref 5–450)
PDW BLD-RTO: 14 % (ref 11.5–14.5)
PERFORMED ON: NORMAL
PLATELET # BLD AUTO: 224 10*3/MM3 (ref 140–450)
PLATELET # BLD: 202 K/UL (ref 130–400)
PMV BLD AUTO: 9 FL (ref 9.4–12.3)
PMV BLD AUTO: 9.2 FL (ref 6–12)
POC CREATININE: 0.7 MG/DL (ref 0.3–1.3)
POC SAMPLE TYPE: NORMAL
POTASSIUM BLD-SCNC: 3.7 MMOL/L (ref 3.5–5.2)
POTASSIUM SERPL-SCNC: 4 MMOL/L (ref 3.5–5)
PRO-BNP: 62 PG/ML (ref 0–450)
PROT SERPL-MCNC: 8 G/DL (ref 6–8.5)
RBC # BLD AUTO: 3.82 10*6/MM3 (ref 3.77–5.28)
RBC # BLD: 3.77 M/UL (ref 4.2–5.4)
SODIUM BLD-SCNC: 139 MMOL/L (ref 136–145)
SODIUM BLD-SCNC: 142 MMOL/L (ref 136–145)
TOTAL PROTEIN: 7.8 G/DL (ref 6.6–8.7)
TROPONIN T SERPL-MCNC: <0.01 NG/ML (ref 0–0.03)
TROPONIN T SERPL-MCNC: <0.01 NG/ML (ref 0–0.03)
WBC # BLD: 8.7 K/UL (ref 4.8–10.8)
WBC NRBC COR # BLD: 9.98 10*3/MM3 (ref 3.4–10.8)

## 2019-10-24 PROCEDURE — 82962 GLUCOSE BLOOD TEST: CPT

## 2019-10-24 PROCEDURE — 93010 ELECTROCARDIOGRAM REPORT: CPT | Performed by: INTERNAL MEDICINE

## 2019-10-24 PROCEDURE — 6360000004 HC RX CONTRAST MEDICATION: Performed by: NURSE PRACTITIONER

## 2019-10-24 PROCEDURE — 99284 EMERGENCY DEPT VISIT MOD MDM: CPT

## 2019-10-24 PROCEDURE — 99496 TRANSJ CARE MGMT HIGH F2F 7D: CPT | Performed by: NURSE PRACTITIONER

## 2019-10-24 PROCEDURE — 84484 ASSAY OF TROPONIN QUANT: CPT | Performed by: PHYSICIAN ASSISTANT

## 2019-10-24 PROCEDURE — 71275 CT ANGIOGRAPHY CHEST: CPT

## 2019-10-24 PROCEDURE — 71045 X-RAY EXAM CHEST 1 VIEW: CPT

## 2019-10-24 PROCEDURE — 1111F DSCHRG MED/CURRENT MED MERGE: CPT | Performed by: NURSE PRACTITIONER

## 2019-10-24 PROCEDURE — 85025 COMPLETE CBC W/AUTO DIFF WBC: CPT | Performed by: PHYSICIAN ASSISTANT

## 2019-10-24 PROCEDURE — 82565 ASSAY OF CREATININE: CPT

## 2019-10-24 PROCEDURE — 93005 ELECTROCARDIOGRAM TRACING: CPT | Performed by: EMERGENCY MEDICINE

## 2019-10-24 PROCEDURE — 94618 PULMONARY STRESS TESTING: CPT | Performed by: NURSE PRACTITIONER

## 2019-10-24 PROCEDURE — 93005 ELECTROCARDIOGRAM TRACING: CPT | Performed by: PHYSICIAN ASSISTANT

## 2019-10-24 PROCEDURE — 80053 COMPREHEN METABOLIC PANEL: CPT | Performed by: PHYSICIAN ASSISTANT

## 2019-10-24 PROCEDURE — 4500000002 HC ER NO CHARGE

## 2019-10-24 PROCEDURE — 83880 ASSAY OF NATRIURETIC PEPTIDE: CPT | Performed by: PHYSICIAN ASSISTANT

## 2019-10-24 RX ORDER — TOPIRAMATE 50 MG/1
50 TABLET, FILM COATED ORAL 2 TIMES DAILY
COMMUNITY

## 2019-10-24 RX ORDER — FAMOTIDINE 20 MG/1
20 TABLET, FILM COATED ORAL
COMMUNITY

## 2019-10-24 RX ORDER — LEVOTHYROXINE SODIUM 0.12 MG/1
125 TABLET ORAL
COMMUNITY

## 2019-10-24 RX ORDER — ATORVASTATIN CALCIUM 20 MG/1
20 TABLET, FILM COATED ORAL DAILY
Qty: 30 TABLET | Refills: 5 | Status: SHIPPED | OUTPATIENT
Start: 2019-10-24 | End: 2020-03-23 | Stop reason: SDUPTHER

## 2019-10-24 RX ORDER — TRAZODONE HYDROCHLORIDE 100 MG/1
100 TABLET ORAL
COMMUNITY

## 2019-10-24 RX ORDER — NAPROXEN SODIUM 550 MG/1
550 TABLET ORAL 2 TIMES DAILY WITH MEALS
COMMUNITY
End: 2020-10-05

## 2019-10-24 RX ORDER — ASPIRIN 81 MG/1
81 TABLET ORAL DAILY
COMMUNITY

## 2019-10-24 RX ORDER — CLONAZEPAM 0.5 MG/1
TABLET ORAL
Qty: 30 TABLET | Refills: 0 | Status: SHIPPED | OUTPATIENT
Start: 2019-10-30 | End: 2020-07-15 | Stop reason: SDUPTHER

## 2019-10-24 RX ORDER — QUINAPRIL 20 MG/1
20 TABLET ORAL
COMMUNITY

## 2019-10-24 RX ORDER — LANOLIN ALCOHOL/MO/W.PET/CERES
100 CREAM (GRAM) TOPICAL DAILY
COMMUNITY

## 2019-10-24 RX ORDER — ATORVASTATIN CALCIUM 20 MG/1
20 TABLET, FILM COATED ORAL
COMMUNITY

## 2019-10-24 RX ORDER — AMITRIPTYLINE HYDROCHLORIDE 25 MG/1
25 TABLET, FILM COATED ORAL
COMMUNITY
End: 2020-10-05

## 2019-10-24 RX ORDER — CLONAZEPAM 0.5 MG/1
0.5 TABLET ORAL AS NEEDED
COMMUNITY

## 2019-10-24 RX ORDER — MONTELUKAST SODIUM 10 MG/1
10 TABLET ORAL
COMMUNITY
End: 2022-08-23 | Stop reason: SDUPTHER

## 2019-10-24 RX ORDER — NITROGLYCERIN 0.4 MG/1
0.4 TABLET SUBLINGUAL
Status: DISCONTINUED | OUTPATIENT
Start: 2019-10-24 | End: 2019-10-25 | Stop reason: HOSPADM

## 2019-10-24 RX ORDER — TOPIRAMATE 50 MG/1
TABLET, FILM COATED ORAL
Qty: 60 TABLET | Refills: 3 | Status: SHIPPED | OUTPATIENT
Start: 2019-10-30 | End: 2020-02-24

## 2019-10-24 RX ADMIN — NITROGLYCERIN 0.4 MG: 0.4 TABLET SUBLINGUAL at 18:53

## 2019-10-24 RX ADMIN — IOPAMIDOL 100 ML: 755 INJECTION, SOLUTION INTRAVENOUS at 15:20

## 2019-10-24 ASSESSMENT — ENCOUNTER SYMPTOMS
GASTROINTESTINAL NEGATIVE: 1
SHORTNESS OF BREATH: 1
CHEST TIGHTNESS: 1

## 2019-10-24 ASSESSMENT — PAIN SCALES - GENERAL: PAINLEVEL_OUTOF10: 10

## 2019-10-24 ASSESSMENT — PAIN DESCRIPTION - FREQUENCY: FREQUENCY: CONTINUOUS

## 2019-10-24 ASSESSMENT — PAIN DESCRIPTION - LOCATION: LOCATION: HEAD;CHEST

## 2019-10-24 ASSESSMENT — PAIN DESCRIPTION - DESCRIPTORS: DESCRIPTORS: SHARP

## 2019-10-25 ENCOUNTER — OFFICE VISIT (OUTPATIENT)
Dept: GASTROENTEROLOGY | Age: 49
End: 2019-10-25
Payer: COMMERCIAL

## 2019-10-25 VITALS
WEIGHT: 198.4 LBS | HEIGHT: 62 IN | BODY MASS INDEX: 36.51 KG/M2 | SYSTOLIC BLOOD PRESSURE: 102 MMHG | HEART RATE: 104 BPM | DIASTOLIC BLOOD PRESSURE: 64 MMHG | OXYGEN SATURATION: 96 %

## 2019-10-25 DIAGNOSIS — Z79.899 POLYPHARMACY: ICD-10-CM

## 2019-10-25 DIAGNOSIS — K59.09 CHRONIC CONSTIPATION: Primary | ICD-10-CM

## 2019-10-25 DIAGNOSIS — R11.0 NAUSEA: ICD-10-CM

## 2019-10-25 PROCEDURE — 99214 OFFICE O/P EST MOD 30 MIN: CPT | Performed by: NURSE PRACTITIONER

## 2019-10-25 RX ORDER — MULTIVITAMIN WITH IRON
100 TABLET ORAL DAILY
COMMUNITY

## 2019-10-25 ASSESSMENT — ENCOUNTER SYMPTOMS
BLOOD IN STOOL: 0
COUGH: 0
VOMITING: 0
BACK PAIN: 0
NAUSEA: 1
CONSTIPATION: 1
ANAL BLEEDING: 0
SHORTNESS OF BREATH: 1
DIARRHEA: 0
TROUBLE SWALLOWING: 1
ABDOMINAL DISTENTION: 0
VOICE CHANGE: 0
ABDOMINAL PAIN: 1
RECTAL PAIN: 0
SORE THROAT: 0

## 2019-10-25 NOTE — ED PROVIDER NOTES
Subjective   History of Present Illness  49-year-old female presents after referral from her family doctor.  She reports she had been discharged from the hospital at [facility] for UTI and weakness.  She said she had E. coli in her bloodstream.  Patient reports she has had this ongoing shortness of breath and dyspnea on exertion for weeks.  She had followed up today and had a CAT scan done with her primary care and her primary care provider had told her to go to the ER for evaluation for CHF.  Her chest CAT scan with contrast revealed no PE but did show trace pleural effusion and bilateral small amount of pulmonary edema.  Review of Systems   All other systems reviewed and are negative.      Past Medical History:   Diagnosis Date   • Diabetes (CMS/[facility])    • GERD (gastroesophageal reflux disease)    • Hyperlipidemia    • Hypertension        Allergies   Allergen Reactions   • Loratadine-Pseudoephedrine Er Other (See Comments)     Makes migraines worse   • Meperidine Other (See Comments)     aggressive   • Percocet [Oxycodone-Acetaminophen] Rash       No past surgical history on file.    No family history on file.    Social History     Socioeconomic History   • Marital status: Single     Spouse name: Not on file   • Number of children: Not on file   • Years of education: Not on file   • Highest education level: Not on file   Tobacco Use   • Smoking status: Never Smoker   Substance and Sexual Activity   • Alcohol use: No     Frequency: Never   • Drug use: No           Objective   Physical Exam   Constitutional: She is oriented to person, place, and time. She appears well-developed and well-nourished.   HENT:   Head: Normocephalic and atraumatic.   Eyes: Pupils are equal, round, and reactive to light.   Neck: Normal range of motion. Neck supple.   Cardiovascular: Normal rate and regular rhythm.   Pulmonary/Chest: Effort normal and breath sounds normal.   Abdominal: Soft. Bowel sounds are normal.   Musculoskeletal: Normal  range of motion.        Right lower leg: Normal.        Left lower leg: Normal.   Neurological: She is alert and oriented to person, place, and time.   Skin: Skin is warm. Capillary refill takes less than 2 seconds.   Psychiatric: She has a normal mood and affect. Her behavior is normal.   Nursing note and vitals reviewed.      Procedures           ED Course      Labs Reviewed   COMPREHENSIVE METABOLIC PANEL - Abnormal; Notable for the following components:       Result Value    Glucose 59 (*)     CO2 21.0 (*)     AST (SGOT) 50 (*)     All other components within normal limits    Narrative:     GFR Normal >60  Chronic Kidney Disease <60  Kidney Failure <15   CBC WITH AUTO DIFFERENTIAL - Abnormal; Notable for the following components:    Hemoglobin 10.4 (*)     Hematocrit 30.6 (*)     Immature Grans % 2.2 (*)     Immature Grans, Absolute 0.22 (*)     All other components within normal limits   POCT GLUCOSE FINGERSTICK - Abnormal; Notable for the following components:    Glucose 50 (*)     All other components within normal limits   TROPONIN (IN-HOUSE) - Normal    Narrative:     Troponin T Reference Range:  <= 0.03 ng/mL-   Negative for AMI  >0.03 ng/mL-     Abnormal for myocardial necrosis.  Clinicians would have to utilize clinical acumen, EKG, Troponin and serial changes to determine if it is an Acute Myocardial Infarction or myocardial injury due to an underlying chronic condition.    BNP (IN-HOUSE) - Normal    Narrative:     Among patients with dyspnea, NT-proBNP is highly sensitive for the detection of acute congestive heart failure. In addition NT-proBNP of <300 pg/ml effectively rules out acute congestive heart failure with 99% negative predictive value.   TROPONIN (IN-HOUSE) - Normal    Narrative:     Troponin T Reference Range:  <= 0.03 ng/mL-   Negative for AMI  >0.03 ng/mL-     Abnormal for myocardial necrosis.  Clinicians would have to utilize clinical acumen, EKG, Troponin and serial changes to determine  if it is an Acute Myocardial Infarction or myocardial injury due to an underlying chronic condition.    CBC AND DIFFERENTIAL    Narrative:     The following orders were created for panel order CBC & Differential.  Procedure                               Abnormality         Status                     ---------                               -----------         ------                     CBC Auto Differential[[id number]]        Abnormal            Final result                 Please view results for these tests on the individual orders.     XR Chest 1 View   Final Result   1. No acute cardiopulmonary process.       This report was finalized on 10/24/2019 19:27 by Dr. Arsh Palencia MD.                    MDM  Number of Diagnoses or Management Options  Chest pain, unspecified type: new and requires workup  Dyspnea, unspecified type: new and requires workup  Diagnosis management comments: There is no lower extremity edema the patient is nontoxic on examination and the patient is not dyspneic on my examination, the patient has been ambulated to the ER and she is not hypoxic, the patient is denying any acute distress here, her chest x-ray does not reveal any pulmonary edema her BNP is negative for troponins negative x2.  I have offered the patient admission for an adult stress echocardiogram however the patient has refused and was to be discharged.  Outpatient stress test ordered.       Amount and/or Complexity of Data Reviewed  Clinical lab tests: ordered and reviewed  Tests in the radiology section of CPT®: ordered and reviewed  Tests in the medicine section of CPT®: ordered and reviewed    Risk of Complications, Morbidity, and/or Mortality  Presenting problems: moderate  Diagnostic procedures: moderate  Management options: moderate    Patient Progress  Patient progress: stable      Final diagnoses:   Dyspnea, unspecified type   Chest pain, unspecified type              Michael Johnson PA-C  10/25/19 0239

## 2019-10-26 DIAGNOSIS — E11.9 TYPE 2 DIABETES MELLITUS WITHOUT COMPLICATION, WITH LONG-TERM CURRENT USE OF INSULIN (HCC): ICD-10-CM

## 2019-10-26 DIAGNOSIS — Z79.4 TYPE 2 DIABETES MELLITUS WITHOUT COMPLICATION, WITH LONG-TERM CURRENT USE OF INSULIN (HCC): ICD-10-CM

## 2019-10-27 RX ORDER — BLOOD SUGAR DIAGNOSTIC
STRIP MISCELLANEOUS
Qty: 100 STRIP | Refills: 0 | Status: SHIPPED | OUTPATIENT
Start: 2019-10-27 | End: 2019-11-23 | Stop reason: SDUPTHER

## 2019-10-27 RX ORDER — LANCETS
EACH MISCELLANEOUS
Qty: 100 EACH | Refills: 1 | Status: SHIPPED | OUTPATIENT
Start: 2019-10-27 | End: 2019-12-20

## 2019-10-28 LAB
EKG P AXIS: 21 DEGREES
EKG P-R INTERVAL: 148 MS
EKG Q-T INTERVAL: 400 MS
EKG QRS DURATION: 86 MS
EKG QTC CALCULATION (BAZETT): 456 MS
EKG T AXIS: -21 DEGREES

## 2019-11-07 ENCOUNTER — NURSE TRIAGE (OUTPATIENT)
Dept: OTHER | Facility: CLINIC | Age: 49
End: 2019-11-07

## 2019-12-03 ENCOUNTER — TELEPHONE (OUTPATIENT)
Dept: PRIMARY CARE CLINIC | Age: 49
End: 2019-12-03

## 2019-12-04 ENCOUNTER — OFFICE VISIT (OUTPATIENT)
Dept: ENDOCRINOLOGY | Facility: CLINIC | Age: 49
End: 2019-12-04

## 2019-12-04 VITALS
BODY MASS INDEX: 36.1 KG/M2 | HEART RATE: 107 BPM | HEIGHT: 62 IN | WEIGHT: 196.2 LBS | OXYGEN SATURATION: 98 % | SYSTOLIC BLOOD PRESSURE: 124 MMHG | DIASTOLIC BLOOD PRESSURE: 72 MMHG

## 2019-12-04 DIAGNOSIS — N18.2 CKD (CHRONIC KIDNEY DISEASE), STAGE II: ICD-10-CM

## 2019-12-04 DIAGNOSIS — E78.2 MIXED DIABETIC HYPERLIPIDEMIA ASSOCIATED WITH TYPE 2 DIABETES MELLITUS (HCC): ICD-10-CM

## 2019-12-04 DIAGNOSIS — Z79.4 TYPE 2 DIABETES MELLITUS WITH HYPERGLYCEMIA, WITH LONG-TERM CURRENT USE OF INSULIN (HCC): Primary | ICD-10-CM

## 2019-12-04 DIAGNOSIS — E11.59 HYPERTENSION ASSOCIATED WITH DIABETES (HCC): ICD-10-CM

## 2019-12-04 DIAGNOSIS — E03.8 HYPOTHYROIDISM DUE TO HASHIMOTO'S THYROIDITIS: ICD-10-CM

## 2019-12-04 DIAGNOSIS — E55.9 VITAMIN D DEFICIENCY: ICD-10-CM

## 2019-12-04 DIAGNOSIS — E11.65 TYPE 2 DIABETES MELLITUS WITH HYPERGLYCEMIA, WITH LONG-TERM CURRENT USE OF INSULIN (HCC): Primary | ICD-10-CM

## 2019-12-04 DIAGNOSIS — E06.3 HYPOTHYROIDISM DUE TO HASHIMOTO'S THYROIDITIS: ICD-10-CM

## 2019-12-04 DIAGNOSIS — E11.69 MIXED DIABETIC HYPERLIPIDEMIA ASSOCIATED WITH TYPE 2 DIABETES MELLITUS (HCC): ICD-10-CM

## 2019-12-04 DIAGNOSIS — I15.2 HYPERTENSION ASSOCIATED WITH DIABETES (HCC): ICD-10-CM

## 2019-12-04 PROCEDURE — 99214 OFFICE O/P EST MOD 30 MIN: CPT | Performed by: INTERNAL MEDICINE

## 2019-12-04 RX ORDER — PROCHLORPERAZINE 25 MG/1
1 SUPPOSITORY RECTAL ONCE
Qty: 1 EACH | Refills: 3 | Status: SHIPPED | OUTPATIENT
Start: 2019-12-04 | End: 2019-12-04

## 2019-12-04 RX ORDER — FLASH GLUCOSE SENSOR
1 KIT MISCELLANEOUS
Qty: 2 EACH | Refills: 11 | Status: SHIPPED | OUTPATIENT
Start: 2019-12-04 | End: 2020-02-03

## 2019-12-04 RX ORDER — PROCHLORPERAZINE 25 MG/1
1 SUPPOSITORY RECTAL ONCE
Qty: 1 DEVICE | Refills: 0 | Status: SHIPPED | OUTPATIENT
Start: 2019-12-04 | End: 2019-12-04

## 2019-12-04 RX ORDER — PROCHLORPERAZINE 25 MG/1
SUPPOSITORY RECTAL AS NEEDED
Qty: 9 EACH | Refills: 3 | Status: SHIPPED | OUTPATIENT
Start: 2019-12-04 | End: 2022-10-27

## 2019-12-04 NOTE — PATIENT INSTRUCTIONS
Tresiba 65 twice daily     Change to once daily only     Compare your sugar between 2 periods of time that you are not eating. Ideally the starting sugar should be in the 100s.     The finishing sugar should be within 40 points of where you started    Example    You go to bed 140 --- you should wake up 100 to 180 without eating    If you wake up less than 100 --- back off 5 units    If you wake up more than 180  --- increase by 5 units every 5 days       =============================    Novolog will replace novolin R    Start by taking 3 units for every 15 grams of carbohydrate that you eat     15 grams --- 3 units    30 grams --- 6units    45 grams --- 9 units    60 grams --- 12 units    75 grams --  15 units     Plus sliding scale    151-200  :  3 units    201- 250 : 6units    251- 300 : 9 units    Above 300 : 12units    Example    You are about to eat 50 grams of carbohydrate and the sugar is 250    For the 50 grams === 9 units    For the 250 --  6 units    Total 15 units    ========================================    Change victoza to ozempic

## 2019-12-04 NOTE — PROGRESS NOTES
" Yon Ochoa is a 49 y.o. female who presents for  evaluation of   Chief Complaint   Patient presents with   • Diabetes       Referring provider     Primary Care Provider    Mallika Edge APRN    Duration 10 years    Timing - Diabetes is Constant    Quality -  reasonably well controlled    Severity -  moderate    Complications - none    Current symptoms/problems  paresthesia of the feet     Alleviating Factors: Compliance       Side Effects  none    Current diet  in general, a \"healthy\" diet      Current exercise none    Current monitoring regimen: home blood tests - checking 4 x daily   Home blood sugar records:     Hypoglycemia nocturnal    Past Medical History:   Diagnosis Date   • Diabetes (CMS/[facility])    • GERD (gastroesophageal reflux disease)    • Hyperlipidemia    • Hypertension    • Hypertension associated with diabetes (CMS/[facility]) 12/4/2019   • Hypothyroidism due to Hashimoto's thyroiditis 12/4/2019   • Mixed diabetic hyperlipidemia associated with type 2 diabetes mellitus (CMS/HCC) 12/4/2019   • Type 2 diabetes mellitus with hyperglycemia, with long-term current use of insulin (CMS/HCC) 12/4/2019     No family history on file.  Social History     Tobacco Use   • Smoking status: Never Smoker   • Smokeless tobacco: Never Used   Substance Use Topics   • Alcohol use: No     Frequency: Never   • Drug use: No         Current Outpatient Medications:   •  amitriptyline (ELAVIL) 25 MG tablet, Take 25 mg by mouth., Disp: , Rfl:   •  aspirin 81 MG EC tablet, Take 81 mg by mouth Daily., Disp: , Rfl:   •  atorvastatin (LIPITOR) 20 MG tablet, Take 20 mg by mouth., Disp: , Rfl:   •  clonazePAM (KlonoPIN) 0.5 MG tablet, Take 0.5 mg by mouth As Needed for Seizures., Disp: , Rfl:   •  famotidine (PEPCID) 20 MG tablet, Take 20 mg by mouth., Disp: , Rfl:   •  levothyroxine (SYNTHROID, LEVOTHROID) 125 MCG tablet, Take 125 mcg by mouth., Disp: , Rfl:   •  montelukast (SINGULAIR) 10 MG tablet, Take 10 mg by mouth., Disp: " , Rfl:   •  naproxen sodium (ANAPROX) 550 MG tablet, Take 550 mg by mouth 2 (Two) Times a Day With Meals., Disp: , Rfl:   •  quinapril (ACCUPRIL) 20 MG tablet, Take 20 mg by mouth., Disp: , Rfl:   •  topiramate (TOPAMAX) 50 MG tablet, Take 50 mg by mouth 2 (Two) Times a Day., Disp: , Rfl:   •  traZODone (DESYREL) 100 MG tablet, Take 100 mg by mouth., Disp: , Rfl:   •  VENLAFAXINE HCL PO, Take 150 mg by mouth., Disp: , Rfl:   •  vitamin B-6 (PYRIDOXINE) 50 MG tablet, Take 100 mg by mouth Daily., Disp: , Rfl:   •  VITAMIN D PO, Take  by mouth As Needed., Disp: , Rfl:   •  Continuous Blood Gluc Sensor (DEXCOM G6 SENSOR), As Needed (glucose control). Every 10 daysDexcom G6 Sensor Kit [address]-06 Use as directed for continuous glucose monitoring, Disp: 9 each, Rfl: 3  •  Continuous Blood Gluc Sensor (FREESTYLE TONY 14 DAY SENSOR) [facility] [place] each Every 14 (Fourteen) Days., Disp: 2 each, Rfl: 11  •  Empagliflozin-metFORMIN HCl ER (SYNJARDY XR) 5-1000 MG tablet sustained-release 24 hour, Take 2 tablet/day by mouth Daily With Breakfast., Disp: 60 tablet, Rfl: 11  •  insulin aspart (NOVOLOG FLEXPEN) 100 UNIT/ML solution pen-injector sc pen, Up to 20 units with meals, Disp: 6 pen, Rfl: 11  •  Insulin Degludec (TRESIBA FLEXTOUCH) 200 UNIT/ML solution pen-injector pen injection, Inject 100 Units under the skin into the appropriate area as directed Every Night., Disp: 5 pen, Rfl: 11  •  Semaglutide,0.25 or 0.5MG/DOS, (OZEMPIC, 0.25 OR 0.5 MG/DOSE,) 2 MG/1.5ML solution pen-injector, Inject 0.5 mg under the skin into the appropriate area as directed 1 (One) Time Per Week. 0.5 mg weekly, Disp: 1 pen, Rfl: 11    Review of Systems    Review of Systems   Constitutional: Negative for activity change, appetite change, chills, diaphoresis, fatigue, fever and unexpected weight change.   HENT: Negative for congestion, dental problem, drooling, ear discharge, ear pain, facial swelling, mouth sores, postnasal drip, rhinorrhea, sinus pressure,  "sore throat, tinnitus, trouble swallowing and voice change.    Eyes: Negative for photophobia, pain, discharge, redness, itching and visual disturbance.   Respiratory: Negative for apnea, cough, choking, chest tightness, shortness of breath, wheezing and stridor.    Cardiovascular: Negative for chest pain, palpitations and leg swelling.   Gastrointestinal: Negative for abdominal distention, abdominal pain, constipation, diarrhea, nausea and vomiting.   Endocrine: Negative for cold intolerance, heat intolerance, polydipsia, polyphagia and polyuria.   Genitourinary: Negative for decreased urine volume, difficulty urinating, dysuria, flank pain, frequency, hematuria and urgency.   Musculoskeletal: Negative for arthralgias, back pain, gait problem, joint swelling, myalgias, neck pain and neck stiffness.   Skin: Negative for color change, pallor, rash and wound.   Allergic/Immunologic: Negative for immunocompromised state.   Neurological: Negative for dizziness, tremors, seizures, syncope, facial asymmetry, speech difficulty, weakness, light-headedness, numbness and headaches.   Hematological: Negative for adenopathy.   Psychiatric/Behavioral: Negative for agitation, behavioral problems, confusion, decreased concentration, dysphoric mood, hallucinations, self-injury, sleep disturbance and suicidal ideas. The patient is not nervous/anxious and is not hyperactive.         Objective:   /72   Pulse 107   Ht 157.5 cm (62\")   Wt 89 kg (196 lb 3.2 oz)   SpO2 98%   BMI 35.89 kg/m²     Physical Exam   Constitutional: She is oriented to person, place, and time. She appears well-developed.   HENT:   Head: Normocephalic.   Right Ear: External ear normal.   Left Ear: External ear normal.   Nose: Nose normal.   Eyes: Conjunctivae and EOM are normal. No scleral icterus.   Neck: Normal range of motion. Neck supple. No tracheal deviation present. No thyromegaly present.   Cardiovascular: Normal rate, regular rhythm, normal " heart sounds and intact distal pulses. Exam reveals no gallop and no friction rub.   No murmur heard.  Pulmonary/Chest: Effort normal and breath sounds normal. No stridor. No respiratory distress. She has no wheezes. She has no rales. She exhibits no tenderness.   Abdominal: Soft. Bowel sounds are normal. She exhibits no distension and no mass. There is no tenderness. There is no rebound and no guarding.   Musculoskeletal: Normal range of motion. She exhibits no tenderness or deformity.   Lymphadenopathy:     She has no cervical adenopathy.   Neurological: She is alert and oriented to person, place, and time. She displays normal reflexes. She exhibits normal muscle tone. Coordination normal.   Skin: No rash noted. No erythema. No pallor.   Psychiatric: She has a normal mood and affect. Her behavior is normal. Judgment and thought content normal.       Lab Review          Assessment/Plan       ICD-10-CM ICD-9-CM   1. Type 2 diabetes mellitus with hyperglycemia, with long-term current use of insulin (CMS/[facility]) E11.65 250.00    Z79.4 790.29     V58.67   2. Hypertension associated with diabetes (CMS/[facility]) E11.59 250.80    I10 401.9   3. Mixed diabetic hyperlipidemia associated with type 2 diabetes mellitus (CMS/[facility]) E11.69 250.80    E78.2 272.2   4. Hypothyroidism due to Hashimoto's thyroiditis E03.8 244.8    E06.3 245.2   5. Vitamin D deficiency E55.9 268.9         I reviewed and summarized records from Mallika Edge APRN from current year  and I reviewed / ordered labs.   From review of records :    Pt has type 2 diabetes with hyperglycemia     Glycemic Management:   Lab Results   Component Value Date    HGBA1C 6.60 (H) 12/05/2019    HGBA1C 9.1 (H) 05/24/2019     Lab Results   Component Value Date    GLUCOSE 66 12/05/2019    BUN 15 12/05/2019    CREATININE 0.85 12/05/2019    EGFRIFNONA 72 12/05/2019    BCR 17.6 12/05/2019    K 4.3 12/05/2019    CO2 16.5 (L) 12/05/2019    CALCIUM 9.6 12/05/2019    ALBUMIN 4.60  12/05/2019    AST 35 (H) 12/05/2019    ALT 18 12/05/2019    ANIONGAP 14.5 12/05/2019     Lab Results   Component Value Date    WBC 12.15 (H) 12/05/2019    HGB 12.1 12/05/2019    HCT 36.6 12/05/2019    MCV 81.2 12/05/2019     12/05/2019       Tresiba 65 twice daily     Change to once daily only     Compare your sugar between 2 periods of time that you are not eating. Ideally the starting sugar should be in the 100s.     The finishing sugar should be within 40 points of where you started    Example    You go to bed 140 --- you should wake up 100 to 180 without eating    If you wake up less than 100 --- back off 5 units    If you wake up more than 180  --- increase by 5 units every 5 days       =============================    Novolog will replace novolin R    Start by taking 3 units for every 15 grams of carbohydrate that you eat     15 grams --- 3 units    30 grams --- 6units    45 grams --- 9 units    60 grams --- 12 units    75 grams --  15 units     Plus sliding scale    151-200  :  3 units    201- 250 : 6units    251- 300 : 9 units    Above 300 : 12units    Example    You are about to eat 50 grams of carbohydrate and the sugar is 250    For the 50 grams === 9 units    For the 250 --  6 units    Total 15 units    ========================================    Change victoza to ozempic       Approve for Dexcom    Approve for  Insulin pump and or Continuous Glucose Sensor     #1  Patient has diabetes mellitus, insulin-dependent.    #2 She performs blood glucose testing at least times daily and blood glucose log was brought to office with variability from .    #3  She is requiring  Basal insulin  and Prandial Insulin for a total of at least  4 injections per day and has been doing this for at least 6 months     #4 Patient tests blood sugars at least 4 times daily and makes frequent self-adjustments and patient is injecting insulin at least 4 times daily. She has been doing this for more than 6 months . She  tests frequently due to hypoglycemia and hyperglycemia.     #5 I have personally seen patient within the past 6 months    #6 We plan on seeing her every 2-3 months for continuous adjustment of her diabetes regimen     #7 patient has hypoglycemia with episodes of unawareness.    #8 patient has day-to-day variation in her mealtime which confounds the degree of insulin dosing with multiple daily injections.    #9 patient has completed diabetes education program with us.    #10 she has demonstrated the ability to self monitor her glucose.        #11 Patient is motivated in improving  diabetes control       Lipid Management  Lab Results   Component Value Date    CHOL 109 12/05/2019     Lab Results   Component Value Date    TRIG 214 (H) 12/05/2019    TRIG 213 (H) 05/24/2019    TRIG 1,030 (H) 07/06/2018     Lab Results   Component Value Date    HDL 32 (L) 12/05/2019    HDL 29 (L) 05/24/2019    HDL 24 (L) 07/06/2018     No components found for: LDLCALC  Lab Results   Component Value Date    LDL 34 12/05/2019    LDL 39 05/24/2019    LDL see below 07/06/2018     No results found for: LDLDIRECT    On lipitor    Blood Pressure Management:    Vitals:    12/04/19 1030   BP: 124/72   Pulse: 107   SpO2: 98%     Lab Results   Component Value Date    GLUCOSE 66 12/05/2019    CALCIUM 9.6 12/05/2019     12/05/2019    K 4.3 12/05/2019    CO2 16.5 (L) 12/05/2019     12/05/2019    BUN 15 12/05/2019    CREATININE 0.85 12/05/2019    EGFRIFNONA 72 12/05/2019    BCR 17.6 12/05/2019    ANIONGAP 14.5 12/05/2019         Normal on quinapril       Microvascular Complication Monitoring:      Eye Exam Evaluation    Within 1 year     8-19 , no retinopathy     -----------    Last Microalbumin-Proteinuria Assessment    Lab Results   Component Value Date    MALBCRERATIO 13.2 12/05/2019    MALBCRERATIO 148 07/06/2018       No results found for: UTPCR    -----------      Neuropathy  yes         Weight Related:   Wt Readings from Last 3  Encounters:   12/04/19 89 kg (196 lb 3.2 oz)   10/24/19 90.3 kg (199 lb)     Body mass index is 35.89 kg/m².        Diet interventions: moderate (500 kCal/d) deficit diet.      Bone Health    Lab Results   Component Value Date    CALCIUM 9.6 12/05/2019    MMHC78RK 29.0 (L) 12/05/2019       Reassess     Thyroid Health    Lab Results   Component Value Date    TSH 1.450 12/05/2019    TSH 1.230 05/24/2019    TSH 2.800 07/06/2018     On levothyroxine 125 mcgs daily           Other Diabetes Related Aspects       Lab Results   Component Value Date    [id number] 535 12/05/2019            Orders Placed This Encounter   Procedures   • CBC Auto Differential   • Comprehensive Metabolic Panel   • Hemoglobin A1c   • Lipid Panel   • Microalbumin / Creatinine Urine Ratio - Urine, Clean Catch   • TSH   • Vitamin B12   • Vitamin D 25 Hydroxy         A copy of my note was sent to Mallika Edge APRN    Please see my above opinion and suggestions.

## 2019-12-05 ENCOUNTER — APPOINTMENT (OUTPATIENT)
Dept: LAB | Facility: HOSPITAL | Age: 49
End: 2019-12-05

## 2019-12-05 PROCEDURE — 82043 UR ALBUMIN QUANTITATIVE: CPT | Performed by: INTERNAL MEDICINE

## 2019-12-05 PROCEDURE — 82306 VITAMIN D 25 HYDROXY: CPT | Performed by: INTERNAL MEDICINE

## 2019-12-05 PROCEDURE — 83036 HEMOGLOBIN GLYCOSYLATED A1C: CPT | Performed by: INTERNAL MEDICINE

## 2019-12-05 PROCEDURE — 80061 LIPID PANEL: CPT | Performed by: INTERNAL MEDICINE

## 2019-12-05 PROCEDURE — 80053 COMPREHEN METABOLIC PANEL: CPT | Performed by: INTERNAL MEDICINE

## 2019-12-05 PROCEDURE — 82607 VITAMIN B-12: CPT | Performed by: INTERNAL MEDICINE

## 2019-12-05 PROCEDURE — 36415 COLL VENOUS BLD VENIPUNCTURE: CPT | Performed by: INTERNAL MEDICINE

## 2019-12-05 PROCEDURE — 82570 ASSAY OF URINE CREATININE: CPT | Performed by: INTERNAL MEDICINE

## 2019-12-05 PROCEDURE — 84443 ASSAY THYROID STIM HORMONE: CPT | Performed by: INTERNAL MEDICINE

## 2019-12-05 PROCEDURE — 85025 COMPLETE CBC W/AUTO DIFF WBC: CPT | Performed by: INTERNAL MEDICINE

## 2019-12-06 LAB
25(OH)D3 SERPL-MCNC: 29 NG/ML (ref 30–100)
ALBUMIN SERPL-MCNC: 4.6 G/DL (ref 3.5–5.2)
ALBUMIN UR-MCNC: 1.9 MG/DL
ALBUMIN/GLOB SERPL: 1.1 G/DL
ALP SERPL-CCNC: 98 U/L (ref 39–117)
ALT SERPL W P-5'-P-CCNC: 18 U/L (ref 1–33)
ANION GAP SERPL CALCULATED.3IONS-SCNC: 14.5 MMOL/L (ref 5–15)
AST SERPL-CCNC: 35 U/L (ref 1–32)
BASOPHILS # BLD AUTO: 0.1 10*3/MM3 (ref 0–0.2)
BASOPHILS NFR BLD AUTO: 0.8 % (ref 0–1.5)
BILIRUB SERPL-MCNC: 0.4 MG/DL (ref 0.2–1.2)
BUN BLD-MCNC: 15 MG/DL (ref 6–20)
BUN/CREAT SERPL: 17.6 (ref 7–25)
CALCIUM SPEC-SCNC: 9.6 MG/DL (ref 8.6–10.5)
CHLORIDE SERPL-SCNC: 107 MMOL/L (ref 98–107)
CHOLEST SERPL-MCNC: 109 MG/DL (ref 0–200)
CO2 SERPL-SCNC: 16.5 MMOL/L (ref 22–29)
CREAT BLD-MCNC: 0.85 MG/DL (ref 0.57–1)
CREAT UR-MCNC: 143.6 MG/DL
DEPRECATED RDW RBC AUTO: 45 FL (ref 37–54)
EOSINOPHIL # BLD AUTO: 0.22 10*3/MM3 (ref 0–0.4)
EOSINOPHIL NFR BLD AUTO: 1.8 % (ref 0.3–6.2)
ERYTHROCYTE [DISTWIDTH] IN BLOOD BY AUTOMATED COUNT: 15.4 % (ref 12.3–15.4)
GFR SERPL CREATININE-BSD FRML MDRD: 72 ML/MIN/1.73
GLOBULIN UR ELPH-MCNC: 4.2 GM/DL
GLUCOSE BLD-MCNC: 66 MG/DL (ref 65–99)
HBA1C MFR BLD: 6.6 % (ref 4.8–5.6)
HCT VFR BLD AUTO: 36.6 % (ref 34–46.6)
HDLC SERPL-MCNC: 32 MG/DL (ref 40–60)
HGB BLD-MCNC: 12.1 G/DL (ref 12–15.9)
IMM GRANULOCYTES # BLD AUTO: 0.02 10*3/MM3 (ref 0–0.05)
IMM GRANULOCYTES NFR BLD AUTO: 0.2 % (ref 0–0.5)
LDLC SERPL CALC-MCNC: 34 MG/DL (ref 0–100)
LDLC/HDLC SERPL: 1.07 {RATIO}
LYMPHOCYTES # BLD AUTO: 3.47 10*3/MM3 (ref 0.7–3.1)
LYMPHOCYTES NFR BLD AUTO: 28.6 % (ref 19.6–45.3)
MCH RBC QN AUTO: 26.8 PG (ref 26.6–33)
MCHC RBC AUTO-ENTMCNC: 33.1 G/DL (ref 31.5–35.7)
MCV RBC AUTO: 81.2 FL (ref 79–97)
MICROALBUMIN/CREAT UR: 13.2 MG/G
MONOCYTES # BLD AUTO: 0.7 10*3/MM3 (ref 0.1–0.9)
MONOCYTES NFR BLD AUTO: 5.8 % (ref 5–12)
NEUTROPHILS # BLD AUTO: 7.64 10*3/MM3 (ref 1.7–7)
NEUTROPHILS NFR BLD AUTO: 62.8 % (ref 42.7–76)
NRBC BLD AUTO-RTO: 0 /100 WBC (ref 0–0.2)
PLATELET # BLD AUTO: 300 10*3/MM3 (ref 140–450)
PMV BLD AUTO: 10.1 FL (ref 6–12)
POTASSIUM BLD-SCNC: 4.3 MMOL/L (ref 3.5–5.2)
PROT SERPL-MCNC: 8.8 G/DL (ref 6–8.5)
RBC # BLD AUTO: 4.51 10*6/MM3 (ref 3.77–5.28)
SODIUM BLD-SCNC: 138 MMOL/L (ref 136–145)
TRIGL SERPL-MCNC: 214 MG/DL (ref 0–150)
TSH SERPL DL<=0.05 MIU/L-ACNC: 1.45 UIU/ML (ref 0.27–4.2)
VIT B12 BLD-MCNC: 535 PG/ML (ref 211–946)
VLDLC SERPL-MCNC: 42.8 MG/DL (ref 5–40)
WBC NRBC COR # BLD: 12.15 10*3/MM3 (ref 3.4–10.8)

## 2019-12-10 ENCOUNTER — OFFICE VISIT (OUTPATIENT)
Dept: PRIMARY CARE CLINIC | Age: 49
End: 2019-12-10
Payer: COMMERCIAL

## 2019-12-10 VITALS
HEART RATE: 91 BPM | WEIGHT: 200 LBS | SYSTOLIC BLOOD PRESSURE: 128 MMHG | TEMPERATURE: 98.4 F | RESPIRATION RATE: 17 BRPM | OXYGEN SATURATION: 99 % | BODY MASS INDEX: 36.8 KG/M2 | DIASTOLIC BLOOD PRESSURE: 84 MMHG | HEIGHT: 62 IN

## 2019-12-10 DIAGNOSIS — L02.01 CUTANEOUS ABSCESS OF FACE: Primary | ICD-10-CM

## 2019-12-10 DIAGNOSIS — L02.219 ABSCESS OF TRUNK: ICD-10-CM

## 2019-12-10 PROCEDURE — 99213 OFFICE O/P EST LOW 20 MIN: CPT | Performed by: NURSE PRACTITIONER

## 2019-12-10 RX ORDER — CLINDAMYCIN HYDROCHLORIDE 300 MG/1
300 CAPSULE ORAL 3 TIMES DAILY
Qty: 30 CAPSULE | Refills: 0 | Status: SHIPPED | OUTPATIENT
Start: 2019-12-10 | End: 2019-12-18

## 2019-12-12 ENCOUNTER — HOSPITAL ENCOUNTER (OUTPATIENT)
Dept: WOUND CARE | Age: 49
Discharge: HOME | End: 2019-12-12
Payer: COMMERCIAL

## 2019-12-12 VITALS
DIASTOLIC BLOOD PRESSURE: 78 MMHG | TEMPERATURE: 97.8 F | WEIGHT: 200 LBS | BODY MASS INDEX: 36.8 KG/M2 | HEART RATE: 99 BPM | SYSTOLIC BLOOD PRESSURE: 120 MMHG | HEIGHT: 62 IN | RESPIRATION RATE: 18 BRPM

## 2019-12-12 DIAGNOSIS — Z79.4 TYPE 2 DIABETES MELLITUS WITHOUT COMPLICATION, WITH LONG-TERM CURRENT USE OF INSULIN (HCC): ICD-10-CM

## 2019-12-12 DIAGNOSIS — L08.9 SKIN INFECTION: Primary | ICD-10-CM

## 2019-12-12 DIAGNOSIS — L70.0 SUPERFICIAL MIXED COMEDONAL AND INFLAMMATORY ACNE VULGARIS: ICD-10-CM

## 2019-12-12 DIAGNOSIS — E66.09 CLASS 2 OBESITY DUE TO EXCESS CALORIES WITHOUT SERIOUS COMORBIDITY WITH BODY MASS INDEX (BMI) OF 36.0 TO 36.9 IN ADULT: ICD-10-CM

## 2019-12-12 DIAGNOSIS — F41.9 ANXIETY: ICD-10-CM

## 2019-12-12 DIAGNOSIS — Z22.322 MRSA (METHICILLIN RESISTANT STAPH AUREUS) CULTURE POSITIVE: ICD-10-CM

## 2019-12-12 DIAGNOSIS — E11.9 TYPE 2 DIABETES MELLITUS WITHOUT COMPLICATION, WITH LONG-TERM CURRENT USE OF INSULIN (HCC): ICD-10-CM

## 2019-12-12 DIAGNOSIS — B96.6: ICD-10-CM

## 2019-12-12 DIAGNOSIS — L98.492: ICD-10-CM

## 2019-12-12 PROCEDURE — 99214 OFFICE O/P EST MOD 30 MIN: CPT | Performed by: NURSE PRACTITIONER

## 2019-12-12 PROCEDURE — 11042 DBRDMT SUBQ TIS 1ST 20SQCM/<: CPT

## 2019-12-12 PROCEDURE — 11042 DBRDMT SUBQ TIS 1ST 20SQCM/<: CPT | Performed by: NURSE PRACTITIONER

## 2019-12-12 PROCEDURE — 97597 DBRDMT OPN WND 1ST 20 CM/<: CPT | Performed by: NURSE PRACTITIONER

## 2019-12-12 PROCEDURE — 97597 DBRDMT OPN WND 1ST 20 CM/<: CPT

## 2019-12-12 PROCEDURE — 99213 OFFICE O/P EST LOW 20 MIN: CPT

## 2019-12-12 RX ORDER — SODIUM HYPOCHLORITE 1.25 MG/ML
SOLUTION TOPICAL
Qty: 1 BOTTLE | Refills: 1 | Status: SHIPPED | OUTPATIENT
Start: 2019-12-12 | End: 2020-09-29

## 2019-12-12 RX ORDER — SULFAMETHOXAZOLE AND TRIMETHOPRIM 800; 160 MG/1; MG/1
1 TABLET ORAL 2 TIMES DAILY
Qty: 20 TABLET | Refills: 0 | Status: SHIPPED | OUTPATIENT
Start: 2019-12-12 | End: 2019-12-22

## 2019-12-12 RX ORDER — POLYETHYLENE GLYCOL 3350 17 G/17G
17 POWDER, FOR SOLUTION ORAL 2 TIMES DAILY
Refills: 1 | COMMUNITY
Start: 2019-10-21

## 2019-12-12 RX ORDER — SPIRONOLACTONE 50 MG/1
50 TABLET, FILM COATED ORAL DAILY
Qty: 30 TABLET | Refills: 3 | Status: SHIPPED | OUTPATIENT
Start: 2019-12-12 | End: 2020-03-23 | Stop reason: SDUPTHER

## 2019-12-12 ASSESSMENT — ENCOUNTER SYMPTOMS
EYES NEGATIVE: 1
GASTROINTESTINAL NEGATIVE: 1
RESPIRATORY NEGATIVE: 1

## 2019-12-13 PROBLEM — L70.0 SUPERFICIAL MIXED COMEDONAL AND INFLAMMATORY ACNE VULGARIS: Status: ACTIVE | Noted: 2019-12-13

## 2019-12-13 PROBLEM — Z22.322 MRSA (METHICILLIN RESISTANT STAPH AUREUS) CULTURE POSITIVE: Status: ACTIVE | Noted: 2019-12-13

## 2019-12-13 PROBLEM — R10.30 LOWER ABDOMINAL PAIN: Status: RESOLVED | Noted: 2019-01-22 | Resolved: 2019-12-13

## 2019-12-13 PROBLEM — F41.9 ANXIETY: Status: ACTIVE | Noted: 2019-12-13

## 2019-12-13 RX ORDER — LIDOCAINE HYDROCHLORIDE 10 MG/ML
2 INJECTION, SOLUTION EPIDURAL; INFILTRATION; INTRACAUDAL; PERINEURAL ONCE
Status: DISCONTINUED | OUTPATIENT
Start: 2019-12-13 | End: 2019-12-14 | Stop reason: HOSPADM

## 2019-12-13 RX ORDER — LIDOCAINE HYDROCHLORIDE 20 MG/ML
JELLY TOPICAL PRN
Status: DISCONTINUED | OUTPATIENT
Start: 2019-12-13 | End: 2019-12-14 | Stop reason: HOSPADM

## 2019-12-13 ASSESSMENT — ENCOUNTER SYMPTOMS
ALLERGIC/IMMUNOLOGIC NEGATIVE: 1
COLOR CHANGE: 1
EYES NEGATIVE: 1
RESPIRATORY NEGATIVE: 1
GASTROINTESTINAL NEGATIVE: 1

## 2019-12-13 ASSESSMENT — VISUAL ACUITY: OU: 1

## 2019-12-14 LAB
ANAEROBIC CULTURE: ABNORMAL
GRAM STAIN RESULT: ABNORMAL
ORGANISM: ABNORMAL
WOUND/ABSCESS: ABNORMAL

## 2019-12-18 ENCOUNTER — HOSPITAL ENCOUNTER (OUTPATIENT)
Dept: WOUND CARE | Age: 49
Discharge: HOME | End: 2019-12-18
Payer: COMMERCIAL

## 2019-12-18 DIAGNOSIS — L70.0 SUPERFICIAL MIXED COMEDONAL AND INFLAMMATORY ACNE VULGARIS: ICD-10-CM

## 2019-12-18 DIAGNOSIS — Z79.4 TYPE 2 DIABETES MELLITUS WITHOUT COMPLICATION, WITH LONG-TERM CURRENT USE OF INSULIN (HCC): ICD-10-CM

## 2019-12-18 DIAGNOSIS — F41.9 ANXIETY: ICD-10-CM

## 2019-12-18 DIAGNOSIS — R11.0 NAUSEA IN ADULT: ICD-10-CM

## 2019-12-18 DIAGNOSIS — E11.9 TYPE 2 DIABETES MELLITUS WITHOUT COMPLICATION, WITH LONG-TERM CURRENT USE OF INSULIN (HCC): ICD-10-CM

## 2019-12-18 DIAGNOSIS — L98.492: ICD-10-CM

## 2019-12-18 DIAGNOSIS — Z22.322 MRSA (METHICILLIN RESISTANT STAPH AUREUS) CULTURE POSITIVE: ICD-10-CM

## 2019-12-18 DIAGNOSIS — B96.6: ICD-10-CM

## 2019-12-18 DIAGNOSIS — L08.9 SKIN INFECTION: Primary | ICD-10-CM

## 2019-12-18 PROCEDURE — 97597 DBRDMT OPN WND 1ST 20 CM/<: CPT

## 2019-12-18 PROCEDURE — 97597 DBRDMT OPN WND 1ST 20 CM/<: CPT | Performed by: NURSE PRACTITIONER

## 2019-12-19 PROBLEM — E66.09 CLASS 2 OBESITY DUE TO EXCESS CALORIES WITHOUT SERIOUS COMORBIDITY WITH BODY MASS INDEX (BMI) OF 36.0 TO 36.9 IN ADULT: Status: ACTIVE | Noted: 2019-12-19

## 2019-12-19 PROBLEM — E66.812 CLASS 2 OBESITY DUE TO EXCESS CALORIES WITHOUT SERIOUS COMORBIDITY WITH BODY MASS INDEX (BMI) OF 36.0 TO 36.9 IN ADULT: Status: ACTIVE | Noted: 2019-12-19

## 2019-12-19 PROBLEM — B96.6: Status: ACTIVE | Noted: 2019-12-19

## 2019-12-19 PROBLEM — L98.492: Status: ACTIVE | Noted: 2019-12-19

## 2019-12-19 RX ORDER — LIDOCAINE HYDROCHLORIDE 20 MG/ML
JELLY TOPICAL PRN
Status: DISCONTINUED | OUTPATIENT
Start: 2019-12-19 | End: 2019-12-20 | Stop reason: HOSPADM

## 2019-12-20 RX ORDER — LANCETS
EACH MISCELLANEOUS
Qty: 100 EACH | Refills: 1 | Status: SHIPPED | OUTPATIENT
Start: 2019-12-20 | End: 2021-07-27 | Stop reason: ALTCHOICE

## 2019-12-30 ENCOUNTER — HOSPITAL ENCOUNTER (OUTPATIENT)
Dept: WOUND CARE | Age: 49
Discharge: HOME | End: 2019-12-30
Payer: COMMERCIAL

## 2019-12-30 VITALS
RESPIRATION RATE: 18 BRPM | TEMPERATURE: 97.2 F | HEART RATE: 96 BPM | HEIGHT: 62 IN | WEIGHT: 200 LBS | SYSTOLIC BLOOD PRESSURE: 108 MMHG | BODY MASS INDEX: 36.8 KG/M2 | DIASTOLIC BLOOD PRESSURE: 73 MMHG

## 2019-12-30 DIAGNOSIS — E11.9 TYPE 2 DIABETES MELLITUS WITHOUT COMPLICATION, WITH LONG-TERM CURRENT USE OF INSULIN (HCC): Primary | ICD-10-CM

## 2019-12-30 DIAGNOSIS — Z79.4 TYPE 2 DIABETES MELLITUS WITHOUT COMPLICATION, WITH LONG-TERM CURRENT USE OF INSULIN (HCC): Primary | ICD-10-CM

## 2019-12-30 DIAGNOSIS — B96.6: ICD-10-CM

## 2019-12-30 DIAGNOSIS — L98.492: ICD-10-CM

## 2019-12-30 DIAGNOSIS — E66.09 CLASS 2 OBESITY DUE TO EXCESS CALORIES WITHOUT SERIOUS COMORBIDITY WITH BODY MASS INDEX (BMI) OF 36.0 TO 36.9 IN ADULT: ICD-10-CM

## 2019-12-30 DIAGNOSIS — Z22.322 MRSA (METHICILLIN RESISTANT STAPH AUREUS) CULTURE POSITIVE: ICD-10-CM

## 2019-12-30 DIAGNOSIS — F41.9 ANXIETY: ICD-10-CM

## 2019-12-30 DIAGNOSIS — L70.0 SUPERFICIAL MIXED COMEDONAL AND INFLAMMATORY ACNE VULGARIS: ICD-10-CM

## 2019-12-30 DIAGNOSIS — L08.9 SKIN INFECTION: ICD-10-CM

## 2019-12-30 PROCEDURE — 99212 OFFICE O/P EST SF 10 MIN: CPT | Performed by: NURSE PRACTITIONER

## 2019-12-30 PROCEDURE — 99213 OFFICE O/P EST LOW 20 MIN: CPT

## 2019-12-30 RX ORDER — LIDOCAINE HYDROCHLORIDE 20 MG/ML
JELLY TOPICAL PRN
Status: DISCONTINUED | OUTPATIENT
Start: 2019-12-30 | End: 2020-01-01 | Stop reason: HOSPADM

## 2019-12-30 ASSESSMENT — PAIN DESCRIPTION - ONSET: ONSET: ON-GOING

## 2019-12-30 ASSESSMENT — PAIN DESCRIPTION - DESCRIPTORS: DESCRIPTORS: TENDER;SORE;ACHING

## 2019-12-30 ASSESSMENT — PAIN SCALES - GENERAL: PAINLEVEL_OUTOF10: 4

## 2019-12-30 ASSESSMENT — PAIN DESCRIPTION - LOCATION: LOCATION: BACK

## 2019-12-30 ASSESSMENT — PAIN DESCRIPTION - FREQUENCY: FREQUENCY: INTERMITTENT

## 2019-12-30 ASSESSMENT — PAIN DESCRIPTION - PROGRESSION: CLINICAL_PROGRESSION: NOT CHANGED

## 2019-12-30 ASSESSMENT — PAIN DESCRIPTION - PAIN TYPE: TYPE: ACUTE PAIN

## 2020-01-03 ENCOUNTER — TELEPHONE (OUTPATIENT)
Dept: INTERNAL MEDICINE | Age: 50
End: 2020-01-03

## 2020-01-03 NOTE — TELEPHONE ENCOUNTER
Patient is new to us she is a previous [facility] patient records are in the chart.

## 2020-01-07 ENCOUNTER — OFFICE VISIT (OUTPATIENT)
Dept: INTERNAL MEDICINE | Age: 50
End: 2020-01-07
Payer: COMMERCIAL

## 2020-01-07 VITALS
HEART RATE: 99 BPM | WEIGHT: 193.6 LBS | DIASTOLIC BLOOD PRESSURE: 80 MMHG | SYSTOLIC BLOOD PRESSURE: 114 MMHG | OXYGEN SATURATION: 98 % | BODY MASS INDEX: 35.63 KG/M2 | HEIGHT: 62 IN

## 2020-01-07 PROBLEM — R73.9 HYPERGLYCEMIA: Status: RESOLVED | Noted: 2017-08-16 | Resolved: 2020-01-07

## 2020-01-07 PROCEDURE — 99204 OFFICE O/P NEW MOD 45 MIN: CPT | Performed by: INTERNAL MEDICINE

## 2020-01-07 RX ORDER — CHLORHEXIDINE GLUCONATE 4 G/100ML
SOLUTION TOPICAL
Qty: 118 ML | Refills: 0 | Status: SHIPPED | OUTPATIENT
Start: 2020-01-07 | End: 2020-01-21

## 2020-01-07 ASSESSMENT — PATIENT HEALTH QUESTIONNAIRE - PHQ9
SUM OF ALL RESPONSES TO PHQ QUESTIONS 1-9: 2
1. LITTLE INTEREST OR PLEASURE IN DOING THINGS: 1
2. FEELING DOWN, DEPRESSED OR HOPELESS: 1
SUM OF ALL RESPONSES TO PHQ QUESTIONS 1-9: 2
SUM OF ALL RESPONSES TO PHQ9 QUESTIONS 1 & 2: 2

## 2020-01-07 NOTE — PROGRESS NOTES
vomiting)     Staph infection 11/11/2018       Past Surgical History:   Procedure Laterality Date    APPENDECTOMY      as a 3rd grader   [address] 2000, 2003    CHOLECYSTECTOMY, LAPAROSCOPIC      1994    COLONOSCOPY      \"more than 15 + yrs ago\" PER PT RECALL    COLONOSCOPY N/A 7/30/2019    Dr Hetal Burnette hemorrhoids-Grade 1, suboptimal prep, 3 yr recall    HYSTERECTOMY, TOTAL ABDOMINAL      May 2012, withOUT Ophorectomy    [place] REVISE MEDIAN N/CARPAL TUNNEL SURG Left 6/16/2017    CARPAL TUNNEL RELEASE performed by Rain King MD at [address] N/CARPAL TUNNEL SURG Right 7/21/2017    CARPAL TUNNEL RELEASE performed by Rain King MD at [address]      as a 5th grader    UPPER GASTROINTESTINAL ENDOSCOPY N/A 7/30/2019    UPPER GASTROINTESTINAL ENDOSCOPY  7/30/2019    Dr Bella Ferguson exam, empiric dil with 54F over wire, neg EoE       Social History     Socioeconomic History    Marital status:      Spouse name: Not on file    Number of children: 4    Years of education: Not on file    Highest education level: Not on file   Occupational History    Occupation: [email or web address] Employee   Social Needs    Financial resource strain: Not on file    Food insecurity:     Worry: Not on file     Inability: Not on file   [employer] needs:     Medical: Not on file     Non-medical: Not on file   Tobacco Use    Smoking status: Never Smoker    Smokeless tobacco: Never Used   Substance and Sexual Activity    Alcohol use:  Yes     Alcohol/week: 0.0 standard drinks     Comment: \"once in a blue moon\", a few times a year    Drug use: No    Sexual activity: Not on file     Comment: 4   Lifestyle    Physical activity:     Days per week: Not on file     Minutes per session: Not on file    Stress: Not on file   Relationships    Social connections:     Talks on phone: Not on file     Gets topically daily. 85 g 0    chlorhexidine (HIBICLENS) 4 % external liquid Apply topically daily as needed. 118 mL 0    mupirocin (BACTROBAN) 2 % ointment Apply 3 times daily. 1 g 0    ACCU-CHEK SOFTCLIX LANCETS MISC USE TO CHECK BLOOD SUGAR THREE TIMES DAILY AS DIRECTED 100 each 1    polyethylene glycol (GLYCOLAX) powder Take 17 g by mouth 2 times daily  1    sodium hypochlorite (DAKINS) 0.125 % SOLN external solution Apply topically as directed twice daily. 1 Bottle 1    spironolactone (ALDACTONE) 50 MG tablet Take 1 tablet by mouth daily 30 tablet 3    Empagliflozin-metFORMIN HCl ER (SYNJARDY XR)  MG TB24 Take 1,000 mg by mouth 2 times daily      insulin aspart (NOVOLOG FLEXPEN) 100 UNIT/ML injection pen Inject into the skin 3 times daily (before meals) Counting Carbs - Sliding Scale      Semaglutide (OZEMPIC, 0.25 OR 0.5 MG/DOSE, SC) Inject 0.5 mg into the skin once a week On Sundays      ACCU-CHEK GAVI PLUS strip TEST BLOOD SUGAR THREE TIMES DAILY AS NEEDED 100 strip 3    vitamin B-6 (PYRIDOXINE) 100 MG tablet Take 100 mg by mouth daily      linaclotide (LINZESS) 290 MCG CAPS capsule Take 1 capsule by mouth every morning (before breakfast) 30 capsule 11    topiramate (TOPAMAX) 50 MG tablet TAKE 2 TABLETS BY MOUTH EVERY NIGHT 60 tablet 3    clonazePAM (KLONOPIN) 0.5 MG tablet TAKE 1 TABLET BY MOUTH DAILY AS NEEDED. 30 tablet 0    atorvastatin (LIPITOR) 20 MG tablet Take 1 tablet by mouth daily 30 tablet 5    naproxen sodium (ANAPROX) 550 MG tablet Take 1 tablet by mouth 2 times daily (with meals) 60 tablet 3    montelukast (SINGULAIR) 10 MG tablet TAKE 1 TABLET BY MOUTH EVERY DAY 30 tablet 5    traZODone (DESYREL) 100 MG tablet TAKE 1 TABLET BY MOUTH EVERY NIGHT 30 tablet 5    gabapentin (NEURONTIN) 100 MG capsule Take 1 capsule by mouth 2 times daily for 30 days.  60 capsule 3    TRESIBA FLEXTOUCH 200 UNIT/ML SOPN INJECT 60 UNITS INTO THE SKIN DAILY AS DIRECTED (Patient taking differently: 65 units each morning not taking at all during the night.) 4 pen 5    glucose monitoring kit (FREESTYLE) monitoring kit 1 kit by Does not apply route daily 1 kit 0    quinapril (ACCUPRIL) 20 MG tablet TAKE 1 TABLET BY MOUTH EVERY NIGHT AT BEDTIME 30 tablet 5    blood glucose monitor kit and supplies Use to check sugar  E11.9 1 kit 0    tiZANidine (ZANAFLEX) 2 MG tablet Take 1 tablet by mouth every 8 hours as needed (spasm) 60 tablet 0    amitriptyline (ELAVIL) 25 MG tablet TAKE 1 TO 2 TABLETS BY MOUTH IN THE EVENING 60 tablet 11    famotidine (PEPCID) 20 MG tablet Take 1 tablet by mouth 2 times daily 30 tablet 11    Insulin Pen Needle 32G X 4 MM MISC 1 each by Does not apply route daily 100 each 3    levothyroxine (SYNTHROID) 125 MCG tablet TAKE 1 TABLET BY MOUTH DAILY 30 tablet 11    Insulin Syringe-Needle U-100 30G X 5/16\" 0.5 ML MISC 1 each by Does not apply route daily 100 each 3    venlafaxine (EFFEXOR XR) 150 MG extended release capsule Take 1 capsule by mouth daily 30 capsule 11    Lancets MISC Use three times a day. 100 each 3    ASPIRIN LOW DOSE 81 MG EC tablet TAKE 1 TABLET BY MOUTH DAILY 30 tablet 0     No current facility-administered medications for this visit.         Patient Active Problem List   Diagnosis    Hypothyroidism    Vitamin D deficiency    Essential hypertension    Mixed hyperlipidemia    Carpal tunnel syndrome on right    Hypoesthesia of skin    Type 2 diabetes mellitus without complication, with long-term current use of insulin ([facility])    Skin infection    Chronic constipation    Chronic heartburn    Family history of polyps in the colon    Bandemia    Neuropathy    Bacteremia    Polypharmacy    Superficial mixed comedonal and inflammatory acne vulgaris    MRSA (methicillin resistant staph aureus) culture positive    Anxiety    Ulcer due to Bacteroides with fat layer exposed ([facility]    Class 2 obesity due to excess calories without serious JVD.   Cardiovascular:      Rate and Rhythm: Normal rate and regular rhythm. Pulses: Normal pulses. Heart sounds: Normal heart sounds, S1 normal and S2 normal. No murmur. Pulmonary:      Effort: Pulmonary effort is normal. No respiratory distress. Breath sounds: Normal breath sounds and air entry. No stridor. No wheezing, rhonchi or rales. Chest:      Chest wall: No tenderness. Abdominal:      General: Bowel sounds are normal. There is no distension. Palpations: Abdomen is soft. There is no mass. Tenderness: There is tenderness. There is right CVA tenderness. There is no left CVA tenderness or rebound. Hernia: No hernia is present. Musculoskeletal: Normal range of motion. General: No tenderness or deformity. Right lower leg: No edema. Left lower leg: No edema. Lymphadenopathy:      Cervical: No cervical adenopathy. Skin:     General: Skin is warm and dry. Findings: Lesion (follicles in arms trunk, back) and wound present. No rash. Neurological:      General: No focal deficit present. Mental Status: She is alert and oriented to person, place, and time. Cranial Nerves: No cranial nerve deficit. Sensory: Sensory deficit present. Motor: No weakness. Coordination: Coordination normal.      Gait: Gait normal.      Deep Tendon Reflexes: Reflexes normal.   Psychiatric:         Mood and Affect: Mood normal.         Behavior: Behavior normal.         Thought Content:  Thought content normal.         Judgment: Judgment normal.     Visual inspection:  Deformity/amputation: absent  Skin lesions/pre-ulcerative calluses: absent  Edema: right- negative, left- negative    Sensory exam:  Monofilament sensation: abnormal - no sensation  (minimum of 5 random plantar locations tested, avoiding callused areas - > 1 area with absence of sensation is + for neuropathy)    Plus at least one of the following:  Pulses: normal,   Pinprick:

## 2020-01-08 ENCOUNTER — HOSPITAL ENCOUNTER (OUTPATIENT)
Dept: WOUND CARE | Age: 50
Discharge: HOME | End: 2020-01-08
Payer: COMMERCIAL

## 2020-01-08 VITALS
TEMPERATURE: 96.6 F | HEIGHT: 62 IN | RESPIRATION RATE: 20 BRPM | WEIGHT: 193 LBS | HEART RATE: 92 BPM | SYSTOLIC BLOOD PRESSURE: 111 MMHG | BODY MASS INDEX: 35.51 KG/M2 | DIASTOLIC BLOOD PRESSURE: 73 MMHG

## 2020-01-08 PROBLEM — L98.492 SKIN ULCER OF FLANK WITH FAT LAYER EXPOSED (HCC): Status: ACTIVE | Noted: 2020-01-08

## 2020-01-08 PROCEDURE — 97597 DBRDMT OPN WND 1ST 20 CM/<: CPT | Performed by: NURSE PRACTITIONER

## 2020-01-08 PROCEDURE — 97597 DBRDMT OPN WND 1ST 20 CM/<: CPT

## 2020-01-08 RX ORDER — LIDOCAINE HYDROCHLORIDE 20 MG/ML
JELLY TOPICAL PRN
Status: DISCONTINUED | OUTPATIENT
Start: 2020-01-08 | End: 2020-01-10 | Stop reason: HOSPADM

## 2020-01-08 ASSESSMENT — ENCOUNTER SYMPTOMS
ABDOMINAL PAIN: 0
CHEST TIGHTNESS: 0
BLOOD IN STOOL: 0
DIARRHEA: 0
SHORTNESS OF BREATH: 0
RHINORRHEA: 0
WHEEZING: 0
COUGH: 0
SINUS PAIN: 0
CONSTIPATION: 1
BACK PAIN: 0
VOMITING: 0
TROUBLE SWALLOWING: 0

## 2020-01-08 ASSESSMENT — PAIN SCALES - GENERAL: PAINLEVEL_OUTOF10: 0

## 2020-01-08 NOTE — PROGRESS NOTES
[address]   Progress Note and Procedure Note      [employer] RECORD NUMBER:  [id number]  AGE: 54 y.o. GENDER: female  : 1970  EPISODE DATE:  2020    Subjective:     Chief Complaint   Patient presents with    Wound Check     Pt states wounds feel better         HISTORY of PRESENT ILLNESS HPI     Eileen Calixto is a 54 y.o. female who presents today for wound/ulcer evaluation.    History of Wound Context: follow up skin ulcers bilateral flank  Wound/Ulcer Pain Timing/Severity: none  Quality of pain: N/A  Severity:  0 / 10   Modifying Factors: pain to touch  Associated Signs/Symptoms: erythema and drainage    Ulcer Identification:  Ulcer Type: diabetic  Contributing Factors: diabetes        PAST MEDICAL HISTORY        Diagnosis Date    Anxiety     Class 2 obesity due to excess calories without serious comorbidity with body mass index (BMI) of 36.0 to 36.9 in adult 2019    Depression     Diabetes mellitus ([facility] [address]     GERD (gastroesophageal reflux disease)     Hyperglycemia 2017    Hyperlipidemia     Hypertension     Hypothyroidism     Lower abdominal pain 2019    Neuropathy 10/16/2019    Osteoarthritis     PONV (postoperative nausea and vomiting)     Staph infection 2018       PAST SURGICAL HISTORY    Past Surgical History:   Procedure Laterality Date    APPENDECTOMY      as a 3rd grader   [address] ,     CHOLECYSTECTOMY, LAPAROSCOPIC          COLONOSCOPY      \"more than 15 + yrs ago\" PER PT RECALL    COLONOSCOPY N/A 2019    Dr Sherrie Edmondson hemorrhoids-Grade 1, suboptimal prep, 3 yr recall    HYSTERECTOMY, TOTAL ABDOMINAL      May 2012, withOUT Ophorectomy    [place] REVISE MEDIAN N/CARPAL TUNNEL SURG Left 2017    CARPAL TUNNEL RELEASE performed by Sebastian Chiu MD at [address] N/CARPAL TUNNEL SURG Right 2017    CARPAL TUNNEL RELEASE performed by Andie Hallman MD at [address]      as a 5th grader    UPPER GASTROINTESTINAL ENDOSCOPY N/A 7/30/2019    UPPER GASTROINTESTINAL ENDOSCOPY  7/30/2019    Dr Gagan Tabares exam, empiric dil with 54F over wire, neg EoE       FAMILY HISTORY    Family History   Problem Relation Age of Onset    High Blood Pressure Mother     Cancer Mother         of the brain    Other Mother         second hand smoker    High Blood Pressure Father     High Cholesterol Father     Colon Polyps Father     Other Father         smoker    Diabetes Sister         type 1, from alcohol    High Blood Pressure Sister     High Blood Pressure Brother     Colon Cancer Maternal Aunt     Colon Cancer Paternal Uncle     Colon Cancer Paternal Grandfather     Diabetes Brother         from alcohol    Hypertension Brother     No Known Problems Son     No Known Problems Son     No Known Problems Son     No Known Problems Daughter     Esophageal Cancer Neg Hx     Liver Cancer Neg Hx     Liver Disease Neg Hx     Rectal Cancer Neg Hx     Stomach Cancer Neg Hx        SOCIAL HISTORY    Social History     Tobacco Use    Smoking status: Never Smoker    Smokeless tobacco: Never Used   Substance Use Topics    Alcohol use:  Yes     Alcohol/week: 0.0 standard drinks     Comment: \"once in a blue moon\", a few times a year    Drug use: No       ALLERGIES    Allergies   Allergen Reactions    Claritin-D 12 Hour [Loratadine-Pseudoephedrine Er] Other (See Comments)     \"it intensifies my migraines\"    Demerol Hcl [Meperidine] Other (See Comments)     Aggression    Percocet [Oxycodone-Acetaminophen] Rash       MEDICATIONS    Current Outpatient Medications on File Prior to Encounter   Medication Sig Dispense Refill    spironolactone (ALDACTONE) 50 MG tablet Take 1 tablet by mouth daily 30 tablet 3    Empagliflozin-metFORMIN HCl ER (SYNJARDY XR)  MG TB24 Take 1,000 mg by mouth 2 times daily      insulin aspart (NOVOLOG FLEXPEN) 100 UNIT/ML injection pen Inject into the skin 3 times daily (before meals) Counting Carbs - Sliding Scale      Semaglutide (OZEMPIC, 0.25 OR 0.5 MG/DOSE, SC) Inject 0.5 mg into the skin once a week On Sundays      vitamin B-6 (PYRIDOXINE) 100 MG tablet Take 100 mg by mouth daily      linaclotide (LINZESS) 290 MCG CAPS capsule Take 1 capsule by mouth every morning (before breakfast) 30 capsule 11    topiramate (TOPAMAX) 50 MG tablet TAKE 2 TABLETS BY MOUTH EVERY NIGHT 60 tablet 3    atorvastatin (LIPITOR) 20 MG tablet Take 1 tablet by mouth daily 30 tablet 5    naproxen sodium (ANAPROX) 550 MG tablet Take 1 tablet by mouth 2 times daily (with meals) 60 tablet 3    montelukast (SINGULAIR) 10 MG tablet TAKE 1 TABLET BY MOUTH EVERY DAY 30 tablet 5    traZODone (DESYREL) 100 MG tablet TAKE 1 TABLET BY MOUTH EVERY NIGHT 30 tablet 5    gabapentin (NEURONTIN) 100 MG capsule Take 1 capsule by mouth 2 times daily for 30 days. 60 capsule 3    TRESIBA FLEXTOUCH 200 UNIT/ML SOPN INJECT 60 UNITS INTO THE SKIN DAILY AS DIRECTED (Patient taking differently: 65 units each morning not taking at all during the night.) 4 pen 5    quinapril (ACCUPRIL) 20 MG tablet TAKE 1 TABLET BY MOUTH EVERY NIGHT AT BEDTIME 30 tablet 5    amitriptyline (ELAVIL) 25 MG tablet TAKE 1 TO 2 TABLETS BY MOUTH IN THE EVENING 60 tablet 11    famotidine (PEPCID) 20 MG tablet Take 1 tablet by mouth 2 times daily 30 tablet 11    levothyroxine (SYNTHROID) 125 MCG tablet TAKE 1 TABLET BY MOUTH DAILY 30 tablet 11    venlafaxine (EFFEXOR XR) 150 MG extended release capsule Take 1 capsule by mouth daily 30 capsule 11    ASPIRIN LOW DOSE 81 MG EC tablet TAKE 1 TABLET BY MOUTH DAILY 30 tablet 0    econazole nitrate 1 % cream Apply topically daily. 85 g 0    chlorhexidine (HIBICLENS) 4 % external liquid Apply topically daily as needed. 118 mL 0    mupirocin (BACTROBAN) 2 % ointment Apply 3 times daily.  1 g 0    ACCU-CHEK SOFTCLIX LANCETS MISC USE TO CHECK BLOOD SUGAR THREE TIMES DAILY AS DIRECTED 100 each 1    polyethylene glycol (GLYCOLAX) powder Take 17 g by mouth 2 times daily  1    sodium hypochlorite (DAKINS) 0.125 % SOLN external solution Apply topically as directed twice daily. 1 Bottle 1    ACCU-CHEK GAVI PLUS strip TEST BLOOD SUGAR THREE TIMES DAILY AS NEEDED 100 strip 3    clonazePAM (KLONOPIN) 0.5 MG tablet TAKE 1 TABLET BY MOUTH DAILY AS NEEDED. 30 tablet 0    glucose monitoring kit (FREESTYLE) monitoring kit 1 kit by Does not apply route daily 1 kit 0    blood glucose monitor kit and supplies Use to check sugar  E11.9 1 kit 0    tiZANidine (ZANAFLEX) 2 MG tablet Take 1 tablet by mouth every 8 hours as needed (spasm) 60 tablet 0    Insulin Pen Needle 32G X 4 MM MISC 1 each by Does not apply route daily 100 each 3    Insulin Syringe-Needle U-100 30G X 5/16\" 0.5 ML MISC 1 each by Does not apply route daily 100 each 3    Lancets MISC Use three times a day. 100 each 3     No current facility-administered medications on file prior to encounter. REVIEW OF SYSTEMS    A comprehensive review of systems was negative.     Objective:      /73   Pulse 92   Temp 96.6 °F (35.9 °C)   Resp 20   Ht 5' 2\" (1.575 m)   Wt 193 lb (87.5 kg)   LMP  (LMP Unknown)   BMI 35.30 kg/m²     Wt Readings from Last 3 Encounters:   01/08/20 193 lb (87.5 kg)   01/07/20 193 lb 9.6 oz (87.8 kg)   12/30/19 200 lb (90.7 kg)       PHYSICAL EXAM    General Appearance: alert and oriented to person, place and time, well developed and well- nourished, in no acute distress  Skin: warm and dry, no rash or erythema  Head: normocephalic and atraumatic  Eyes: pupils equal, round, and reactive to light, extraocular eye movements intact, conjunctivae normal  ENT: tympanic membrane, external ear and ear canal normal bilaterally, nose without deformity, nasal mucosa and turbinates normal without polyps  Neck: supple and non-tender without mass, no thyromegaly or thyroid nodules, no cervical lymphadenopathy  Pulmonary/Chest: clear to auscultation bilaterally- no wheezes, rales or rhonchi, normal air movement, no respiratory distress  Extremities: no cyanosis, clubbing or edema  Musculoskeletal: normal range of motion, no joint swelling, deformity or tenderness  Neurologic: reflexes normal and symmetric, no cranial nerve deficit, gait, coordination and speech normal      Assessment:      Patient Active Problem List   Diagnosis Code    Hypothyroidism E03.9    Vitamin D deficiency E55.9    Essential hypertension I10    Mixed hyperlipidemia E78.2    Carpal tunnel syndrome on right G56.01    Hypoesthesia of skin R20.1    Type 2 diabetes mellitus without complication, with long-term current use of insulin ([facility]) E11.9, Z79.4    Skin infection L08.9    Chronic constipation K59.09    Chronic heartburn R12    Family history of polyps in the colon Z83.71    Bandemia D72.825    Neuropathy G62.9    Bacteremia R78.81    Polypharmacy Z79.899    Superficial mixed comedonal and inflammatory acne vulgaris L70.0    MRSA (methicillin resistant staph aureus) culture positive Z22.322    Anxiety F41.9    Ulcer due to Bacteroides with fat layer exposed ([facility]) L98.492, B96.6    Class 2 obesity due to excess calories without serious comorbidity with body mass index (BMI) of 36.0 to 36.9 in adult E66.09, Z68.36    Skin ulcer of flank with fat layer exposed ([facility] L98.492        Procedure Note  Indications:  Based on my examination of this patient's wound(s)/ulcer(s) today, debridement is required to promote healing and evaluate the wound base.     Performed by: NICANOR Astorga - PABLO    Consent obtained:  Yes    Time out taken:  Yes    Pain Control: Anesthetic  Anesthetic: 2% Lidocaine Gel Topical       Debridement:Non-excisional Debridement    Using curette the wound(s)/ulcer(s) was/were sharply debrided down through and including the removal of epidermis and dermis. Devitalized Tissue Debrided:  fibrin and biofilm    Pre Debridement Measurements:  Are located in the [facility]  Documentation Flow Sheet    Wound/Ulcer #: 1 and 2    Post Debridement Measurements:  Wound/Ulcer Descriptions are Pre Debridement except measurements:    Wound 12/12/19 Back Lateral;Right wound 2- right lateral abcess (Active)   Wound Image   1/8/2020  3:48 PM   Dressing Status Old drainage 1/8/2020  3:48 PM   Dressing Changed Changed/New 12/30/2019 11:36 AM   Wound Cleansed Rinsed/Irrigated with saline 1/8/2020  3:48 PM   Wound Length (cm) 0.4 cm 1/8/2020  3:48 PM   Wound Width (cm) 0.5 cm 1/8/2020  3:48 PM   Wound Depth (cm) 0.1 cm 1/8/2020  3:48 PM   Wound Surface Area (cm^2) 0.2 cm^2 1/8/2020  3:48 PM   Change in Wound Size % (l*w) 50 1/8/2020  3:48 PM   Wound Volume (cm^3) 0.02 cm^3 1/8/2020  3:48 PM   Wound Healing % 90 1/8/2020  3:48 PM   Post-Procedure Length (cm) 0.4 cm 1/8/2020  5:22 PM   Post-Procedure Width (cm) 0.5 cm 1/8/2020  5:22 PM   Post-Procedure Depth (cm) 0.1 cm 1/8/2020  5:22 PM   Post-Procedure Surface Area (cm^2) 0.2 cm^2 1/8/2020  5:22 PM   Post-Procedure Volume (cm^3) 0.02 cm^3 1/8/2020  5:22 PM   Distance Tunneling (cm) 0 cm 1/8/2020  3:48 PM   Tunneling Position ___ O'Clock 0 1/8/2020  3:48 PM   Undermining Starts ___ O'Clock 0 1/8/2020  3:48 PM   Undermining Ends___ O'Clock 0 1/8/2020  3:48 PM   Undermining Maxium Distance (cm) 0 1/8/2020  3:48 PM   Wound Assessment Red;Yellow;Drainage;Slough 1/8/2020  3:48 PM   Drainage Amount Small 1/8/2020  3:48 PM   Drainage Description Serosanguinous 1/8/2020  3:48 PM   Odor None 1/8/2020  3:48 PM   Margins Attached edges 1/8/2020  3:48 PM   Evangelina-wound Assessment Dry; Intact; Pink 1/8/2020  3:48 PM   Non-staged Wound Description Full thickness 1/8/2020  3:48 PM   [place]%Wound Bed 0 1/8/2020  3:48 PM   Red%Wound Bed 99 1/8/2020  3:48 PM   Yellow%Wound Bed 1 1/8/2020  3:48 PM   Black%Wound Bed 0 Diabetic/Pressure/Non Pressure Ulcers only:  Ulcer: Diabetic ulcer, fat layer exposed      Estimated Blood Loss:  Minimal    Hemostasis Achieved:  by pressure    Procedural Pain:  0  / 10     Post Procedural Pain:  0 / 10     Response to treatment:  Well tolerated by patient. Plan:     Problem List Items Addressed This Visit     Type 2 diabetes mellitus without complication, with long-term current use of insulin ([address] - Primary    Superficial mixed comedonal and inflammatory acne vulgaris    MRSA (methicillin resistant staph aureus) culture positive    Anxiety    Ulcer due to Bacteroides with fat layer exposed ([address]    Class 2 obesity due to excess calories without serious comorbidity with body mass index (BMI) of 36.0 to 36.9 in adult    * (Principal) Skin ulcer of flank with fat layer exposed ([address]          Treatment Note please see attached Discharge Instructions    In my professional opinion this patient would benefit from HBO Therapy: No    Written patient dismissal instructions given to patient and signed by patient or POA. Ms. Cj Cam skin looks great, her wounds are healing nicely since the dressing order change. We will follow up in 2 weeks. She recently started with her new PCP stating she loves her and they are trying to determine a solution to her skin problem but for now she will continue to take the Spirolactone. Discharge Instructions       Visit Discharge/Physician Orders    Discharge condition: Stable    Discharge to: Home    Left via:Private automobile    Accompanied by:  self    ECF/HHA: none    Dressing Orders:  Left side and Right side wound: Soap and water wash, tuck 1/4 STR Dakins moistened gauze into wound bed loosely. Cover with KerraMax border, change daily    Treatment Orders: Protein rich diet, keep skin dry from moisture and friction.     [facility] followup visit _____2 weeks________________________  (Please note your next appointment above and if you are unable to keep, kindly give a 24 hour notice. Thank you.)          If you experience any of the following, please call the [address] during business hours:    * Increase in Pain  * Temperature over 101  * Increase in drainage from your wound  * Drainage with a foul odor  * Bleeding  * Increase in swelling  * Need for compression bandage changes due to slippage, breakthrough drainage. If you need medical attention outside of the business hours of the [address] please contact your PCP or go to the nearest emergency room.           Electronically signed by Murrel Hatchet, APRN - CNP on 1/8/2020 at 5:25 PM

## 2020-01-22 ENCOUNTER — HOSPITAL ENCOUNTER (OUTPATIENT)
Dept: WOUND CARE | Age: 50
Discharge: HOME | End: 2020-01-22
Payer: COMMERCIAL

## 2020-01-22 VITALS
HEART RATE: 103 BPM | WEIGHT: 193 LBS | DIASTOLIC BLOOD PRESSURE: 82 MMHG | HEIGHT: 62 IN | RESPIRATION RATE: 20 BRPM | BODY MASS INDEX: 35.51 KG/M2 | TEMPERATURE: 97.6 F | SYSTOLIC BLOOD PRESSURE: 121 MMHG

## 2020-01-22 PROCEDURE — 99213 OFFICE O/P EST LOW 20 MIN: CPT

## 2020-01-22 PROCEDURE — 99212 OFFICE O/P EST SF 10 MIN: CPT | Performed by: NURSE PRACTITIONER

## 2020-01-22 ASSESSMENT — PAIN SCALES - GENERAL: PAINLEVEL_OUTOF10: 0

## 2020-01-22 NOTE — PROGRESS NOTES
[address]   Progress Note and Procedure Note      [address] RECORD NUMBER:  [id number]  AGE: 54 y.o. GENDER: female  : 1970  EPISODE DATE:  2020    Subjective:     Chief Complaint   Patient presents with    Wound Check     Flank wounds; Patient denies increased pain at wound site         HISTORY of PRESENT ILLNESS HPI     Rachel Zarco is a 54 y.o. female who presents today for wound/ulcer evaluation.    History of Wound Context:follow up skin ulcers bilateral flank  Ulcer Identification:  Ulcer Type: diabetic  Contributing Factors: diabetes and obesity    PAST MEDICAL HISTORY        Diagnosis Date    Anxiety     Class 2 obesity due to excess calories without serious comorbidity with body mass index (BMI) of 36.0 to 36.9 in adult 2019    Depression     Diabetes mellitus ([facility] [address]     GERD (gastroesophageal reflux disease)     Hyperglycemia 2017    Hyperlipidemia     Hypertension     Hypothyroidism     Lower abdominal pain 2019    Neuropathy 10/16/2019    Osteoarthritis     PONV (postoperative nausea and vomiting)     Staph infection 2018       PAST SURGICAL HISTORY    Past Surgical History:   Procedure Laterality Date    APPENDECTOMY      as a 1st grader   [address] [place] [address]      , ,     CHOLECYSTECTOMY, LAPAROSCOPIC          COLONOSCOPY      \"more than 15 + yrs ago\" PER PT RECALL    COLONOSCOPY N/A 2019    Dr Amaury Bueno hemorrhoids-Grade 1, suboptimal prep, 3 yr recall    HYSTERECTOMY, TOTAL ABDOMINAL      May 2012, withOUT Ophorectomy    [place] REVISE MEDIAN N/CARPAL TUNNEL SURG Left 2017    CARPAL TUNNEL RELEASE performed by Rios Alanis MD at [address] N/CARPAL TUNNEL SURG Right 2017    CARPAL TUNNEL RELEASE performed by Rios Alanis MD at [address]      as a 5th grader    UPPER GASTROINTESTINAL ENDOSCOPY N/A 7/30/2019    UPPER GASTROINTESTINAL ENDOSCOPY  7/30/2019    Dr Womack Dec exam, empiric dil with 54F over wire, neg EoE       FAMILY HISTORY    Family History   Problem Relation Age of Onset    High Blood Pressure Mother     Cancer Mother         of the brain    Other Mother         second hand smoker    High Blood Pressure Father     High Cholesterol Father     Colon Polyps Father     Other Father         smoker    Diabetes Sister         type 1, from alcohol    High Blood Pressure Sister     High Blood Pressure Brother     Colon Cancer Maternal Aunt     Colon Cancer Paternal Uncle     Colon Cancer Paternal Grandfather     Diabetes Brother         from alcohol    Hypertension Brother     No Known Problems Son     No Known Problems Son     No Known Problems Son     No Known Problems Daughter     Esophageal Cancer Neg Hx     Liver Cancer Neg Hx     Liver Disease Neg Hx     Rectal Cancer Neg Hx     Stomach Cancer Neg Hx        SOCIAL HISTORY    Social History     Tobacco Use    Smoking status: Never Smoker    Smokeless tobacco: Never Used   Substance Use Topics    Alcohol use: Yes     Alcohol/week: 0.0 standard drinks     Comment: \"once in a blue moon\", a few times a year    Drug use: No       ALLERGIES    Allergies   Allergen Reactions    Claritin-D 12 Hour [Loratadine-Pseudoephedrine Er] Other (See Comments)     \"it intensifies my migraines\"    Demerol Hcl [Meperidine] Other (See Comments)     Aggression    Percocet [Oxycodone-Acetaminophen] Rash       MEDICATIONS    Current Outpatient Medications on File Prior to Encounter   Medication Sig Dispense Refill    econazole nitrate 1 % cream Apply topically daily.  85 g 0    ACCU-CHEK SOFTCLIX LANCETS MISC USE TO CHECK BLOOD SUGAR THREE TIMES DAILY AS DIRECTED 100 each 1    polyethylene glycol (GLYCOLAX) powder Take 17 g by mouth 2 times daily  1    sodium hypochlorite (DAKINS) 0.125 % SOLN external solution Apply topically as directed twice daily. 1 Bottle 1    spironolactone (ALDACTONE) 50 MG tablet Take 1 tablet by mouth daily 30 tablet 3    Empagliflozin-metFORMIN HCl ER (SYNJARDY XR)  MG TB24 Take 1,000 mg by mouth 2 times daily      insulin aspart (NOVOLOG FLEXPEN) 100 UNIT/ML injection pen Inject into the skin 3 times daily (before meals) Counting Carbs - Sliding Scale      Semaglutide (OZEMPIC, 0.25 OR 0.5 MG/DOSE, SC) Inject 0.5 mg into the skin once a week On Sundays      ACCU-CHEK GAVI PLUS strip TEST BLOOD SUGAR THREE TIMES DAILY AS NEEDED 100 strip 3    vitamin B-6 (PYRIDOXINE) 100 MG tablet Take 100 mg by mouth daily      linaclotide (LINZESS) 290 MCG CAPS capsule Take 1 capsule by mouth every morning (before breakfast) 30 capsule 11    topiramate (TOPAMAX) 50 MG tablet TAKE 2 TABLETS BY MOUTH EVERY NIGHT 60 tablet 3    clonazePAM (KLONOPIN) 0.5 MG tablet TAKE 1 TABLET BY MOUTH DAILY AS NEEDED. 30 tablet 0    atorvastatin (LIPITOR) 20 MG tablet Take 1 tablet by mouth daily 30 tablet 5    naproxen sodium (ANAPROX) 550 MG tablet Take 1 tablet by mouth 2 times daily (with meals) 60 tablet 3    montelukast (SINGULAIR) 10 MG tablet TAKE 1 TABLET BY MOUTH EVERY DAY 30 tablet 5    traZODone (DESYREL) 100 MG tablet TAKE 1 TABLET BY MOUTH EVERY NIGHT 30 tablet 5    gabapentin (NEURONTIN) 100 MG capsule Take 1 capsule by mouth 2 times daily for 30 days.  60 capsule 3    TRESIBA FLEXTOUCH 200 UNIT/ML SOPN INJECT 60 UNITS INTO THE SKIN DAILY AS DIRECTED (Patient taking differently: 65 units each morning not taking at all during the night.) 4 pen 5    glucose monitoring kit (FREESTYLE) monitoring kit 1 kit by Does not apply route daily 1 kit 0    quinapril (ACCUPRIL) 20 MG tablet TAKE 1 TABLET BY MOUTH EVERY NIGHT AT BEDTIME 30 tablet 5    blood glucose monitor kit and supplies Use to check sugar  E11.9 1 kit 0    tiZANidine (ZANAFLEX) 2 MG tablet Take 1 tablet by mouth every 8 hours as needed (spasm) 60 tablet 0    amitriptyline (ELAVIL) 25 MG tablet TAKE 1 TO 2 TABLETS BY MOUTH IN THE EVENING 60 tablet 11    famotidine (PEPCID) 20 MG tablet Take 1 tablet by mouth 2 times daily 30 tablet 11    Insulin Pen Needle 32G X 4 MM MISC 1 each by Does not apply route daily 100 each 3    levothyroxine (SYNTHROID) 125 MCG tablet TAKE 1 TABLET BY MOUTH DAILY 30 tablet 11    Insulin Syringe-Needle U-100 30G X 5/16\" 0.5 ML MISC 1 each by Does not apply route daily 100 each 3    venlafaxine (EFFEXOR XR) 150 MG extended release capsule Take 1 capsule by mouth daily 30 capsule 11    Lancets MISC Use three times a day. 100 each 3    ASPIRIN LOW DOSE 81 MG EC tablet TAKE 1 TABLET BY MOUTH DAILY 30 tablet 0     No current facility-administered medications on file prior to encounter. REVIEW OF SYSTEMS    A comprehensive review of systems was negative.     Objective:      /82   Pulse 103   Temp 97.6 °F (36.4 °C) (Temporal)   Resp 20   Ht 5' 2\" (1.575 m)   Wt 193 lb (87.5 kg)   LMP  (LMP Unknown)   BMI 35.30 kg/m²     Wt Readings from Last 3 Encounters:   01/22/20 193 lb (87.5 kg)   01/08/20 193 lb (87.5 kg)   01/07/20 193 lb 9.6 oz (87.8 kg)       PHYSICAL EXAM    General Appearance: alert and oriented to person, place and time, well developed and well- nourished, in no acute distress  Skin: warm and dry, no rash or erythema, wound present  Head: normocephalic and atraumatic  Eyes: pupils equal, round, and reactive to light, extraocular eye movements intact, conjunctivae normal  ENT: tympanic membrane, external ear and ear canal normal bilaterally, nose without deformity, nasal mucosa and turbinates normal without polyps  Neck: supple and non-tender without mass, no thyromegaly or thyroid nodules, no cervical lymphadenopathy  Pulmonary/Chest: clear to auscultation bilaterally- no wheezes, rales or rhonchi, normal air movement, no respiratory distress  Extremities: no cyanosis, clubbing or edema  Musculoskeletal: normal range of motion, no joint swelling, deformity or tenderness  Neurologic: reflexes normal and symmetric, no cranial nerve deficit, gait, coordination and speech normal      Assessment:      Patient Active Problem List   Diagnosis Code    Hypothyroidism E03.9    Vitamin D deficiency E55.9    Essential hypertension I10    Mixed hyperlipidemia E78.2    Carpal tunnel syndrome on right G56.01    Hypoesthesia of skin R20.1    Type 2 diabetes mellitus without complication, with long-term current use of insulin ([facility]) E11.9, Z79.4    Skin infection L08.9    Chronic constipation K59.09    Chronic heartburn R12    Family history of polyps in the colon Z83.71    Bandemia D72.825    Neuropathy G62.9    Bacteremia R78.81    Polypharmacy Z79.899    Superficial mixed comedonal and inflammatory acne vulgaris L70.0    MRSA (methicillin resistant staph aureus) culture positive Z22.322    Anxiety F41.9    Ulcer due to Bacteroides with fat layer exposed ([address] L98.492, B96.6    Class 2 obesity due to excess calories without serious comorbidity with body mass index (BMI) of 36.0 to 36.9 in adult E66.09, Z68.36    Skin ulcer of flank with fat layer exposed ([address] L98.492                 Wound 12/12/19 Back Lateral;Right wound 2- right lateral abcess (Active)   Wound Image    1/22/2020  3:32 PM   Wound Other 1/22/2020  3:32 PM   Dressing Status Old drainage; Intact 1/22/2020  3:32 PM   Dressing Changed Changed/New 1/22/2020  3:32 PM   Dressing/Treatment Vaseline gauze;Silicone border 1/23/9266  3:32 PM   Wound Cleansed Rinsed/Irrigated with saline 1/22/2020  3:32 PM   Wound Length (cm) 0.2 cm 1/22/2020  3:32 PM   Wound Width (cm) 0.5 cm 1/22/2020  3:32 PM   Wound Depth (cm) 0.1 cm 1/22/2020  3:32 PM   Wound Surface Area (cm^2) 0.1 cm^2 1/22/2020  3:32 PM   Change in Wound Size % (l*w) 75 1/22/2020  3:32 PM   Wound Volume (cm^3) 0.01 cm^3 1/22/2020  3:32 PM   Wound Healing % 95 1/22/2020  3:32 PM   Post-Procedure wound: Soap and water wash, Apply vaseline gauze to wound bed. Cover with KerraMax border, change daily    Treatment Orders: Protein rich diet, keep skin dry from moisture and friction. [facility] followup visit _____1 week________________________  (Please note your next appointment above and if you are unable to keep, kindly give a 24 hour notice. Thank you.)          If you experience any of the following, please call the [facility] during business hours:    * Increase in Pain  * Temperature over 101  * Increase in drainage from your wound  * Drainage with a foul odor  * Bleeding  * Increase in swelling  * Need for compression bandage changes due to slippage, breakthrough drainage. If you need medical attention outside of the business hours of the [facility] please contact your PCP or go to the nearest emergency room.           Electronically signed by NICANOR Haddad CNP on 1/22/2020 at 4:50 PM

## 2020-01-22 NOTE — PLAN OF CARE
Problem: Wound:  Goal: Will show signs of wound healing; wound closure and no evidence of infection  Description  Will show signs of wound healing; wound closure and no evidence of infection  Outcome: Ongoing     Problem: Weight control:  Goal: Ability to maintain an optimal weight for height and age will be supported  Description  Ability to maintain an optimal weight for height and age will be supported  Outcome: Ongoing     Problem: Blood Glucose:  Goal: Ability to maintain appropriate glucose levels will improve  Description  Ability to maintain appropriate glucose levels will improve  Outcome: Ongoing

## 2020-01-29 ENCOUNTER — HOSPITAL ENCOUNTER (OUTPATIENT)
Dept: WOUND CARE | Age: 50
Discharge: HOME | End: 2020-01-29
Payer: COMMERCIAL

## 2020-01-29 VITALS
BODY MASS INDEX: 35.51 KG/M2 | HEART RATE: 101 BPM | WEIGHT: 193 LBS | TEMPERATURE: 97 F | RESPIRATION RATE: 20 BRPM | SYSTOLIC BLOOD PRESSURE: 123 MMHG | DIASTOLIC BLOOD PRESSURE: 72 MMHG | HEIGHT: 62 IN

## 2020-01-29 PROBLEM — T23.301A: Status: ACTIVE | Noted: 2020-01-29

## 2020-01-29 PROCEDURE — 16020 DRESS/DEBRID P-THICK BURN S: CPT | Performed by: NURSE PRACTITIONER

## 2020-01-29 PROCEDURE — 99213 OFFICE O/P EST LOW 20 MIN: CPT

## 2020-01-29 PROCEDURE — 16020 DRESS/DEBRID P-THICK BURN S: CPT

## 2020-01-29 RX ORDER — LIDOCAINE HYDROCHLORIDE 20 MG/ML
JELLY TOPICAL PRN
Status: DISCONTINUED | OUTPATIENT
Start: 2020-01-29 | End: 2020-01-31 | Stop reason: HOSPADM

## 2020-01-29 RX ORDER — INSULIN LISPRO 100 [IU]/ML
INJECTION, SOLUTION INTRAVENOUS; SUBCUTANEOUS
Qty: 6 PEN | Refills: 11 | Status: SHIPPED | OUTPATIENT
Start: 2020-01-29 | End: 2021-01-28 | Stop reason: SDUPTHER

## 2020-01-29 ASSESSMENT — PAIN SCALES - GENERAL: PAINLEVEL_OUTOF10: 5

## 2020-01-29 ASSESSMENT — PAIN DESCRIPTION - DESCRIPTORS: DESCRIPTORS: BURNING

## 2020-01-29 ASSESSMENT — PAIN DESCRIPTION - LOCATION: LOCATION: FINGER (COMMENT WHICH ONE)

## 2020-01-29 ASSESSMENT — PAIN DESCRIPTION - PAIN TYPE: TYPE: ACUTE PAIN

## 2020-01-29 ASSESSMENT — PAIN DESCRIPTION - ORIENTATION: ORIENTATION: RIGHT

## 2020-01-29 NOTE — PROGRESS NOTES
[address]   Progress Note and Procedure Note      [address] RECORD NUMBER:  [id number]  AGE: 54 y.o. GENDER: female  : 1970  EPISODE DATE:  2020    Subjective:     Chief Complaint   Patient presents with    Wound Check     Pt has a burn on her R. 4th finger         HISTORY of PRESENT ILLNESS HPI     Brenden oGng is a 54 y.o. female who presents today for wound/ulcer evaluation.    History of Wound Context: left and right flank area ulcer follow up and new wound right hand  Ulcer Identification:  Ulcer Type: diabetic and burn  Contributing Factors: diabetes, shear force and obesity    Wound: Burn        PAST MEDICAL HISTORY        Diagnosis Date    Anxiety     Class 2 obesity due to excess calories without serious comorbidity with body mass index (BMI) of 36.0 to 36.9 in adult 2019    Depression     Diabetes mellitus ([facility] [address]     GERD (gastroesophageal reflux disease)     Hyperglycemia 2017    Hyperlipidemia     Hypertension     Hypothyroidism     Lower abdominal pain 2019    Neuropathy 10/16/2019    Osteoarthritis     PONV (postoperative nausea and vomiting)     Staph infection 2018       PAST SURGICAL HISTORY    Past Surgical History:   Procedure Laterality Date    APPENDECTOMY      as a 1st grader   [address] [place] [address]      , ,     CHOLECYSTECTOMY, LAPAROSCOPIC          COLONOSCOPY      \"more than 15 + yrs ago\" PER PT RECALL    COLONOSCOPY N/A 2019    Dr Marisol Holcomb hemorrhoids-Grade 1, suboptimal prep, 3 yr recall    HYSTERECTOMY, TOTAL ABDOMINAL      May 2012, withOUT Ophorectomy    [place] REVISE MEDIAN N/CARPAL TUNNEL SURG Left 2017    CARPAL TUNNEL RELEASE performed by Roberta Albert MD at [address] N/CARPAL TUNNEL SURG Right 2017    CARPAL TUNNEL RELEASE performed by Roberta Albert MD at [address] clubbing or edema  Musculoskeletal: normal range of motion, no joint swelling, deformity or tenderness  Neurologic: reflexes normal and symmetric, no cranial nerve deficit, gait, coordination and speech normal      Assessment:      Patient Active Problem List   Diagnosis Code    Hypothyroidism E03.9    Vitamin D deficiency E55.9    Essential hypertension I10    Mixed hyperlipidemia E78.2    Carpal tunnel syndrome on right G56.01    Hypoesthesia of skin R20.1    Type 2 diabetes mellitus without complication, with long-term current use of insulin ([facility]) E11.9, Z79.4    Skin infection L08.9    Chronic constipation K59.09    Chronic heartburn R12    Family history of polyps in the colon Z83.71    Bandemia D72.825    Neuropathy G62.9    Bacteremia R78.81    Polypharmacy Z79.899    Superficial mixed comedonal and inflammatory acne vulgaris L70.0    MRSA (methicillin resistant staph aureus) culture positive Z22.322    Anxiety F41.9    Ulcer due to Bacteroides with fat layer exposed ([address] L98.492, B96.6    Class 2 obesity due to excess calories without serious comorbidity with body mass index (BMI) of 36.0 to 36.9 in adult E66.09, Z68.36    Skin ulcer of flank with fat layer exposed ([address] L98.492    Burn of hand, deep third degree, right, initial encounter T23.301A        Procedure Note  Indications:  Based on my examination of this patient's wound(s)/ulcer(s) today, debridement is required to promote healing and evaluate the wound base. Performed by: NICANOR Frederick CNP    Consent obtained:  Yes    Time out taken:  Yes    Pain Control:         Debridement:Non-excisional Debridement    Using curette the wound(s)/ulcer(s) was/were sharply debrided down through and including the removal of epidermis and dermis.         Devitalized Tissue Debrided:  fibrin, biofilm, slough and exudate    Pre Debridement Measurements:  Are located in the [address]  Documentation Flow Sheet    Wound/Ulcer #: 4    Post Debridement Measurements:  Wound/Ulcer Descriptions are Pre Debridement except measurements:    Wound 12/12/19 Back Lateral;Right wound 2- right lateral abcess (Active)   Wound Image   1/29/2020  4:01 PM   Dressing Status Clean;Dry 1/29/2020  4:01 PM   Dressing Changed Changed/New 1/22/2020  3:32 PM   Dressing/Treatment Vaseline gauze;Silicone border 6/69/5737  3:32 PM   Wound Cleansed Not Cleansed 1/29/2020  4:01 PM   Wound Length (cm) 0 cm 1/29/2020  4:01 PM   Wound Width (cm) 0 cm 1/29/2020  4:01 PM   Wound Depth (cm) 0 cm 1/29/2020  4:01 PM   Wound Surface Area (cm^2) 0 cm^2 1/29/2020  4:01 PM   Change in Wound Size % (l*w) 100 1/29/2020  4:01 PM   Wound Volume (cm^3) 0 cm^3 1/29/2020  4:01 PM   Wound Healing % 100 1/29/2020  4:01 PM   Post-Procedure Length (cm) 0.2 cm 1/22/2020  3:32 PM   Post-Procedure Width (cm) 0.5 cm 1/22/2020  3:32 PM   Post-Procedure Depth (cm) 0.1 cm 1/22/2020  3:32 PM   Post-Procedure Surface Area (cm^2) 0.1 cm^2 1/22/2020  3:32 PM   Post-Procedure Volume (cm^3) 0.01 cm^3 1/22/2020  3:32 PM   Distance Tunneling (cm) 0 cm 1/29/2020  4:01 PM   Tunneling Position ___ O'Clock 0 1/29/2020  4:01 PM   Undermining Starts ___ O'Clock 0 1/29/2020  4:01 PM   Undermining Ends___ O'Clock 0 1/29/2020  4:01 PM   Undermining Maxium Distance (cm) 0 1/29/2020  4:01 PM   Wound Assessment Epithelialization 1/29/2020  4:01 PM   Drainage Amount None 1/29/2020  4:01 PM   Odor None 1/29/2020  4:01 PM   Evangelina-wound Assessment Dry; Intact; Pink 1/22/2020  3:32 PM   Non-staged Wound Description Full thickness 1/29/2020  4:01 PM   Clarissa%Wound Bed 0 1/29/2020  4:01 PM   Red%Wound Bed 0 1/29/2020  4:01 PM   Yellow%Wound Bed 0 1/29/2020  4:01 PM   Black%Wound Bed 0 1/29/2020  4:01 PM   Purple%Wound Bed 0 1/29/2020  4:01 PM   Other%Wound Bed 0 1/29/2020  4:01 PM   Number of days: 48       Wound 12/12/19 Back Lateral;Left wound 1- left lateral flank abcess (Active)   Wound Image   1/29/2020  4:01 PM   Dressing ulcer of flank with fat layer exposed ([facility] [address]    * (Principal) Burn of hand, deep third degree, right, initial encounter          Treatment Note please see attached Discharge Instructions    In my professional opinion this patient would benefit from HBO Therapy: No    Written patient dismissal instructions given to patient and signed by patient or POA. Ms. Margarito Vaz previous wounds are healed however she has a new wound on her right hand, a burn from her work as a . We will use duoderm for debridement and follow up in 2 weeks. Discharge Instructions       Visit Discharge/Physician Orders    Discharge condition: Stable    Discharge to: Home    Left via:Private automobile    Accompanied by:  self    ECF/HHA: none    Dressing Orders:   Right : Soap and water wash, Apply duoderm slightly bigger than wound bed. Cover with glove to keep hands clean and dry. Change daily or as needed    Treatment Orders: Protein rich diet, keep skin dry from moisture and friction. [address] [place] [address] followup visit ____1 week_________________________  (Please note your next appointment above and if you are unable to keep, kindly give a 24 hour notice. Thank you.)          If you experience any of the following, please call the [facility] during business hours:    * Increase in Pain  * Temperature over 101  * Increase in drainage from your wound  * Drainage with a foul odor  * Bleeding  * Increase in swelling  * Need for compression bandage changes due to slippage, breakthrough drainage. If you need medical attention outside of the business hours of the [address] please contact your PCP or go to the nearest emergency room.           Electronically signed by NICANOR Wild CNP on 1/29/2020 at 4:34 PM

## 2020-01-30 ENCOUNTER — TELEPHONE (OUTPATIENT)
Dept: ENDOCRINOLOGY | Facility: CLINIC | Age: 50
End: 2020-01-30

## 2020-01-30 NOTE — TELEPHONE ENCOUNTER
Yon Ochoa (Key: [id number] - [id number]   Need help? Call us at [phone]   Status   [facility]   Next Steps   The plan will fax you a determination, typically within 1 to 5 business days.  How do I follow up?   DrugSynjardy XR 5-1000MG er tablets   Form[place] Plan [place] Medicaid Prior Authorization FormPrior Authorization for General [phone]   Original Claim Info75

## 2020-01-31 ENCOUNTER — TELEPHONE (OUTPATIENT)
Dept: ENDOCRINOLOGY | Facility: CLINIC | Age: 50
End: 2020-01-31

## 2020-02-03 ENCOUNTER — OFFICE VISIT (OUTPATIENT)
Dept: ENDOCRINOLOGY | Facility: CLINIC | Age: 50
End: 2020-02-03

## 2020-02-03 VITALS
DIASTOLIC BLOOD PRESSURE: 72 MMHG | HEART RATE: 111 BPM | SYSTOLIC BLOOD PRESSURE: 126 MMHG | WEIGHT: 184.7 LBS | BODY MASS INDEX: 33.99 KG/M2 | OXYGEN SATURATION: 99 % | HEIGHT: 62 IN

## 2020-02-03 DIAGNOSIS — E06.3 HYPOTHYROIDISM DUE TO HASHIMOTO'S THYROIDITIS: ICD-10-CM

## 2020-02-03 DIAGNOSIS — E11.59 HYPERTENSION ASSOCIATED WITH DIABETES (HCC): ICD-10-CM

## 2020-02-03 DIAGNOSIS — I15.2 HYPERTENSION ASSOCIATED WITH DIABETES (HCC): ICD-10-CM

## 2020-02-03 DIAGNOSIS — E11.65 TYPE 2 DIABETES MELLITUS WITH HYPERGLYCEMIA, WITH LONG-TERM CURRENT USE OF INSULIN (HCC): Primary | ICD-10-CM

## 2020-02-03 DIAGNOSIS — E11.69 MIXED DIABETIC HYPERLIPIDEMIA ASSOCIATED WITH TYPE 2 DIABETES MELLITUS (HCC): ICD-10-CM

## 2020-02-03 DIAGNOSIS — Z79.4 TYPE 2 DIABETES MELLITUS WITH HYPERGLYCEMIA, WITH LONG-TERM CURRENT USE OF INSULIN (HCC): Primary | ICD-10-CM

## 2020-02-03 DIAGNOSIS — E78.2 MIXED DIABETIC HYPERLIPIDEMIA ASSOCIATED WITH TYPE 2 DIABETES MELLITUS (HCC): ICD-10-CM

## 2020-02-03 DIAGNOSIS — E55.9 VITAMIN D DEFICIENCY: ICD-10-CM

## 2020-02-03 DIAGNOSIS — N18.2 CKD (CHRONIC KIDNEY DISEASE), STAGE II: ICD-10-CM

## 2020-02-03 DIAGNOSIS — E03.8 HYPOTHYROIDISM DUE TO HASHIMOTO'S THYROIDITIS: ICD-10-CM

## 2020-02-03 PROBLEM — G56.01 CARPAL TUNNEL SYNDROME ON RIGHT: Status: ACTIVE | Noted: 2020-02-03

## 2020-02-03 PROBLEM — G56.01 CARPAL TUNNEL SYNDROME ON RIGHT: Status: RESOLVED | Noted: 2020-02-03 | Resolved: 2020-02-03

## 2020-02-03 PROBLEM — F41.9 ANXIETY: Status: ACTIVE | Noted: 2019-12-13

## 2020-02-03 PROCEDURE — 99214 OFFICE O/P EST MOD 30 MIN: CPT | Performed by: INTERNAL MEDICINE

## 2020-02-03 NOTE — PROGRESS NOTES
" Yon Ochoa is a 49 y.o. female who presents for  evaluation of   Chief Complaint   Patient presents with   • Diabetes       Referring provider     Primary Care Provider    Mallika Edge APRN    Duration 10 years    Timing - Diabetes is Constant    Quality -  reasonably well controlled    Severity -  moderate    Complications - none    Current symptoms/problems  paresthesia of the feet     Alleviating Factors: Compliance       Side Effects  none    Current diet  in general, a \"healthy\" diet      Current exercise none    Current monitoring regimen: home blood tests - checking 4 x daily   Home blood sugar records:     Hypoglycemia nocturnal    Past Medical History:   Diagnosis Date   • Carpal tunnel syndrome on right 2/3/2020   • Diabetes (CMS/[facility])    • GERD (gastroesophageal reflux disease)    • Hyperlipidemia    • Hypertension    • Hypertension associated with diabetes (CMS/[facility]) 12/4/2019   • Hypothyroidism due to Hashimoto's thyroiditis 12/4/2019   • Mixed diabetic hyperlipidemia associated with type 2 diabetes mellitus (CMS/[facility]) 12/4/2019   • Type 2 diabetes mellitus with hyperglycemia, with long-term current use of insulin (CMS/HCC) 12/4/2019     No family history on file.  Social History     Tobacco Use   • Smoking status: Never Smoker   • Smokeless tobacco: Never Used   Substance Use Topics   • Alcohol use: No     Frequency: Never   • Drug use: No         Current Outpatient Medications:   •  amitriptyline (ELAVIL) 25 MG tablet, Take 25 mg by mouth., Disp: , Rfl:   •  aspirin 81 MG EC tablet, Take 81 mg by mouth Daily., Disp: , Rfl:   •  atorvastatin (LIPITOR) 20 MG tablet, Take 20 mg by mouth., Disp: , Rfl:   •  clonazePAM (KlonoPIN) 0.5 MG tablet, Take 0.5 mg by mouth As Needed for Seizures., Disp: , Rfl:   •  Continuous Blood Gluc Sensor (DEXCOM G6 SENSOR), As Needed (glucose control). Every 10 daysDexcom G6 Sensor Kit [address]-52 Use as directed for continuous glucose monitoring, Disp: 9 each, " Rfl: 3  •  Ertugliflozin-metFORMIN HCl (SEGLUROMET) 2.5-1000 MG tablet, Take 1 tablet by mouth 2 (Two) Times a Day With Meals., Disp: 60 tablet, Rfl: 11  •  famotidine (PEPCID) 20 MG tablet, Take 20 mg by mouth., Disp: , Rfl:   •  insulin aspart (NOVOLOG FLEXPEN) 100 UNIT/ML solution pen-injector sc pen, Up to 20 units with meals, Disp: 6 pen, Rfl: 11  •  Insulin Degludec (TRESIBA FLEXTOUCH) 200 UNIT/ML solution pen-injector pen injection, Inject 100 Units under the skin into the appropriate area as directed Every Night., Disp: 5 pen, Rfl: 11  •  Insulin Lispro, 1 Unit Dial, (ADMELOG SOLOSTAR) 100 UNIT/ML solution pen-injector, Up to 20 units with meals, Disp: 6 pen, Rfl: 11  •  levothyroxine (SYNTHROID, LEVOTHROID) 125 MCG tablet, Take 125 mcg by mouth., Disp: , Rfl:   •  montelukast (SINGULAIR) 10 MG tablet, Take 10 mg by mouth., Disp: , Rfl:   •  naproxen sodium (ANAPROX) 550 MG tablet, Take 550 mg by mouth 2 (Two) Times a Day With Meals., Disp: , Rfl:   •  quinapril (ACCUPRIL) 20 MG tablet, Take 20 mg by mouth., Disp: , Rfl:   •  Semaglutide,0.25 or 0.5MG/DOS, (OZEMPIC, 0.25 OR 0.5 MG/DOSE,) 2 MG/1.5ML solution pen-injector, Inject 0.5 mg under the skin into the appropriate area as directed 1 (One) Time Per Week. 0.5 mg weekly, Disp: 1 pen, Rfl: 11  •  topiramate (TOPAMAX) 50 MG tablet, Take 50 mg by mouth 2 (Two) Times a Day., Disp: , Rfl:   •  traZODone (DESYREL) 100 MG tablet, Take 100 mg by mouth., Disp: , Rfl:   •  VENLAFAXINE HCL PO, Take 150 mg by mouth., Disp: , Rfl:   •  vitamin B-6 (PYRIDOXINE) 50 MG tablet, Take 100 mg by mouth Daily., Disp: , Rfl:   •  VITAMIN D PO, Take  by mouth As Needed., Disp: , Rfl:     Review of Systems    Review of Systems   Constitutional: Negative for activity change, appetite change, chills, diaphoresis, fatigue, fever and unexpected weight change.   HENT: Negative for congestion, dental problem, drooling, ear discharge, ear pain, facial swelling, mouth sores, postnasal  "drip, rhinorrhea, sinus pressure, sore throat, tinnitus, trouble swallowing and voice change.    Eyes: Negative for photophobia, pain, discharge, redness, itching and visual disturbance.   Respiratory: Negative for apnea, cough, choking, chest tightness, shortness of breath, wheezing and stridor.    Cardiovascular: Negative for chest pain, palpitations and leg swelling.   Gastrointestinal: Negative for abdominal distention, abdominal pain, constipation, diarrhea, nausea and vomiting.   Endocrine: Negative for cold intolerance, heat intolerance, polydipsia, polyphagia and polyuria.   Genitourinary: Negative for decreased urine volume, difficulty urinating, dysuria, flank pain, frequency, hematuria and urgency.   Musculoskeletal: Negative for arthralgias, back pain, gait problem, joint swelling, myalgias, neck pain and neck stiffness.   Skin: Negative for color change, pallor, rash and wound.   Allergic/Immunologic: Negative for immunocompromised state.   Neurological: Negative for dizziness, tremors, seizures, syncope, facial asymmetry, speech difficulty, weakness, light-headedness, numbness and headaches.   Hematological: Negative for adenopathy.   Psychiatric/Behavioral: Negative for agitation, behavioral problems, confusion, decreased concentration, dysphoric mood, hallucinations, self-injury, sleep disturbance and suicidal ideas. The patient is not nervous/anxious and is not hyperactive.         Objective:   /72   Pulse 111   Ht 157.5 cm (62\")   Wt 83.8 kg (184 lb 11.2 oz)   SpO2 99%   BMI 33.78 kg/m²     Physical Exam   Constitutional: She is oriented to person, place, and time. She appears well-developed.   HENT:   Head: Normocephalic.   Right Ear: External ear normal.   Left Ear: External ear normal.   Nose: Nose normal.   Eyes: Conjunctivae and EOM are normal. No scleral icterus.   Neck: Normal range of motion. Neck supple. No tracheal deviation present. No thyromegaly present.   Cardiovascular: " Normal rate, regular rhythm, normal heart sounds and intact distal pulses. Exam reveals no gallop and no friction rub.   No murmur heard.  Pulmonary/Chest: Effort normal and breath sounds normal. No stridor. No respiratory distress. She has no wheezes. She has no rales. She exhibits no tenderness.   Abdominal: Soft. Bowel sounds are normal. She exhibits no distension and no mass. There is no tenderness. There is no rebound and no guarding.   Musculoskeletal: Normal range of motion. She exhibits no tenderness or deformity.   Lymphadenopathy:     She has no cervical adenopathy.   Neurological: She is alert and oriented to person, place, and time. She displays normal reflexes. She exhibits normal muscle tone. Coordination normal.   Skin: No rash noted. No erythema. No pallor.   Psychiatric: She has a normal mood and affect. Her behavior is normal. Judgment and thought content normal.       Lab Review          Assessment/Plan       ICD-10-CM ICD-9-CM   1. Type 2 diabetes mellitus with hyperglycemia, with long-term current use of insulin (CMS/[facility]) E11.65 250.00    Z79.4 790.29     V58.67   2. Hypertension associated with diabetes (CMS/[facility]) E11.59 250.80    I10 401.9   3. Mixed diabetic hyperlipidemia associated with type 2 diabetes mellitus (CMS/[facility]) E11.69 250.80    E78.2 272.2   4. Hypothyroidism due to Hashimoto's thyroiditis E03.8 244.8    E06.3 245.2   5. Vitamin D deficiency E55.9 268.9   6. CKD (chronic kidney disease), stage II N18.2 585.2         I reviewed and summarized records from Mallika Edge APRN from current year  and I reviewed / ordered labs.   From review of records :    Pt has type 2 diabetes with hyperglycemia     Glycemic Management:   Lab Results   Component Value Date    HGBA1C 6.60 (H) 12/05/2019    HGBA1C 9.1 (H) 05/24/2019     Lab Results   Component Value Date    GLUCOSE 66 12/05/2019    BUN 15 12/05/2019    CREATININE 0.85 12/05/2019    EGFRIFNONA 72 12/05/2019    BCR 17.6 12/05/2019    K  4.3 12/05/2019    CO2 16.5 (L) 12/05/2019    CALCIUM 9.6 12/05/2019    ALBUMIN 4.60 12/05/2019    AST 35 (H) 12/05/2019    ALT 18 12/05/2019    ANIONGAP 14.5 12/05/2019     Lab Results   Component Value Date    WBC 12.15 (H) 12/05/2019    HGB 12.1 12/05/2019    HCT 36.6 12/05/2019    MCV 81.2 12/05/2019     12/05/2019       Tresiba 65 once daily only --- decrease to 40     Compare your sugar between 2 periods of time that you are not eating. Ideally the starting sugar should be in the 100s.     The finishing sugar should be within 40 points of where you started    Example    You go to bed 140 --- you should wake up 100 to 180 without eating    If you wake up less than 100 --- back off 5 units    If you wake up more than 180  --- increase by 5 units every 5 days       =============================    Novolog will replace novolin R    Start by taking 3 units for every 15 grams of carbohydrate that you eat     15 grams --- 3 units    30 grams --- 6units    45 grams --- 9 units    60 grams --- 12 units    75 grams --  15 units     Plus sliding scale    151-200  :  3 units    201- 250 : 6units    251- 300 : 9 units    Above 300 : 12units    Example    You are about to eat 50 grams of carbohydrate and the sugar is 250    For the 50 grams === 9 units    For the 250 --  6 units    Total 15 units    Now doing common sense     ========================================    ozempic       Approve for Dexcom    Approve for  Insulin pump and or Continuous Glucose Sensor     #1  Patient has diabetes mellitus, insulin-dependent.    #2 She performs blood glucose testing at least times daily and blood glucose log was brought to office with variability from .    #3  She is requiring  Basal insulin  and Prandial Insulin for a total of at least  4 injections per day and has been doing this for at least 6 months     #4 Patient tests blood sugars at least 4 times daily and makes frequent self-adjustments and patient is injecting  insulin at least 4 times daily. She has been doing this for more than 6 months . She tests frequently due to hypoglycemia and hyperglycemia.     #5 I have personally seen patient within the past 6 months    #6 We plan on seeing her every 2-3 months for continuous adjustment of her diabetes regimen     #7 patient has hypoglycemia with episodes of unawareness.    #8 patient has day-to-day variation in her mealtime which confounds the degree of insulin dosing with multiple daily injections.    #9 patient has completed diabetes education program with us.    #10 she has demonstrated the ability to self monitor her glucose.        #11 Patient is motivated in improving  diabetes control       Lipid Management  Lab Results   Component Value Date    CHOL 109 12/05/2019     Lab Results   Component Value Date    TRIG 214 (H) 12/05/2019    TRIG 213 (H) 05/24/2019    TRIG 1,030 (H) 07/06/2018     Lab Results   Component Value Date    HDL 32 (L) 12/05/2019    HDL 29 (L) 05/24/2019    HDL 24 (L) 07/06/2018     No components found for: LDLCALC  Lab Results   Component Value Date    LDL 34 12/05/2019    LDL 39 05/24/2019    LDL see below 07/06/2018     No results found for: LDLDIRECT    On lipitor    Blood Pressure Management:    Vitals:    02/03/20 0904   BP: 126/72   Pulse: 111   SpO2: 99%     Lab Results   Component Value Date    GLUCOSE 66 12/05/2019    CALCIUM 9.6 12/05/2019     12/05/2019    K 4.3 12/05/2019    CO2 16.5 (L) 12/05/2019     12/05/2019    BUN 15 12/05/2019    CREATININE 0.85 12/05/2019    EGFRIFNONA 72 12/05/2019    BCR 17.6 12/05/2019    ANIONGAP 14.5 12/05/2019         Normal on quinapril       Microvascular Complication Monitoring:      Eye Exam Evaluation    Within 1 year     8-19 , no retinopathy     -----------    Last Microalbumin-Proteinuria Assessment    Lab Results   Component Value Date    MALBCRERATIO 13.2 12/05/2019       No results found for: UTPCR    -----------      Neuropathy  yes          Weight Related:   Wt Readings from Last 3 Encounters:   02/03/20 83.8 kg (184 lb 11.2 oz)   12/04/19 89 kg (196 lb 3.2 oz)   10/24/19 90.3 kg (199 lb)     Body mass index is 33.78 kg/m².        Diet interventions: moderate (500 kCal/d) deficit diet.      Bone Health    Lab Results   Component Value Date    CALCIUM 9.6 12/05/2019    CEVZ50IZ 29.0 (L) 12/05/2019       Reassess     Thyroid Health    Lab Results   Component Value Date    TSH 1.450 12/05/2019    TSH 1.230 05/24/2019    TSH 2.800 07/06/2018     On levothyroxine 125 mcgs daily           Other Diabetes Related Aspects       Lab Results   Component Value Date    OLVSZCKD45 535 12/05/2019            No orders of the defined types were placed in this encounter.        A copy of my note was sent to Mallika Edge APRN    Please see my above opinion and suggestions.

## 2020-02-06 ENCOUNTER — TELEPHONE (OUTPATIENT)
Dept: ENDOCRINOLOGY | Facility: CLINIC | Age: 50
End: 2020-02-06

## 2020-02-12 ENCOUNTER — TELEPHONE (OUTPATIENT)
Dept: ENDOCRINOLOGY | Facility: CLINIC | Age: 50
End: 2020-02-12

## 2020-02-12 ENCOUNTER — HOSPITAL ENCOUNTER (OUTPATIENT)
Dept: WOUND CARE | Age: 50
Discharge: HOME | End: 2020-02-12
Payer: COMMERCIAL

## 2020-02-12 VITALS
BODY MASS INDEX: 33.86 KG/M2 | DIASTOLIC BLOOD PRESSURE: 84 MMHG | WEIGHT: 184 LBS | TEMPERATURE: 96.9 F | HEIGHT: 62 IN | RESPIRATION RATE: 18 BRPM | SYSTOLIC BLOOD PRESSURE: 125 MMHG | HEART RATE: 94 BPM

## 2020-02-12 PROBLEM — L98.492 SKIN ULCER OF FLANK WITH FAT LAYER EXPOSED (HCC): Status: RESOLVED | Noted: 2020-01-08 | Resolved: 2020-02-12

## 2020-02-12 PROCEDURE — 99212 OFFICE O/P EST SF 10 MIN: CPT | Performed by: NURSE PRACTITIONER

## 2020-02-12 PROCEDURE — 99213 OFFICE O/P EST LOW 20 MIN: CPT

## 2020-02-12 ASSESSMENT — PAIN SCALES - GENERAL: PAINLEVEL_OUTOF10: 0

## 2020-02-12 NOTE — PROGRESS NOTES
Apply topically as directed twice daily. 1 Bottle 1    insulin aspart (NOVOLOG FLEXPEN) 100 UNIT/ML injection pen Inject into the skin 3 times daily (before meals) Counting Carbs - Sliding Scale      ACCU-CHEK GAVI PLUS strip TEST BLOOD SUGAR THREE TIMES DAILY AS NEEDED 100 strip 3    naproxen sodium (ANAPROX) 550 MG tablet Take 1 tablet by mouth 2 times daily (with meals) 60 tablet 3    glucose monitoring kit (FREESTYLE) monitoring kit 1 kit by Does not apply route daily 1 kit 0    blood glucose monitor kit and supplies Use to check sugar  E11.9 1 kit 0    Insulin Pen Needle 32G X 4 MM MISC 1 each by Does not apply route daily 100 each 3    Insulin Syringe-Needle U-100 30G X 5/16\" 0.5 ML MISC 1 each by Does not apply route daily 100 each 3    Lancets MISC Use three times a day. 100 each 3     No current facility-administered medications on file prior to encounter. REVIEW OF SYSTEMS    A comprehensive review of systems was negative.     Objective:      /84   Pulse 94   Temp 96.9 °F (36.1 °C) (Temporal)   Resp 18   Ht 5' 2\" (1.575 m)   Wt 184 lb (83.5 kg)   LMP  (LMP Unknown)   BMI 33.65 kg/m²     Wt Readings from Last 3 Encounters:   02/12/20 184 lb (83.5 kg)   01/29/20 193 lb (87.5 kg)   01/22/20 193 lb (87.5 kg)       PHYSICAL EXAM    General Appearance: alert and oriented to person, place and time, well developed and well- nourished, in no acute distress  Skin: warm and dry, no rash or erythema  Head: normocephalic and atraumatic  Eyes: pupils equal, round, and reactive to light, extraocular eye movements intact, conjunctivae normal  ENT: tympanic membrane, external ear and ear canal normal bilaterally, nose without deformity, nasal mucosa and turbinates normal without polyps  Neck: supple and non-tender without mass, no thyromegaly or thyroid nodules, no cervical lymphadenopathy  Pulmonary/Chest: clear to auscultation bilaterally- no wheezes, rales or rhonchi, normal air movement, no respiratory distress  Extremities: no cyanosis, clubbing or edema  Musculoskeletal: normal range of motion, no joint swelling, deformity or tenderness  Neurologic: reflexes normal and symmetric, no cranial nerve deficit, gait, coordination and speech normal      Assessment:      Patient Active Problem List   Diagnosis Code    Hypothyroidism E03.9    Type 2 diabetes mellitus (HCC) E11.9    Vitamin D deficiency E55.9    Essential hypertension I10    Mixed hyperlipidemia E78.2    Carpal tunnel syndrome on right G56.01    Hypoesthesia of skin R20.1    Type 2 diabetes mellitus without complication, with long-term current use of insulin ([facility]) E11.9, Z79.4    Skin infection L08.9    Chronic constipation K59.09    Chronic heartburn R12    Family history of polyps in the colon Z83.71    Bandemia D72.825    Neuropathy G62.9    Bacteremia R78.81    Polypharmacy Z79.899    Superficial mixed comedonal and inflammatory acne vulgaris L70.0    MRSA (methicillin resistant staph aureus) culture positive Z22.322    Anxiety F41.9    Ulcer due to Bacteroides with fat layer exposed ([facility]) L98.492, B96.6    Class 2 obesity due to excess calories without serious comorbidity with body mass index (BMI) of 36.0 to 36.9 in adult E66.09, Z68.36    Burn of hand, deep third degree, right, initial encounter T23.301A                 Wound 01/29/20 Finger (Comment which one) Right; Anterior Wound 4, Thermal Burn, R. 4th finger (Active)   Wound Image   2/12/2020  3:37 PM   Wound Burn 2/12/2020  3:37 PM   Wound Cleansed Not Cleansed 2/12/2020  3:37 PM   Wound Length (cm) 0.1 cm 2/12/2020  3:37 PM   Wound Width (cm) 0.2 cm 2/12/2020  3:37 PM   Wound Depth (cm) 0.1 cm 2/12/2020  3:37 PM   Wound Surface Area (cm^2) 0.02 cm^2 2/12/2020  3:37 PM   Change in Wound Size % (l*w) 88.89 2/12/2020  3:37 PM   Wound Volume (cm^3) 0 cm^3 2/12/2020  3:37 PM   Wound Healing % 100 2/12/2020  3:37 PM   Post-Procedure Length (cm) 0.1 cm 2/12/2020

## 2020-02-21 RX ORDER — GABAPENTIN 100 MG/1
100 CAPSULE ORAL 2 TIMES DAILY
Qty: 60 CAPSULE | Refills: 0 | Status: SHIPPED | OUTPATIENT
Start: 2020-02-21 | End: 2020-03-23 | Stop reason: SDUPTHER

## 2020-02-24 RX ORDER — TOPIRAMATE 50 MG/1
TABLET, FILM COATED ORAL
Qty: 60 TABLET | Refills: 5 | Status: SHIPPED | OUTPATIENT
Start: 2020-02-24 | End: 2020-08-14

## 2020-02-26 ENCOUNTER — HOSPITAL ENCOUNTER (OUTPATIENT)
Dept: WOUND CARE | Age: 50
Discharge: HOME | End: 2020-02-26
Payer: COMMERCIAL

## 2020-02-26 VITALS
BODY MASS INDEX: 33.86 KG/M2 | RESPIRATION RATE: 18 BRPM | TEMPERATURE: 97.4 F | HEIGHT: 62 IN | HEART RATE: 90 BPM | WEIGHT: 184 LBS | SYSTOLIC BLOOD PRESSURE: 115 MMHG | DIASTOLIC BLOOD PRESSURE: 86 MMHG

## 2020-02-26 PROCEDURE — 99212 OFFICE O/P EST SF 10 MIN: CPT

## 2020-02-26 PROCEDURE — 99212 OFFICE O/P EST SF 10 MIN: CPT | Performed by: NURSE PRACTITIONER

## 2020-02-26 ASSESSMENT — PAIN DESCRIPTION - PROGRESSION: CLINICAL_PROGRESSION: NOT CHANGED

## 2020-02-26 ASSESSMENT — PAIN DESCRIPTION - PAIN TYPE: TYPE: ACUTE PAIN

## 2020-02-26 ASSESSMENT — PAIN DESCRIPTION - FREQUENCY: FREQUENCY: INTERMITTENT

## 2020-02-26 ASSESSMENT — PAIN DESCRIPTION - LOCATION: LOCATION: FINGER (COMMENT WHICH ONE)

## 2020-02-26 ASSESSMENT — PAIN SCALES - GENERAL: PAINLEVEL_OUTOF10: 0

## 2020-02-26 ASSESSMENT — PAIN DESCRIPTION - ORIENTATION: ORIENTATION: RIGHT

## 2020-02-26 ASSESSMENT — PAIN DESCRIPTION - ONSET: ONSET: ON-GOING

## 2020-02-26 NOTE — PROGRESS NOTES
GASTROINTESTINAL ENDOSCOPY N/A 7/30/2019    UPPER GASTROINTESTINAL ENDOSCOPY  7/30/2019    Dr Abner Bonner exam, empiric dil with 54F over wire, neg EoE       FAMILY HISTORY    Family History   Problem Relation Age of Onset    High Blood Pressure Mother     Cancer Mother         of the brain    Other Mother         second hand smoker    High Blood Pressure Father     High Cholesterol Father     Colon Polyps Father     Other Father         smoker    Diabetes Sister         type 1, from alcohol    High Blood Pressure Sister     High Blood Pressure Brother     Colon Cancer Maternal Aunt     Colon Cancer Paternal Uncle     Colon Cancer Paternal Grandfather     Diabetes Brother         from alcohol    Hypertension Brother     No Known Problems Son     No Known Problems Son     No Known Problems Son     No Known Problems Daughter     Esophageal Cancer Neg Hx     Liver Cancer Neg Hx     Liver Disease Neg Hx     Rectal Cancer Neg Hx     Stomach Cancer Neg Hx        SOCIAL HISTORY    Social History     Tobacco Use    Smoking status: Never Smoker    Smokeless tobacco: Never Used   Substance Use Topics    Alcohol use: Yes     Alcohol/week: 0.0 standard drinks     Comment: \"once in a blue moon\", a few times a year    Drug use: No       ALLERGIES    Allergies   Allergen Reactions    Claritin-D 12 Hour [Loratadine-Pseudoephedrine Er] Other (See Comments)     \"it intensifies my migraines\"    Demerol Hcl [Meperidine] Other (See Comments)     Aggression    Percocet [Oxycodone-Acetaminophen] Rash       MEDICATIONS    Current Outpatient Medications on File Prior to Encounter   Medication Sig Dispense Refill    topiramate (TOPAMAX) 50 MG tablet TAKE 2 TABLETS BY MOUTH EVERY NIGHT 60 tablet 5    gabapentin (NEURONTIN) 100 MG capsule Take 1 capsule by mouth 2 times daily for 30 days. 60 capsule 0    econazole nitrate 1 % cream Apply topically daily.  85 g 0    polyethylene glycol (GLYCOLAX) powder Take 17 g by mouth 2 times daily  1    sodium hypochlorite (DAKINS) 0.125 % SOLN external solution Apply topically as directed twice daily. 1 Bottle 1    spironolactone (ALDACTONE) 50 MG tablet Take 1 tablet by mouth daily 30 tablet 3    Empagliflozin-metFORMIN HCl ER (SYNJARDY XR)  MG TB24 Take 1,000 mg by mouth 2 times daily      insulin aspart (NOVOLOG FLEXPEN) 100 UNIT/ML injection pen Inject into the skin 3 times daily (before meals) Counting Carbs - Sliding Scale      Semaglutide (OZEMPIC, 0.25 OR 0.5 MG/DOSE, SC) Inject 0.5 mg into the skin once a week On Sundays      vitamin B-6 (PYRIDOXINE) 100 MG tablet Take 100 mg by mouth daily      linaclotide (LINZESS) 290 MCG CAPS capsule Take 1 capsule by mouth every morning (before breakfast) 30 capsule 11    clonazePAM (KLONOPIN) 0.5 MG tablet TAKE 1 TABLET BY MOUTH DAILY AS NEEDED.  30 tablet 0    atorvastatin (LIPITOR) 20 MG tablet Take 1 tablet by mouth daily 30 tablet 5    naproxen sodium (ANAPROX) 550 MG tablet Take 1 tablet by mouth 2 times daily (with meals) 60 tablet 3    montelukast (SINGULAIR) 10 MG tablet TAKE 1 TABLET BY MOUTH EVERY DAY 30 tablet 5    traZODone (DESYREL) 100 MG tablet TAKE 1 TABLET BY MOUTH EVERY NIGHT 30 tablet 5    TRESIBA FLEXTOUCH 200 UNIT/ML SOPN INJECT 60 UNITS INTO THE SKIN DAILY AS DIRECTED (Patient taking differently: 65 units each morning not taking at all during the night.) 4 pen 5    quinapril (ACCUPRIL) 20 MG tablet TAKE 1 TABLET BY MOUTH EVERY NIGHT AT BEDTIME 30 tablet 5    tiZANidine (ZANAFLEX) 2 MG tablet Take 1 tablet by mouth every 8 hours as needed (spasm) 60 tablet 0    amitriptyline (ELAVIL) 25 MG tablet TAKE 1 TO 2 TABLETS BY MOUTH IN THE EVENING 60 tablet 11    famotidine (PEPCID) 20 MG tablet Take 1 tablet by mouth 2 times daily 30 tablet 11    levothyroxine (SYNTHROID) 125 MCG tablet TAKE 1 TABLET BY MOUTH DAILY 30 tablet 11    venlafaxine (EFFEXOR XR) 150 MG extended release Post-Procedure Length (cm) 0 cm 2/26/2020  3:47 PM   Post-Procedure Width (cm) 0 cm 2/26/2020  3:47 PM   Post-Procedure Depth (cm) 0 cm 2/26/2020  3:47 PM   Post-Procedure Surface Area (cm^2) 0 cm^2 2/26/2020  3:47 PM   Post-Procedure Volume (cm^3) 0 cm^3 2/26/2020  3:47 PM   Distance Tunneling (cm) 0 cm 2/26/2020  3:47 PM   Tunneling Position ___ O'Clock 0 2/26/2020  3:47 PM   Undermining Starts ___ O'Clock 0 2/26/2020  3:47 PM   Undermining Ends___ O'Clock 0 2/26/2020  3:47 PM   Undermining Maxium Distance (cm) 0 2/26/2020  3:47 PM   Wound Assessment Dry; Intact 2/26/2020  3:47 PM   Drainage Amount None 2/26/2020  3:47 PM   Odor None 2/26/2020  3:47 PM   Evangelina-wound Assessment Pink 2/26/2020  3:47 PM   Non-staged Wound Description Full thickness 2/26/2020  3:47 PM   [place]%Wound Bed 0 2/26/2020  3:47 PM   Red%Wound Bed 0 2/26/2020  3:47 PM   Yellow%Wound Bed 0 2/26/2020  3:47 PM   Black%Wound Bed 0 2/26/2020  3:47 PM   Purple%Wound Bed 0 2/26/2020  3:47 PM   Other%Wound Bed 0 2/26/2020  3:47 PM   Number of days: 28       Plan:     Problem List Items Addressed This Visit     Burn of hand, deep third degree, right, initial encounter - Primary              Treatment Note please see attached Discharge Instructions    In my professional opinion this patient would benefit from HBO Therapy: No    Written patient dismissal instructions given to patient and signed by patient or POA. Ms. Abhilash Francisco is healed! Discharge Instructions       Visit Discharge/Physician Orders    Discharge condition: Stable    Discharge to: Home    Left via:Private automobile    Accompanied by:  self    ECF/HHA: none    Dressing Orders: Both hands use thick moisturizer several times daily  Protein rich diet, keep skin dry from moisture and friction. Use warm compresses on reddened area on right flank.     [address] [facility] followup visit __________as needed___________________  (Please note your next appointment above and if you are unable to keep,

## 2020-03-05 RX ORDER — VENLAFAXINE HYDROCHLORIDE 150 MG/1
150 CAPSULE, EXTENDED RELEASE ORAL DAILY
Qty: 90 CAPSULE | Refills: 3 | Status: SHIPPED | OUTPATIENT
Start: 2020-03-05 | End: 2020-10-27 | Stop reason: ALTCHOICE

## 2020-03-05 NOTE — TELEPHONE ENCOUNTER
Dayne Michaels called requesting a refill of the below medication which has been pended for you:     Requested Prescriptions     Pending Prescriptions Disp Refills    venlafaxine (EFFEXOR XR) 150 MG extended release capsule [Pharmacy Med Name: VENLAFAXINE ER 150MG CAPSULES] 90 capsule 3     Sig: TAKE 1 CAPSULE BY MOUTH DAILY       Last Appointment Date: 12/10/2019  Next Appointment Date: Visit date not found    Allergies   Allergen Reactions    Claritin-D 12 Hour [Loratadine-Pseudoephedrine Er] Other (See Comments)     \"it intensifies my migraines\"    Demerol Hcl [Meperidine] Other (See Comments)     Aggression    Percocet [Oxycodone-Acetaminophen] Rash

## 2020-03-23 ENCOUNTER — TELEPHONE (OUTPATIENT)
Dept: INTERNAL MEDICINE | Age: 50
End: 2020-03-23

## 2020-03-23 RX ORDER — NAPROXEN SODIUM 550 MG/1
550 TABLET ORAL 2 TIMES DAILY WITH MEALS
Qty: 60 TABLET | Refills: 3 | Status: SHIPPED | OUTPATIENT
Start: 2020-03-23 | End: 2020-07-15

## 2020-03-23 RX ORDER — SPIRONOLACTONE 50 MG/1
50 TABLET, FILM COATED ORAL DAILY
Qty: 30 TABLET | Refills: 5 | Status: SHIPPED | OUTPATIENT
Start: 2020-03-23 | End: 2020-09-14

## 2020-03-23 RX ORDER — LEVOTHYROXINE SODIUM 0.12 MG/1
TABLET ORAL
Qty: 30 TABLET | Refills: 11 | Status: SHIPPED | OUTPATIENT
Start: 2020-03-23 | End: 2021-03-10

## 2020-03-23 RX ORDER — QUINAPRIL 20 MG/1
TABLET ORAL
Qty: 30 TABLET | Refills: 5 | Status: SHIPPED | OUTPATIENT
Start: 2020-03-23 | End: 2020-09-14

## 2020-03-23 RX ORDER — GABAPENTIN 100 MG/1
100 CAPSULE ORAL 2 TIMES DAILY
Qty: 60 CAPSULE | Refills: 0 | Status: SHIPPED | OUTPATIENT
Start: 2020-03-23 | End: 2020-04-30 | Stop reason: SDUPTHER

## 2020-03-23 RX ORDER — ATORVASTATIN CALCIUM 20 MG/1
20 TABLET, FILM COATED ORAL DAILY
Qty: 30 TABLET | Refills: 5 | Status: SHIPPED | OUTPATIENT
Start: 2020-03-23 | End: 2020-07-15 | Stop reason: SDUPTHER

## 2020-03-23 RX ORDER — MONTELUKAST SODIUM 10 MG/1
10 TABLET ORAL NIGHTLY
Qty: 30 TABLET | Refills: 5 | Status: SHIPPED | OUTPATIENT
Start: 2020-03-23 | End: 2020-07-15 | Stop reason: SDUPTHER

## 2020-03-23 RX ORDER — FAMOTIDINE 20 MG/1
20 TABLET, FILM COATED ORAL 2 TIMES DAILY
Qty: 30 TABLET | Refills: 11 | Status: SHIPPED | OUTPATIENT
Start: 2020-03-23 | End: 2020-12-21 | Stop reason: SDUPTHER

## 2020-04-02 ENCOUNTER — TELEPHONE (OUTPATIENT)
Dept: ENDOCRINOLOGY | Facility: CLINIC | Age: 50
End: 2020-04-02

## 2020-04-06 RX ORDER — AMITRIPTYLINE HYDROCHLORIDE 25 MG/1
TABLET, FILM COATED ORAL
Qty: 60 TABLET | Refills: 11 | Status: SHIPPED | OUTPATIENT
Start: 2020-04-06 | End: 2020-09-30 | Stop reason: ALTCHOICE

## 2020-04-08 ENCOUNTER — TELEMEDICINE (OUTPATIENT)
Dept: INTERNAL MEDICINE | Age: 50
End: 2020-04-08
Payer: COMMERCIAL

## 2020-04-08 PROBLEM — R78.81 BACTEREMIA: Status: RESOLVED | Noted: 2019-10-17 | Resolved: 2020-04-08

## 2020-04-08 PROBLEM — D72.825 BANDEMIA: Status: RESOLVED | Noted: 2019-10-16 | Resolved: 2020-04-08

## 2020-04-08 PROCEDURE — 99214 OFFICE O/P EST MOD 30 MIN: CPT | Performed by: INTERNAL MEDICINE

## 2020-04-08 RX ORDER — NYSTATIN 100000 [USP'U]/G
POWDER TOPICAL
Qty: 60 G | Refills: 11 | Status: SHIPPED | OUTPATIENT
Start: 2020-04-08 | End: 2020-10-27 | Stop reason: ALTCHOICE

## 2020-04-08 ASSESSMENT — ENCOUNTER SYMPTOMS
BLOOD IN STOOL: 0
SINUS PAIN: 0
RHINORRHEA: 0
VOMITING: 0
WHEEZING: 0
BACK PAIN: 0
DIARRHEA: 0
TROUBLE SWALLOWING: 0
COUGH: 0
CHEST TIGHTNESS: 0
SHORTNESS OF BREATH: 0
ABDOMINAL PAIN: 0
CONSTIPATION: 0

## 2020-04-08 NOTE — PROGRESS NOTES
2020    TELEHEALTH EVALUATION -- Audio/Visual (During [facility]-61 public health emergency)    HPI:    Harini Gómez (:  1970) has requested an audio/video evaluation for the following concern(s):  57-year-old female presenting for video visit  History of diabetes mellitus followed by Dr. Richard Chinchilla finally has her Dexcom and has her sugar is better managed she denies any low sugars  Patient also had a history of recurrent intertrigo was started on econazole cream and nystatin which has helped greatly with her groin rash  She has some peripheral neuropathy doing well with gabapentin  She has a chronic right shoulder pain she has been seeing pain management for left shoulder pain  Hypertension compliant with quinapril drip denies any problems such as headaches tries to adhere to low-salt diet      Review of Systems   Constitutional: Negative for activity change, chills, fatigue and fever. HENT: Negative for hearing loss, postnasal drip, rhinorrhea, sinus pain and trouble swallowing. Eyes: Negative for visual disturbance. Respiratory: Negative for cough, chest tightness, shortness of breath and wheezing. Cardiovascular: Negative for chest pain, palpitations and leg swelling. Gastrointestinal: Negative for abdominal pain, blood in stool, constipation, diarrhea and vomiting. Endocrine: Negative for cold intolerance. Genitourinary: Negative for frequency and urgency. Musculoskeletal: Negative for arthralgias (R shoulder), back pain and myalgias. Skin: Positive for rash. Allergic/Immunologic: Negative for environmental allergies. Neurological: Positive for light-headedness and numbness. Negative for headaches. Psychiatric/Behavioral: Negative for sleep disturbance. Prior to Visit Medications    Medication Sig Taking? Authorizing Provider   econazole nitrate 1 % cream Apply topically daily.  Yes Claudia Valdez MD   nystatin (MYCOSTATIN) 961867 UNIT/GM powder Apply 3 times daily. Yes Claudia Valdez MD   naproxen sodium (ANAPROX) 550 MG tablet Take 1 tablet by mouth 2 times daily (with meals) Yes Claudia Valdez MD   gabapentin (NEURONTIN) 100 MG capsule Take 1 capsule by mouth 2 times daily for 30 days. Yes Claudia Guillen MD   atorvastatin (LIPITOR) 20 MG tablet Take 1 tablet by mouth daily Yes Claudia Valdez MD   famotidine (PEPCID) 20 MG tablet Take 1 tablet by mouth 2 times daily Yes Claudia Valdez MD   spironolactone (ALDACTONE) 50 MG tablet Take 1 tablet by mouth daily Yes Claudia Valdez MD   levothyroxine (SYNTHROID) 125 MCG tablet TAKE 1 TABLET BY MOUTH DAILY Yes Claudia Valdez MD   quinapril (ACCUPRIL) 20 MG tablet TAKE 1 TABLET BY MOUTH EVERY NIGHT AT BEDTIME Yes Claudia Valdez MD   montelukast (SINGULAIR) 10 MG tablet Take 1 tablet by mouth nightly Yes Claudia Valdez MD   venlafaxine (EFFEXOR XR) 150 MG extended release capsule TAKE 1 CAPSULE BY MOUTH DAILY Yes Claudia Valdez MD   topiramate (TOPAMAX) 50 MG tablet TAKE 2 TABLETS BY MOUTH EVERY NIGHT Yes Claudia Valdez MD   polyethylene glycol (GLYCOLAX) powder Take 17 g by mouth 2 times daily Yes Historical Provider, MD   sodium hypochlorite (DAKINS) 0.125 % SOLN external solution Apply topically as directed twice daily.  Yes NICANOR Conner - CNP   Empagliflozin-metFORMIN HCl ER (SYNJARDY XR)  MG TB24 Take 1,000 mg by mouth 2 times daily Yes Historical Provider, MD   insulin aspart (NOVOLOG FLEXPEN) 100 UNIT/ML injection pen Inject into the skin 3 times daily (before meals) Counting Carbs - Sliding Scale Yes Historical Provider, MD   Semaglutide (OZEMPIC, 0.25 OR 0.5 MG/DOSE, SC) Inject 0.5 mg into the skin once a week On Sundays Yes Historical Provider, MD   vitamin B-6 (PYRIDOXINE) 100 MG tablet Take 100 mg by mouth daily Yes Historical Provider, MD   linaclotide (LINZESS) 290 MCG CAPS capsule Take 1 capsule by mouth every morning (before breakfast) Yes NICANOR Moran   clonazePAM (KLONOPIN) 0.5 MG tablet TAKE 1 TABLET BY MOUTH DAILY AS NEEDED. Yes NICANOR Griffin   traZODone (DESYREL) 100 MG tablet TAKE 1 TABLET BY MOUTH EVERY NIGHT Yes NICANOR Griffin   TRESIBA FLEXTOUCH 200 UNIT/ML SOPN INJECT 60 UNITS INTO THE SKIN DAILY AS DIRECTED  Patient taking differently: 65 units each morning not taking at all during the night. Yes NICANOR Isaac   glucose monitoring kit (FREESTYLE) monitoring kit 1 kit by Does not apply route daily Yes NICANOR Griffin   Insulin Pen Needle 32G X 4 MM MISC 1 each by Does not apply route daily Yes NICANOR Isaac   Insulin Syringe-Needle U-100 30G X 5/16\" 0.5 ML MISC 1 each by Does not apply route daily Yes NICANOR Lagos   Lancets MISC Use three times a day. Yes Joslyn He DO   ASPIRIN LOW DOSE 81 MG EC tablet TAKE 1 TABLET BY MOUTH DAILY Yes Jazzy Zamorano MD   amitriptyline (ELAVIL) 25 MG tablet TAKE 1 TO 2 TABLETS BY MOUTH IN THE EVENING  Corina Romeo MD   ACCU-CHEK SOFTCLIX LANCETS MISC USE TO CHECK BLOOD SUGAR THREE TIMES DAILY AS DIRECTED  NICANOR Griffin   ACCU-CHEK GAVI PLUS strip TEST BLOOD SUGAR THREE TIMES DAILY AS NEEDED  NICANOR Griffin   blood glucose monitor kit and supplies Use to check sugar  E11.9  NICANOR Griffin   tiZANidine (ZANAFLEX) 2 MG tablet Take 1 tablet by mouth every 8 hours as needed (spasm)  NICANOR Griffin       Social History     Tobacco Use    Smoking status: Never Smoker    Smokeless tobacco: Never Used   Substance Use Topics    Alcohol use: Yes     Alcohol/week: 0.0 standard drinks     Comment: \"once in a blue moon\", a few times a year    Drug use:  No            PHYSICAL EXAMINATION:  [ INSTRUCTIONS:  \"[x]\" Indicates a positive item  \"[]\" Indicates a negative item  -- DELETE ALL ITEMS NOT EXAMINED]  Vital Signs: (As obtained by patient/caregiver or practitioner observation)    Blood pressure-  Heart rate-    Respiratory rate- deficiency  Recheck vitamin D levels    Orders Placed This Encounter   Procedures    TSH without Reflex     Standing Status:   Future     Standing Expiration Date:   10/8/2020    Hemoglobin A1C     Standing Status:   Future     Standing Expiration Date:   10/8/2020    Comprehensive Metabolic Panel     Standing Status:   Future     Standing Expiration Date:   10/8/2020    CBC Auto Differential     Standing Status:   Future     Standing Expiration Date:   10/8/2020    Iron and TIBC     Standing Status:   Future     Standing Expiration Date:   10/8/2020     Order Specific Question:   Is Patient Fasting? Answer:   yes     Order Specific Question:   No of Hours? Answer:   10    Ferritin     Standing Status:   Future     Standing Expiration Date:   10/8/2020    Lipid Panel     Standing Status:   Future     Standing Expiration Date:   10/8/2020     Order Specific Question:   Is Patient Fasting?/# of Hours     Answer:   fasting     Return in about 4 months (around 8/8/2020). Osman Houston is a 52 y.o. female being evaluated by a Virtual Visit (video visit) encounter to address concerns as mentioned above. A caregiver was present when appropriate. Due to this being a TeleHealth encounter (During [facility] public health emergency), evaluation of the following organ systems was limited: Vitals/Constitutional/EENT/Resp/CV/GI//MS/Neuro/Skin/Heme-Lymph-Imm. Pursuant to the emergency declaration under the [address] [place] [address], [address] [place] [address] authority and the [employer] Act, this Virtual Visit was conducted with patient's (and/or legal guardian's) consent, to reduce the patient's risk of exposure to COVID-19 and provide necessary medical care.   The patient (and/or legal guardian) has also been advised to contact this office for worsening conditions or problems, and seek emergency medical treatment and/or call 911 if deemed

## 2020-04-27 ENCOUNTER — TELEPHONE (OUTPATIENT)
Dept: ENDOCRINOLOGY | Facility: CLINIC | Age: 50
End: 2020-04-27

## 2020-04-28 ENCOUNTER — TELEPHONE (OUTPATIENT)
Dept: ENDOCRINOLOGY | Facility: CLINIC | Age: 50
End: 2020-04-28

## 2020-04-30 ENCOUNTER — TELEPHONE (OUTPATIENT)
Dept: INTERNAL MEDICINE | Age: 50
End: 2020-04-30

## 2020-04-30 RX ORDER — GABAPENTIN 100 MG/1
100 CAPSULE ORAL 2 TIMES DAILY
Qty: 60 CAPSULE | Refills: 0 | Status: SHIPPED | OUTPATIENT
Start: 2020-04-30 | End: 2020-05-13 | Stop reason: SDUPTHER

## 2020-05-13 RX ORDER — GABAPENTIN 100 MG/1
100 CAPSULE ORAL 2 TIMES DAILY
Qty: 60 CAPSULE | Refills: 0 | Status: SHIPPED | OUTPATIENT
Start: 2020-05-13 | End: 2020-06-16 | Stop reason: SDUPTHER

## 2020-05-13 NOTE — TELEPHONE ENCOUNTER
Haven Martins called to request a refill on her medication. Last office visit : 1/7/2020   Next office visit : 8/11/2020     Last UDS:   Amphetamine Screen, Urine   Date Value Ref Range Status   09/06/2019 neg  Final     Barbiturate Screen, Urine   Date Value Ref Range Status   09/06/2019 neg  Final     Benzodiazepine Screen, Urine   Date Value Ref Range Status   09/06/2019 neg  Final     Buprenorphine Urine   Date Value Ref Range Status   09/06/2019 neg  Final     Cocaine Metabolite Screen, Urine   Date Value Ref Range Status   09/06/2019 neg  Final     Gabapentin Screen, Urine   Date Value Ref Range Status   09/06/2019 neg  Final     MDMA, Urine   Date Value Ref Range Status   09/06/2019 neg  Final     Methamphetamine, Urine   Date Value Ref Range Status   09/06/2019 neg  Final     Opiate Scrn, Ur   Date Value Ref Range Status   09/06/2019 neg  Final     Oxycodone Screen, Ur   Date Value Ref Range Status   09/06/2019 neg  Final     PCP Screen, Urine   Date Value Ref Range Status   09/06/2019 neg  Final     Propoxyphene Screen, Urine   Date Value Ref Range Status   09/06/2019 neg  Final     THC Screen, Urine   Date Value Ref Range Status   09/06/2019 neg  Final     Tricyclic Antidepressants, Urine   Date Value Ref Range Status   09/06/2019 neg  Final       Last Perry Kincaidbayront: 03/18/2020  Medication Contract: 01/07/2020     Requested Prescriptions     Pending Prescriptions Disp Refills    gabapentin (NEURONTIN) 100 MG capsule 60 capsule 0     Sig: Take 1 capsule by mouth 2 times daily for 30 days. Please approve or refuse this medication.    Jefferson Moya

## 2020-06-01 ENCOUNTER — APPOINTMENT (OUTPATIENT)
Dept: LAB | Facility: HOSPITAL | Age: 50
End: 2020-06-01

## 2020-06-01 LAB
ALBUMIN SERPL-MCNC: 4.6 G/DL (ref 3.5–5.2)
ALBUMIN/GLOB SERPL: 1.2 G/DL
ALP SERPL-CCNC: 101 U/L (ref 39–117)
ALT SERPL W P-5'-P-CCNC: 17 U/L (ref 1–33)
ANION GAP SERPL CALCULATED.3IONS-SCNC: 13 MMOL/L (ref 5–15)
AST SERPL-CCNC: 30 U/L (ref 1–32)
BASOPHILS # BLD AUTO: 0.07 10*3/MM3 (ref 0–0.2)
BASOPHILS NFR BLD AUTO: 0.6 % (ref 0–1.5)
BILIRUB SERPL-MCNC: 0.3 MG/DL (ref 0.2–1.2)
BUN BLD-MCNC: 18 MG/DL (ref 6–20)
BUN/CREAT SERPL: 24 (ref 7–25)
CALCIUM SPEC-SCNC: 9.8 MG/DL (ref 8.6–10.5)
CHLORIDE SERPL-SCNC: 100 MMOL/L (ref 98–107)
CO2 SERPL-SCNC: 23 MMOL/L (ref 22–29)
CREAT BLD-MCNC: 0.75 MG/DL (ref 0.57–1)
DEPRECATED RDW RBC AUTO: 44.4 FL (ref 37–54)
EOSINOPHIL # BLD AUTO: 0.16 10*3/MM3 (ref 0–0.4)
EOSINOPHIL NFR BLD AUTO: 1.3 % (ref 0.3–6.2)
ERYTHROCYTE [DISTWIDTH] IN BLOOD BY AUTOMATED COUNT: 14.8 % (ref 12.3–15.4)
GFR SERPL CREATININE-BSD FRML MDRD: 82 ML/MIN/1.73
GLOBULIN UR ELPH-MCNC: 3.7 GM/DL
GLUCOSE BLD-MCNC: 82 MG/DL (ref 65–99)
HCT VFR BLD AUTO: 36.1 % (ref 34–46.6)
HGB BLD-MCNC: 12.2 G/DL (ref 12–15.9)
IMM GRANULOCYTES # BLD AUTO: 0.04 10*3/MM3 (ref 0–0.05)
IMM GRANULOCYTES NFR BLD AUTO: 0.3 % (ref 0–0.5)
LYMPHOCYTES # BLD AUTO: 4.36 10*3/MM3 (ref 0.7–3.1)
LYMPHOCYTES NFR BLD AUTO: 34.3 % (ref 19.6–45.3)
MCH RBC QN AUTO: 28.2 PG (ref 26.6–33)
MCHC RBC AUTO-ENTMCNC: 33.8 G/DL (ref 31.5–35.7)
MCV RBC AUTO: 83.4 FL (ref 79–97)
MONOCYTES # BLD AUTO: 0.82 10*3/MM3 (ref 0.1–0.9)
MONOCYTES NFR BLD AUTO: 6.5 % (ref 5–12)
NEUTROPHILS # BLD AUTO: 7.25 10*3/MM3 (ref 1.7–7)
NEUTROPHILS NFR BLD AUTO: 57 % (ref 42.7–76)
NRBC BLD AUTO-RTO: 0 /100 WBC (ref 0–0.2)
PLATELET # BLD AUTO: 236 10*3/MM3 (ref 140–450)
PMV BLD AUTO: 9.5 FL (ref 6–12)
POTASSIUM BLD-SCNC: 4.4 MMOL/L (ref 3.5–5.2)
PROT SERPL-MCNC: 8.3 G/DL (ref 6–8.5)
RBC # BLD AUTO: 4.33 10*6/MM3 (ref 3.77–5.28)
SODIUM BLD-SCNC: 136 MMOL/L (ref 136–145)
WBC NRBC COR # BLD: 12.7 10*3/MM3 (ref 3.4–10.8)

## 2020-06-01 PROCEDURE — 82306 VITAMIN D 25 HYDROXY: CPT | Performed by: INTERNAL MEDICINE

## 2020-06-01 PROCEDURE — 82607 VITAMIN B-12: CPT | Performed by: INTERNAL MEDICINE

## 2020-06-01 PROCEDURE — 83036 HEMOGLOBIN GLYCOSYLATED A1C: CPT | Performed by: INTERNAL MEDICINE

## 2020-06-01 PROCEDURE — 82043 UR ALBUMIN QUANTITATIVE: CPT | Performed by: INTERNAL MEDICINE

## 2020-06-01 PROCEDURE — 80061 LIPID PANEL: CPT | Performed by: INTERNAL MEDICINE

## 2020-06-01 PROCEDURE — 84443 ASSAY THYROID STIM HORMONE: CPT | Performed by: INTERNAL MEDICINE

## 2020-06-01 PROCEDURE — 80053 COMPREHEN METABOLIC PANEL: CPT | Performed by: INTERNAL MEDICINE

## 2020-06-01 PROCEDURE — 82570 ASSAY OF URINE CREATININE: CPT | Performed by: INTERNAL MEDICINE

## 2020-06-01 PROCEDURE — 85025 COMPLETE CBC W/AUTO DIFF WBC: CPT | Performed by: INTERNAL MEDICINE

## 2020-06-01 PROCEDURE — 36415 COLL VENOUS BLD VENIPUNCTURE: CPT | Performed by: INTERNAL MEDICINE

## 2020-06-02 LAB
25(OH)D3 SERPL-MCNC: 36.7 NG/ML (ref 30–100)
ALBUMIN UR-MCNC: <1.2 MG/DL
CHOLEST SERPL-MCNC: 96 MG/DL (ref 0–200)
CREAT UR-MCNC: 164.3 MG/DL
HBA1C MFR BLD: 5.8 % (ref 4.8–5.6)
HDLC SERPL-MCNC: 30 MG/DL (ref 40–60)
LDLC SERPL CALC-MCNC: 27 MG/DL (ref 0–100)
LDLC/HDLC SERPL: 0.89 {RATIO}
MICROALBUMIN/CREAT UR: NORMAL MG/G{CREAT}
TRIGL SERPL-MCNC: 197 MG/DL (ref 0–150)
TSH SERPL DL<=0.05 MIU/L-ACNC: 1.76 UIU/ML (ref 0.27–4.2)
VIT B12 BLD-MCNC: 559 PG/ML (ref 211–946)
VLDLC SERPL-MCNC: 39.4 MG/DL (ref 5–40)

## 2020-06-03 ENCOUNTER — OFFICE VISIT (OUTPATIENT)
Dept: ENDOCRINOLOGY | Facility: CLINIC | Age: 50
End: 2020-06-03

## 2020-06-03 VITALS
HEIGHT: 62 IN | SYSTOLIC BLOOD PRESSURE: 104 MMHG | OXYGEN SATURATION: 99 % | HEART RATE: 108 BPM | WEIGHT: 180.9 LBS | DIASTOLIC BLOOD PRESSURE: 66 MMHG | BODY MASS INDEX: 33.29 KG/M2

## 2020-06-03 DIAGNOSIS — I15.2 HYPERTENSION ASSOCIATED WITH DIABETES (HCC): Primary | ICD-10-CM

## 2020-06-03 DIAGNOSIS — E11.69 MIXED DIABETIC HYPERLIPIDEMIA ASSOCIATED WITH TYPE 2 DIABETES MELLITUS (HCC): ICD-10-CM

## 2020-06-03 DIAGNOSIS — E78.2 MIXED DIABETIC HYPERLIPIDEMIA ASSOCIATED WITH TYPE 2 DIABETES MELLITUS (HCC): ICD-10-CM

## 2020-06-03 DIAGNOSIS — E03.8 HYPOTHYROIDISM DUE TO HASHIMOTO'S THYROIDITIS: ICD-10-CM

## 2020-06-03 DIAGNOSIS — E11.65 TYPE 2 DIABETES MELLITUS WITH HYPERGLYCEMIA, WITH LONG-TERM CURRENT USE OF INSULIN (HCC): ICD-10-CM

## 2020-06-03 DIAGNOSIS — Z79.4 TYPE 2 DIABETES MELLITUS WITH HYPERGLYCEMIA, WITH LONG-TERM CURRENT USE OF INSULIN (HCC): ICD-10-CM

## 2020-06-03 DIAGNOSIS — E06.3 HYPOTHYROIDISM DUE TO HASHIMOTO'S THYROIDITIS: ICD-10-CM

## 2020-06-03 DIAGNOSIS — E11.59 HYPERTENSION ASSOCIATED WITH DIABETES (HCC): Primary | ICD-10-CM

## 2020-06-03 PROCEDURE — 99214 OFFICE O/P EST MOD 30 MIN: CPT | Performed by: NURSE PRACTITIONER

## 2020-06-03 PROCEDURE — 95251 CONT GLUC MNTR ANALYSIS I&R: CPT | Performed by: NURSE PRACTITIONER

## 2020-06-03 NOTE — PROGRESS NOTES
"  Subjective    Yon Ochoa is a 50 y.o. female. she is here today for follow-up.    History of Present Illness     IN OFFICE VISIT         Referring provider     Primary Care Provider     Mallika Edge APRN    REASON - diabetes      Duration 10 years     Timing - Diabetes is Constant     Quality -  CONTROLLED      Severity -  moderate     Complications - none     Current symptoms/problems  paresthesia of the feet      Alleviating Factors: Compliance       Side Effects  none     Current diet  in general, a \"healthy\" diet       Current exercise none     Current monitoring regimen: home blood tests - checking 4 x daily before dexcom    Now usiing dexcom and able to monitor more frequently     Lab Results   Component Value Date    HGBA1C 5.80 (H) 06/01/2020       Home blood sugar records:    Dexcom G6 downloaded     Dated from May 21 - Zuri 3, 2020    Average        93 % in target     6 % high     1 % low      Hypoglycemia nocturnal           The following portions of the patient's history were reviewed and updated as appropriate:   Past Medical History:   Diagnosis Date   • Carpal tunnel syndrome on right 2/3/2020   • Diabetes (CMS/[facility])    • GERD (gastroesophageal reflux disease)    • Hyperlipidemia    • Hypertension    • Hypertension associated with diabetes (CMS/[facility]) 12/4/2019   • Hypothyroidism due to Hashimoto's thyroiditis 12/4/2019   • Mixed diabetic hyperlipidemia associated with type 2 diabetes mellitus (CMS/[facility]) 12/4/2019   • Type 2 diabetes mellitus with hyperglycemia, with long-term current use of insulin (CMS/[facility]) 12/4/2019     No past surgical history on file.  No family history on file.  OB History    None       Current Outpatient Medications   Medication Sig Dispense Refill   • amitriptyline (ELAVIL) 25 MG tablet Take 25 mg by mouth.     • aspirin 81 MG EC tablet Take 81 mg by mouth Daily.     • atorvastatin (LIPITOR) 20 MG tablet Take 20 mg by mouth.     • clonazePAM (KlonoPIN) 0.5 MG tablet " Take 0.5 mg by mouth As Needed for Seizures.     • Continuous Blood Gluc Sensor (DEXCOM G6 SENSOR) As Needed (glucose control). Every 10 daysDexcom G6 Sensor Kit [address]-58 Use as directed for continuous glucose monitoring 9 each 3   • Ertugliflozin-metFORMIN HCl (SEGLUROMET) 2.5-1000 MG tablet Take 1 tablet by mouth 2 (Two) Times a Day With Meals. 60 tablet 11   • famotidine (PEPCID) 20 MG tablet Take 20 mg by mouth.     • insulin aspart (NOVOLOG FLEXPEN) 100 UNIT/ML solution pen-injector sc pen Up to 20 units with meals 6 pen 11   • Insulin Degludec (TRESIBA FLEXTOUCH) 200 UNIT/ML solution pen-injector pen injection Inject 100 Units under the skin into the appropriate area as directed Every Night. 5 pen 11   • levothyroxine (SYNTHROID, LEVOTHROID) 125 MCG tablet Take 125 mcg by mouth.     • montelukast (SINGULAIR) 10 MG tablet Take 10 mg by mouth.     • naproxen sodium (ANAPROX) 550 MG tablet Take 550 mg by mouth 2 (Two) Times a Day With Meals.     • quinapril (ACCUPRIL) 20 MG tablet Take 20 mg by mouth.     • Semaglutide,0.25 or 0.5MG/DOS, (OZEMPIC, 0.25 OR 0.5 MG/DOSE,) 2 MG/1.5ML solution pen-injector Inject 0.5 mg under the skin into the appropriate area as directed 1 (One) Time Per Week. 0.5 mg weekly 1 pen 11   • topiramate (TOPAMAX) 50 MG tablet Take 50 mg by mouth 2 (Two) Times a Day.     • traZODone (DESYREL) 100 MG tablet Take 100 mg by mouth.     • VENLAFAXINE HCL PO Take 150 mg by mouth.     • vitamin B-6 (PYRIDOXINE) 50 MG tablet Take 100 mg by mouth Daily.     • VITAMIN D PO Take  by mouth As Needed.     • Ertugliflozin L-PyroglutamicAc (STEGLATRO) 5 MG tablet Take 1 tablet by mouth Every Morning. 30 tablet 11   • Insulin Lispro, 1 Unit Dial, (ADMELOG SOLOSTAR) 100 UNIT/ML solution pen-injector Up to 20 units with meals 6 pen 11     No current facility-administered medications for this visit.      Allergies   Allergen Reactions   • Loratadine-Pseudoephedrine Er Other (See Comments)     Makes  "migraines worse   • Meperidine Other (See Comments)     aggressive   • Percocet [Oxycodone-Acetaminophen] Rash     Social History     Socioeconomic History   • Marital status: Single     Spouse name: Not on file   • Number of children: Not on file   • Years of education: Not on file   • Highest education level: Not on file   Tobacco Use   • Smoking status: Never Smoker   • Smokeless tobacco: Never Used   Substance and Sexual Activity   • Alcohol use: No     Frequency: Never   • Drug use: No       Review of Systems  Review of Systems   Constitutional: Negative for activity change, appetite change, diaphoresis and fatigue.   HENT: Negative for facial swelling, sneezing, sore throat, tinnitus, trouble swallowing and voice change.    Eyes: Negative for photophobia, pain, discharge, redness, itching and visual disturbance.   Respiratory: Negative for apnea, cough, choking, chest tightness and shortness of breath.    Cardiovascular: Negative for chest pain, palpitations and leg swelling.   Gastrointestinal: Negative for abdominal distention, abdominal pain, constipation, diarrhea, nausea and vomiting.   Endocrine: Negative for cold intolerance, heat intolerance, polydipsia, polyphagia and polyuria.   Genitourinary: Negative for difficulty urinating, dysuria, frequency, hematuria and urgency.   Musculoskeletal: Negative for arthralgias, back pain, gait problem, joint swelling, myalgias, neck pain and neck stiffness.   Skin: Negative for color change, pallor, rash and wound.   Neurological: Negative for dizziness, tremors, weakness, light-headedness, numbness and headaches.   Hematological: Negative for adenopathy. Does not bruise/bleed easily.   Psychiatric/Behavioral: Negative for behavioral problems, confusion and sleep disturbance.        Objective    /66   Pulse 108   Ht 157.5 cm (62\")   Wt 82.1 kg (180 lb 14.4 oz)   SpO2 99%   BMI 33.09 kg/m²   Physical Exam   Constitutional: She is oriented to person, " place, and time. She appears well-developed and well-nourished. No distress.   HENT:   Head: Normocephalic and atraumatic.   Right Ear: External ear normal.   Left Ear: External ear normal.   Nose: Nose normal.   Eyes: Pupils are equal, round, and reactive to light. Conjunctivae and EOM are normal.   Neck: Normal range of motion. Neck supple. No tracheal deviation present. No thyromegaly present.   Cardiovascular: Normal rate, regular rhythm and normal heart sounds.   No murmur heard.  Pulmonary/Chest: Effort normal and breath sounds normal. No respiratory distress. She has no wheezes.   Abdominal: Soft. Bowel sounds are normal. There is no tenderness. There is no rebound and no guarding.   Musculoskeletal: Normal range of motion. She exhibits no edema, tenderness or deformity.   Neurological: She is alert and oriented to person, place, and time. No cranial nerve deficit.   Skin: Skin is warm and dry. No rash noted.   Psychiatric: She has a normal mood and affect. Her behavior is normal. Judgment and thought content normal.       Lab Review  Glucose (mg/dL)   Date Value   06/01/2020 82   12/05/2019 66   10/24/2019 59 (L)   10/24/2019 64 (L)   10/21/2019 101   10/20/2019 89     Sodium (mmol/L)   Date Value   06/01/2020 136   12/05/2019 138   10/24/2019 139   10/24/2019 142   10/21/2019 140   10/20/2019 143     Potassium (mmol/L)   Date Value   06/01/2020 4.4   12/05/2019 4.3   10/24/2019 3.7   10/24/2019 4.0   10/15/2019 3.8   09/30/2019 3.8     Chloride (mmol/L)   Date Value   06/01/2020 100   12/05/2019 107   10/24/2019 103   10/24/2019 108   10/21/2019 107   10/20/2019 110     CO2 (mmol/L)   Date Value   06/01/2020 23.0   12/05/2019 16.5 (L)   10/24/2019 21.0 (L)   10/24/2019 21 (L)   10/21/2019 22   10/20/2019 24     BUN (mg/dL)   Date Value   06/01/2020 18   12/05/2019 15   10/24/2019 10   10/24/2019 11   10/21/2019 12   10/20/2019 11     Creatinine (mg/dL)   Date Value   06/01/2020 0.75   12/05/2019 0.85    10/24/2019 0.59   10/24/2019 0.7   10/21/2019 0.7   10/20/2019 0.7     Hemoglobin A1C (%)   Date Value   06/01/2020 5.80 (H)   12/05/2019 6.60 (H)   05/24/2019 9.1 (H)     Triglycerides (mg/dL)   Date Value   06/01/2020 197 (H)   12/05/2019 214 (H)   05/24/2019 213 (H)   07/06/2018 1,030 (H)     LDL Cholesterol  (mg/dL)   Date Value   06/01/2020 27   12/05/2019 34   05/24/2019 39   07/06/2018 see below       Assessment/Plan      1. Hypertension associated with diabetes (CMS/[facility])    2. Mixed diabetic hyperlipidemia associated with type 2 diabetes mellitus (CMS/[facility])    3. Type 2 diabetes mellitus with hyperglycemia, with long-term current use of insulin (CMS/[facility])    4. Hypothyroidism due to Hashimoto's thyroiditis    .    Medications prescribed:  Outpatient Encounter Medications as of 6/3/2020   Medication Sig Dispense Refill   • amitriptyline (ELAVIL) 25 MG tablet Take 25 mg by mouth.     • aspirin 81 MG EC tablet Take 81 mg by mouth Daily.     • atorvastatin (LIPITOR) 20 MG tablet Take 20 mg by mouth.     • clonazePAM (KlonoPIN) 0.5 MG tablet Take 0.5 mg by mouth As Needed for Seizures.     • Continuous Blood Gluc Sensor (DEXCOM G6 SENSOR) As Needed (glucose control). Every 10 daysDexcom G6 Sensor Kit [address]-35 Use as directed for continuous glucose monitoring 9 each 3   • Ertugliflozin-metFORMIN HCl (SEGLUROMET) 2.5-1000 MG tablet Take 1 tablet by mouth 2 (Two) Times a Day With Meals. 60 tablet 11   • famotidine (PEPCID) 20 MG tablet Take 20 mg by mouth.     • insulin aspart (NOVOLOG FLEXPEN) 100 UNIT/ML solution pen-injector sc pen Up to 20 units with meals 6 pen 11   • Insulin Degludec (TRESIBA FLEXTOUCH) 200 UNIT/ML solution pen-injector pen injection Inject 100 Units under the skin into the appropriate area as directed Every Night. 5 pen 11   • levothyroxine (SYNTHROID, LEVOTHROID) 125 MCG tablet Take 125 mcg by mouth.     • montelukast (SINGULAIR) 10 MG tablet Take 10 mg by mouth.     • naproxen sodium  (ANAPROX) 550 MG tablet Take 550 mg by mouth 2 (Two) Times a Day With Meals.     • quinapril (ACCUPRIL) 20 MG tablet Take 20 mg by mouth.     • Semaglutide,0.25 or 0.5MG/DOS, (OZEMPIC, 0.25 OR 0.5 MG/DOSE,) 2 MG/1.5ML solution pen-injector Inject 0.5 mg under the skin into the appropriate area as directed 1 (One) Time Per Week. 0.5 mg weekly 1 pen 11   • topiramate (TOPAMAX) 50 MG tablet Take 50 mg by mouth 2 (Two) Times a Day.     • traZODone (DESYREL) 100 MG tablet Take 100 mg by mouth.     • VENLAFAXINE HCL PO Take 150 mg by mouth.     • vitamin B-6 (PYRIDOXINE) 50 MG tablet Take 100 mg by mouth Daily.     • VITAMIN D PO Take  by mouth As Needed.     • Ertugliflozin L-PyroglutamicAc (STEGLATRO) 5 MG tablet Take 1 tablet by mouth Every Morning. 30 tablet 11   • Insulin Lispro, 1 Unit Dial, (ADMELOG SOLOSTAR) 100 UNIT/ML solution pen-injector Up to 20 units with meals 6 pen 11     No facility-administered encounter medications on file as of 6/3/2020.        Orders placed during this encounter include:  Orders Placed This Encounter   Procedures   • Comprehensive Metabolic Panel   • Hemoglobin A1c   • Lipid Panel   • Protein / Creatinine Ratio, Urine - Urine, Clean Catch   • Microalbumin / Creatinine Urine Ratio - Urine, Clean Catch   • TSH   • Vitamin B12   • Vitamin D 25 Hydroxy   • CBC & Differential     Order Specific Question:   Manual Differential     Answer:   No     Pt has type 2 diabetes   Glycemic Management:       Lab Results   Component Value Date    HGBA1C 5.80 (H) 06/01/2020             Tresiba taking 50 units      If you drop more than 40 points from bedtime to morning decrease by 5 units or if you wake up with a sugar less than 80         =============================     Novolog       3 units for every 15 grams of carbohydrate that you eat           Plus sliding scale     3 units for every 50 above 150           ========================================     ozempic 0.5 mg once weekly on Sunday      segluromet -- change to steglatro mg once daily       for fear of metformin by patient         Dexcom G6     Downloaded and reviewed     Dated from May 21 - Zuri 3, 2020    Average 127    93 % in target     6 % high    1 % low               Lipid Management    Total Cholesterol   Date Value Ref Range Status   06/01/2020 96 0 - 200 mg/dL Final     Triglycerides   Date Value Ref Range Status   06/01/2020 197 (H) 0 - 150 mg/dL Final   05/24/2019 213 (H) 0 - 149 mg/dL Final     HDL Cholesterol   Date Value Ref Range Status   06/01/2020 30 (L) 40 - 60 mg/dL Final   05/24/2019 29 (L) 65 - 121 mg/dL Final     Comment:     VALUES>60 MG/DL ARE ASSOCIATED WITH A DECREASED RISK OF  ATHEROSCLEROTIC CARDIOVASCULAR DISEASE     LDL Cholesterol    Date Value Ref Range Status   06/01/2020 27 0 - 100 mg/dL Final   05/24/2019 39 <100 mg/dL Final     Comment:     <100 MG/DL=OPITIMAL    100-129 MG/DL=DESIRABLE    130-159 MG/DL BORDERLINE=INCREASED RISK OF ATHEROSCLEROTIC  CARDIOVASCULAR DISEASE    > OR = 160 MG/DL=ASSOCIATED WITH AN INCREASE RISK OF  ATHEROSCLEROTIC CARDIOVASCULAR DISEASE              On lipitor 20 mg one daily        Blood Pressure Management:        Quinapril 20 mg daily         Microvascular Complication Monitoring:       Last Eye Exam Evaluation --- August 2019, no DR             -----------     Last Microalbumin-Proteinuria Assessment          Component      Latest Ref Rng & Units 6/1/2020   Microalbumin/Creatinine Ratio          Creatinine, Urine      mg/dL 164.3   Microalbumin, Urine      mg/dL <1.2     -----------        Neuropathy  yes           Weight Related:       Patient's Body mass index is 33.09 kg/m². BMI is above normal parameters. Recommendations include: nutrition counseling.    Decrease caloric intake by 500 calories per day      Bone Health     Vitamin d def.    Taking vitamin d daily      Component      Latest Ref Rng & Units 6/1/2020   25 Hydroxy, Vitamin D      30.0 - 100.0 ng/ml 36.7         Thyroid Health       On levothyroxine 125 mcgs daily     Lab Results   Component Value Date    TSH 1.760 06/01/2020                   Other Diabetes Related Aspects      Lab Results   Component Value Date    [id number] 559 06/01/2020       4. Follow-up: Return in about 4 months (around 10/3/2020) for Recheck.                This document has been electronically signed by EMILY Lopes on Zuri 3, 2020 10:39

## 2020-06-09 ENCOUNTER — TELEPHONE (OUTPATIENT)
Dept: ENDOCRINOLOGY | Facility: CLINIC | Age: 50
End: 2020-06-09

## 2020-06-11 ENCOUNTER — TELEPHONE (OUTPATIENT)
Dept: INTERNAL MEDICINE | Age: 50
End: 2020-06-11

## 2020-06-11 NOTE — TELEPHONE ENCOUNTER
Pt states she has been under a lot of stress lately and states she has not been able to keep anything down. States her white blood cell count was elevated when she went to her diabetic doctor.

## 2020-06-12 ENCOUNTER — TELEMEDICINE (OUTPATIENT)
Dept: INTERNAL MEDICINE | Age: 50
End: 2020-06-12
Payer: COMMERCIAL

## 2020-06-12 DIAGNOSIS — E66.09 CLASS 2 OBESITY DUE TO EXCESS CALORIES WITHOUT SERIOUS COMORBIDITY WITH BODY MASS INDEX (BMI) OF 36.0 TO 36.9 IN ADULT: ICD-10-CM

## 2020-06-12 DIAGNOSIS — Z79.4 TYPE 2 DIABETES MELLITUS WITHOUT COMPLICATION, WITH LONG-TERM CURRENT USE OF INSULIN (HCC): ICD-10-CM

## 2020-06-12 DIAGNOSIS — E11.9 TYPE 2 DIABETES MELLITUS WITHOUT COMPLICATION, WITH LONG-TERM CURRENT USE OF INSULIN (HCC): ICD-10-CM

## 2020-06-12 DIAGNOSIS — I10 ESSENTIAL HYPERTENSION: ICD-10-CM

## 2020-06-12 DIAGNOSIS — M54.50 LOW BACK PAIN, UNSPECIFIED BACK PAIN LATERALITY, UNSPECIFIED CHRONICITY, UNSPECIFIED WHETHER SCIATICA PRESENT: ICD-10-CM

## 2020-06-12 DIAGNOSIS — R19.7 DIARRHEA, UNSPECIFIED TYPE: ICD-10-CM

## 2020-06-12 DIAGNOSIS — E78.2 MIXED HYPERLIPIDEMIA: ICD-10-CM

## 2020-06-12 DIAGNOSIS — D64.9 ANEMIA, UNSPECIFIED TYPE: ICD-10-CM

## 2020-06-12 DIAGNOSIS — Z13.29 SCREENING FOR THYROID DISORDER: ICD-10-CM

## 2020-06-12 LAB
ALBUMIN SERPL-MCNC: 4.7 G/DL (ref 3.5–5.2)
ALP BLD-CCNC: 104 U/L (ref 35–104)
ALT SERPL-CCNC: 19 U/L (ref 5–33)
AMYLASE: 61 U/L (ref 28–100)
ANION GAP SERPL CALCULATED.3IONS-SCNC: 16 MMOL/L (ref 7–19)
AST SERPL-CCNC: 34 U/L (ref 5–32)
BACTERIA: ABNORMAL /HPF
BASOPHILS ABSOLUTE: 0.1 K/UL (ref 0–0.2)
BASOPHILS RELATIVE PERCENT: 0.6 % (ref 0–1)
BILIRUB SERPL-MCNC: 0.3 MG/DL (ref 0.2–1.2)
BILIRUBIN DIRECT: 0.1 MG/DL (ref 0–0.3)
BILIRUBIN URINE: ABNORMAL
BILIRUBIN, INDIRECT: 0.2 MG/DL (ref 0.1–1)
BLOOD, URINE: NEGATIVE
BUN BLDV-MCNC: 14 MG/DL (ref 6–20)
CALCIUM SERPL-MCNC: 10.1 MG/DL (ref 8.6–10)
CHLORIDE BLD-SCNC: 103 MMOL/L (ref 98–111)
CHOLESTEROL, TOTAL: 111 MG/DL (ref 160–199)
CLARITY: ABNORMAL
CO2: 20 MMOL/L (ref 22–29)
COLOR: ABNORMAL
COMMENT UA: ABNORMAL
CREAT SERPL-MCNC: 0.8 MG/DL (ref 0.5–0.9)
EOSINOPHILS ABSOLUTE: 0.3 K/UL (ref 0–0.6)
EOSINOPHILS RELATIVE PERCENT: 2.5 % (ref 0–5)
EPITHELIAL CELLS, UA: ABNORMAL /HPF
FERRITIN: 60.6 NG/ML (ref 13–150)
GFR NON-AFRICAN AMERICAN: >60
GLUCOSE BLD-MCNC: 143 MG/DL (ref 74–109)
GLUCOSE URINE: NEGATIVE MG/DL
HBA1C MFR BLD: 6.6 % (ref 4–6)
HCT VFR BLD CALC: 43 % (ref 37–47)
HDLC SERPL-MCNC: 31 MG/DL (ref 65–121)
HEMOGLOBIN: 14.4 G/DL (ref 12–16)
HYALINE CASTS: 47 /HPF (ref 0–8)
IMMATURE GRANULOCYTES #: 0 K/UL
IRON SATURATION: 18 % (ref 14–50)
IRON: 60 UG/DL (ref 37–145)
KETONES, URINE: ABNORMAL MG/DL
LDL CHOLESTEROL CALCULATED: 22 MG/DL
LEUKOCYTE ESTERASE, URINE: NEGATIVE
LIPASE: 60 U/L (ref 13–60)
LYMPHOCYTES ABSOLUTE: 4 K/UL (ref 1.1–4.5)
LYMPHOCYTES RELATIVE PERCENT: 30.9 % (ref 20–40)
MCH RBC QN AUTO: 29.2 PG (ref 27–31)
MCHC RBC AUTO-ENTMCNC: 33.5 G/DL (ref 33–37)
MCV RBC AUTO: 87.2 FL (ref 81–99)
MONOCYTES ABSOLUTE: 0.8 K/UL (ref 0–0.9)
MONOCYTES RELATIVE PERCENT: 6.2 % (ref 0–10)
NEUTROPHILS ABSOLUTE: 7.8 K/UL (ref 1.5–7.5)
NEUTROPHILS RELATIVE PERCENT: 59.5 % (ref 50–65)
NITRITE, URINE: NEGATIVE
PDW BLD-RTO: 14.5 % (ref 11.5–14.5)
PH UA: 5.5 (ref 5–8)
PLATELET # BLD: 243 K/UL (ref 130–400)
PMV BLD AUTO: 10.2 FL (ref 9.4–12.3)
POTASSIUM SERPL-SCNC: 4.4 MMOL/L (ref 3.5–5)
PROTEIN UA: 30 MG/DL
RBC # BLD: 4.93 M/UL (ref 4.2–5.4)
RBC UA: ABNORMAL /HPF (ref 0–2)
SODIUM BLD-SCNC: 139 MMOL/L (ref 136–145)
SPECIFIC GRAVITY UA: 1.03 (ref 1–1.03)
TOTAL IRON BINDING CAPACITY: 327 UG/DL (ref 250–400)
TOTAL PROTEIN: 8.5 G/DL (ref 6.6–8.7)
TRIGL SERPL-MCNC: 288 MG/DL (ref 0–149)
TSH SERPL DL<=0.05 MIU/L-ACNC: 0.59 UIU/ML (ref 0.27–4.2)
UROBILINOGEN, URINE: 1 E.U./DL
WBC # BLD: 13.1 K/UL (ref 4.8–10.8)
WBC UA: ABNORMAL /HPF (ref 0–5)
YEAST: PRESENT /HPF

## 2020-06-12 PROCEDURE — 99213 OFFICE O/P EST LOW 20 MIN: CPT | Performed by: INTERNAL MEDICINE

## 2020-06-12 RX ORDER — AMOXICILLIN AND CLAVULANATE POTASSIUM 875; 125 MG/1; MG/1
1 TABLET, FILM COATED ORAL 2 TIMES DAILY
Qty: 14 TABLET | Refills: 0 | Status: SHIPPED | OUTPATIENT
Start: 2020-06-12 | End: 2020-06-19

## 2020-06-12 RX ORDER — ONDANSETRON 4 MG/1
4 TABLET, FILM COATED ORAL DAILY PRN
Qty: 30 TABLET | Refills: 0 | Status: SHIPPED | OUTPATIENT
Start: 2020-06-12 | End: 2020-09-30 | Stop reason: ALTCHOICE

## 2020-06-12 ASSESSMENT — ENCOUNTER SYMPTOMS
WHEEZING: 0
BLOOD IN STOOL: 0
TROUBLE SWALLOWING: 0
RHINORRHEA: 0
CHEST TIGHTNESS: 0
DIARRHEA: 1
CONSTIPATION: 0
ABDOMINAL PAIN: 0
NAUSEA: 1
SINUS PAIN: 0
VOMITING: 0
SHORTNESS OF BREATH: 0
COUGH: 0
BACK PAIN: 1

## 2020-06-12 NOTE — PROGRESS NOTES
GASTROINTESTINAL ENDOSCOPY N/A 7/30/2019    UPPER GASTROINTESTINAL ENDOSCOPY  7/30/2019    Dr Sandra Dodge exam, empiric dil with 54F over wire, neg EoE   ,   Social History     Tobacco Use    Smoking status: Never Smoker    Smokeless tobacco: Never Used   Substance Use Topics    Alcohol use:  Yes     Alcohol/week: 0.0 standard drinks     Comment: \"once in a blue moon\", a few times a year    Drug use: No   ,   Family History   Problem Relation Age of Onset    High Blood Pressure Mother     Cancer Mother         of the brain    Other Mother         second hand smoker    High Blood Pressure Father     High Cholesterol Father     Colon Polyps Father     Other Father         smoker    Diabetes Sister         type 1, from alcohol    High Blood Pressure Sister     High Blood Pressure Brother     Colon Cancer Maternal Aunt     Colon Cancer Paternal Uncle     Colon Cancer Paternal Grandfather     Diabetes Brother         from alcohol    Hypertension Brother     No Known Problems Son     No Known Problems Son     No Known Problems Son     No Known Problems Daughter     Esophageal Cancer Neg Hx     Liver Cancer Neg Hx     Liver Disease Neg Hx     Rectal Cancer Neg Hx     Stomach Cancer Neg Hx    ,   Immunization History   Administered Date(s) Administered    Influenza, Quadv, IM, (6 mo and older Fluzone, Flulaval, Fluarix and 3 yrs and older Afluria) 09/27/2017    Influenza, Quadv, IM, PF (6 mo and older Fluzone, Flulaval, Fluarix, and 3 yrs and older Afluria) 10/14/2016    PPD Test 10/19/2015    Pneumococcal Polysaccharide ([id number]) 08/15/2017    Tdap (Boostrix, Adacel) 04/14/2016   ,   Health Maintenance   Topic Date Due    Diabetic retinal exam  11/02/2016    Diabetic microalbuminuria test  07/06/2019    TSH testing  05/24/2020    Lipid screen  05/24/2020    Hepatitis B vaccine (1 of 3 - Risk 3-dose series) 09/30/2020 (Originally 2/26/1989)    Flu vaccine (Season Ended) pertinent observable physical exam findings-     ASSESSMENT/PLAN:  1. Diarrhea, unspecified type    - CBC Auto Differential; Future  - Hepatic Function Panel; Future  - Amylase; Future  - Lipase; Future  - amoxicillin-clavulanate (AUGMENTIN) 875-125 MG per tablet; Take 1 tablet by mouth 2 times daily for 7 days  Dispense: 14 tablet; Refill: 0    2. Low back pain, unspecified back pain laterality, unspecified chronicity, unspecified whether sciatica present    - Urinalysis; Future  - Culture, Urine; Future      possibly enteroviral infection, R/O UTI, will emeperically treat with Augmenti  Advised about bland diet    No follow-ups on file. Haley Johnson is a 52 y.o. female being evaluated by a Virtual Visit (video visit) encounter to address concerns as mentioned above. A caregiver was present when appropriate. Due to this being a TeleHealth encounter (During GMDYX-09 public health emergency), evaluation of the following organ systems was limited: Vitals/Constitutional/EENT/Resp/CV/GI//MS/Neuro/Skin/Heme-Lymph-Imm. Pursuant to the emergency declaration under the [address] [place] [address], [address] [place] [address] authority and the [employer] Act, this Virtual Visit was conducted with patient's (and/or legal guardian's) consent, to reduce the patient's risk of exposure to COVID-19 and provide necessary medical care. The patient (and/or legal guardian) has also been advised to contact this office for worsening conditions or problems, and seek emergency medical treatment and/or call 911 if deemed necessary. Patient identification was verified at the start of the visit: Yes    Total time spent on this encounter: 15    Services were provided through a video synchronous discussion virtually to substitute for in-person clinic visit. Patient and provider were located at their individual homes.     --Betzaida Ba MD on 6/12/2020 at 10:52 AM    An electronic signature was used to authenticate this note.

## 2020-06-14 LAB — URINE CULTURE, ROUTINE: NORMAL

## 2020-06-15 ENCOUNTER — TELEPHONE (OUTPATIENT)
Dept: INTERNAL MEDICINE | Age: 50
End: 2020-06-15

## 2020-06-15 NOTE — TELEPHONE ENCOUNTER
Called the pt and left message for her that her urine was negative for infection and that is she is getting better she can complete the medication. She is to let us know if she is not better.

## 2020-06-16 RX ORDER — GABAPENTIN 100 MG/1
100 CAPSULE ORAL 2 TIMES DAILY
Qty: 60 CAPSULE | Refills: 0 | Status: SHIPPED | OUTPATIENT
Start: 2020-06-16 | End: 2020-07-19 | Stop reason: SDUPTHER

## 2020-06-16 NOTE — TELEPHONE ENCOUNTER
Karsten Hollis called to request a refill on her medication. Last office visit : 1/7/2020   Next office visit : 8/11/2020     Last UDS:   Amphetamine Screen, Urine   Date Value Ref Range Status   09/06/2019 neg  Final     Barbiturate Screen, Urine   Date Value Ref Range Status   09/06/2019 neg  Final     Benzodiazepine Screen, Urine   Date Value Ref Range Status   09/06/2019 neg  Final     Buprenorphine Urine   Date Value Ref Range Status   09/06/2019 neg  Final     Cocaine Metabolite Screen, Urine   Date Value Ref Range Status   09/06/2019 neg  Final     Gabapentin Screen, Urine   Date Value Ref Range Status   09/06/2019 neg  Final     MDMA, Urine   Date Value Ref Range Status   09/06/2019 neg  Final     Methamphetamine, Urine   Date Value Ref Range Status   09/06/2019 neg  Final     Opiate Scrn, Ur   Date Value Ref Range Status   09/06/2019 neg  Final     Oxycodone Screen, Ur   Date Value Ref Range Status   09/06/2019 neg  Final     PCP Screen, Urine   Date Value Ref Range Status   09/06/2019 neg  Final     Propoxyphene Screen, Urine   Date Value Ref Range Status   09/06/2019 neg  Final     THC Screen, Urine   Date Value Ref Range Status   09/06/2019 neg  Final     Tricyclic Antidepressants, Urine   Date Value Ref Range Status   09/06/2019 neg  Final       Last Jocelyn Board: 06/11/2020  Medication Contract: 01/07/2020     Requested Prescriptions     Pending Prescriptions Disp Refills    gabapentin (NEURONTIN) 100 MG capsule 60 capsule 0     Sig: Take 1 capsule by mouth 2 times daily for 30 days. Please approve or refuse this medication.    Aundrea Larose

## 2020-06-18 ENCOUNTER — HOSPITAL ENCOUNTER (OUTPATIENT)
Dept: SLEEP CENTER | Age: 50
Discharge: HOME | End: 2020-06-20
Payer: COMMERCIAL

## 2020-06-18 PROCEDURE — 95810 POLYSOM 6/> YRS 4/> PARAM: CPT | Performed by: PSYCHIATRY & NEUROLOGY

## 2020-06-18 PROCEDURE — 95810 POLYSOM 6/> YRS 4/> PARAM: CPT

## 2020-07-15 RX ORDER — CLONAZEPAM 0.5 MG/1
TABLET ORAL
Qty: 30 TABLET | Refills: 0 | Status: SHIPPED | OUTPATIENT
Start: 2020-07-15 | End: 2020-09-30 | Stop reason: SDUPTHER

## 2020-07-15 RX ORDER — TIZANIDINE 2 MG/1
2 TABLET ORAL EVERY 8 HOURS PRN
Qty: 60 TABLET | Refills: 0 | Status: SHIPPED | OUTPATIENT
Start: 2020-07-15 | End: 2020-10-28 | Stop reason: ALTCHOICE

## 2020-07-15 RX ORDER — NAPROXEN SODIUM 550 MG/1
TABLET ORAL
Qty: 60 TABLET | Refills: 3 | Status: SHIPPED | OUTPATIENT
Start: 2020-07-15 | End: 2020-08-10 | Stop reason: ALTCHOICE

## 2020-07-15 RX ORDER — NAPROXEN SODIUM 550 MG/1
550 TABLET ORAL 2 TIMES DAILY WITH MEALS
Qty: 60 TABLET | Refills: 3 | Status: SHIPPED | OUTPATIENT
Start: 2020-07-15 | End: 2020-09-30 | Stop reason: ALTCHOICE

## 2020-07-15 RX ORDER — MONTELUKAST SODIUM 10 MG/1
10 TABLET ORAL NIGHTLY
Qty: 30 TABLET | Refills: 5 | Status: SHIPPED | OUTPATIENT
Start: 2020-07-15 | End: 2021-03-08

## 2020-07-15 RX ORDER — ATORVASTATIN CALCIUM 20 MG/1
20 TABLET, FILM COATED ORAL DAILY
Qty: 30 TABLET | Refills: 5 | Status: SHIPPED | OUTPATIENT
Start: 2020-07-15 | End: 2021-03-08

## 2020-07-15 NOTE — TELEPHONE ENCOUNTER
Britt Kumar called requesting a refill of the below medication which has been pended for you:     Requested Prescriptions     Pending Prescriptions Disp Refills    naproxen sodium (ANAPROX) 550 MG tablet 60 tablet 3     Sig: Take 1 tablet by mouth 2 times daily (with meals)    atorvastatin (LIPITOR) 20 MG tablet 30 tablet 5     Sig: Take 1 tablet by mouth daily    montelukast (SINGULAIR) 10 MG tablet 30 tablet 5     Sig: Take 1 tablet by mouth nightly       Last Appointment Date: 1/7/2020  Next Appointment Date: 8/11/2020    Allergies   Allergen Reactions    Claritin-D 12 Hour [Loratadine-Pseudoephedrine Er] Other (See Comments)     \"it intensifies my migraines\"    Demerol Hcl [Meperidine] Other (See Comments)     Aggression    Percocet [Oxycodone-Acetaminophen] Rash

## 2020-07-15 NOTE — TELEPHONE ENCOUNTER
Doole called requesting a refill of the below medication which has been pended for you:     Requested Prescriptions     Pending Prescriptions Disp Refills    naproxen sodium (ANAPROX) 550 MG tablet [Pharmacy Med Name: NAPROXEN SODIUM 550MG TABLETS] 60 tablet 3     Sig: TAKE 1 TABLET BY MOUTH TWICE DAILY WITH MEALS       Last Appointment Date: 1/7/2020  Next Appointment Date: 7/15/2020    Allergies   Allergen Reactions    Claritin-D 12 Hour [Loratadine-Pseudoephedrine Er] Other (See Comments)     \"it intensifies my migraines\"    Demerol Hcl [Meperidine] Other (See Comments)     Aggression    Percocet [Oxycodone-Acetaminophen] Rash

## 2020-07-20 RX ORDER — TRAZODONE HYDROCHLORIDE 100 MG/1
TABLET ORAL
Qty: 30 TABLET | Refills: 5 | Status: SHIPPED | OUTPATIENT
Start: 2020-07-20 | End: 2021-01-11

## 2020-07-20 RX ORDER — GABAPENTIN 100 MG/1
100 CAPSULE ORAL 2 TIMES DAILY
Qty: 60 CAPSULE | Refills: 0 | Status: SHIPPED | OUTPATIENT
Start: 2020-07-20 | End: 2020-08-24 | Stop reason: SDUPTHER

## 2020-07-20 NOTE — TELEPHONE ENCOUNTER
Azra Staley called to request a refill on her medication. Last office visit : 12/10/2019   Next office visit : Visit date not found     Last UDS:   Amphetamine Screen, Urine   Date Value Ref Range Status   09/06/2019 neg  Final     Barbiturate Screen, Urine   Date Value Ref Range Status   09/06/2019 neg  Final     Benzodiazepine Screen, Urine   Date Value Ref Range Status   09/06/2019 neg  Final     Buprenorphine Urine   Date Value Ref Range Status   09/06/2019 neg  Final     Cocaine Metabolite Screen, Urine   Date Value Ref Range Status   09/06/2019 neg  Final     Gabapentin Screen, Urine   Date Value Ref Range Status   09/06/2019 neg  Final     MDMA, Urine   Date Value Ref Range Status   09/06/2019 neg  Final     Methamphetamine, Urine   Date Value Ref Range Status   09/06/2019 neg  Final     Opiate Scrn, Ur   Date Value Ref Range Status   09/06/2019 neg  Final     Oxycodone Screen, Ur   Date Value Ref Range Status   09/06/2019 neg  Final     PCP Screen, Urine   Date Value Ref Range Status   09/06/2019 neg  Final     Propoxyphene Screen, Urine   Date Value Ref Range Status   09/06/2019 neg  Final     THC Screen, Urine   Date Value Ref Range Status   09/06/2019 neg  Final     Tricyclic Antidepressants, Urine   Date Value Ref Range Status   09/06/2019 neg  Final       Last Santo [place]: 06/11/2020  Medication Contract: 01/07/2020     Requested Prescriptions     Pending Prescriptions Disp Refills    traZODone (DESYREL) 100 MG tablet 30 tablet 5     Sig: TAKE 1 TABLET BY MOUTH EVERY NIGHT         Please approve or refuse this medication.    Jessica Roche     I do not see this on her medication list.

## 2020-07-20 NOTE — TELEPHONE ENCOUNTER
Vito Rankin called to request a refill on her medication. Last office visit : 1/7/2020   Next office visit : 8/11/2020     Last UDS:   Amphetamine Screen, Urine   Date Value Ref Range Status   09/06/2019 neg  Final     Barbiturate Screen, Urine   Date Value Ref Range Status   09/06/2019 neg  Final     Benzodiazepine Screen, Urine   Date Value Ref Range Status   09/06/2019 neg  Final     Buprenorphine Urine   Date Value Ref Range Status   09/06/2019 neg  Final     Cocaine Metabolite Screen, Urine   Date Value Ref Range Status   09/06/2019 neg  Final     Gabapentin Screen, Urine   Date Value Ref Range Status   09/06/2019 neg  Final     MDMA, Urine   Date Value Ref Range Status   09/06/2019 neg  Final     Methamphetamine, Urine   Date Value Ref Range Status   09/06/2019 neg  Final     Opiate Scrn, Ur   Date Value Ref Range Status   09/06/2019 neg  Final     Oxycodone Screen, Ur   Date Value Ref Range Status   09/06/2019 neg  Final     PCP Screen, Urine   Date Value Ref Range Status   09/06/2019 neg  Final     Propoxyphene Screen, Urine   Date Value Ref Range Status   09/06/2019 neg  Final     THC Screen, Urine   Date Value Ref Range Status   09/06/2019 neg  Final     Tricyclic Antidepressants, Urine   Date Value Ref Range Status   09/06/2019 neg  Final       Last Amish Horse: 06/11/2020  Medication Contract: 01/07/2020     Requested Prescriptions     Pending Prescriptions Disp Refills    gabapentin (NEURONTIN) 100 MG capsule 60 capsule 0     Sig: Take 1 capsule by mouth 2 times daily for 30 days. Please approve or refuse this medication.    Farheen Sahu

## 2020-07-30 ENCOUNTER — TELEPHONE (OUTPATIENT)
Dept: INTERNAL MEDICINE | Age: 50
End: 2020-07-30

## 2020-07-30 ENCOUNTER — NURSE TRIAGE (OUTPATIENT)
Dept: OTHER | Facility: CLINIC | Age: 50
End: 2020-07-30

## 2020-07-30 NOTE — TELEPHONE ENCOUNTER
spoiled? \"     no  10. ANTIBIOTIC USE: \"Are you taking antibiotics now or have you taken antibiotics in the past 2 months? \"        no  11. OTHER SYMPTOMS: \"Do you have any other symptoms? \" (e.g., fever, blood in stool)        no    Protocols used: DIARRHEA-ADULT-OH    Received call from [facility]. Pt agrees with discharge disposition, care advice given  Call soft transferred to [address] [place] [address] at [facility] to schedule appointment. Please do not reply to the triage nurse through this encounter. Any subsequent communication should be directly with the patient.

## 2020-07-30 NOTE — TELEPHONE ENCOUNTER
Nurse triage called us and insisted this patient be seen in our office. We are not able to see any sick patients in the office at this time. Spoke to the pt she thinks she is dehydrated but doesn't want to go to the hospital. I let her know that if she thinks she is dehydrated she needs to go because we do not do fluids in the office and we are not currently seeing sick patients in the office. She wanted us to just send her in an antibiotic I let her know that we can not do that she would need to be seen and with her having on going diarrhea and dry heaves she would need to be seen. She asked if she could just go to the flu clinic I told her she can start there and if they think she needs fluids they will send her to the ER. She voice understood.

## 2020-08-14 RX ORDER — TOPIRAMATE 50 MG/1
TABLET, FILM COATED ORAL
Qty: 60 TABLET | Refills: 5 | Status: SHIPPED | OUTPATIENT
Start: 2020-08-14 | End: 2021-02-12

## 2020-08-14 RX ORDER — BLOOD SUGAR DIAGNOSTIC
STRIP MISCELLANEOUS
Qty: 100 STRIP | Refills: 0 | Status: SHIPPED | OUTPATIENT
Start: 2020-08-14 | End: 2020-11-18 | Stop reason: SDUPTHER

## 2020-08-24 ENCOUNTER — HOSPITAL ENCOUNTER (OUTPATIENT)
Dept: SLEEP CENTER | Age: 50
Discharge: HOME | End: 2020-08-26
Payer: COMMERCIAL

## 2020-08-24 PROCEDURE — 95811 POLYSOM 6/>YRS CPAP 4/> PARM: CPT | Performed by: PSYCHIATRY & NEUROLOGY

## 2020-08-24 PROCEDURE — 95811 POLYSOM 6/>YRS CPAP 4/> PARM: CPT

## 2020-08-25 RX ORDER — GABAPENTIN 100 MG/1
100 CAPSULE ORAL 2 TIMES DAILY
Qty: 60 CAPSULE | Refills: 0 | Status: SHIPPED | OUTPATIENT
Start: 2020-08-25 | End: 2020-09-30 | Stop reason: ALTCHOICE

## 2020-08-25 NOTE — PROGRESS NOTES
[facility]  [address]  Phone [phone] Fax [phone]     Sleep Study Technician Review    Patient Name:  Miguel A Ding  :   1970  Referring Provider: Israel Almanzar MD    Brief History:  Miguel A Ding is a 52 y.o. female with a history of Anxiety, Depression, Diabetes and Hypertension who had be referred for a sleep study. Insomnia patient takes Elavil which helps her sleep and is on gabapentin for neuropathy and takes clonazepam as needed for sleep. Height: 5'2\"  Weight: 184 lbs  BMI:     33.65  Neck Circ:       14\"  MALLAMPATI: Tylpe 2  ESS:            Type of Study: Titration sleep study  Time Stage Position Snore Hypopnea Obs Apnea Chanel Apnea PAP O2   2200 1 supine no no no no +4 RA   2300 1 supine no no no no +4 RA   2400 2 supine no no no no +4 RA   0100 2 supine no yes no no +6 RA   0200 2 supine no no no no +6 RA   0300 2 supine yes no no no +7 RA   0400 2 supine no no no no +7 RA     Summary:  Patient slept well and voiced no complaints. Oxygen saturations maintained in the mid nineties. DME: [employer]   Final PAP settings:+7 cmh2o  Mask Type: nasal  Mask: Dreamwear pillows  Mask Size: small    The study was reviewed briefly with Miguel A Ding. She will be notified of the formal results and recommendations after the study is scored and interpreted. The report will be sent to his referring provider.     Technician: Cayden Jean-Baptisteole RRT, RPSGT

## 2020-08-25 NOTE — TELEPHONE ENCOUNTER
Fran Garcia called to request a refill on her medication. Last office visit : 8/11/2020   Next office visit : Visit date not found     Last UDS:   Amphetamine Screen, Urine   Date Value Ref Range Status   09/06/2019 neg  Final     Barbiturate Screen, Urine   Date Value Ref Range Status   09/06/2019 neg  Final     Benzodiazepine Screen, Urine   Date Value Ref Range Status   09/06/2019 neg  Final     Buprenorphine Urine   Date Value Ref Range Status   09/06/2019 neg  Final     Cocaine Metabolite Screen, Urine   Date Value Ref Range Status   09/06/2019 neg  Final     Gabapentin Screen, Urine   Date Value Ref Range Status   09/06/2019 neg  Final     MDMA, Urine   Date Value Ref Range Status   09/06/2019 neg  Final     Methamphetamine, Urine   Date Value Ref Range Status   09/06/2019 neg  Final     Opiate Scrn, Ur   Date Value Ref Range Status   09/06/2019 neg  Final     Oxycodone Screen, Ur   Date Value Ref Range Status   09/06/2019 neg  Final     PCP Screen, Urine   Date Value Ref Range Status   09/06/2019 neg  Final     Propoxyphene Screen, Urine   Date Value Ref Range Status   09/06/2019 neg  Final     THC Screen, Urine   Date Value Ref Range Status   09/06/2019 neg  Final     Tricyclic Antidepressants, Urine   Date Value Ref Range Status   09/06/2019 neg  Final       Derrick Kathy 06/11/2020  UDS 01/07/2020  Med contract 01/07/2020    Requested Prescriptions     Pending Prescriptions Disp Refills    gabapentin (NEURONTIN) 100 MG capsule 60 capsule 0     Sig: Take 1 capsule by mouth 2 times daily for 30 days. Please approve or refuse this medication.    Rock Darling

## 2020-09-14 RX ORDER — QUINAPRIL 20 MG/1
TABLET ORAL
Qty: 30 TABLET | Refills: 0 | Status: SHIPPED | OUTPATIENT
Start: 2020-09-14 | End: 2020-10-12 | Stop reason: SDUPTHER

## 2020-09-14 RX ORDER — SPIRONOLACTONE 50 MG/1
50 TABLET, FILM COATED ORAL DAILY
Qty: 30 TABLET | Refills: 0 | Status: SHIPPED | OUTPATIENT
Start: 2020-09-14 | End: 2020-10-12 | Stop reason: SDUPTHER

## 2020-09-14 NOTE — TELEPHONE ENCOUNTER
Jah Corey called requesting a refill of the below medication which has been pended for you:     Requested Prescriptions     Pending Prescriptions Disp Refills    spironolactone (ALDACTONE) 50 MG tablet [Pharmacy Med Name: SPIRONOLACTONE 50MG TABLETS] 30 tablet 5     Sig: TAKE 1 TABLET BY MOUTH DAILY    quinapril (ACCUPRIL) 20 MG tablet [Pharmacy Med Name: QUINAPRIL 20MG TABLETS] 30 tablet 5     Sig: TAKE 1 TABLET BY MOUTH EVERY NIGHT AT BEDTIME       Last Appointment Date: 8/11/2020  Next Appointment Date: 9/11/2020    Allergies   Allergen Reactions    Claritin-D 12 Hour [Loratadine-Pseudoephedrine Er] Other (See Comments)     \"it intensifies my migraines\"    Demerol Hcl [Meperidine] Other (See Comments)     Aggression    Percocet [Oxycodone-Acetaminophen] Rash

## 2020-09-29 ENCOUNTER — OFFICE VISIT (OUTPATIENT)
Dept: GASTROENTEROLOGY | Age: 50
End: 2020-09-29
Payer: COMMERCIAL

## 2020-09-29 VITALS
DIASTOLIC BLOOD PRESSURE: 70 MMHG | HEIGHT: 62 IN | BODY MASS INDEX: 32.02 KG/M2 | SYSTOLIC BLOOD PRESSURE: 130 MMHG | HEART RATE: 82 BPM | OXYGEN SATURATION: 98 % | WEIGHT: 174 LBS

## 2020-09-29 PROCEDURE — 99213 OFFICE O/P EST LOW 20 MIN: CPT | Performed by: NURSE PRACTITIONER

## 2020-09-29 RX ORDER — MULTIVIT-MIN/IRON/FOLIC ACID/K 18-600-40
CAPSULE ORAL PRN
COMMUNITY
End: 2021-07-27 | Stop reason: ALTCHOICE

## 2020-09-29 ASSESSMENT — ENCOUNTER SYMPTOMS
TROUBLE SWALLOWING: 0
DIARRHEA: 0
ABDOMINAL PAIN: 0
BACK PAIN: 0
BLOOD IN STOOL: 0
NAUSEA: 0
ANAL BLEEDING: 0
CONSTIPATION: 1
SHORTNESS OF BREATH: 0
VOMITING: 0
COUGH: 0
RECTAL PAIN: 0
ABDOMINAL DISTENTION: 0
VOICE CHANGE: 0

## 2020-09-29 NOTE — PROGRESS NOTES
Subjective:      Lynn Damian is a54 y.o. female  Chief Complaint   Patient presents with    Constipation       HPI  PCP: Linda Yi MD  Pt is here today with c/o chronic constipation. Currently on Linzess 290mcg daily. This used to work. But over the past 2-3 months it has become ineffective. Which started after she started taking neurontin. Having a BM every 3-4 days on average. And stools are hard kennedy. Has tried and failed mag citrate, miralax, fiber supplements, and MOM in the past.  No further GI complaints at this time. GI scope reports in history per MA per OV policy, I reviewed this. Family HX:  Father had colon polyps, paternal uncle had colon cancer, maternal aunt had colon cancer  Pt denies family hx of inflammatory bowel dx, gastric CA and esophageal CA.       Past Medical History:   Diagnosis Date    Anxiety     Bacteremia 10/17/2019    Bandemia 10/16/2019    Class 2 obesity due to excess calories without serious comorbidity with body mass index (BMI) of 36.0 to 36.9 in adult 12/19/2019    Depression     Diabetes mellitus ([address]     GERD (gastroesophageal reflux disease)     Hyperglycemia 8/16/2017    Hyperlipidemia     Hypertension     Hypothyroidism     Lower abdominal pain 1/22/2019    Neuropathy 10/16/2019    Osteoarthritis     PONV (postoperative nausea and vomiting)     Skin ulcer of flank with fat layer exposed ([address] 1/8/2020    Staph infection 11/11/2018          Past Surgical History:   Procedure Laterality Date    APPENDECTOMY      as a 1st grader   [address] [place] [address]      1996, 2000, 2003    CHOLECYSTECTOMY, LAPAROSCOPIC      1994    COLONOSCOPY      \"more than 15 + yrs ago\" PER PT RECALL    COLONOSCOPY N/A 7/30/2019    Dr Earnest Morrow hemorrhoids-Grade 1, suboptimal prep, 3 yr recall    HYSTERECTOMY, TOTAL ABDOMINAL      May 2012, withOUT Ophorectomy    [place] REVISE MEDIAN N/CARPAL TUNNEL SURG Left 6/16/2017    CARPAL TUNNEL RELEASE performed by Dea Finn MD at [address] N/CARPAL TUNNEL SURG Right 7/21/2017    CARPAL TUNNEL RELEASE performed by Dea Finn MD at [address]      as a 7th grader    UPPER GASTROINTESTINAL ENDOSCOPY N/A 7/30/2019    UPPER GASTROINTESTINAL ENDOSCOPY  7/30/2019    Dr Gabriel Xavier exam, empiric dil with 54F over wire, neg EoE       Social History     Socioeconomic History    Marital status:      Spouse name: None    Number of children: 4    Years of education: None    Highest education level: None   Occupational History    Occupation: [employer] Employee   Social Needs    Financial resource strain: None    Food insecurity     Worry: None     Inability: None    Transportation needs     Medical: None     Non-medical: None   Tobacco Use    Smoking status: Never Smoker    Smokeless tobacco: Never Used   Substance and Sexual Activity    Alcohol use: Not Currently     Alcohol/week: 0.0 standard drinks     Comment: \"once in a blue moon\", a few times a year    Drug use: No    Sexual activity: None     Comment: 4   Lifestyle    Physical activity     Days per week: None     Minutes per session: None    Stress: None   Relationships    Social connections     Talks on phone: None     Gets together: None     Attends Sikhism service: None     Active member of club or organization: None     Attends meetings of clubs or organizations: None     Relationship status: None    Intimate partner violence     Fear of current or ex partner: None     Emotionally abused: None     Physically abused: None     Forced sexual activity: None   Other Topics Concern    None   Social History Narrative    CODE STATUS: Full Code    HEALTH CARE PROXY: her boyfriend, Mr. Jeffry Jimenes, [phone]    AMBULATES: independently    DOMICILED: lives in a private house, lives with her boyfriend and two of the kids, has dog and cat, has 3-4 steps to enter the home and a ramp, no stairs inside       Allergies   Allergen Reactions    Claritin-D 12 Hour [Loratadine-Pseudoephedrine Er] Other (See Comments)     \"it intensifies my migraines\"    Demerol Hcl [Meperidine] Other (See Comments)     Aggression    Percocet [Oxycodone-Acetaminophen] Rash       Current Outpatient Medications   Medication Sig Dispense Refill    Cholecalciferol (VITAMIN D) 50 MCG (2000 UT) CAPS capsule Take by mouth as needed      Plecanatide 3 MG TABS Take 1 tablet by mouth daily 30 tablet 11    LINZESS 290 MCG CAPS capsule TAKE 1 CAPSULE BY MOUTH EVERY MORNING BEFORE BREAKFAST 30 capsule 0    spironolactone (ALDACTONE) 50 MG tablet TAKE 1 TABLET BY MOUTH DAILY 30 tablet 0    quinapril (ACCUPRIL) 20 MG tablet TAKE 1 TABLET BY MOUTH EVERY NIGHT AT BEDTIME 30 tablet 0    gabapentin (NEURONTIN) 100 MG capsule Take 1 capsule by mouth 2 times daily for 30 days. 60 capsule 0    ACCU-CHEK GAVI PLUS strip USE TO TEST ONCE DAILY AS NEEDED 100 strip 0    topiramate (TOPAMAX) 50 MG tablet TAKE 2 TABLETS BY MOUTH EVERY NIGHT AT BEDTIME 60 tablet 5    traZODone (DESYREL) 100 MG tablet TAKE 1 TABLET BY MOUTH EVERY NIGHT 30 tablet 5    naproxen sodium (ANAPROX) 550 MG tablet Take 1 tablet by mouth 2 times daily (with meals) 60 tablet 3    atorvastatin (LIPITOR) 20 MG tablet Take 1 tablet by mouth daily 30 tablet 5    montelukast (SINGULAIR) 10 MG tablet Take 1 tablet by mouth nightly 30 tablet 5    clonazePAM (KLONOPIN) 0.5 MG tablet TAKE 1 TABLET BY MOUTH DAILY AS NEEDED. 30 tablet 0    tiZANidine (ZANAFLEX) 2 MG tablet Take 1 tablet by mouth every 8 hours as needed (spasm) 60 tablet 0    ondansetron (ZOFRAN) 4 MG tablet Take 1 tablet by mouth daily as needed for Nausea or Vomiting 30 tablet 0    econazole nitrate 1 % cream Apply topically daily. 85 g 0    nystatin (MYCOSTATIN) 126186 UNIT/GM powder Apply 3 times daily.  60 g 11    amitriptyline (ELAVIL) 25 MG tablet TAKE 1 TO 2 TABLETS BY MOUTH IN THE EVENING 60 tablet 11    famotidine (PEPCID) 20 MG tablet Take 1 tablet by mouth 2 times daily 30 tablet 11    levothyroxine (SYNTHROID) 125 MCG tablet TAKE 1 TABLET BY MOUTH DAILY 30 tablet 11    venlafaxine (EFFEXOR XR) 150 MG extended release capsule TAKE 1 CAPSULE BY MOUTH DAILY 90 capsule 3    ACCU-CHEK SOFTCLIX LANCETS MISC USE TO CHECK BLOOD SUGAR THREE TIMES DAILY AS DIRECTED 100 each 1    polyethylene glycol (GLYCOLAX) powder Take 17 g by mouth 2 times daily  1    insulin aspart (NOVOLOG FLEXPEN) 100 UNIT/ML injection pen Inject into the skin 3 times daily (before meals) Counting Carbs - Sliding Scale      Semaglutide (OZEMPIC, 0.25 OR 0.5 MG/DOSE, SC) Inject 0.5 mg into the skin once a week On Sundays      vitamin B-6 (PYRIDOXINE) 100 MG tablet Take 100 mg by mouth daily      TRESIBA FLEXTOUCH 200 UNIT/ML SOPN INJECT 60 UNITS INTO THE SKIN DAILY AS DIRECTED (Patient taking differently: 65 units each morning not taking at all during the night.) 4 pen 5    glucose monitoring kit (FREESTYLE) monitoring kit 1 kit by Does not apply route daily 1 kit 0    blood glucose monitor kit and supplies Use to check sugar  E11.9 1 kit 0    Insulin Pen Needle 32G X 4 MM MISC 1 each by Does not apply route daily 100 each 3    Insulin Syringe-Needle U-100 30G X 5/16\" 0.5 ML MISC 1 each by Does not apply route daily 100 each 3    Lancets MISC Use three times a day. 100 each 3    ASPIRIN LOW DOSE 81 MG EC tablet TAKE 1 TABLET BY MOUTH DAILY 30 tablet 0     No current facility-administered medications for this visit. Review of Systems   Constitutional: Positive for fatigue. Negative for unexpected weight change. HENT: Negative for trouble swallowing and voice change. Respiratory: Negative for cough and shortness of breath. Cardiovascular: Negative for chest pain and palpitations. Gastrointestinal: Positive for constipation.  Negative for abdominal distention, abdominal pain, anal bleeding, blood in stool, diarrhea, nausea, rectal pain and vomiting. Genitourinary: Negative for hematuria. Musculoskeletal: Positive for arthralgias and neck pain. Negative for back pain. Neurological: Negative for weakness and headaches. Psychiatric/Behavioral: Positive for confusion (at times) and dysphoric mood. The patient is nervous/anxious. Objective:     Physical Exam  Vitals signs and nursing note reviewed. Constitutional:       Appearance: She is well-developed. Comments: /70   Pulse 82   Ht 5' 2\" (1.575 m)   Wt 174 lb (78.9 kg)   LMP  (LMP Unknown)   SpO2 98%   BMI 31.83 kg/m²    Eyes:      General: No scleral icterus. Conjunctiva/sclera: Conjunctivae normal.      Pupils: Pupils are equal, round, and reactive to light. Cardiovascular:      Rate and Rhythm: Normal rate and regular rhythm. Heart sounds: Normal heart sounds. No murmur. No friction rub. No gallop. Pulmonary:      Effort: Pulmonary effort is normal. No respiratory distress. Breath sounds: Normal breath sounds. Abdominal:      General: Bowel sounds are normal. There is no distension. Palpations: Abdomen is soft. Tenderness: There is no abdominal tenderness. There is no rebound. Neurological:      Mental Status: She is alert and oriented to person, place, and time. Cranial Nerves: No cranial nerve deficit. Psychiatric:         Judgment: Judgment normal.           Assessment:       Diagnosis Orders   1. Chronic constipation  Plecanatide 3 MG TABS         Plan:      1. Stop the linzess  2. Start trulance 3mg daily. This was escribed  3. F/u in 3-4 weeks if Trulance isnt effective.  We can try Dawit

## 2020-09-30 ENCOUNTER — OFFICE VISIT (OUTPATIENT)
Dept: INTERNAL MEDICINE | Age: 50
End: 2020-09-30
Payer: COMMERCIAL

## 2020-09-30 VITALS
OXYGEN SATURATION: 99 % | DIASTOLIC BLOOD PRESSURE: 80 MMHG | HEART RATE: 101 BPM | WEIGHT: 172 LBS | BODY MASS INDEX: 31.65 KG/M2 | SYSTOLIC BLOOD PRESSURE: 122 MMHG | HEIGHT: 62 IN

## 2020-09-30 PROBLEM — T23.301A: Status: RESOLVED | Noted: 2020-01-29 | Resolved: 2020-09-30

## 2020-09-30 PROBLEM — L98.492: Status: RESOLVED | Noted: 2019-12-19 | Resolved: 2020-09-30

## 2020-09-30 PROBLEM — R51.9 WORSENING HEADACHES: Status: ACTIVE | Noted: 2020-09-30

## 2020-09-30 PROBLEM — B96.6: Status: RESOLVED | Noted: 2019-12-19 | Resolved: 2020-09-30

## 2020-09-30 PROBLEM — K21.9 GERD WITHOUT ESOPHAGITIS: Status: ACTIVE | Noted: 2019-01-22

## 2020-09-30 PROCEDURE — 90694 VACC AIIV4 NO PRSRV 0.5ML IM: CPT | Performed by: INTERNAL MEDICINE

## 2020-09-30 PROCEDURE — 99214 OFFICE O/P EST MOD 30 MIN: CPT | Performed by: INTERNAL MEDICINE

## 2020-09-30 PROCEDURE — 90471 IMMUNIZATION ADMIN: CPT | Performed by: INTERNAL MEDICINE

## 2020-09-30 RX ORDER — GABAPENTIN 100 MG/1
100 CAPSULE ORAL 2 TIMES DAILY
Qty: 60 CAPSULE | Refills: 0 | Status: CANCELLED | OUTPATIENT
Start: 2020-09-30 | End: 2020-10-30

## 2020-09-30 RX ORDER — METHOCARBAMOL 750 MG/1
750 TABLET, FILM COATED ORAL 4 TIMES DAILY
Qty: 40 TABLET | Refills: 1 | Status: SHIPPED | OUTPATIENT
Start: 2020-09-30 | End: 2020-10-10

## 2020-09-30 RX ORDER — DULOXETIN HYDROCHLORIDE 30 MG/1
30 CAPSULE, DELAYED RELEASE ORAL DAILY
Qty: 30 CAPSULE | Refills: 0 | Status: SHIPPED | OUTPATIENT
Start: 2020-09-30 | End: 2020-10-27 | Stop reason: SDUPTHER

## 2020-09-30 RX ORDER — METHYLPREDNISOLONE 4 MG/1
TABLET ORAL
Qty: 1 KIT | Refills: 0 | Status: SHIPPED | OUTPATIENT
Start: 2020-09-30 | End: 2020-10-06

## 2020-09-30 RX ORDER — CLONAZEPAM 0.5 MG/1
TABLET ORAL
Qty: 30 TABLET | Refills: 0 | Status: SHIPPED | OUTPATIENT
Start: 2020-09-30 | End: 2021-05-15 | Stop reason: SDUPTHER

## 2020-09-30 NOTE — PATIENT INSTRUCTIONS
The medication list included in this document is our record of what you are currently taking, including any changes that were made at today's visit.  If you find any differences when compared to your medications at home, or have any questions that were not answered at your visit, please contact the office. Patient Education        Heart-Healthy Diet: Care Instructions  Your Care Instructions     A heart-healthy diet has lots of vegetables, fruits, nuts, beans, and whole grains, and is low in salt. It limits foods that are high in saturated fat, such as meats, cheeses, and fried foods. It may be hard to change your diet, but even small changes can lower your risk of heart attack and heart disease. Follow-up care is a key part of your treatment and safety. Be sure to make and go to all appointments, and call your doctor if you are having problems. It's also a good idea to know your test results and keep a list of the medicines you take. How can you care for yourself at home? Watch your portions  · Learn what a serving is. A \"serving\" and a \"portion\" are not always the same thing. Make sure that you are not eating larger portions than are recommended. For example, a serving of pasta is ½ cup. A serving size of meat is 2 to 3 ounces. A 3-ounce serving is about the size of a deck of cards. Measure serving sizes until you are good at [place]" them. Keep in mind that restaurants often serve portions that are 2 or 3 times the size of one serving. · To keep your energy level up and keep you from feeling hungry, eat often but in smaller portions. · Eat only the number of calories you need to stay at a healthy weight. If you need to lose weight, eat fewer calories than your body burns (through exercise and other physical activity). Eat more fruits and vegetables  · Eat a variety of fruit and vegetables every day. Dark green, deep orange, red, or yellow fruits and vegetables are especially good for you.  Examples health, and be sure to contact your doctor if:  · You would like help planning heart-healthy meals. Where can you learn more? Go to [email or web address] [email or web address] org and sign in to your Tripvi account. Enter V137 in the Sproom box to learn more about \"Heart-Healthy Diet: Care Instructions. \"     If you do not have an account, please click on the \"Sign Up Now\" link. Current as of: August 22, 2019               Content Version: 12.5  © 2727-7550 [employer]. Care instructions adapted under license by [facility] ([facility]). If you have questions about a medical condition or this instruction, always ask your healthcare professional. [address] any warranty or liability for your use of this information. Patient Education        A Healthy Lifestyle: Care Instructions  Your Care Instructions     A healthy lifestyle can help you feel good, stay at a healthy weight, and have plenty of energy for both work and play. A healthy lifestyle is something you can share with your whole family. A healthy lifestyle also can lower your risk for serious health problems, such as high blood pressure, heart disease, and diabetes. You can follow a few steps listed below to improve your health and the health of your family. Follow-up care is a key part of your treatment and safety. Be sure to make and go to all appointments, and call your doctor if you are having problems. It's also a good idea to know your test results and keep a list of the medicines you take. How can you care for yourself at home? · Do not eat too much sugar, fat, or fast foods. You can still have dessert and treats now and then. The goal is moderation. · Start small to improve your eating habits. Pay attention to portion sizes, drink less juice and soda pop, and eat more fruits and vegetables. ? Eat a healthy amount of food.  A 3-ounce serving of meat, for example, is about the size of a deck of lorraine. Luz Flores the rest of your plate with vegetables and whole grains. ? Limit the amount of soda and sports drinks you have every day. Drink more water when you are thirsty. ? Eat at least 5 servings of fruits and vegetables every day. It may seem like a lot, but it is not hard to reach this goal. A serving or helping is 1 piece of fruit, 1 cup of vegetables, or 2 cups of leafy, raw vegetables. Have an apple or some carrot sticks as an afternoon snack instead of a candy bar. Try to have fruits and/or vegetables at every meal.  · Make exercise part of your daily routine. You may want to start with simple activities, such as walking, bicycling, or slow swimming. Try to be active 30 to 60 minutes every day. You do not need to do all 30 to 60 minutes all at once. For example, you can exercise 3 times a day for 10 or 20 minutes. Moderate exercise is safe for most people, but it is always a good idea to talk to your doctor before starting an exercise program.  · Keep moving. Evelyn Bishop the lawn, work in the garden, or [employer]. Take the stairs instead of the elevator at work. · If you smoke, quit. People who smoke have an increased risk for heart attack, stroke, cancer, and other lung illnesses. Quitting is hard, but there are ways to boost your chance of quitting tobacco for good. ? Use nicotine gum, patches, or lozenges. ? Ask your doctor about stop-smoking programs and medicines. ? Keep trying. In addition to reducing your risk of diseases in the future, you will notice some benefits soon after you stop using tobacco. If you have shortness of breath or asthma symptoms, they will likely get better within a few weeks after you quit. · Limit how much alcohol you drink. Moderate amounts of alcohol (up to 2 drinks a day for men, 1 drink a day for women) are okay. But drinking too much can lead to liver problems, high blood pressure, and other health problems.   Family health  If you have a family, there are many things you can do together to improve your health. · Eat meals together as a family as often as possible. · Eat healthy foods. This includes fruits, vegetables, lean meats and dairy, and whole grains. · Include your family in your fitness plan. Most people think of activities such as jogging or tennis as the way to fitness, but there are many ways you and your family can be more active. Anything that makes you breathe hard and gets your heart pumping is exercise. Here are some tips:  ? Walk to do errands or to take your child to school or the bus.  ? Go for a family bike ride after dinner instead of watching TV. Where can you learn more? Go to https://[employer].Eventap. org and sign in to your SodaStream account. Enter F686 in the TearSolutions box to learn more about \"A Healthy Lifestyle: Care Instructions. \"     If you do not have an account, please click on the \"Sign Up Now\" link. Current as of: January 31, 2020               Content Version: 12.5  © 8203-6469 Healthwise[employer] Inc[employer]. Care instructions adapted under license by [facility] ([facility]). If you have questions about a medical condition or this instruction, always ask your healthcare professional. [address] any warranty or liability for your use of this information. The medication list included in this document is our record of what you are currently taking, including any changes that were made at today's visit.  If you find any differences when compared to your medications at home, or have any questions that were not answered at your visit, please contact the office.

## 2020-10-05 ENCOUNTER — OFFICE VISIT (OUTPATIENT)
Dept: ENDOCRINOLOGY | Facility: CLINIC | Age: 50
End: 2020-10-05

## 2020-10-05 VITALS
DIASTOLIC BLOOD PRESSURE: 52 MMHG | WEIGHT: 167.6 LBS | HEIGHT: 62 IN | SYSTOLIC BLOOD PRESSURE: 110 MMHG | BODY MASS INDEX: 30.84 KG/M2 | OXYGEN SATURATION: 99 % | HEART RATE: 85 BPM

## 2020-10-05 DIAGNOSIS — E06.3 HYPOTHYROIDISM DUE TO HASHIMOTO'S THYROIDITIS: ICD-10-CM

## 2020-10-05 DIAGNOSIS — E11.69 MIXED DIABETIC HYPERLIPIDEMIA ASSOCIATED WITH TYPE 2 DIABETES MELLITUS (HCC): ICD-10-CM

## 2020-10-05 DIAGNOSIS — I15.2 HYPERTENSION ASSOCIATED WITH DIABETES (HCC): Primary | ICD-10-CM

## 2020-10-05 DIAGNOSIS — E11.65 TYPE 2 DIABETES MELLITUS WITH HYPERGLYCEMIA, WITH LONG-TERM CURRENT USE OF INSULIN (HCC): ICD-10-CM

## 2020-10-05 DIAGNOSIS — E55.9 VITAMIN D DEFICIENCY: ICD-10-CM

## 2020-10-05 DIAGNOSIS — E03.8 HYPOTHYROIDISM DUE TO HASHIMOTO'S THYROIDITIS: ICD-10-CM

## 2020-10-05 DIAGNOSIS — E11.59 HYPERTENSION ASSOCIATED WITH DIABETES (HCC): Primary | ICD-10-CM

## 2020-10-05 DIAGNOSIS — Z79.4 TYPE 2 DIABETES MELLITUS WITH HYPERGLYCEMIA, WITH LONG-TERM CURRENT USE OF INSULIN (HCC): ICD-10-CM

## 2020-10-05 DIAGNOSIS — E78.2 MIXED DIABETIC HYPERLIPIDEMIA ASSOCIATED WITH TYPE 2 DIABETES MELLITUS (HCC): ICD-10-CM

## 2020-10-05 PROCEDURE — 95251 CONT GLUC MNTR ANALYSIS I&R: CPT | Performed by: NURSE PRACTITIONER

## 2020-10-05 PROCEDURE — 99213 OFFICE O/P EST LOW 20 MIN: CPT | Performed by: NURSE PRACTITIONER

## 2020-10-05 RX ORDER — METHOCARBAMOL 750 MG/1
750 TABLET, FILM COATED ORAL DAILY
COMMUNITY
Start: 2020-09-30 | End: 2020-10-10

## 2020-10-05 RX ORDER — DULOXETIN HYDROCHLORIDE 30 MG/1
30 CAPSULE, DELAYED RELEASE ORAL DAILY
COMMUNITY
Start: 2020-10-09 | End: 2022-10-27

## 2020-10-05 RX ORDER — PLECANATIDE 3 MG/1
1 TABLET ORAL DAILY
COMMUNITY
Start: 2020-09-29

## 2020-10-05 NOTE — PROGRESS NOTES
[place] [address]  [address]  Phone [phone] Fax [phone]     Patient Name: Stephenie Valdes 2020  : 1970  Age: 52 y.o.   Patient Address: [address] [place] [address]       Patient Phone: [phone] (home) [phone] (work)    REFERRAL  Referred to: DME provider of patient's choice  Stephenie Valdes is referred for the following:    DME Equipment HPCPS Code Setting   CPAP device with flex or comparable pressure relief per comfort  8cm   Heated Humidifier  Patient Choice       Replinishible PAP Supplies, 1 year supply  Item HPCPS Code Frequency   Mask of choice  or  1 per 3 months   Nasal Mask cushion/pillows  or  2 per 30 days   Full Face Mask Interface  1 per 30 days   Headgear  1 per 6 months   Tubing, length of choice  or  1 per 3 months   Water Chamber  1 per 6 months   Chinstrap  1 per 6 months   Disposable Filters  2 per 30 days   Reusable Filters  1 per 6 months     Diagnoses:  Obstructive sleep apnea (G47.33)  Length of Need: Lifetime, 99    Ordering Provider: Benito Holstein, M.D. Rosi Crisp: [phone]         Signature:       Date: 2020      Electronically Signed by Benito Holstein, M.D.

## 2020-10-05 NOTE — PROGRESS NOTES
"  Subjective    Yon Ochoa is a 50 y.o. female. she is here today for follow-up.    History of Present Illness     IN OFFICE VISIT       Referring provider     Primary Care Provider     Mallika Edge APRN     REASON - diabetes      Duration 10 years     Timing - Diabetes is Constant     Quality - near control      Severity -  moderate     Complications - none     Current symptoms/problems  paresthesia of the feet      Alleviating Factors: Compliance       Side Effects  none     Current diet  in general, a \"healthy\" diet       Current exercise none     Current monitoring regimen: home blood tests - checking 4 x daily before dexcom     Now usiing dexcom and able to monitor more frequently and having less hypoglycemia            Lab Results   Component Value Date     HGBA1C 5.80 (H) 06/01/2020         Home blood sugar records:     Dexcom G6 downloaded           Hypoglycemia nocturnal      patient is currently on a steroid dose pack for shoulder and states sugars are higher since on steriod       The following portions of the patient's history were reviewed and updated as appropriate:   Past Medical History:   Diagnosis Date   • Carpal tunnel syndrome on right 2/3/2020   • Diabetes (CMS/[facility])    • GERD (gastroesophageal reflux disease)    • Hyperlipidemia    • Hypertension    • Hypertension associated with diabetes (CMS/[facility]) 12/4/2019   • Hypothyroidism due to Hashimoto's thyroiditis 12/4/2019   • Mixed diabetic hyperlipidemia associated with type 2 diabetes mellitus (CMS/[facility]) 12/4/2019   • Type 2 diabetes mellitus with hyperglycemia, with long-term current use of insulin (CMS/[facility]) 12/4/2019     No past surgical history on file.  No family history on file.  OB History    No obstetric history on file.       Current Outpatient Medications   Medication Sig Dispense Refill   • aspirin 81 MG EC tablet Take 81 mg by mouth Daily.     • atorvastatin (LIPITOR) 20 MG tablet Take 20 mg by mouth.     • clonazePAM (KlonoPIN) " 0.5 MG tablet Take 0.5 mg by mouth As Needed for Seizures.     • Continuous Blood Gluc Sensor (DEXCOM G6 SENSOR) As Needed (glucose control). Every 10 daysDexcom G6 Sensor Kit [address]-08 Use as directed for continuous glucose monitoring 9 each 3   • Ertugliflozin L-PyroglutamicAc (STEGLATRO) 5 MG tablet Take 1 tablet by mouth Every Morning. 30 tablet 11   • famotidine (PEPCID) 20 MG tablet Take 20 mg by mouth.     • Insulin Degludec (TRESIBA FLEXTOUCH) 200 UNIT/ML solution pen-injector pen injection Inject 100 Units under the skin into the appropriate area as directed Every Night. 5 pen 11   • Insulin Lispro, 1 Unit Dial, (ADMELOG SOLOSTAR) 100 UNIT/ML solution pen-injector Up to 20 units with meals 6 pen 11   • levothyroxine (SYNTHROID, LEVOTHROID) 125 MCG tablet Take 125 mcg by mouth.     • methocarbamol (ROBAXIN) 750 MG tablet Take 750 mg by mouth Daily.     • montelukast (SINGULAIR) 10 MG tablet Take 10 mg by mouth.     • quinapril (ACCUPRIL) 20 MG tablet Take 20 mg by mouth.     • Semaglutide,0.25 or 0.5MG/DOS, (OZEMPIC, 0.25 OR 0.5 MG/DOSE,) 2 MG/1.5ML solution pen-injector Inject 0.5 mg under the skin into the appropriate area as directed 1 (One) Time Per Week. 0.5 mg weekly 1 pen 11   • topiramate (TOPAMAX) 50 MG tablet Take 50 mg by mouth 2 (Two) Times a Day.     • traZODone (DESYREL) 100 MG tablet Take 100 mg by mouth.     • Trulance 3 MG tablet Take 1 tablet by mouth Daily.     • vitamin B-6 (PYRIDOXINE) 50 MG tablet Take 100 mg by mouth Daily.     • VITAMIN D PO Take  by mouth As Needed.     • [START ON 10/9/2020] DULoxetine (CYMBALTA) 30 MG capsule Take 30 mg by mouth Daily.       No current facility-administered medications for this visit.      Allergies   Allergen Reactions   • Loratadine-Pseudoephedrine Er Other (See Comments)     Makes migraines worse  Claritin D   • Meperidine Other (See Comments)     Aggressive  Demerol   • Percocet [Oxycodone-Acetaminophen] Rash     Social History  "    Socioeconomic History   • Marital status: Single     Spouse name: Not on file   • Number of children: Not on file   • Years of education: Not on file   • Highest education level: Not on file   Tobacco Use   • Smoking status: Never Smoker   • Smokeless tobacco: Never Used   Substance and Sexual Activity   • Alcohol use: No     Frequency: Never   • Drug use: No       Review of Systems  Review of Systems   Constitutional: Negative for activity change, appetite change, diaphoresis and fatigue.   HENT: Negative for facial swelling, sneezing, sore throat, tinnitus, trouble swallowing and voice change.    Eyes: Negative for photophobia, pain, discharge, redness, itching and visual disturbance.   Respiratory: Negative for apnea, cough, choking, chest tightness and shortness of breath.    Cardiovascular: Negative for chest pain, palpitations and leg swelling.   Gastrointestinal: Negative for abdominal distention, abdominal pain, constipation, diarrhea, nausea and vomiting.   Endocrine: Negative for cold intolerance, heat intolerance, polydipsia, polyphagia and polyuria.   Genitourinary: Negative for difficulty urinating, dysuria, frequency, hematuria and urgency.   Musculoskeletal: Negative for arthralgias, back pain, gait problem, joint swelling, myalgias, neck pain and neck stiffness.   Skin: Negative for color change, pallor, rash and wound.   Neurological: Negative for dizziness, tremors, weakness, light-headedness, numbness and headaches.   Hematological: Negative for adenopathy. Does not bruise/bleed easily.   Psychiatric/Behavioral: Negative for behavioral problems, confusion and sleep disturbance.        Objective    /52   Pulse 85   Ht 157.5 cm (62\")   Wt 76 kg (167 lb 9.6 oz)   SpO2 99%   BMI 30.65 kg/m²   Physical Exam  Constitutional:       General: She is not in acute distress.     Appearance: She is well-developed.   HENT:      Head: Normocephalic and atraumatic.      Right Ear: External ear " normal.      Left Ear: External ear normal.      Nose: Nose normal.   Eyes:      Conjunctiva/sclera: Conjunctivae normal.      Pupils: Pupils are equal, round, and reactive to light.   Neck:      Musculoskeletal: Normal range of motion and neck supple.      Thyroid: No thyromegaly.      Trachea: No tracheal deviation.   Cardiovascular:      Rate and Rhythm: Normal rate and regular rhythm.      Pulses:           Dorsalis pedis pulses are 2+ on the right side and 2+ on the left side.        Posterior tibial pulses are 2+ on the right side and 2+ on the left side.      Heart sounds: Normal heart sounds. No murmur.   Pulmonary:      Effort: Pulmonary effort is normal. No respiratory distress.      Breath sounds: Normal breath sounds. No wheezing.   Abdominal:      General: Bowel sounds are normal.      Palpations: Abdomen is soft.      Tenderness: There is no abdominal tenderness. There is no guarding or rebound.   Musculoskeletal: Normal range of motion.         General: No tenderness or deformity.   Feet:      Right foot:      Skin integrity: Skin integrity normal.      Toenail Condition: Right toenails are normal.      Left foot:      Skin integrity: Skin integrity normal.      Toenail Condition: Left toenails are normal.      Comments: Diabetic Foot Exam Performed and Monofilament Test Performed    Skin:     General: Skin is warm and dry.      Findings: No rash.   Neurological:      Mental Status: She is alert and oriented to person, place, and time.      Cranial Nerves: No cranial nerve deficit.   Psychiatric:         Behavior: Behavior normal.         Thought Content: Thought content normal.         Judgment: Judgment normal.         Lab Review  Glucose (mg/dL)   Date Value   06/12/2020 143 (H)   06/01/2020 82   12/05/2019 66   10/24/2019 59 (L)   10/24/2019 64 (L)   10/21/2019 101     Sodium (mmol/L)   Date Value   06/12/2020 139   06/01/2020 136   12/05/2019 138   10/24/2019 139   10/24/2019 142   10/21/2019 140      Potassium (mmol/L)   Date Value   06/12/2020 4.4   06/01/2020 4.4   12/05/2019 4.3   10/24/2019 3.7   10/24/2019 4.0   10/15/2019 3.8     Chloride (mmol/L)   Date Value   06/12/2020 103   06/01/2020 100   12/05/2019 107   10/24/2019 103   10/24/2019 108   10/21/2019 107     CO2 (mmol/L)   Date Value   06/12/2020 20 (L)   06/01/2020 23.0   12/05/2019 16.5 (L)   10/24/2019 21.0 (L)   10/24/2019 21 (L)   10/21/2019 22     BUN (mg/dL)   Date Value   06/12/2020 14   06/01/2020 18   12/05/2019 15   10/24/2019 10   10/24/2019 11   10/21/2019 12     Creatinine (mg/dL)   Date Value   06/12/2020 0.8   06/01/2020 0.75   12/05/2019 0.85   10/24/2019 0.59   10/24/2019 0.7   10/21/2019 0.7     Hemoglobin A1C (%)   Date Value   06/12/2020 6.6 (H)   06/01/2020 5.80 (H)   12/05/2019 6.60 (H)   05/24/2019 9.1 (H)     Triglycerides (mg/dL)   Date Value   06/12/2020 288 (H)   06/01/2020 197 (H)   12/05/2019 214 (H)   05/24/2019 213 (H)   07/06/2018 1,030 (H)     LDL Cholesterol  (mg/dL)   Date Value   06/12/2020 22   06/01/2020 27   12/05/2019 34   05/24/2019 39   07/06/2018 see below       Assessment/Plan      1. Hypertension associated with diabetes (CMS/[facility])    2. Mixed diabetic hyperlipidemia associated with type 2 diabetes mellitus (CMS/[facility])    3. Type 2 diabetes mellitus with hyperglycemia, with long-term current use of insulin (CMS/[facility])    4. Hypothyroidism due to Hashimoto's thyroiditis    5. Vitamin D deficiency    .    Medications prescribed:  Outpatient Encounter Medications as of 10/5/2020   Medication Sig Dispense Refill   • aspirin 81 MG EC tablet Take 81 mg by mouth Daily.     • atorvastatin (LIPITOR) 20 MG tablet Take 20 mg by mouth.     • clonazePAM (KlonoPIN) 0.5 MG tablet Take 0.5 mg by mouth As Needed for Seizures.     • Continuous Blood Gluc Sensor (DEXCOM G6 SENSOR) As Needed (glucose control). Every 10 daysDexcom G6 Sensor Kit [address]-56 Use as directed for continuous glucose monitoring 9 each 3   •  Ertugliflozin L-PyroglutamicAc (STEGLATRO) 5 MG tablet Take 1 tablet by mouth Every Morning. 30 tablet 11   • famotidine (PEPCID) 20 MG tablet Take 20 mg by mouth.     • Insulin Degludec (TRESIBA FLEXTOUCH) 200 UNIT/ML solution pen-injector pen injection Inject 100 Units under the skin into the appropriate area as directed Every Night. 5 pen 11   • Insulin Lispro, 1 Unit Dial, (ADMELOG SOLOSTAR) 100 UNIT/ML solution pen-injector Up to 20 units with meals 6 pen 11   • levothyroxine (SYNTHROID, LEVOTHROID) 125 MCG tablet Take 125 mcg by mouth.     • methocarbamol (ROBAXIN) 750 MG tablet Take 750 mg by mouth Daily.     • montelukast (SINGULAIR) 10 MG tablet Take 10 mg by mouth.     • quinapril (ACCUPRIL) 20 MG tablet Take 20 mg by mouth.     • Semaglutide,0.25 or 0.5MG/DOS, (OZEMPIC, 0.25 OR 0.5 MG/DOSE,) 2 MG/1.5ML solution pen-injector Inject 0.5 mg under the skin into the appropriate area as directed 1 (One) Time Per Week. 0.5 mg weekly 1 pen 11   • topiramate (TOPAMAX) 50 MG tablet Take 50 mg by mouth 2 (Two) Times a Day.     • traZODone (DESYREL) 100 MG tablet Take 100 mg by mouth.     • Trulance 3 MG tablet Take 1 tablet by mouth Daily.     • vitamin B-6 (PYRIDOXINE) 50 MG tablet Take 100 mg by mouth Daily.     • VITAMIN D PO Take  by mouth As Needed.     • [START ON 10/9/2020] DULoxetine (CYMBALTA) 30 MG capsule Take 30 mg by mouth Daily.     • [DISCONTINUED] amitriptyline (ELAVIL) 25 MG tablet Take 25 mg by mouth.     • [DISCONTINUED] Ertugliflozin-metFORMIN HCl (SEGLUROMET) 2.5-1000 MG tablet Take 1 tablet by mouth 2 (Two) Times a Day With Meals. 60 tablet 11   • [DISCONTINUED] insulin aspart (NOVOLOG FLEXPEN) 100 UNIT/ML solution pen-injector sc pen Up to 20 units with meals 6 pen 11   • [DISCONTINUED] naproxen sodium (ANAPROX) 550 MG tablet Take 550 mg by mouth 2 (Two) Times a Day With Meals.     • [DISCONTINUED] VENLAFAXINE HCL PO Take 150 mg by mouth.       No facility-administered encounter medications on  file as of 10/5/2020.        Orders placed during this encounter include:  Orders Placed This Encounter   Procedures   • Hemoglobin A1c     Standing Status:   Future     Standing Expiration Date:   10/5/2021   • TSH     Standing Status:   Future     Standing Expiration Date:   10/5/2021   • Protein / Creatinine Ratio, Urine - Urine, Clean Catch     Standing Status:   Future     Standing Expiration Date:   10/5/2021   • Microalbumin / Creatinine Urine Ratio - Urine, Clean Catch     Standing Status:   Future     Standing Expiration Date:   10/5/2021   • Comprehensive Metabolic Panel     Standing Status:   Future     Standing Expiration Date:   10/5/2021   • Lipid Panel     Standing Status:   Future     Standing Expiration Date:   10/5/2021   • Vitamin D 25 Hydroxy     Standing Status:   Future     Standing Expiration Date:   10/5/2021   • Vitamin B12     Standing Status:   Future     Standing Expiration Date:   10/5/2021   • CBC & Differential     Standing Status:   Future     Standing Expiration Date:   10/5/2021     Order Specific Question:   Manual Differential     Answer:   No     Pt has type 2 diabetes   Glycemic Management:               Lab Results   Component Value Date     HGBA1C 5.80 (H) 06/01/2020               Tresiba taking 50 units      If you drop more than 40 points from bedtime to morning decrease by 5 units or if you wake up with a sugar less than 80         =============================     Novolog       3 units for every 15 grams of carbohydrate that you eat TID     She will need to double while on the steroid            Plus sliding scale     3 units for every 50 above 150            ========================================     ozempic 0.5 mg once weekly on Sunday      segluromet -- change to steglatro mg once daily         for fear of metformin by patient         Dexcom G6                 Lipid Management           Total Cholesterol   Date Value Ref Range Status   06/01/2020 96 0 - 200 mg/dL  Final            Triglycerides   Date Value Ref Range Status   06/01/2020 197 (H) 0 - 150 mg/dL Final   05/24/2019 213 (H) 0 - 149 mg/dL Final              HDL Cholesterol   Date Value Ref Range Status   06/01/2020 30 (L) 40 - 60 mg/dL Final   05/24/2019 29 (L) 65 - 121 mg/dL Final       Comment:       VALUES>60 MG/DL ARE ASSOCIATED WITH A DECREASED RISK OF  ATHEROSCLEROTIC CARDIOVASCULAR DISEASE              LDL Cholesterol    Date Value Ref Range Status   06/01/2020 27 0 - 100 mg/dL Final   05/24/2019 39 <100 mg/dL Final       Comment:       <100 MG/DL=OPITIMAL     100-129 MG/DL=DESIRABLE     130-159 MG/DL BORDERLINE=INCREASED RISK OF ATHEROSCLEROTIC  CARDIOVASCULAR DISEASE     > OR = 160 MG/DL=ASSOCIATED WITH AN INCREASE RISK OF  ATHEROSCLEROTIC CARDIOVASCULAR DISEASE                   On lipitor 20 mg one daily         Blood Pressure Management:          Quinapril 20 mg daily         Microvascular Complication Monitoring:       Last Eye Exam Evaluation --- August 2019, no DR --schedule Nov. 2, 2020               -----------     Last Microalbumin-Proteinuria Assessment           Component      Latest Ref Rng & Units 6/1/2020   Microalbumin/Creatinine Ratio           Creatinine, Urine      mg/dL 164.3   Microalbumin, Urine      mg/dL <1.2      -----------        Neuropathy  yes           Weight Related:      Patient's Body mass index is 30.65 kg/m². BMI is above normal parameters. Recommendations include: nutrition counseling.       Decrease caloric intake by 500 calories per day      Bone Health     Vitamin d def.     Taking vitamin d daily        Component      Latest Ref Rng & Units 6/1/2020   25 Hydroxy, Vitamin D      30.0 - 100.0 ng/ml 36.7         Thyroid Health        On levothyroxine 125 mcgs daily              Lab Results   Component Value Date    TSH 0.588 06/12/2020                Other Diabetes Related Aspects      Lab Results   Component Value Date     [id number] 06/01/2020             4.  Follow-up: No follow-ups on file.

## 2020-10-08 ENCOUNTER — TELEPHONE (OUTPATIENT)
Dept: INTERNAL MEDICINE | Age: 50
End: 2020-10-08

## 2020-10-09 ENCOUNTER — TELEPHONE (OUTPATIENT)
Dept: NEUROLOGY | Age: 50
End: 2020-10-09

## 2020-10-09 NOTE — TELEPHONE ENCOUNTER
Called patient about an appointment , with Sindi Aleman left message on patient voice mail about the appointment.

## 2020-10-12 RX ORDER — QUINAPRIL 20 MG/1
TABLET ORAL
Qty: 30 TABLET | Refills: 0 | Status: SHIPPED | OUTPATIENT
Start: 2020-10-12 | End: 2020-11-09 | Stop reason: SDUPTHER

## 2020-10-12 RX ORDER — SPIRONOLACTONE 50 MG/1
50 TABLET, FILM COATED ORAL DAILY
Qty: 30 TABLET | Refills: 0 | Status: SHIPPED | OUTPATIENT
Start: 2020-10-12 | End: 2020-11-09 | Stop reason: SDUPTHER

## 2020-10-12 NOTE — TELEPHONE ENCOUNTER
Raoul Bauman called requesting a refill of the below medication which has been pended for you:     Requested Prescriptions     Pending Prescriptions Disp Refills    spironolactone (ALDACTONE) 50 MG tablet 30 tablet 0     Sig: Take 1 tablet by mouth daily    quinapril (ACCUPRIL) 20 MG tablet 30 tablet 0     Sig: Take 1 tablet by mouth every night at bedtime       Last Appointment Date: 9/30/2020  Next Appointment Date: 10/27/2020    Allergies   Allergen Reactions    Claritin-D 12 Hour [Loratadine-Pseudoephedrine Er] Other (See Comments)     \"it intensifies my migraines\"    Demerol Hcl [Meperidine] Other (See Comments)     Aggression    Percocet [Oxycodone-Acetaminophen] Rash

## 2020-10-22 DIAGNOSIS — Z79.4 TYPE 2 DIABETES MELLITUS WITHOUT COMPLICATION, WITH LONG-TERM CURRENT USE OF INSULIN (HCC): ICD-10-CM

## 2020-10-22 DIAGNOSIS — E55.9 VITAMIN D DEFICIENCY: ICD-10-CM

## 2020-10-22 DIAGNOSIS — E11.9 TYPE 2 DIABETES MELLITUS WITHOUT COMPLICATION, WITH LONG-TERM CURRENT USE OF INSULIN (HCC): ICD-10-CM

## 2020-10-22 DIAGNOSIS — E78.2 MIXED HYPERLIPIDEMIA: ICD-10-CM

## 2020-10-22 DIAGNOSIS — E03.9 ACQUIRED HYPOTHYROIDISM: ICD-10-CM

## 2020-10-22 DIAGNOSIS — I10 ESSENTIAL HYPERTENSION: ICD-10-CM

## 2020-10-22 LAB
ALBUMIN SERPL-MCNC: 4.5 G/DL (ref 3.5–5.2)
ALP BLD-CCNC: 94 U/L (ref 35–104)
ALT SERPL-CCNC: 17 U/L (ref 5–33)
ANION GAP SERPL CALCULATED.3IONS-SCNC: 13 MMOL/L (ref 7–19)
AST SERPL-CCNC: 32 U/L (ref 5–32)
BASOPHILS ABSOLUTE: 0.1 K/UL (ref 0–0.2)
BASOPHILS RELATIVE PERCENT: 0.5 % (ref 0–1)
BILIRUB SERPL-MCNC: 0.4 MG/DL (ref 0.2–1.2)
BUN BLDV-MCNC: 13 MG/DL (ref 6–20)
CALCIUM SERPL-MCNC: 9.4 MG/DL (ref 8.6–10)
CHLORIDE BLD-SCNC: 104 MMOL/L (ref 98–111)
CHOLESTEROL, TOTAL: 115 MG/DL (ref 160–199)
CO2: 22 MMOL/L (ref 22–29)
CREAT SERPL-MCNC: 0.7 MG/DL (ref 0.5–0.9)
CREATININE URINE: 153.6 MG/DL (ref 4.2–622)
EOSINOPHILS ABSOLUTE: 0.2 K/UL (ref 0–0.6)
EOSINOPHILS RELATIVE PERCENT: 1.4 % (ref 0–5)
GFR AFRICAN AMERICAN: >59
GFR NON-AFRICAN AMERICAN: >60
GLUCOSE BLD-MCNC: 83 MG/DL (ref 74–109)
HBA1C MFR BLD: 6.9 % (ref 4–6)
HCT VFR BLD CALC: 37.7 % (ref 37–47)
HDLC SERPL-MCNC: 36 MG/DL (ref 65–121)
HEMOGLOBIN: 12.5 G/DL (ref 12–16)
IMMATURE GRANULOCYTES #: 0 K/UL
LDL CHOLESTEROL CALCULATED: 47 MG/DL
LYMPHOCYTES ABSOLUTE: 3.8 K/UL (ref 1.1–4.5)
LYMPHOCYTES RELATIVE PERCENT: 27.4 % (ref 20–40)
MCH RBC QN AUTO: 28.9 PG (ref 27–31)
MCHC RBC AUTO-ENTMCNC: 33.2 G/DL (ref 33–37)
MCV RBC AUTO: 87.1 FL (ref 81–99)
MICROALBUMIN UR-MCNC: 1.6 MG/DL (ref 0–19)
MICROALBUMIN/CREAT UR-RTO: 10.4 MG/G
MONOCYTES ABSOLUTE: 0.6 K/UL (ref 0–0.9)
MONOCYTES RELATIVE PERCENT: 4.6 % (ref 0–10)
NEUTROPHILS ABSOLUTE: 9.1 K/UL (ref 1.5–7.5)
NEUTROPHILS RELATIVE PERCENT: 65.8 % (ref 50–65)
PDW BLD-RTO: 13.5 % (ref 11.5–14.5)
PLATELET # BLD: 241 K/UL (ref 130–400)
PMV BLD AUTO: 9.7 FL (ref 9.4–12.3)
POTASSIUM SERPL-SCNC: 4 MMOL/L (ref 3.5–5)
RBC # BLD: 4.33 M/UL (ref 4.2–5.4)
SODIUM BLD-SCNC: 139 MMOL/L (ref 136–145)
TOTAL PROTEIN: 8.3 G/DL (ref 6.6–8.7)
TRIGL SERPL-MCNC: 159 MG/DL (ref 0–149)
TSH SERPL DL<=0.05 MIU/L-ACNC: 0.38 UIU/ML (ref 0.27–4.2)
VITAMIN D 25-HYDROXY: 43.2 NG/ML
WBC # BLD: 13.8 K/UL (ref 4.8–10.8)

## 2020-10-27 ENCOUNTER — OFFICE VISIT (OUTPATIENT)
Dept: INTERNAL MEDICINE | Age: 50
End: 2020-10-27
Payer: COMMERCIAL

## 2020-10-27 VITALS
DIASTOLIC BLOOD PRESSURE: 60 MMHG | BODY MASS INDEX: 30.99 KG/M2 | OXYGEN SATURATION: 97 % | HEIGHT: 62 IN | SYSTOLIC BLOOD PRESSURE: 128 MMHG | WEIGHT: 168.4 LBS | HEART RATE: 67 BPM

## 2020-10-27 PROBLEM — M25.519 NECK AND SHOULDER PAIN: Status: ACTIVE | Noted: 2020-10-27

## 2020-10-27 PROBLEM — G43.109 MIGRAINE WITH AURA: Status: ACTIVE | Noted: 2020-09-30

## 2020-10-27 PROBLEM — M54.2 NECK AND SHOULDER PAIN: Status: ACTIVE | Noted: 2020-10-27

## 2020-10-27 PROCEDURE — 99214 OFFICE O/P EST MOD 30 MIN: CPT | Performed by: INTERNAL MEDICINE

## 2020-10-27 RX ORDER — METHOCARBAMOL 500 MG/1
500 TABLET, FILM COATED ORAL 4 TIMES DAILY
Qty: 120 TABLET | Refills: 0 | Status: SHIPPED | OUTPATIENT
Start: 2020-10-27 | End: 2020-11-26

## 2020-10-27 RX ORDER — ERENUMAB-AOOE 70 MG/ML
70 INJECTION SUBCUTANEOUS
Qty: 1 ML | Refills: 5 | Status: SHIPPED | OUTPATIENT
Start: 2020-10-27 | End: 2020-11-26

## 2020-10-27 RX ORDER — DULOXETIN HYDROCHLORIDE 60 MG/1
60 CAPSULE, DELAYED RELEASE ORAL DAILY
Qty: 30 CAPSULE | Refills: 5 | Status: SHIPPED | OUTPATIENT
Start: 2020-10-27 | End: 2021-04-09

## 2020-10-27 ASSESSMENT — ENCOUNTER SYMPTOMS
WHEEZING: 0
RHINORRHEA: 0
BLOOD IN STOOL: 0
CONSTIPATION: 0
BACK PAIN: 1
SHORTNESS OF BREATH: 0
VOMITING: 0
ABDOMINAL PAIN: 0
PHOTOPHOBIA: 1
COUGH: 0
DIARRHEA: 0
CHEST TIGHTNESS: 0
SINUS PAIN: 0
TROUBLE SWALLOWING: 0

## 2020-10-27 NOTE — ASSESSMENT & PLAN NOTE
Patient will be started on Aimovig injection first injection will be given today with monthly injections thereafter if we cannot get this medication authorized I will refer her to neurology

## 2020-10-27 NOTE — PROGRESS NOTES
[facility] INTERNAL MEDICINE  [address] [place] [address] Na Peters  [facility] [place] [address]  Phone: [phone]  Fax: [phone]    Visit Date:  10/27/2020    SUBJECTIVE:     Aide Jones is a 52 y.o. female presents today in the office for:    Chief Complaint   Patient presents with    Hypertension     4 week follow up    Diabetes     neuropathy is worse    Migraine       HPI  26-year-old female who presents for follow-up visit  This was a follow-up visit for her neck pain she would have at this time completed 4 weeks of physical therapy there was some miscommunication patient had not gone for physical therapy once completed for 4 weeks we will attempt to reorder her CAT scan  She has her worst migraine headache did not go to work today has been taking Topamax for years she does not get relief from triptans and is asking to see if we can give her other medications for migraines  Her neuropathy has been acting worse she was started on Cymbalta 30 and she is requesting to increase it  Chronic neck pain she currently is requesting for for some muscle relaxant but she will try and get physical therapy started  Past Medical History:   Diagnosis Date    Anxiety     Bacteremia 10/17/2019    Bandemia 10/16/2019    Class 2 obesity due to excess calories without serious comorbidity with body mass index (BMI) of 36.0 to 36.9 in adult 12/19/2019    Depression     Diabetes mellitus ([address]     GERD (gastroesophageal reflux disease)     Hyperglycemia 8/16/2017    Hyperlipidemia     Hypertension     Hypothyroidism     Lower abdominal pain 1/22/2019    Neuropathy 10/16/2019    Obstructive sleep apnea     Osteoarthritis     PONV (postoperative nausea and vomiting)     Skin ulcer of flank with fat layer exposed ([address] 1/8/2020    Staph infection 11/11/2018       Past Surgical History:   Procedure Laterality Date    APPENDECTOMY      as a 1st grader   [address] 2000, 2003    CHOLECYSTECTOMY, LAPAROSCOPIC      1994    COLONOSCOPY      \"more than 15 + yrs ago\" PER PT RECALL    COLONOSCOPY N/A 7/30/2019    Dr Jessie Hayden hemorrhoids-Grade 1, suboptimal prep, 3 yr recall    HYSTERECTOMY, TOTAL ABDOMINAL      May 2012, withOUT Ophorectomy    [place] RE[facility] N/CARPAL TUNNEL SURG Left 6/16/2017    CARPAL TUNNEL RELEASE performed by Ying Mead MD at [address] N/CARPAL TUNNEL SURG Right 7/21/2017    CARPAL TUNNEL RELEASE performed by Ying Mead MD at [address]      as a 7th grader    UPPER GASTROINTESTINAL ENDOSCOPY N/A 7/30/2019    UPPER GASTROINTESTINAL ENDOSCOPY  7/30/2019    Dr Srinivasan Head exam, empiric dil with 54F over wire, neg EoE       Social History     Socioeconomic History    Marital status:      Spouse name: Not on file    Number of children: 4    Years of education: Not on file    Highest education level: Not on file   Occupational History    Occupation: [employer] Employee   Social Needs    Financial resource strain: Not on file    Food insecurity     Worry: Not on file     Inability: Not on file   [employer] needs     Medical: Not on file     Non-medical: Not on file   Tobacco Use    Smoking status: Never Smoker    Smokeless tobacco: Never Used   Substance and Sexual Activity    Alcohol use: Not Currently     Alcohol/week: 0.0 standard drinks     Comment: \"once in a blue moon\", a few times a year    Drug use: No    Sexual activity: Not on file     Comment: 4   Lifestyle    Physical activity     Days per week: Not on file     Minutes per session: Not on file    Stress: Not on file   Relationships    Social connections     Talks on phone: Not on file     Gets together: Not on file     Attends Jewish service: Not on file     Active member of club or organization: Not on file     Attends meetings of clubs or organizations: Not on file Relationship status: Not on file    Intimate partner violence     Fear of current or ex partner: Not on file     Emotionally abused: Not on file     Physically abused: Not on file     Forced sexual activity: Not on file   Other Topics Concern    Not on file   Social History Narrative    CODE STATUS: Full Code    HEALTH CARE PROXY: her boyfriend, Mr. Serafin Reyes, [phone]    AMBULATES: independently    DOMICILED: lives in a private house, lives with her boyfriend and two of the kids, has dog and cat, has 3-4 steps to enter the home and a ramp, no stairs inside       Family History   Problem Relation Age of Onset    High Blood Pressure Mother     Cancer Mother         of the brain    Other Mother         second hand smoker    High Blood Pressure Father     High Cholesterol Father     Colon Polyps Father     Other Father         smoker    Diabetes Sister         type 1, from alcohol    High Blood Pressure Sister     High Blood Pressure Brother     Colon Cancer Maternal Aunt     Colon Cancer Paternal Uncle     Colon Cancer Paternal Grandfather     Diabetes Brother         from alcohol    Hypertension Brother     No Known Problems Son     No Known Problems Son     No Known Problems Son     No Known Problems Daughter     Esophageal Cancer Neg Hx     Liver Cancer Neg Hx     Liver Disease Neg Hx     Rectal Cancer Neg Hx     Stomach Cancer Neg Hx        Allergies   Allergen Reactions    Claritin-D 12 Hour [Loratadine-Pseudoephedrine Er] Other (See Comments)     \"it intensifies my migraines\"    Demerol Hcl [Meperidine] Other (See Comments)     Aggression    Percocet [Oxycodone-Acetaminophen] Rash       Current Outpatient Medications   Medication Sig Dispense Refill    Erenumab-aooe (AIMOVIG) 70 MG/ML SOAJ Inject 70 mg into the skin every 30 days 1 mL 5    DULoxetine (CYMBALTA) 60 MG extended release capsule Take 1 capsule by mouth daily 30 capsule 5    methocarbamol (ROBAXIN) 500 MG tablet Take 1 tablet by mouth 4 times daily 120 tablet 0    spironolactone (ALDACTONE) 50 MG tablet Take 1 tablet by mouth daily 30 tablet 0    quinapril (ACCUPRIL) 20 MG tablet Take 1 tablet by mouth every night at bedtime 30 tablet 0    clonazePAM (KLONOPIN) 0.5 MG tablet TAKE 1 TABLET BY MOUTH DAILY AS NEEDED. 30 tablet 0    Cholecalciferol (VITAMIN D) 50 MCG (2000 UT) CAPS capsule Take by mouth as needed      topiramate (TOPAMAX) 50 MG tablet TAKE 2 TABLETS BY MOUTH EVERY NIGHT AT BEDTIME 60 tablet 5    traZODone (DESYREL) 100 MG tablet TAKE 1 TABLET BY MOUTH EVERY NIGHT 30 tablet 5    atorvastatin (LIPITOR) 20 MG tablet Take 1 tablet by mouth daily 30 tablet 5    montelukast (SINGULAIR) 10 MG tablet Take 1 tablet by mouth nightly 30 tablet 5    tiZANidine (ZANAFLEX) 2 MG tablet Take 1 tablet by mouth every 8 hours as needed (spasm) 60 tablet 0    econazole nitrate 1 % cream Apply topically daily.  85 g 0    famotidine (PEPCID) 20 MG tablet Take 1 tablet by mouth 2 times daily 30 tablet 11    levothyroxine (SYNTHROID) 125 MCG tablet TAKE 1 TABLET BY MOUTH DAILY 30 tablet 11    ACCU-CHEK SOFTCLIX LANCETS MISC USE TO CHECK BLOOD SUGAR THREE TIMES DAILY AS DIRECTED 100 each 1    polyethylene glycol (GLYCOLAX) powder Take 17 g by mouth 2 times daily  1    insulin aspart (NOVOLOG FLEXPEN) 100 UNIT/ML injection pen Inject into the skin 3 times daily (before meals) Counting Carbs - Sliding Scale      Semaglutide (OZEMPIC, 0.25 OR 0.5 MG/DOSE, SC) Inject 0.5 mg into the skin once a week On Sundays      vitamin B-6 (PYRIDOXINE) 100 MG tablet Take 100 mg by mouth daily      TRESIBA FLEXTOUCH 200 UNIT/ML SOPN INJECT 60 UNITS INTO THE SKIN DAILY AS DIRECTED (Patient taking differently: 65 units each morning not taking at all during the night.) 4 pen 5    glucose monitoring kit (FREESTYLE) monitoring kit 1 kit by Does not apply route daily 1 kit 0    blood glucose monitor kit and supplies Use to check sugar  E11.9 1 kit 0    Insulin Pen Needle 32G X 4 MM MISC 1 each by Does not apply route daily 100 each 3    Insulin Syringe-Needle U-100 30G X 5/16\" 0.5 ML MISC 1 each by Does not apply route daily 100 each 3    Lancets MISC Use three times a day. 100 each 3    ASPIRIN LOW DOSE 81 MG EC tablet TAKE 1 TABLET BY MOUTH DAILY 30 tablet 0    Plecanatide 3 MG TABS Take 1 tablet by mouth daily 30 tablet 11    ACCU-CHEK GAVI PLUS strip USE TO TEST ONCE DAILY AS NEEDED 100 strip 0     No current facility-administered medications for this visit. Patient Active Problem List   Diagnosis    Hypothyroidism    Vitamin D deficiency    Essential hypertension    Mixed hyperlipidemia    Carpal tunnel syndrome on right    Hypoesthesia of skin    Type 2 diabetes mellitus without complication, with long-term current use of insulin ([address]    Skin infection    Chronic constipation    GERD without esophagitis    Family history of polyps in the colon    Neuropathy    Polypharmacy    Superficial mixed comedonal and inflammatory acne vulgaris    MRSA (methicillin resistant staph aureus) culture positive    Anxiety    Class 2 obesity due to excess calories without serious comorbidity with body mass index (BMI) of 36.0 to 36.9 in adult    Migraine with aura    Obstructive sleep apnea    Neck and shoulder pain             Review of Systems:   Review of Systems   Constitutional: Positive for fatigue. Negative for activity change, appetite change, chills and fever. HENT: Negative for hearing loss, postnasal drip, rhinorrhea, sinus pain and trouble swallowing. Eyes: Positive for photophobia. Negative for visual disturbance. Respiratory: Negative for cough, chest tightness, shortness of breath and wheezing. Cardiovascular: Negative for chest pain, palpitations and leg swelling. Gastrointestinal: Negative for abdominal pain, blood in stool, constipation, diarrhea and vomiting.    Endocrine: Negative for cold intolerance. Genitourinary: Negative for frequency and urgency. Musculoskeletal: Positive for back pain and neck pain. Negative for arthralgias and myalgias. Allergic/Immunologic: Negative for environmental allergies. Neurological: Positive for numbness and headaches. Negative for light-headedness. OBJECTIVE:   @  BP Readings from Last 3 Encounters:   10/27/20 128/60   09/30/20 122/80   09/29/20 130/70        @  Lab Results   Component Value Date    LABA1C 6.9 (H) 10/22/2020    LABA1C 6.6 (H) 06/12/2020    LABA1C 7.3 09/06/2019     Lab Results   Component Value Date    GLUF 82 05/24/2019    LABMICR 1.60 10/22/2020    LDLCALC 47 10/22/2020    CREATININE 0.7 10/22/2020        Physical Exam:    Physical Exam  Vitals signs and nursing note reviewed. Constitutional:       General: She is not in acute distress. Appearance: She is ill-appearing. Comments: Having a really bad headache today   HENT:      Head: Normocephalic. Right Ear: External ear normal.      Left Ear: External ear normal.      Nose: Nose normal.   Eyes:      General: Lids are normal. No scleral icterus. Extraocular Movements: Extraocular movements intact. Conjunctiva/sclera: Conjunctivae normal.      Pupils: Pupils are equal, round, and reactive to light. Neck:      Musculoskeletal: Normal range of motion and neck supple. Thyroid: No thyromegaly. Vascular: No JVD. Cardiovascular:      Rate and Rhythm: Normal rate and regular rhythm. Pulses: Normal pulses. Heart sounds: Normal heart sounds, S1 normal and S2 normal. No murmur. Pulmonary:      Effort: Pulmonary effort is normal. No respiratory distress. Breath sounds: Normal breath sounds and air entry. No wheezing or rales. Abdominal:      General: Bowel sounds are normal. There is no distension. Palpations: Abdomen is soft. Tenderness: There is no abdominal tenderness. Musculoskeletal: Normal range of motion. General: No deformity. Right lower leg: No edema. Left lower leg: No edema. Lymphadenopathy:      Cervical: No cervical adenopathy. Skin:     General: Skin is warm and dry. Findings: No rash. Neurological:      General: No focal deficit present. Mental Status: She is alert and oriented to person, place, and time. Cranial Nerves: Cranial nerves are intact. No cranial nerve deficit. Motor: Motor function is intact. Deep Tendon Reflexes: Reflexes normal.   Psychiatric:         Attention and Perception: Attention normal.         Mood and Affect: Affect is tearful. Behavior: Behavior normal.         Thought Content: Thought content normal.         Judgment: Judgment normal.       Orders Only on 10/22/2020   Component Date Value Ref Range Status    Microalbumin, Random Urine 10/22/2020 1.60  0.00 - 19.00 mg/dL Final    Creatinine, Ur 10/22/2020 153.6  4.2 - 622.0 mg/dL Final    Microalbumin Creatinine Ratio 10/22/2020 10.4  mg/g Final    TSH 10/22/2020 0.378  0.270 - 4.200 uIU/mL Final    Vit D, 25-Hydroxy 10/22/2020 43.2  >=30 ng/mL Final    Comment: <=20 ng/mL. ........... [place] Snare Deficient  21-29 ng/mL. ......... Lorean Snare Insufficient  >=30 ng/mL. ........ Lorean Snare Sufficient      Cholesterol, Total 10/22/2020 115* 160 - 199 mg/dL Final    Comment: <160 MG/DL=OPTIMAL    160-199 MG/DL= DESIRABLE    200-239 MG/DL=BORDERLINE-INCREASED RISK OF ATHEROSCLEROTIC  CARDIOVASCULAR DISEASE    > OR = 240 MG/DL-ASSOCIATED WITH AN INCREASED RISK OF  ATHROSCLEROTIC CARDIOVASCULAR DISEASE      Triglycerides 10/22/2020 159* 0 - 149 mg/dL Final    HDL 10/22/2020 36* 65 - 121 mg/dL Final    Comment: VALUES>60 MG/DL ARE ASSOCIATED WITH A DECREASED RISK OF  ATHEROSCLEROTIC CARDIOVASCULAR DISEASE      LDL Calculated 10/22/2020 47  <100 mg/dL Final    Comment: <100 MG/DL=OPITIMAL    100-129 MG/DL=DESIRABLE    130-159 MG/DL BORDERLINE=INCREASED RISK OF ATHEROSCLEROTIC  CARDIOVASCULAR DISEASE    > OR = 160 MG/DL=ASSOCIATED WITH AN INCREASE RISK OF  ATHEROSCLEROTIC CARDIOVASCULAR DISEASE      Hemoglobin A1C 10/22/2020 6.9* 4.0 - 6.0 % Final    Comment: HbA1c levels >6% are an indication of hyperglycemia during the preceding 2  to 3 months or longer. HbA1c levels may reach 20% or higher in poorly controlled diabetes. Therapeutic action is suggested at levels above 8%. Diabetes patients with HbA1c levels below 7% meet the goal of the American  Diabetes Association. HbA1c levels below the established reference range may indicate recent  episodes of hypoglycemia, the presence of Hb variants, or shortened lifetime  of erythrocytes.       Sodium 10/22/2020 139  136 - 145 mmol/L Final    Potassium 10/22/2020 4.0  3.5 - 5.0 mmol/L Final    Chloride 10/22/2020 104  98 - 111 mmol/L Final    CO2 10/22/2020 22  22 - 29 mmol/L Final    Anion Gap 10/22/2020 13  7 - 19 mmol/L Final    Glucose 10/22/2020 83  74 - 109 mg/dL Final    BUN 10/22/2020 13  6 - 20 mg/dL Final    CREATININE 10/22/2020 0.7  0.5 - 0.9 mg/dL Final    GFR Non- 10/22/2020 >60  >60 Final    Comment: This calculation may be inaccurate for patients under the age of 25 years. For ages 25 and older, a GFR >60 mL/min/1.73m2 (not corrected for weight) is  valid for stable renal function.  GFR  10/22/2020 >59  >59 Final    Comment: Chronic Kidney Disease: less than 60 ml/min/1.73 sq.m. Kidney Failure: less than 15 ml/min/1.73 sq.m. Results valid for patients 18 years and older.       Calcium 10/22/2020 9.4  8.6 - 10.0 mg/dL Final    Total Protein 10/22/2020 8.3  6.6 - 8.7 g/dL Final    Alb 10/22/2020 4.5  3.5 - 5.2 g/dL Final    Total Bilirubin 10/22/2020 0.4  0.2 - 1.2 mg/dL Final    Alkaline Phosphatase 10/22/2020 94  35 - 104 U/L Final    ALT 10/22/2020 17  5 - 33 U/L Final    AST 10/22/2020 32  5 - 32 U/L Final    WBC 10/22/2020 13.8* 4.8 - 10.8 K/uL Final    RBC 10/22/2020 4.33  4.20 - 5.40 be given today with monthly injections thereafter if we cannot get this medication authorized I will refer her to neurology         Relevant Medications    ASPIRIN LOW DOSE 81 MG EC tablet    tiZANidine (ZANAFLEX) 2 MG tablet    traZODone (DESYREL) 100 MG tablet    topiramate (TOPAMAX) 50 MG tablet    clonazePAM (KLONOPIN) 0.5 MG tablet    Erenumab-aooe (AIMOVIG) 70 MG/ML SOAJ    DULoxetine (CYMBALTA) 60 MG extended release capsule    methocarbamol (ROBAXIN) 500 MG tablet       Other    Neck and shoulder pain     Patient's neck pain still present unfortunately patient did not get her physical therapy evaluation and treatment due to miscommunication assuming that we would make the appointment for her so she will call and try and schedule so after 4 weeks of treatment if there is no improvement in her neck pain we can go ahead and order the imaging study in the meantime were going to add Robaxin for muscle sprain strain and increasing her Cymbalta to 60 mg daily              Discussed lab work within acceptable limits vitals are all within acceptable limits  Migraine to start Aimovig was given referral to neurology  Neuropathy increase Cymbalta 60 mg daily  PLAN:        Chronic neck pain will add Robaxin to current regimen and will reevaluate after physical therapy  Medications Ordered:  Orders Placed This Encounter   Medications    Erenumab-aooe (AIMOVIG) 70 MG/ML SOAJ     Sig: Inject 70 mg into the skin every 30 days     Dispense:  1 mL     Refill:  5    DULoxetine (CYMBALTA) 60 MG extended release capsule     Sig: Take 1 capsule by mouth daily     Dispense:  30 capsule     Refill:  5    methocarbamol (ROBAXIN) 500 MG tablet     Sig: Take 1 tablet by mouth 4 times daily     Dispense:  120 tablet     Refill:  0       Other Orders Placed:  Orders Placed This Encounter   Procedures   [id number], [place]     Referral Priority:   Routine     Referral Type:   Eval and Treat     Referral Reason: Specialty Services Required     Requested Specialty:   Neurology     Number of Visits Requested:   1       Return in about 3 months (around 1/27/2021).            Electronically signed by Terrence Miner MD on 10/27/2020 at 2:52 PM

## 2020-10-27 NOTE — ASSESSMENT & PLAN NOTE
Patient's neck pain still present unfortunately patient did not get her physical therapy evaluation and treatment due to miscommunication assuming that we would make the appointment for her so she will call and try and schedule so after 4 weeks of treatment if there is no improvement in her neck pain we can go ahead and order the imaging study in the meantime were going to add Robaxin for muscle sprain strain and increasing her Cymbalta to 60 mg daily

## 2020-10-28 ENCOUNTER — OFFICE VISIT (OUTPATIENT)
Dept: GASTROENTEROLOGY | Age: 50
End: 2020-10-28
Payer: COMMERCIAL

## 2020-10-28 VITALS
WEIGHT: 168 LBS | HEIGHT: 62 IN | BODY MASS INDEX: 30.91 KG/M2 | SYSTOLIC BLOOD PRESSURE: 120 MMHG | OXYGEN SATURATION: 99 % | HEART RATE: 99 BPM | DIASTOLIC BLOOD PRESSURE: 70 MMHG

## 2020-10-28 PROBLEM — R12 HEARTBURN: Status: ACTIVE | Noted: 2020-10-28

## 2020-10-28 PROBLEM — R13.10 DYSPHAGIA: Status: ACTIVE | Noted: 2020-10-28

## 2020-10-28 PROCEDURE — 99214 OFFICE O/P EST MOD 30 MIN: CPT | Performed by: NURSE PRACTITIONER

## 2020-10-28 RX ORDER — OMEPRAZOLE 20 MG/1
20 CAPSULE, DELAYED RELEASE ORAL DAILY
Qty: 30 CAPSULE | Refills: 11 | Status: SHIPPED | OUTPATIENT
Start: 2020-10-28 | End: 2021-01-27 | Stop reason: SDUPTHER

## 2020-10-28 RX ORDER — PRUCALOPRIDE 2 MG/1
1 TABLET, FILM COATED ORAL DAILY
Qty: 30 TABLET | Refills: 11 | Status: SHIPPED | OUTPATIENT
Start: 2020-10-28 | End: 2021-03-18

## 2020-10-28 ASSESSMENT — ENCOUNTER SYMPTOMS
TROUBLE SWALLOWING: 1
ANAL BLEEDING: 0
VOMITING: 0
SORE THROAT: 0
BACK PAIN: 1
BLOOD IN STOOL: 0
DIARRHEA: 0
ABDOMINAL DISTENTION: 0
RECTAL PAIN: 0
COUGH: 0
ABDOMINAL PAIN: 0
CONSTIPATION: 1
VOICE CHANGE: 0
NAUSEA: 0
SHORTNESS OF BREATH: 0

## 2020-10-28 NOTE — PATIENT INSTRUCTIONS
You are going to have an Endoscopy and here are some basic instructions:    Nothing to eat or drink after midnight EXCEPT:  PLEASE TAKE MEDICATION(S) FOR HIGH BLOOD PRESSURE, SEIZURES, HEART, AND THYROID WITH A SIP OF WATER AT LEAST 2 HOURS PRIOR TO ARRIVAL TIME.   YOU MAY ALSO TAKE ANY INHALERS YOU ARE PRESCRIBED. You will not be able to drive for 24 hours after the procedure due to sedation. Bring a  with you the day of the procedure. No aspirin, ibuprofen, naproxen, fish oil or vitamin E for 5 days before procedure. Continue current medications. If you are on blood thinners, clearance from the prescribing physician will be obtained before your procedure is scheduled. [email or web address] may be associated with discontinuation of your blood thinner and include, but not limited to, stroke, TIA, or cardiac event. If biopsies are taken during the procedure they will be sent to a pathologist for analysis. You will be notified by mail of the pathology results in 2-3 weeks. Your physician may also schedule a follow up appointment with the nurse practitioner to discuss pathology, symptoms or to check if you have had any problems related to your procedure. If you prefer not to return to the office after your procedure please discuss this with your physician on the day of your procedure.

## 2020-10-29 ENCOUNTER — TELEPHONE (OUTPATIENT)
Dept: NEUROLOGY | Age: 50
End: 2020-10-29

## 2020-10-29 ENCOUNTER — TELEPHONE (OUTPATIENT)
Dept: GASTROENTEROLOGY | Age: 50
End: 2020-10-29

## 2020-10-29 NOTE — TELEPHONE ENCOUNTER
Patient was seen in the office by Raphael Roy on 10/28/20 Deja Gutierrez ordered an EGD  - upon checking out she said that she wanted to see if the [address],  that was also ordered, works before she schedules EGD. She said she will wait until she comes back and sees Shasha Saini again 12/1/20.   I let her know if she changes her mind before  That apt and wants to schedule to just call the office - she voiced understanding

## 2020-10-30 ENCOUNTER — TELEPHONE (OUTPATIENT)
Dept: INTERNAL MEDICINE | Age: 50
End: 2020-10-30

## 2020-11-06 ENCOUNTER — OFFICE VISIT (OUTPATIENT)
Age: 50
End: 2020-11-06

## 2020-11-06 VITALS — TEMPERATURE: 96.9 F | OXYGEN SATURATION: 94 % | HEART RATE: 105 BPM

## 2020-11-06 PROCEDURE — 99999 PR OFFICE/OUTPT VISIT,PROCEDURE ONLY: CPT | Performed by: NURSE PRACTITIONER

## 2020-11-09 RX ORDER — QUINAPRIL 20 MG/1
TABLET ORAL
Qty: 90 TABLET | Refills: 3 | Status: SHIPPED | OUTPATIENT
Start: 2020-11-09 | End: 2021-07-27 | Stop reason: SDUPTHER

## 2020-11-09 RX ORDER — SPIRONOLACTONE 50 MG/1
50 TABLET, FILM COATED ORAL DAILY
Qty: 90 TABLET | Refills: 3 | Status: SHIPPED | OUTPATIENT
Start: 2020-11-09 | End: 2021-10-11

## 2020-11-09 NOTE — TELEPHONE ENCOUNTER
Nesha Arzate called requesting a refill of the below medication which has been pended for you:     Requested Prescriptions     Pending Prescriptions Disp Refills    quinapril (ACCUPRIL) 20 MG tablet 30 tablet 3     Sig: Take 1 tablet by mouth every night at bedtime    spironolactone (ALDACTONE) 50 MG tablet 30 tablet 3     Sig: Take 1 tablet by mouth daily       Last Appointment Date: 10/27/2020  Next Appointment Date: 1/27/2021    Allergies   Allergen Reactions    Claritin-D 12 Hour [Loratadine-Pseudoephedrine Er] Other (See Comments)     \"it intensifies my migraines\"    Demerol Hcl [Meperidine] Other (See Comments)     Aggression    Percocet [Oxycodone-Acetaminophen] Rash

## 2020-11-10 LAB — SARS-COV-2, NAA: NOT DETECTED

## 2020-11-13 ENCOUNTER — TELEPHONE (OUTPATIENT)
Dept: NEUROLOGY | Age: 50
End: 2020-11-13

## 2020-11-13 NOTE — TELEPHONE ENCOUNTER
Called pt to let her know we had to reschedule appt 11/23/20 with Dr Stephany Benavides, he will be out tof the office. Could not get a hold of pt by phone, will send her appt note by mail.

## 2020-11-18 RX ORDER — BLOOD SUGAR DIAGNOSTIC
STRIP MISCELLANEOUS
Qty: 100 STRIP | Refills: 3 | Status: SHIPPED | OUTPATIENT
Start: 2020-11-18 | End: 2021-07-27 | Stop reason: ALTCHOICE

## 2020-11-18 NOTE — TELEPHONE ENCOUNTER
Evan Pederson called requesting a refill of the below medication which has been pended for you:     Requested Prescriptions     Pending Prescriptions Disp Refills    blood glucose test strips (ACCU-CHEK GAVI PLUS) strip 100 strip 3     Sig: USE TO TEST ONCE DAILY       Last Appointment Date: 10/27/2020  Next Appointment Date: 1/27/2021    Allergies   Allergen Reactions    Claritin-D 12 Hour [Loratadine-Pseudoephedrine Er] Other (See Comments)     \"it intensifies my migraines\"    Demerol Hcl [Meperidine] Other (See Comments)     Aggression    Percocet [Oxycodone-Acetaminophen] Rash

## 2020-11-30 ENCOUNTER — DOCUMENTATION (OUTPATIENT)
Dept: ENDOCRINOLOGY | Facility: CLINIC | Age: 50
End: 2020-11-30

## 2020-12-04 ENCOUNTER — TELEPHONE (OUTPATIENT)
Dept: NEUROLOGY | Age: 50
End: 2020-12-04

## 2020-12-17 ENCOUNTER — OFFICE VISIT (OUTPATIENT)
Dept: NEUROLOGY | Age: 50
End: 2020-12-17
Payer: COMMERCIAL

## 2020-12-17 VITALS
HEART RATE: 101 BPM | DIASTOLIC BLOOD PRESSURE: 73 MMHG | BODY MASS INDEX: 30.91 KG/M2 | SYSTOLIC BLOOD PRESSURE: 112 MMHG | WEIGHT: 168 LBS | HEIGHT: 62 IN

## 2020-12-17 PROCEDURE — 99204 OFFICE O/P NEW MOD 45 MIN: CPT | Performed by: PSYCHIATRY & NEUROLOGY

## 2020-12-17 RX ORDER — RIMEGEPANT SULFATE 75 MG/75MG
1 TABLET, ORALLY DISINTEGRATING ORAL DAILY PRN
Qty: 30 TABLET | Refills: 5 | Status: SHIPPED | OUTPATIENT
Start: 2020-12-17 | End: 2021-07-27 | Stop reason: ALTCHOICE

## 2020-12-17 RX ORDER — DULOXETIN HYDROCHLORIDE 30 MG/1
30 CAPSULE, DELAYED RELEASE ORAL DAILY
Qty: 30 CAPSULE | Refills: 2 | Status: SHIPPED | OUTPATIENT
Start: 2020-12-17 | End: 2021-03-11

## 2020-12-17 NOTE — PROGRESS NOTES
Maternal Aunt     Colon Cancer Paternal Uncle     Colon Cancer Paternal Grandfather     Diabetes Brother         from alcohol    Hypertension Brother     No Known Problems Son     No Known Problems Son     No Known Problems Son     No Known Problems Daughter     Esophageal Cancer Neg Hx     Liver Cancer Neg Hx     Liver Disease Neg Hx     Rectal Cancer Neg Hx     Stomach Cancer Neg Hx        Allergies   Allergen Reactions    Claritin-D 12 Hour [Loratadine-Pseudoephedrine Er] Other (See Comments)     \"it intensifies my migraines\"    Demerol Hcl [Meperidine] Other (See Comments)     Aggression    Percocet [Oxycodone-Acetaminophen] Rash       Social History     Socioeconomic History    Marital status:      Spouse name: Not on file    Number of children: 3    Years of education: Not on file    Highest education level: Not on file   Occupational History    Occupation: [employer] Employee   Social Needs    Financial resource strain: Not on file    Food insecurity     Worry: Not on file     Inability: Not on file   Frogdice needs     Medical: Not on file     Non-medical: Not on file   Tobacco Use    Smoking status: Never Smoker    Smokeless tobacco: Never Used   Substance and Sexual Activity    Alcohol use: Not Currently     Alcohol/week: 0.0 standard drinks     Comment: \"once in a blue moon\", a few times a year    Drug use: No    Sexual activity: Not on file     Comment: 4   Lifestyle    Physical activity     Days per week: Not on file     Minutes per session: Not on file    Stress: Not on file   Relationships    Social connections     Talks on phone: Not on file     Gets together: Not on file     Attends Denominational service: Not on file     Active member of club or organization: Not on file     Attends meetings of clubs or organizations: Not on file     Relationship status: Not on file    Intimate partner violence     Fear of current or ex partner: Not on file     Emotionally abused: Not on file     Physically abused: Not on file     Forced sexual activity: Not on file   Other Topics Concern    Not on file   Social History Narrative    CODE STATUS: Full Code    HEALTH CARE PROXY: her boyfriend, Mr. Gautam Barton, [phone]    AMBULATES: independently    DOMICILED: lives in a private house, lives with her boyfriend and two of the kids, has dog and cat, has 3-4 steps to enter the home and a ramp, no stairs inside       Review of Systems     Constitutional  No fever or chills. No diaphoresis or significant fatigue. HENT   No tinnitus or significant hearing loss. Eyes  no sudden vision change or eye pain  Respiratory  no significant shortness of breath or cough  Cardiovascular  no chest pain No palpitations or significant leg swelling  Gastrointestinal  no abdominal swelling or pain. Genitourinary  No difficulty urinating, dysuria  Musculoskeletal  no back pain or myalgia. Skin  no color change or rash  Neurologic  No seizures. No lateralizing weakness. Hematologic  no easy bruising or excessive bleeding. Psychiatric  no severe anxiety or nervousness. All other review of systems are negative.       Current Outpatient Medications   Medication Sig Dispense Refill    Rimegepant Sulfate (NURTEC) 75 MG TBDP Take 1 capsule by mouth daily as needed (migraine) 30 tablet 5    DULoxetine (CYMBALTA) 30 MG extended release capsule Take 1 capsule by mouth daily 30 capsule 2    blood glucose test strips (ACCU-CHEK GAVI PLUS) strip USE TO TEST ONCE DAILY 100 strip 3    quinapril (ACCUPRIL) 20 MG tablet Take 1 tablet by mouth every night at bedtime 90 tablet 3    spironolactone (ALDACTONE) 50 MG tablet Take 1 tablet by mouth daily 90 tablet 3    omeprazole (PRILOSEC) 20 MG delayed release capsule Take 1 capsule by mouth Daily Take first thing in the morning on an empty stomach.  30 capsule 11    Prucalopride Succinate (MOTEGRITY) 2 MG TABS Take 1 tablet by mouth daily 30 tablet 11    clonazePAM (KLONOPIN) 0.5 MG tablet TAKE 1 TABLET BY MOUTH DAILY AS NEEDED. 30 tablet 0    Cholecalciferol (VITAMIN D) 50 MCG (2000 UT) CAPS capsule Take by mouth as needed      Plecanatide 3 MG TABS Take 1 tablet by mouth daily 30 tablet 11    topiramate (TOPAMAX) 50 MG tablet TAKE 2 TABLETS BY MOUTH EVERY NIGHT AT BEDTIME 60 tablet 5    traZODone (DESYREL) 100 MG tablet TAKE 1 TABLET BY MOUTH EVERY NIGHT 30 tablet 5    montelukast (SINGULAIR) 10 MG tablet Take 1 tablet by mouth nightly 30 tablet 5    econazole nitrate 1 % cream Apply topically daily.  (Patient taking differently: Apply topically as needed Apply topically daily.) 85 g 0    famotidine (PEPCID) 20 MG tablet Take 1 tablet by mouth 2 times daily 30 tablet 11    levothyroxine (SYNTHROID) 125 MCG tablet TAKE 1 TABLET BY MOUTH DAILY 30 tablet 11    ACCU-CHEK SOFTCLIX LANCETS MISC USE TO CHECK BLOOD SUGAR THREE TIMES DAILY AS DIRECTED 100 each 1    polyethylene glycol (GLYCOLAX) powder Take 17 g by mouth 2 times daily  1    insulin aspart (NOVOLOG FLEXPEN) 100 UNIT/ML injection pen Inject into the skin 3 times daily (before meals) Counting Carbs - Sliding Scale      Semaglutide (OZEMPIC, 0.25 OR 0.5 MG/DOSE, SC) Inject 0.5 mg into the skin once a week On Sundays      vitamin B-6 (PYRIDOXINE) 100 MG tablet Take 100 mg by mouth daily      TRESIBA FLEXTOUCH 200 UNIT/ML SOPN INJECT 60 UNITS INTO THE SKIN DAILY AS DIRECTED (Patient taking differently: 65 units each morning not taking at all during the night.) 4 pen 5    glucose monitoring kit (FREESTYLE) monitoring kit 1 kit by Does not apply route daily 1 kit 0    blood glucose monitor kit and supplies Use to check sugar  E11.9 1 kit 0    Insulin Pen Needle 32G X 4 MM MISC 1 each by Does not apply route daily 100 each 3    Insulin Syringe-Needle U-100 30G X 5/16\" 0.5 ML MISC 1 each by Does not apply route daily 100 each 3    Lancets MISC Use three times a day. 100 each 3    ASPIRIN LOW DOSE 81 MG EC tablet TAKE 1 TABLET BY MOUTH DAILY 30 tablet 0    DULoxetine (CYMBALTA) 60 MG extended release capsule Take 1 capsule by mouth daily 30 capsule 5    atorvastatin (LIPITOR) 20 MG tablet Take 1 tablet by mouth daily 30 tablet 5     No current facility-administered medications for this visit. Outpatient Medications Marked as Taking for the 12/17/20 encounter (Office Visit) with Vito Odom MD   Medication Sig Dispense Refill    Rimegepant Sulfate (NURTEC) 75 MG TBDP Take 1 capsule by mouth daily as needed (migraine) 30 tablet 5    DULoxetine (CYMBALTA) 30 MG extended release capsule Take 1 capsule by mouth daily 30 capsule 2    blood glucose test strips (ACCU-CHEK GAVI PLUS) strip USE TO TEST ONCE DAILY 100 strip 3    quinapril (ACCUPRIL) 20 MG tablet Take 1 tablet by mouth every night at bedtime 90 tablet 3    spironolactone (ALDACTONE) 50 MG tablet Take 1 tablet by mouth daily 90 tablet 3    omeprazole (PRILOSEC) 20 MG delayed release capsule Take 1 capsule by mouth Daily Take first thing in the morning on an empty stomach. 30 capsule 11    Prucalopride Succinate (MOTEGRITY) 2 MG TABS Take 1 tablet by mouth daily 30 tablet 11    clonazePAM (KLONOPIN) 0.5 MG tablet TAKE 1 TABLET BY MOUTH DAILY AS NEEDED. 30 tablet 0    Cholecalciferol (VITAMIN D) 50 MCG (2000 UT) CAPS capsule Take by mouth as needed      Plecanatide 3 MG TABS Take 1 tablet by mouth daily 30 tablet 11    topiramate (TOPAMAX) 50 MG tablet TAKE 2 TABLETS BY MOUTH EVERY NIGHT AT BEDTIME 60 tablet 5    traZODone (DESYREL) 100 MG tablet TAKE 1 TABLET BY MOUTH EVERY NIGHT 30 tablet 5    montelukast (SINGULAIR) 10 MG tablet Take 1 tablet by mouth nightly 30 tablet 5    econazole nitrate 1 % cream Apply topically daily.  (Patient taking differently: Apply topically as needed Apply topically daily.) 85 g 0    famotidine (PEPCID) 20 MG tablet Take 1 tablet by mouth 2 times daily 30 tablet 11    levothyroxine (SYNTHROID) 125 MCG tablet TAKE 1 TABLET BY MOUTH DAILY 30 tablet 11    ACCU-CHEK SOFTCLIX LANCETS MISC USE TO CHECK BLOOD SUGAR THREE TIMES DAILY AS DIRECTED 100 each 1    polyethylene glycol (GLYCOLAX) powder Take 17 g by mouth 2 times daily  1    insulin aspart (NOVOLOG FLEXPEN) 100 UNIT/ML injection pen Inject into the skin 3 times daily (before meals) Counting Carbs - Sliding Scale      Semaglutide (OZEMPIC, 0.25 OR 0.5 MG/DOSE, SC) Inject 0.5 mg into the skin once a week On Sundays      vitamin B-6 (PYRIDOXINE) 100 MG tablet Take 100 mg by mouth daily      TRESIBA FLEXTOUCH 200 UNIT/ML SOPN INJECT 60 UNITS INTO THE SKIN DAILY AS DIRECTED (Patient taking differently: 65 units each morning not taking at all during the night.) 4 pen 5    glucose monitoring kit (FREESTYLE) monitoring kit 1 kit by Does not apply route daily 1 kit 0    blood glucose monitor kit and supplies Use to check sugar  E11.9 1 kit 0    Insulin Pen Needle 32G X 4 MM MISC 1 each by Does not apply route daily 100 each 3    Insulin Syringe-Needle U-100 30G X 5/16\" 0.5 ML MISC 1 each by Does not apply route daily 100 each 3    Lancets MISC Use three times a day. 100 each 3    ASPIRIN LOW DOSE 81 MG EC tablet TAKE 1 TABLET BY MOUTH DAILY 30 tablet 0       /73   Pulse 101   Ht 5' 2\" (1.575 m)   Wt 168 lb (76.2 kg)   LMP  (LMP Unknown)   BMI 30.73 kg/m²       Constitutional  well developed, well nourished. Eyes  conjunctiva normal.  Pupils react to light  Ear, nose, throat -hearing intact to finger rub No scars, masses, or lesions over external nose or ears, no atrophy of tongue  Neck-symmetric, no masses noted, no jugular vein distension. No bruits noted.   Respiration- chest wall appears symmetric, good expansion,   normal effort without use of accessory muscles  Cardiovascular- RRR  Musculoskeletal  no significant wasting of muscles noted, no bony deformities, gait no gross ataxia Extremities-no clubbing, cyanosis or edema  Skin  warm, dry, and intact. No rash, erythema, or pallor. Psychiatric  mood, affect, and behavior appear normal.      Neurological exam  Awake, alert, fluent oriented x 3 appropriate affect  Attention and concentration appear appropriate  Recent and remote memory appears unremarkable  Speech normal without dysarthria  No clear issues with language of fund of knowledge    Cranial Nerve Exam   CN II- Visual fields grossly unremarkable. VA adequate. Discs sharp  CN III, IV,VI- PERRLA, EOMI, No nystagmus, conjugate eye movements, no ptosis  CN V-sensation intact to LT over face  CN VII-no facial asymmetry  CN VIII-Hearing intact   CN IX and X- Palate elevates in midline  CN XI-good shoulder shrug  CN XII-Tongue midline with no fasciculations or fibrillations    Motor Exam  V/V throughout upper and lower extremities bilaterally, no cogwheeling, normal tone    Sensory Exam  Decreased pinprick to the ankles    Reflexes   2+ biceps bilaterally  2+ brachioradialis  2+ triceps  2+patella  2+ ankle jerks  No clonus ankles  No Gaines's sign bilateral hands. No Babinski sign. Tremors- no tremors in hands or head noted    Gait  Normal base and speed  No ataxia.  No Romberg sign    Coordination  Finger to nose and EMMIE-unremarkable    Lab Results   Component Value Date    [id number] 522 05/27/2016     Lab Results   Component Value Date    WBC 13.8 (H) 10/22/2020    HGB 12.5 10/22/2020    HCT 37.7 10/22/2020    MCV 87.1 10/22/2020     10/22/2020     Lab Results   Component Value Date     10/22/2020    K 4.0 10/22/2020     10/22/2020    CO2 22 10/22/2020    BUN 13 10/22/2020    CREATININE 0.7 10/22/2020    GLUCOSE 83 10/22/2020    CALCIUM 9.4 10/22/2020    PROT 8.3 10/22/2020    LABALBU 4.5 10/22/2020    BILITOT 0.4 10/22/2020    ALKPHOS 94 10/22/2020    AST 32 10/22/2020    ALT 17 10/22/2020    LABGLOM >60 10/22/2020    GFRAA >59 10/22/2020    GLOB 4.2 02/15/2017           Assessment    ICD-10-CM    1. Intractable chronic migraine without aura and without status migrainosus  G43.719    2. History of anxiety  Z86.59    3. History of hypertension  Z86.79    4. Neuropathy  G62.9      Intractable migraines. Meets criteria for Botox. I would like to proceed that route. If unable to do that or if ineffective try one of the monthly CGRP medications. Try Nurtec in the meantime. Cymbalta increased to 90 mg a day to help with her neuropathic symptoms due to diabetes. Plan        Orders Placed This Encounter   Medications    Rimegepant Sulfate (NURTEC) 75 MG TBDP     Sig: Take 1 capsule by mouth daily as needed (migraine)     Dispense:  30 tablet     Refill:  5    DULoxetine (CYMBALTA) 30 MG extended release capsule     Sig: Take 1 capsule by mouth daily     Dispense:  30 capsule     Refill:  2       Pt warned of potential side effects of medication changes/new medicines. They are to call for new or ongoing difficulties. Return in about 3 months (around 3/17/2021).     (Please note that portions of this note were completed with a voice recognition program. Efforts were made to edit the dictations but occasionally words are mis-transcribed.)

## 2020-12-21 RX ORDER — FAMOTIDINE 20 MG/1
20 TABLET, FILM COATED ORAL 2 TIMES DAILY
Qty: 30 TABLET | Refills: 1 | Status: SHIPPED | OUTPATIENT
Start: 2020-12-21 | End: 2021-05-15 | Stop reason: SDUPTHER

## 2020-12-28 ENCOUNTER — TELEPHONE (OUTPATIENT)
Dept: NEUROLOGY | Age: 50
End: 2020-12-28

## 2020-12-29 ENCOUNTER — TELEPHONE (OUTPATIENT)
Dept: GASTROENTEROLOGY | Age: 50
End: 2020-12-29

## 2020-12-29 NOTE — TELEPHONE ENCOUNTER
Last OV [facility] aprn 10-28-20. No FU scheduled. Rx for Motegrity 2 mg daily was prescribed which required a PA. The PA was completed and sent to [facility] through [facility] and we received a letter that it was approved from  to . Patient calls today and said when she went to the Pharmacy to get her medication refilled they told her the insurance company was now only allowing 10 pills per month when she takes this daily. I told the patient to contact her [facility] Medicaid insurance and find out what the issue with this is and ask what she needs to do at this point. Sometimes medications will fall off formulary and no longer supplied as prescribed. I told the patient to let me know what she finds out.  Abhilash arreaga

## 2021-01-05 ENCOUNTER — TELEMEDICINE (OUTPATIENT)
Dept: ENDOCRINOLOGY | Facility: CLINIC | Age: 51
End: 2021-01-05

## 2021-01-05 DIAGNOSIS — E03.8 HYPOTHYROIDISM DUE TO HASHIMOTO'S THYROIDITIS: ICD-10-CM

## 2021-01-05 DIAGNOSIS — Z79.4 TYPE 2 DIABETES MELLITUS WITH HYPERGLYCEMIA, WITH LONG-TERM CURRENT USE OF INSULIN (HCC): Primary | ICD-10-CM

## 2021-01-05 DIAGNOSIS — I15.2 HYPERTENSION ASSOCIATED WITH DIABETES (HCC): ICD-10-CM

## 2021-01-05 DIAGNOSIS — E11.69 MIXED DIABETIC HYPERLIPIDEMIA ASSOCIATED WITH TYPE 2 DIABETES MELLITUS (HCC): ICD-10-CM

## 2021-01-05 DIAGNOSIS — E11.59 HYPERTENSION ASSOCIATED WITH DIABETES (HCC): ICD-10-CM

## 2021-01-05 DIAGNOSIS — E78.2 MIXED DIABETIC HYPERLIPIDEMIA ASSOCIATED WITH TYPE 2 DIABETES MELLITUS (HCC): ICD-10-CM

## 2021-01-05 DIAGNOSIS — E11.65 TYPE 2 DIABETES MELLITUS WITH HYPERGLYCEMIA, WITH LONG-TERM CURRENT USE OF INSULIN (HCC): Primary | ICD-10-CM

## 2021-01-05 DIAGNOSIS — E06.3 HYPOTHYROIDISM DUE TO HASHIMOTO'S THYROIDITIS: ICD-10-CM

## 2021-01-05 PROCEDURE — 95251 CONT GLUC MNTR ANALYSIS I&R: CPT | Performed by: NURSE PRACTITIONER

## 2021-01-05 PROCEDURE — 99214 OFFICE O/P EST MOD 30 MIN: CPT | Performed by: NURSE PRACTITIONER

## 2021-01-05 RX ORDER — INSULIN ASPART 100 [IU]/ML
INJECTION, SOLUTION INTRAVENOUS; SUBCUTANEOUS
Qty: 6 PEN | Refills: 5 | Status: SHIPPED | OUTPATIENT
Start: 2021-01-05

## 2021-01-05 RX ORDER — SEMAGLUTIDE 1.34 MG/ML
0.5 INJECTION, SOLUTION SUBCUTANEOUS WEEKLY
Qty: 1 PEN | Refills: 11 | Status: SHIPPED | OUTPATIENT
Start: 2021-01-05

## 2021-01-05 RX ORDER — INSULIN DEGLUDEC 200 U/ML
50 INJECTION, SOLUTION SUBCUTANEOUS NIGHTLY
Qty: 5 PEN | Refills: 11 | Status: SHIPPED | OUTPATIENT
Start: 2021-01-05 | End: 2022-01-05

## 2021-01-05 NOTE — PROGRESS NOTES
Chief Complaint  Diabetes    Subjective          Yon Ochoa presents to [facility] END[facility]CR[facility]OLOG[facility] for   History of Present Illness    Referring provider     Primary Care Provider    50-year-old female presents for follow-up    Reason diabetes mellitus    Duration diagnosed in 2008    Timing is constant    Quality near controlled    Severity is moderate    Complications hyperglycemia       Current problems burning feet pain    Alleviating factors compliance with insulin    Side effects none    Current diet regular    Exercise none          Current monitoring regimen: home blood tests -Dexcom       checking 4 x daily before dexcom     Now usiing dexcom and able to monitor more frequently and having less hypoglycemia      Lab Results   Component Value Date    TSH 0.378 10/22/2020          Home blood sugar records:     Dexcom G6 downloaded               Most are at goal    She does have some postprandial hyperglycemia    She states the hyperglycemia with meals is started since she was switched to Admelog from NovoLog    When she was on NovoLog she had better blood glucose control with mealtime  Hypoglycemia nocturnal               Objective   Vital Signs:   There were no vitals taken for this visit.    Physical Exam  Constitutional:       Appearance: Normal appearance.   HENT:      Head: Normocephalic and atraumatic.      Right Ear: External ear normal.      Left Ear: External ear normal.   Eyes:      Extraocular Movements: Extraocular movements intact.      Conjunctiva/sclera: Conjunctivae normal.      Pupils: Pupils are equal, round, and reactive to light.   Neck:      Musculoskeletal: Normal range of motion.   Pulmonary:      Effort: Pulmonary effort is normal.   Musculoskeletal: Normal range of motion.   Skin:     Coloration: Skin is not pale.   Neurological:      General: No focal deficit present.      Mental Status: She is alert.        Result Review :   The following data was reviewed by:  Alex Roberts, APRN on 01/05/2021:  CMP    CMP 6/1/20 6/12/20 6/12/20 10/22/20     1131 1131    BUN 18  14 13   Creatinine 0.75  0.8 0.7   eGFR Non African Am 82  >60 >60   eGFR African Am    >59   Sodium 136  139 139   Potassium 4.4  4.4 4.0   Chloride 100  103 104   Calcium 9.8  10.1 (A) 9.4   Albumin 4.60 4.7  4.5   Total Bilirubin 0.3 0.3  0.4   Alkaline Phosphatase 101 104  94   AST (SGOT) 30 34 (A)  32   ALT (SGPT) 17 19  17   (A) Abnormal value       Comments are available for some flowsheets but are not being displayed.           CBC    CBC 6/1/20 6/12/20 10/22/20   WBC 12.70 (A) 13.1 (A) 13.8 (A)   RBC 4.33 4.93 4.33   Hemoglobin 12.2 14.4 12.5   Hematocrit 36.1 43.0 37.7   MCV 83.4 87.2 87.1   MCH 28.2 29.2 28.9   MCHC 33.8 33.5 33.2   RDW 14.8 14.5 13.5   Platelets 236 243 241   (A) Abnormal value            Lipid Panel    Lipid Panel 6/1/20 6/12/20 10/22/20   Total Cholesterol  111 (A) 115 (A)   Triglycerides 197 (A) 288 (A) 159 (A)   HDL Cholesterol 30 (A) 31 (A) 36 (A)   VLDL Cholesterol 39.4     LDL Cholesterol  27 22 47   LDL/HDL Ratio 0.89     (A) Abnormal value       Comments are available for some flowsheets but are not being displayed.           Most Recent A1C    HGBA1C Most Recent 10/22/20   Hemoglobin A1C 6.9 (A)   (A) Abnormal value       Comments are available for some flowsheets but are not being displayed.                     Assessment and Plan    Problem List Items Addressed This Visit        Other    Type 2 diabetes mellitus with hyperglycemia, with long-term current use of insulin (CMS/[facility]) - Primary    Relevant Medications    Insulin Degludec (Tresiba FlexTouch) 200 UNIT/ML solution pen-injector pen injection    Semaglutide,0.25 or 0.5MG/DOS, (Ozempic, 0.25 or 0.5 MG/DOSE,) 2 MG/1.5ML solution pen-injector    insulin aspart (NovoLOG FlexPen) 100 UNIT/ML solution pen-injector sc pen    Hypertension associated with diabetes (CMS/HCC)    Relevant Medications    Insulin Degludec  (Tresiba FlexTouch) 200 UNIT/ML solution pen-injector pen injection    Semaglutide,0.25 or 0.5MG/DOS, (Ozempic, 0.25 or 0.5 MG/DOSE,) 2 MG/1.5ML solution pen-injector    insulin aspart (NovoLOG FlexPen) 100 UNIT/ML solution pen-injector sc pen    Mixed diabetic hyperlipidemia associated with type 2 diabetes mellitus (CMS/HCC)    Relevant Medications    Insulin Degludec (Tresiba FlexTouch) 200 UNIT/ML solution pen-injector pen injection    Semaglutide,0.25 or 0.5MG/DOS, (Ozempic, 0.25 or 0.5 MG/DOSE,) 2 MG/1.5ML solution pen-injector    insulin aspart (NovoLOG FlexPen) 100 UNIT/ML solution pen-injector sc pen    Hypothyroidism due to Hashimoto's thyroiditis                  Pt has type 2 diabetes   Glycemic Management:         Lab Results   Component Value Date    HGBA1C 6.9 (H) 10/22/2020             Tresiba taking 50 units --continue this dose, Rx filled today     If you drop more than 40 points from bedtime to morning decrease by 5 units or if you wake up with a sugar less than 80         =============================     Novolog ---now on Ademlog per insurance     resubmit for NovoLog    Reason patient had better postprandial control with NovoLog versus Admelog              3 units for every 15 grams of carbohydrate that you eat TID      She will need to double while on the steroid            Plus sliding scale     3 units for every 50 above 150            ========================================     Taking ozempic 0.5 mg once weekly on Sunday Rx filled today     segluromet -- change to steglatro mg once daily         for fear of metformin by patient         Dexcom G6                                Based on Dexcom sensor data she is mostly at goal    Occasional postprandial hyperglycemia    We will try to get the NovoLog back    She can try to change the carb count until we get this NovoLog back      Lipid Management               Total Cholesterol   Date Value Ref Range Status   06/01/2020 96 0 - 200 mg/dL Final                 Triglycerides   Date Value Ref Range Status   06/01/2020 197 (H) 0 - 150 mg/dL Final   05/24/2019 213 (H) 0 - 149 mg/dL Final                    HDL Cholesterol   Date Value Ref Range Status   06/01/2020 30 (L) 40 - 60 mg/dL Final   05/24/2019 29 (L) 65 - 121 mg/dL Final       Comment:       VALUES>60 MG/DL ARE ASSOCIATED WITH A DECREASED RISK OF  ATHEROSCLEROTIC CARDIOVASCULAR DISEASE                    LDL Cholesterol    Date Value Ref Range Status   06/01/2020 27 0 - 100 mg/dL Final   05/24/2019 39 <100 mg/dL Final       Comment:       <100 MG/DL=OPITIMAL     100-129 MG/DL=DESIRABLE     130-159 MG/DL BORDERLINE=INCREASED RISK OF ATHEROSCLEROTIC  CARDIOVASCULAR DISEASE     > OR = 160 MG/DL=ASSOCIATED WITH AN INCREASE RISK OF  ATHEROSCLEROTIC CARDIOVASCULAR DISEASE                 Taking lipitor 20 mg one daily         Blood Pressure Management:          Taking quinapril 20 mg daily         Microvascular Complication Monitoring:       Last Eye Exam Evaluation --- August 2019, no DR --schedule for Feb. 2021               -----------     Last Microalbumin-Proteinuria Assessment           Component      Latest Ref Rng & Units 6/1/2020   Microalbumin/Creatinine Ratio           Creatinine, Urine      mg/dL 164.3   Microalbumin, Urine      mg/dL <1.2      -----------        Neuropathy  yes      Refers no Rx at this time     Weight Related:          Patient's Body mass index is 30.65 kg/m². BMI is above normal parameters. Recommendations include: nutrition counseling.        Decrease caloric intake by 500 calories per day      Bone Health     Vitamin d def.     Taking vitamin d daily        Component      Latest Ref Rng & Units 6/1/2020   25 Hydroxy, Vitamin D      30.0 - 100.0 ng/ml 36.7         Thyroid Health           Oct. 2020     TSH -  0.378       Keep taking levothyroxine 125 mcg daily     Other Diabetes Related Aspects            Lab Results   Component Value Date     [id number] 559 06/01/2020                 Follow Up   No follow-ups on file.  Patient was given instructions and counseling regarding her condition or for health maintenance advice. Please see specific information pulled into the AVS if appropriate.     We spent 30 minutes including reviewing the patients electronic chart, reviewing medications, reviewing labs, active problems    I provided advice regarding diabetes management, dietary changes, adjustments of medication, self titration of insulin, refilled medications, ordering labs, future appointments    Patient was advised to contact the office if there are unanswered questions, trouble with blood glucose management, or any concerns

## 2021-01-06 ENCOUNTER — DOCUMENTATION (OUTPATIENT)
Dept: ENDOCRINOLOGY | Facility: CLINIC | Age: 51
End: 2021-01-06

## 2021-01-09 DIAGNOSIS — F41.9 ANXIETY: ICD-10-CM

## 2021-01-09 DIAGNOSIS — Z76.0 MEDICATION REFILL: ICD-10-CM

## 2021-01-11 RX ORDER — TRAZODONE HYDROCHLORIDE 100 MG/1
TABLET ORAL
Qty: 30 TABLET | Refills: 5 | Status: SHIPPED | OUTPATIENT
Start: 2021-01-11 | End: 2021-06-11

## 2021-01-11 NOTE — TELEPHONE ENCOUNTER
Toi Aleman called requesting a refill of the below medication which has been pended for you:     Requested Prescriptions     Pending Prescriptions Disp Refills    traZODone (DESYREL) 100 MG tablet [Pharmacy Med Name: TRAZODONE 100MG TABLETS] 30 tablet 5     Sig: TAKE 1 TABLET BY MOUTH EVERY NIGHT       Last Appointment Date: 10/27/2020  Next Appointment Date: 01/27/2021    Allergies   Allergen Reactions    Claritin-D 12 Hour [Loratadine-Pseudoephedrine Er] Other (See Comments)     \"it intensifies my migraines\"    Demerol Hcl [Meperidine] Other (See Comments)     Aggression    Percocet [Oxycodone-Acetaminophen] Rash

## 2021-01-14 ENCOUNTER — DOCUMENTATION (OUTPATIENT)
Dept: ENDOCRINOLOGY | Facility: CLINIC | Age: 51
End: 2021-01-14

## 2021-01-19 ENCOUNTER — VIRTUAL VISIT (OUTPATIENT)
Dept: INTERNAL MEDICINE | Age: 51
End: 2021-01-19
Payer: COMMERCIAL

## 2021-01-19 DIAGNOSIS — R11.0 NAUSEA: ICD-10-CM

## 2021-01-19 DIAGNOSIS — R19.7 DIARRHEA, UNSPECIFIED TYPE: Primary | ICD-10-CM

## 2021-01-19 PROCEDURE — 99213 OFFICE O/P EST LOW 20 MIN: CPT | Performed by: NURSE PRACTITIONER

## 2021-01-19 RX ORDER — PROMETHAZINE HYDROCHLORIDE 25 MG/1
25 TABLET ORAL 4 TIMES DAILY PRN
Qty: 20 TABLET | Refills: 0 | Status: SHIPPED | OUTPATIENT
Start: 2021-01-19 | End: 2021-01-26

## 2021-01-19 ASSESSMENT — ENCOUNTER SYMPTOMS
ABDOMINAL PAIN: 0
COUGH: 0
BLOOD IN STOOL: 0
DIARRHEA: 1
WHEEZING: 0
ABDOMINAL DISTENTION: 0
SHORTNESS OF BREATH: 0
COLOR CHANGE: 0
CHOKING: 0
EYE DISCHARGE: 0
STRIDOR: 0
VOMITING: 0
TROUBLE SWALLOWING: 0
NAUSEA: 1
EYE ITCHING: 0
SORE THROAT: 0
CONSTIPATION: 0

## 2021-01-19 NOTE — PROGRESS NOTES
[address] [facility]  [address] [place] [address] [place] [address]  Dept: [phone]  Dept Fax: [phone]: [phone]    Tayo Arguelles (:  1970) is a 52 y.o. female,Established patient, here for evaluation of the following chief complaint(s): Nausea (nausea for over a week, diarrhea started over weekend)      Tayo Arguelles is a 52 y.o. female who presents today for her medical conditions/complaints as noted below. Tayo Arguelles is c/shaka Nausea (nausea for over a week, diarrhea started over weekend)        HPI:     HPI   1. Diarrhea and nausea for about a week; She has had no vomiting; Yesterday she had increased diarrhea; She has not been able to keep much down; She has been drinking sprite; No vomiting today;     She will need covid testing   Chief Complaint   Patient presents with    Nausea     nausea for over a week, diarrhea started over weekend       Past Medical History:   Diagnosis Date    Anxiety     Bacteremia 10/17/2019    Bandemia 10/16/2019    Class 2 obesity due to excess calories without serious comorbidity with body mass index (BMI) of 36.0 to 36.9 in adult 2019    Depression     Diabetes mellitus ([address]     GERD (gastroesophageal reflux disease)     Hyperglycemia 2017    Hyperlipidemia     Hypertension     Hypothyroidism     Lower abdominal pain 2019    Neuropathy 10/16/2019    Obstructive sleep apnea     Osteoarthritis     PONV (postoperative nausea and vomiting)     Skin ulcer of flank with fat layer exposed ([address] 2020    Staph infection 2018      Past Surgical History:   Procedure Laterality Date    APPENDECTOMY      as a 1st grader   [address] [place] [address]      1996, ,     CHOLECYSTECTOMY, LAPAROSCOPIC          COLONOSCOPY      \"more than 15 + yrs ago\" PER PT RECALL    COLONOSCOPY N/A 2019  Liver Cancer Neg Hx     Liver Disease Neg Hx     Rectal Cancer Neg Hx     Stomach Cancer Neg Hx        Social History     Tobacco Use    Smoking status: Never Smoker    Smokeless tobacco: Never Used   Substance Use Topics    Alcohol use: Not Currently     Alcohol/week: 0.0 standard drinks     Comment: \"once in a blue moon\", a few times a year      Current Outpatient Medications   Medication Sig Dispense Refill    promethazine (PHENERGAN) 25 MG tablet Take 1 tablet by mouth 4 times daily as needed for Nausea 20 tablet 0    traZODone (DESYREL) 100 MG tablet TAKE 1 TABLET BY MOUTH EVERY NIGHT 30 tablet 5    famotidine (PEPCID) 20 MG tablet Take 1 tablet by mouth 2 times daily 30 tablet 1    Rimegepant Sulfate (NURTEC) 75 MG TBDP Take 1 capsule by mouth daily as needed (migraine) 30 tablet 5    DULoxetine (CYMBALTA) 30 MG extended release capsule Take 1 capsule by mouth daily 30 capsule 2    blood glucose test strips (ACCU-CHEK GAVI PLUS) strip USE TO TEST ONCE DAILY 100 strip 3    quinapril (ACCUPRIL) 20 MG tablet Take 1 tablet by mouth every night at bedtime 90 tablet 3    spironolactone (ALDACTONE) 50 MG tablet Take 1 tablet by mouth daily 90 tablet 3    omeprazole (PRILOSEC) 20 MG delayed release capsule Take 1 capsule by mouth Daily Take first thing in the morning on an empty stomach. 30 capsule 11    Prucalopride Succinate (MOTEGRITY) 2 MG TABS Take 1 tablet by mouth daily 30 tablet 11    DULoxetine (CYMBALTA) 60 MG extended release capsule Take 1 capsule by mouth daily 30 capsule 5    clonazePAM (KLONOPIN) 0.5 MG tablet TAKE 1 TABLET BY MOUTH DAILY AS NEEDED.  30 tablet 0    Cholecalciferol (VITAMIN D) 50 MCG (2000 UT) CAPS capsule Take by mouth as needed      Plecanatide 3 MG TABS Take 1 tablet by mouth daily 30 tablet 11    topiramate (TOPAMAX) 50 MG tablet TAKE 2 TABLETS BY MOUTH EVERY NIGHT AT BEDTIME 60 tablet 5  atorvastatin (LIPITOR) 20 MG tablet Take 1 tablet by mouth daily 30 tablet 5    montelukast (SINGULAIR) 10 MG tablet Take 1 tablet by mouth nightly 30 tablet 5    econazole nitrate 1 % cream Apply topically daily. (Patient taking differently: Apply topically as needed Apply topically daily.) 85 g 0    levothyroxine (SYNTHROID) 125 MCG tablet TAKE 1 TABLET BY MOUTH DAILY 30 tablet 11    ACCU-CHEK SOFTCLIX LANCETS MISC USE TO CHECK BLOOD SUGAR THREE TIMES DAILY AS DIRECTED 100 each 1    polyethylene glycol (GLYCOLAX) powder Take 17 g by mouth 2 times daily  1    insulin aspart (NOVOLOG FLEXPEN) 100 UNIT/ML injection pen Inject into the skin 3 times daily (before meals) Counting Carbs - Sliding Scale      Semaglutide (OZEMPIC, 0.25 OR 0.5 MG/DOSE, SC) Inject 0.5 mg into the skin once a week On Sundays      vitamin B-6 (PYRIDOXINE) 100 MG tablet Take 100 mg by mouth daily      TRESIBA FLEXTOUCH 200 UNIT/ML SOPN INJECT 60 UNITS INTO THE SKIN DAILY AS DIRECTED (Patient taking differently: 65 units each morning not taking at all during the night.) 4 pen 5    glucose monitoring kit (FREESTYLE) monitoring kit 1 kit by Does not apply route daily 1 kit 0    blood glucose monitor kit and supplies Use to check sugar  E11.9 1 kit 0    Insulin Pen Needle 32G X 4 MM MISC 1 each by Does not apply route daily 100 each 3    Insulin Syringe-Needle U-100 30G X 5/16\" 0.5 ML MISC 1 each by Does not apply route daily 100 each 3    Lancets MISC Use three times a day. 100 each 3    ASPIRIN LOW DOSE 81 MG EC tablet TAKE 1 TABLET BY MOUTH DAILY 30 tablet 0     No current facility-administered medications for this visit.       Allergies   Allergen Reactions    Claritin-D 12 Hour [Loratadine-Pseudoephedrine Er] Other (See Comments)     \"it intensifies my migraines\"    Demerol Hcl [Meperidine] Other (See Comments)     Aggression    Percocet [Oxycodone-Acetaminophen] Rash       Health Maintenance   Topic Date Due  Hepatitis C screen  1970    Diabetic retinal exam  11/02/2016    Hepatitis B vaccine (1 of 3 - Risk 3-dose series) 10/27/2021 (Originally 2/26/1991)    Diabetic foot exam  09/30/2021    A1C test (Diabetic or Prediabetic)  10/22/2021    Diabetic microalbuminuria test  10/22/2021    Lipid screen  10/22/2021    TSH testing  10/22/2021    Potassium monitoring  10/22/2021    Creatinine monitoring  10/22/2021    Colon cancer screen colonoscopy  07/30/2022    DTaP/Tdap/Td vaccine (2 - Td) 04/14/2026    Flu vaccine  Completed    Pneumococcal 0-64 years Vaccine  Completed    HIV screen  Addressed    Hepatitis A vaccine  Aged Out    Hib vaccine  Aged Out    Meningococcal (ACWY) vaccine  Aged Out       Lab Results   Component Value Date    LABA1C 6.9 (H) 10/22/2020     No results found for: PSA, PSADIA  TSH   Date Value Ref Range Status   10/22/2020 0.378 0.270 - 4.200 uIU/mL Final   ]  Lab Results   Component Value Date     10/22/2020    K 4.0 10/22/2020     10/22/2020    CO2 22 10/22/2020    BUN 13 10/22/2020    CREATININE 0.7 10/22/2020    GLUCOSE 83 10/22/2020    CALCIUM 9.4 10/22/2020    PROT 8.3 10/22/2020    LABALBU 4.5 10/22/2020    BILITOT 0.4 10/22/2020    ALKPHOS 94 10/22/2020    AST 32 10/22/2020    ALT 17 10/22/2020    LABGLOM >60 10/22/2020    GFRAA >59 10/22/2020    GLOB 4.2 02/15/2017     Lab Results   Component Value Date    CHOL 115 (L) 10/22/2020    CHOL 111 (L) 06/12/2020    CHOL 111 (L) 05/24/2019     Lab Results   Component Value Date    TRIG 159 (H) 10/22/2020    TRIG 288 (H) 06/12/2020    TRIG 213 (H) 05/24/2019     Lab Results   Component Value Date    HDL 36 (L) 10/22/2020    HDL 31 (L) 06/12/2020    HDL 29 (L) 05/24/2019     Lab Results   Component Value Date    LDLCALC 47 10/22/2020    LDLCALC 22 06/12/2020    LDLCALC 39 05/24/2019     Lab Results   Component Value Date     10/22/2020     02/20/2011    K 4.0 10/22/2020    K 4.1 10/21/2019 K 4.2 02/20/2011     10/22/2020     02/20/2011    CO2 22 10/22/2020    BUN 13 10/22/2020    CREATININE 0.7 10/22/2020    CREATININE 0.6 02/20/2011    GLUCOSE 83 10/22/2020    CALCIUM 9.4 10/22/2020      Lab Results   Component Value Date    WBC 13.8 (H) 10/22/2020    HGB 12.5 10/22/2020    HCT 37.7 10/22/2020    MCV 87.1 10/22/2020     10/22/2020    LABLYMP 2.75 10/19/2015    LYMPHOPCT 27.4 10/22/2020    RBC 4.33 10/22/2020    MCH 28.9 10/22/2020    MCHC 33.2 10/22/2020    RDW 13.5 10/22/2020     Lab Results   Component Value Date    VITD25 43.2 10/22/2020       Subjective:      Review of Systems   Constitutional: Positive for fatigue. Negative for fever and unexpected weight change. HENT: Negative for ear discharge, ear pain, mouth sores, sore throat and trouble swallowing. Eyes: Negative for discharge, itching and visual disturbance. Respiratory: Negative for cough, choking, shortness of breath, wheezing and stridor. Cardiovascular: Negative for chest pain, palpitations and leg swelling. Gastrointestinal: Positive for diarrhea and nausea. Negative for abdominal distention, abdominal pain, blood in stool, constipation and vomiting. Endocrine: Negative for cold intolerance, polydipsia and polyuria. Genitourinary: Negative for difficulty urinating, dysuria, frequency and urgency. Musculoskeletal: Negative for arthralgias and gait problem. Skin: Negative for color change and rash. Allergic/Immunologic: Negative for food allergies and immunocompromised state. Neurological: Negative for dizziness, tremors, syncope, speech difficulty, weakness and headaches. Hematological: Negative for adenopathy. Does not bruise/bleed easily. Psychiatric/Behavioral: Negative for confusion and hallucinations.        Objective:     Physical Exam   DGEVISITPE      GENERAL:   Afebrile alert no acute distress  Respiratory no use of accessory muscles no acute distress no audible wheezing Mental status alert oriented adequate thought processes        Vital signs were not taken at this visit as no equipment was available;      LMP  (LMP Unknown)     Assessment:       Diagnosis Orders   1. Diarrhea, unspecified type     2. Nausea         Plan:        Patient given educational materials - see patient instructions. Discussed use, benefit, and side effects of prescribed medications. Allpatient questions answered. Pt voiced understanding. Reviewed health maintenance. Instructed to continue current medications, diet and exercise. Patient agreed with treatment plan. Follow up as directed. MEDICATIONS:  Orders Placed This Encounter   Medications    promethazine (PHENERGAN) 25 MG tablet     Sig: Take 1 tablet by mouth 4 times daily as needed for Nausea     Dispense:  20 tablet     Refill:  0         ORDERS:  No orders of the defined types were placed in this encounter. Follow-up:  No follow-ups on file. PATIENT INSTRUCTIONS:  Patient Instructions   1. Diarrhea and nausea   Phenergan 25  And immodium 2 after every loose stool   She needs to have covid testing; Also she will need to be off work until she is 48 hours clear of symptoms without taking any meds; We will write excuse and take to covid clinic so she can pick it up in the am     Electronically signed by NICANOR Moore on 1/19/2021 at 4:09 PM        EMRDragon/transcription disclaimer:  Much of this encounter note is electronic transcription/translation of spoken language to printed texts. The electronic translation of spoken language may be erroneous, or at times,nonsensical words or phrases may be inadvertently transcribed.   Although I have reviewed the note for such errors, some may still exist.

## 2021-01-20 ENCOUNTER — OFFICE VISIT (OUTPATIENT)
Age: 51
End: 2021-01-20

## 2021-01-20 VITALS — TEMPERATURE: 97.7 F | OXYGEN SATURATION: 91 % | HEART RATE: 119 BPM

## 2021-01-20 DIAGNOSIS — Z11.59 SCREENING FOR VIRAL DISEASE: Primary | ICD-10-CM

## 2021-01-20 PROCEDURE — 99999 PR OFFICE/OUTPT VISIT,PROCEDURE ONLY: CPT | Performed by: NURSE PRACTITIONER

## 2021-01-21 LAB — SARS-COV-2, NAA: NOT DETECTED

## 2021-01-25 RX ORDER — ONABOTULINUMTOXINA 200 [USP'U]/1
INJECTION, POWDER, LYOPHILIZED, FOR SOLUTION INTRADERMAL; INTRAMUSCULAR
Qty: 1 EACH | Refills: 3
Start: 2021-01-25

## 2021-01-25 RX ORDER — ONABOTULINUMTOXINA 200 [USP'U]/1
200 INJECTION, POWDER, LYOPHILIZED, FOR SOLUTION INTRADERMAL; INTRAMUSCULAR ONCE
Qty: 200 UNITS | Refills: 3
Start: 2021-01-25 | End: 2021-01-25 | Stop reason: CLARIF

## 2021-01-27 ENCOUNTER — DOCUMENTATION (OUTPATIENT)
Dept: ENDOCRINOLOGY | Facility: CLINIC | Age: 51
End: 2021-01-27

## 2021-01-27 ENCOUNTER — TELEPHONE (OUTPATIENT)
Dept: ENDOCRINOLOGY | Facility: CLINIC | Age: 51
End: 2021-01-27

## 2021-01-27 ENCOUNTER — OFFICE VISIT (OUTPATIENT)
Dept: INTERNAL MEDICINE | Age: 51
End: 2021-01-27
Payer: COMMERCIAL

## 2021-01-27 VITALS
HEIGHT: 62 IN | HEART RATE: 117 BPM | WEIGHT: 164.2 LBS | SYSTOLIC BLOOD PRESSURE: 102 MMHG | OXYGEN SATURATION: 98 % | DIASTOLIC BLOOD PRESSURE: 70 MMHG | BODY MASS INDEX: 30.22 KG/M2

## 2021-01-27 DIAGNOSIS — M54.2 NECK AND SHOULDER PAIN: ICD-10-CM

## 2021-01-27 DIAGNOSIS — R30.0 DYSURIA: ICD-10-CM

## 2021-01-27 DIAGNOSIS — I10 ESSENTIAL HYPERTENSION: ICD-10-CM

## 2021-01-27 DIAGNOSIS — E03.9 ACQUIRED HYPOTHYROIDISM: ICD-10-CM

## 2021-01-27 DIAGNOSIS — R12 HEARTBURN: ICD-10-CM

## 2021-01-27 DIAGNOSIS — Z79.4 TYPE 2 DIABETES MELLITUS WITHOUT COMPLICATION, WITH LONG-TERM CURRENT USE OF INSULIN (HCC): Primary | ICD-10-CM

## 2021-01-27 DIAGNOSIS — Z11.59 ENCOUNTER FOR HEPATITIS C SCREENING TEST FOR LOW RISK PATIENT: ICD-10-CM

## 2021-01-27 DIAGNOSIS — M25.519 NECK AND SHOULDER PAIN: ICD-10-CM

## 2021-01-27 DIAGNOSIS — E11.9 TYPE 2 DIABETES MELLITUS WITHOUT COMPLICATION, WITH LONG-TERM CURRENT USE OF INSULIN (HCC): Primary | ICD-10-CM

## 2021-01-27 LAB
BACTERIA URINE, POC: NORMAL
BILIRUBIN URINE: 0 MG/DL
BLOOD, URINE: POSITIVE
CASTS URINE, POC: NORMAL
CLARITY: CLEAR
COLOR: YELLOW
CRYSTALS URINE, POC: NORMAL
EPI CELLS URINE, POC: NORMAL
GLUCOSE URINE: NORMAL
KETONES, URINE: NEGATIVE
LEUKOCYTE EST, POC: NORMAL
NITRITE, URINE: NEGATIVE
PH UA: 7 (ref 4.5–8)
PROTEIN UA: NEGATIVE
RBC URINE, POC: NORMAL
SPECIFIC GRAVITY UA: 1.02 (ref 1–1.03)
UROBILINOGEN, URINE: NORMAL
WBC URINE, POC: NORMAL
YEAST URINE, POC: NORMAL

## 2021-01-27 PROCEDURE — 99214 OFFICE O/P EST MOD 30 MIN: CPT | Performed by: INTERNAL MEDICINE

## 2021-01-27 PROCEDURE — 81001 URINALYSIS AUTO W/SCOPE: CPT | Performed by: INTERNAL MEDICINE

## 2021-01-27 RX ORDER — OMEPRAZOLE 40 MG/1
20 CAPSULE, DELAYED RELEASE ORAL 2 TIMES DAILY
Qty: 90 CAPSULE | Refills: 1
Start: 2021-01-27 | End: 2021-11-15 | Stop reason: SDUPTHER

## 2021-01-27 RX ORDER — SULFAMETHOXAZOLE AND TRIMETHOPRIM 800; 160 MG/1; MG/1
1 TABLET ORAL 2 TIMES DAILY
Qty: 6 TABLET | Refills: 0 | Status: SHIPPED | OUTPATIENT
Start: 2021-01-27 | End: 2021-01-30

## 2021-01-27 RX ORDER — CYCLOBENZAPRINE HCL 10 MG
10 TABLET ORAL NIGHTLY PRN
Qty: 30 TABLET | Refills: 0 | Status: SHIPPED | OUTPATIENT
Start: 2021-01-27 | End: 2021-05-10

## 2021-01-27 ASSESSMENT — ENCOUNTER SYMPTOMS
SHORTNESS OF BREATH: 0
RHINORRHEA: 0
NAUSEA: 1
WHEEZING: 0
BACK PAIN: 1
CHEST TIGHTNESS: 0
TROUBLE SWALLOWING: 0
CONSTIPATION: 0
VOMITING: 0
SINUS PAIN: 0
COUGH: 0
DIARRHEA: 0
ABDOMINAL PAIN: 0
BLOOD IN STOOL: 0

## 2021-01-27 ASSESSMENT — PATIENT HEALTH QUESTIONNAIRE - PHQ9
SUM OF ALL RESPONSES TO PHQ QUESTIONS 1-9: 0
2. FEELING DOWN, DEPRESSED OR HOPELESS: 0
SUM OF ALL RESPONSES TO PHQ9 QUESTIONS 1 & 2: 0
SUM OF ALL RESPONSES TO PHQ QUESTIONS 1-9: 0

## 2021-01-27 NOTE — PROGRESS NOTES
Conjunctiva/sclera: Conjunctivae normal.      Pupils: Pupils are equal, round, and reactive to light. Neck:      Musculoskeletal: Normal range of motion and neck supple. Thyroid: No thyromegaly. Vascular: No JVD. Cardiovascular:      Rate and Rhythm: Normal rate and regular rhythm. Heart sounds: Normal heart sounds. No murmur. Pulmonary:      Effort: Pulmonary effort is normal. No respiratory distress. Breath sounds: Normal breath sounds. No wheezing or rales. Abdominal:      General: Bowel sounds are normal. There is no distension. Palpations: Abdomen is soft. Tenderness: There is no abdominal tenderness. Musculoskeletal: Normal range of motion. General: No deformity. Thoracic back: She exhibits spasm. Lymphadenopathy:      Cervical: No cervical adenopathy. Skin:     General: Skin is warm and dry. Findings: No rash. Neurological:      Mental Status: She is alert and oriented to person, place, and time. Cranial Nerves: No cranial nerve deficit. Deep Tendon Reflexes: Reflexes normal.   Psychiatric:         Behavior: Behavior normal.         Thought Content:  Thought content normal.         Judgment: Judgment normal.           (Time Documentation Optional [id number])    An electronic signaturewaas used to authenticate this note  -Ting Brasher MD on 1/27/2021 at 3:27 PM

## 2021-01-28 RX ORDER — INSULIN LISPRO 100 [IU]/ML
INJECTION, SOLUTION INTRAVENOUS; SUBCUTANEOUS
Qty: 6 PEN | Refills: 11 | Status: SHIPPED | OUTPATIENT
Start: 2021-01-28 | End: 2022-02-16 | Stop reason: SDUPTHER

## 2021-01-29 LAB
ORGANISM: ABNORMAL
URINE CULTURE, ROUTINE: ABNORMAL
URINE CULTURE, ROUTINE: ABNORMAL

## 2021-02-01 ENCOUNTER — TELEPHONE (OUTPATIENT)
Dept: INTERNAL MEDICINE | Age: 51
End: 2021-02-01

## 2021-02-01 DIAGNOSIS — N39.0 URINARY TRACT INFECTION WITHOUT HEMATURIA, SITE UNSPECIFIED: Primary | ICD-10-CM

## 2021-02-01 RX ORDER — SULFAMETHOXAZOLE AND TRIMETHOPRIM 800; 160 MG/1; MG/1
1 TABLET ORAL 2 TIMES DAILY
Qty: 14 TABLET | Refills: 0 | Status: SHIPPED | OUTPATIENT
Start: 2021-02-01 | End: 2021-02-08

## 2021-02-01 RX ORDER — PHENAZOPYRIDINE HYDROCHLORIDE 100 MG/1
100 TABLET, FILM COATED ORAL 3 TIMES DAILY PRN
Qty: 15 TABLET | Refills: 0 | Status: SHIPPED | OUTPATIENT
Start: 2021-02-01 | End: 2021-02-06

## 2021-02-01 NOTE — TELEPHONE ENCOUNTER
Pt called stated that she took all three days of the Bactrim and is still having pain in her back, and problems with urination. She wants to know if you will send her in more antibiotics.

## 2021-02-11 DIAGNOSIS — Z76.0 MEDICATION REFILL: ICD-10-CM

## 2021-02-12 RX ORDER — TOPIRAMATE 50 MG/1
TABLET, FILM COATED ORAL
Qty: 60 TABLET | Refills: 5 | Status: SHIPPED | OUTPATIENT
Start: 2021-02-12 | End: 2021-07-12

## 2021-02-12 NOTE — TELEPHONE ENCOUNTER
Anjel Mckeon called requesting a refill of the below medication which has been pended for you:     Requested Prescriptions     Pending Prescriptions Disp Refills    topiramate (TOPAMAX) 50 MG tablet [Pharmacy Med Name: TOPIRAMATE 50MG TABLETS] 60 tablet 5     Sig: TAKE 2 TABLETS BY MOUTH EVERY NIGHT AT BEDTIME       Last Appointment Date: 1/27/2021  Next Appointment Date: 07/27/2021    Allergies   Allergen Reactions    Claritin-D 12 Hour [Loratadine-Pseudoephedrine Er] Other (See Comments)     \"it intensifies my migraines\"    Demerol Hcl [Meperidine] Other (See Comments)     Aggression    Percocet [Oxycodone-Acetaminophen] Rash

## 2021-03-03 ENCOUNTER — HOSPITAL ENCOUNTER (OUTPATIENT)
Dept: PAIN MANAGEMENT | Age: 51
Discharge: HOME | End: 2021-03-03
Payer: COMMERCIAL

## 2021-03-03 VITALS
SYSTOLIC BLOOD PRESSURE: 108 MMHG | OXYGEN SATURATION: 100 % | RESPIRATION RATE: 18 BRPM | TEMPERATURE: 96.8 F | HEART RATE: 93 BPM | DIASTOLIC BLOOD PRESSURE: 71 MMHG

## 2021-03-03 PROCEDURE — 6360000002 HC RX W HCPCS

## 2021-03-03 PROCEDURE — 64615 CHEMODENERV MUSC MIGRAINE: CPT | Performed by: PSYCHIATRY & NEUROLOGY

## 2021-03-03 PROCEDURE — 64615 CHEMODENERV MUSC MIGRAINE: CPT

## 2021-03-03 NOTE — PROCEDURES
[address]    Patient Name: Gordon Harris    : 1970                    Age: 54 y.o. Sex: female    Date: March 3, 2021    Pre-op Diagnosis: Chronic Migraines    Post-op Diagnosis: Chronic Migraines    Procedure: Botox injections x 155 units (45 units wasted)    Performing Procedure:  Misty Gonzalez      Patient Vitals for the past 24 hrs:   BP Temp Temp src Pulse Resp SpO2   21 0859 108/71 96.8 °F (36 °C) Temporal 93 18 100 %       Previous Injections:  Gordon Harris is a 52y.o. year old female who is seen for evaluation of her chronic migraines. Says she has had them since childhood. Currently having more than 15 days a month with these, lasting more than 4 hours. Has them several times a week. Her entire head is throbbing. There is sensitivity to light, noise and movement along with intermittent nausea and vomiting. She has tried and failed Topamax, Elavil and propanolol in the past.  She took Aimovig once and it worked well for 2-1/2 weeks. She also has diabetic neuropathy. Denies any focal signs or symptoms suggestive of stroke or seizure. Currently on Topamax. Today is 1st botox . Indications:    Gordon Harris has been treated for problems with chronic migraine headaches. The patient was taken through a conservative course of treatment without complete resolution of symptoms. Botox injection therapy was offered and after the risks and benefits of the procedure were explained to the patient, we had agreed to proceed. The patient was taken to the procedure room and placed in the seated position. The following muscles were identified using palpation and standard landmarks. These muscles were marked and prepped with alcohol. A total of 155 units were injected after careful aspiration and the following distribution bilaterally:  10 units in 2 sites, procerus 5 units in one site, frontalis 20 units in 4 sites, temporalis 40 unit in 8 sites, occipitals 30 units in 6 sites, cervical paraspinals 20 units in 4 sites, and trapezius 30 units in 6 sites. Discharge: The patient tolerated the procedure well. There were no complications during the procedure and the patient was discharged home with discharge instructions. The patient has been instructed to contact the office should there be any complications or questions to arise between today and their next appointment.     Plan:  [x] Will return to the office in   [] 1 month  [] 4 - 6 weeks  [] 8 weeks   [x] 12 weeks:  [x] Procedure Follow-up   [] Office Visit      Paula Reynaga MD, 3/3/2021 at 9:44 AM

## 2021-03-06 DIAGNOSIS — Z76.0 MEDICATION REFILL: ICD-10-CM

## 2021-03-06 DIAGNOSIS — E78.5 HYPERLIPIDEMIA, UNSPECIFIED HYPERLIPIDEMIA TYPE: ICD-10-CM

## 2021-03-08 RX ORDER — ATORVASTATIN CALCIUM 20 MG/1
20 TABLET, FILM COATED ORAL DAILY
Qty: 90 TABLET | Refills: 3 | Status: SHIPPED | OUTPATIENT
Start: 2021-03-08 | End: 2021-05-15 | Stop reason: SDUPTHER

## 2021-03-08 RX ORDER — MONTELUKAST SODIUM 10 MG/1
TABLET ORAL
Qty: 90 TABLET | Refills: 3 | Status: SHIPPED | OUTPATIENT
Start: 2021-03-08 | End: 2021-10-18 | Stop reason: ALTCHOICE

## 2021-03-08 NOTE — TELEPHONE ENCOUNTER
Ángel Morley called requesting a refill of the below medication which has been pended for you:     Requested Prescriptions     Pending Prescriptions Disp Refills    montelukast (SINGULAIR) 10 MG tablet [Pharmacy Med Name: MONTELUKAST 10MG TABLETS] 90 tablet 3     Sig: TAKE 1 TABLET BY MOUTH EVERY NIGHT    atorvastatin (LIPITOR) 20 MG tablet [Pharmacy Med Name: ATORVASTATIN 20MG TABLETS] 90 tablet 3     Sig: TAKE 1 TABLET BY MOUTH DAILY       Last Appointment Date: 1/27/2021  Next Appointment Date: 7/27/2021    Allergies   Allergen Reactions    Claritin-D 12 Hour [Loratadine-Pseudoephedrine Er] Other (See Comments)     \"it intensifies my migraines\"    Demerol Hcl [Meperidine] Other (See Comments)     Aggression    Percocet [Oxycodone-Acetaminophen] Rash

## 2021-03-10 DIAGNOSIS — E03.9 HYPOTHYROIDISM, UNSPECIFIED TYPE: ICD-10-CM

## 2021-03-10 RX ORDER — LEVOTHYROXINE SODIUM 0.12 MG/1
TABLET ORAL
Qty: 30 TABLET | Refills: 11 | Status: SHIPPED | OUTPATIENT
Start: 2021-03-10 | End: 2022-02-07

## 2021-03-10 NOTE — TELEPHONE ENCOUNTER
Requested Prescriptions     Pending Prescriptions Disp Refills    DULoxetine (CYMBALTA) 30 MG extended release capsule [Pharmacy Med Name: DULOXETINE DR 30MG CAPSULES] 30 capsule 2     Sig: TAKE 1 CAPSULE BY MOUTH DAILY       Last Office Visit: 12/17/2020  Next Office Visit: 3/18/2021  Last Medication Refill: 12/17/2020 2 refills

## 2021-03-10 NOTE — TELEPHONE ENCOUNTER
Mike Bravo called requesting a refill of the below medication which has been pended for you:     Requested Prescriptions     Pending Prescriptions Disp Refills    levothyroxine (SYNTHROID) 125 MCG tablet [Pharmacy Med Name: LEVOTHYROXINE 0.125MG (125MCG) TAB] 30 tablet 11     Sig: TAKE 1 TABLET BY MOUTH DAILY       Last Appointment Date: 1/27/2021  Next Appointment Date: 7/27/2021    Allergies   Allergen Reactions    Claritin-D 12 Hour [Loratadine-Pseudoephedrine Er] Other (See Comments)     \"it intensifies my migraines\"    Demerol Hcl [Meperidine] Other (See Comments)     Aggression    Percocet [Oxycodone-Acetaminophen] Rash

## 2021-03-11 RX ORDER — DULOXETIN HYDROCHLORIDE 30 MG/1
30 CAPSULE, DELAYED RELEASE ORAL DAILY
Qty: 30 CAPSULE | Refills: 2 | Status: SHIPPED | OUTPATIENT
Start: 2021-03-11 | End: 2021-06-10

## 2021-03-18 ENCOUNTER — OFFICE VISIT (OUTPATIENT)
Dept: NEUROLOGY | Age: 51
End: 2021-03-18
Payer: COMMERCIAL

## 2021-03-18 VITALS
BODY MASS INDEX: 29.08 KG/M2 | HEIGHT: 62 IN | DIASTOLIC BLOOD PRESSURE: 74 MMHG | HEART RATE: 95 BPM | SYSTOLIC BLOOD PRESSURE: 119 MMHG | WEIGHT: 158 LBS

## 2021-03-18 DIAGNOSIS — G43.719 INTRACTABLE CHRONIC MIGRAINE WITHOUT AURA AND WITHOUT STATUS MIGRAINOSUS: Primary | ICD-10-CM

## 2021-03-18 DIAGNOSIS — Z86.79 HISTORY OF HYPERTENSION: ICD-10-CM

## 2021-03-18 DIAGNOSIS — Z86.59 HISTORY OF ANXIETY: ICD-10-CM

## 2021-03-18 PROCEDURE — 99213 OFFICE O/P EST LOW 20 MIN: CPT | Performed by: PSYCHIATRY & NEUROLOGY

## 2021-03-18 RX ORDER — LINACLOTIDE 290 UG/1
290 CAPSULE, GELATIN COATED ORAL
COMMUNITY
End: 2021-12-06 | Stop reason: ALTCHOICE

## 2021-03-18 NOTE — PROGRESS NOTES
Chief Complaint   Patient presents with    Follow-up     3mo follow up for migraines. Franci Alexander is a 54y.o. year old female who is seen for evaluation of her chronic migraines. Says she has had them since childhood. Currently having more than 15 days a month with these, lasting more than 4 hours. Has them several times a week. Her entire head is throbbing. There is sensitivity to light, noise and movement along with intermittent nausea and vomiting. She has tried and failed Topamax, Elavil and propanolol in the past.  She took Aimovig once and it worked well for 2-1/2 weeks. She also has diabetic neuropathy. Denies any focal signs or symptoms suggestive of stroke or seizure. Currently on Topamax. First round of Botox injections for migraine 3/3/2021. So Far, headaches are slightly better in the front but still problematic posteriorly.   Active Ambulatory Problems     Diagnosis Date Noted    Hypothyroidism 10/19/2015    Vitamin D deficiency 10/19/2015    Essential hypertension 12/08/2015    Mixed hyperlipidemia 12/08/2015    Carpal tunnel syndrome on right     Hypoesthesia of skin     Type 2 diabetes mellitus without complication, with long-term current use of insulin ([facility] [address] 08/16/2017    Skin infection 11/14/2018    Chronic constipation 01/22/2019    GERD without esophagitis 01/22/2019    Family history of polyps in the colon 01/22/2019    Neuropathy 10/16/2019    Polypharmacy 10/25/2019    Superficial mixed comedonal and inflammatory acne vulgaris 12/13/2019    MRSA (methicillin resistant staph aureus) culture positive 12/13/2019    Anxiety 12/13/2019    Class 2 obesity due to excess calories without serious comorbidity with body mass index (BMI) of 36.0 to 36.9 in adult 12/19/2019    Migraine with aura 09/30/2020    Obstructive sleep apnea     Neck and shoulder pain 10/27/2020    Heartburn 10/28/2020    Dysphagia 10/28/2020     Resolved Ambulatory Problems Diagnosis Date Noted    Hyperglycemia 08/16/2017    BRBPR (bright red blood per rectum) 01/22/2019    Lower abdominal pain 01/22/2019    Dysphagia 01/22/2019    Acute pain of left shoulder 05/12/2019    Strain of left shoulder 05/12/2019    Bandemia 10/16/2019    Bacteremia 10/17/2019    Ulcer due to Bacteroides with fat layer exposed ([facility] [address] 12/19/2019    Skin ulcer of flank with fat layer exposed ([facility] [address] 01/08/2020    Burn of hand, deep third degree, right, initial encounter 01/29/2020     Past Medical History:   Diagnosis Date    Depression     Diabetes mellitus ([address]     GERD (gastroesophageal reflux disease)     Hyperlipidemia     Hypertension     Osteoarthritis     PONV (postoperative nausea and vomiting)     Staph infection 11/11/2018       Past Surgical History:   Procedure Laterality Date    APPENDECTOMY      as a 1st grader   [address] [place] [address]      1996, 2000, 2003    CHOLECYSTECTOMY, LAPAROSCOPIC      1994    COLONOSCOPY      \"more than 15 + yrs ago\" PER PT RECALL    COLONOSCOPY N/A 7/30/2019    Dr Zarco Flash hemorrhoids-Grade 1, suboptimal prep, 3 yr recall    HYSTERECTOMY, TOTAL ABDOMINAL      May 2012, withOUT Ophorectomy    MA REVISE MEDIAN N/CARPAL TUNNEL SURG Left 6/16/2017    CARPAL TUNNEL RELEASE performed by Judy De La Torre MD at [address] N/CARPAL TUNNEL SURG Right 7/21/2017    CARPAL TUNNEL RELEASE performed by Judy De La Torre MD at [address]      as a 5th grader    UPPER GASTROINTESTINAL ENDOSCOPY N/A 7/30/2019    UPPER GASTROINTESTINAL ENDOSCOPY  7/30/2019    Dr Wade exam, empiric dil with 54F over wire, neg EoE       Family History   Problem Relation Age of Onset    High Blood Pressure Mother     Cancer Mother         of the brain    Other Mother         second hand smoker    High Blood Pressure Father     High Cholesterol Father     Colon Polyps Father     Other Father smoker    Diabetes Sister         type 1, from alcohol    High Blood Pressure Sister     High Blood Pressure Brother     Colon Cancer Maternal Aunt     Colon Cancer Paternal Uncle     Colon Cancer Paternal Grandfather     Diabetes Brother         from alcohol    Hypertension Brother     No Known Problems Son     No Known Problems Son     No Known Problems Son     No Known Problems Daughter     Esophageal Cancer Neg Hx     Liver Cancer Neg Hx     Liver Disease Neg Hx     Rectal Cancer Neg Hx     Stomach Cancer Neg Hx        Allergies   Allergen Reactions    Claritin-D 12 Hour [Loratadine-Pseudoephedrine Er] Other (See Comments)     \"it intensifies my migraines\"    Demerol Hcl [Meperidine] Other (See Comments)     Aggression    Percocet [Oxycodone-Acetaminophen] Rash       Social History     Socioeconomic History    Marital status:      Spouse name: Not on file    Number of children: 3    Years of education: Not on file    Highest education level: Not on file   Occupational History    Occupation: [employer] Employee   Social Needs    Financial resource strain: Not on file    Food insecurity     Worry: Not on file     Inability: Not on file   [employer] needs     Medical: Not on file     Non-medical: Not on file   Tobacco Use    Smoking status: Never Smoker    Smokeless tobacco: Never Used   Substance and Sexual Activity    Alcohol use: Not Currently     Alcohol/week: 0.0 standard drinks     Comment: \"once in a blue moon\", a few times a year    Drug use: No    Sexual activity: Not on file     Comment: 4   Lifestyle    Physical activity     Days per week: Not on file     Minutes per session: Not on file    Stress: Not on file   Relationships    Social connections     Talks on phone: Not on file     Gets together: Not on file     Attends [employer] service: Not on file     Active member of club or organization: Not on file     Attends meetings of Outpatient Medications   Medication Sig Dispense Refill    linaclotide (LINZESS) 290 MCG CAPS capsule Take 290 mcg by mouth every morning (before breakfast)      DULoxetine (CYMBALTA) 30 MG extended release capsule TAKE 1 CAPSULE BY MOUTH DAILY 30 capsule 2    levothyroxine (SYNTHROID) 125 MCG tablet TAKE 1 TABLET BY MOUTH DAILY 30 tablet 11    montelukast (SINGULAIR) 10 MG tablet TAKE 1 TABLET BY MOUTH EVERY NIGHT 90 tablet 3    atorvastatin (LIPITOR) 20 MG tablet TAKE 1 TABLET BY MOUTH DAILY 90 tablet 3    topiramate (TOPAMAX) 50 MG tablet TAKE 2 TABLETS BY MOUTH EVERY NIGHT AT BEDTIME 60 tablet 5    omeprazole (PRILOSEC) 40 MG delayed release capsule Take 1 capsule by mouth 2 times daily Take first thing in the morning on an empty stomach. 90 capsule 1    Onabotulinumtoxin A (BOTOX) 200 units injection 155 units IM in Cervical and Facial Region every 90 days      Please fax all correspondence to [phone]  Please call [phone] with any questions. 1 each 3    traZODone (DESYREL) 100 MG tablet TAKE 1 TABLET BY MOUTH EVERY NIGHT 30 tablet 5    famotidine (PEPCID) 20 MG tablet Take 1 tablet by mouth 2 times daily 30 tablet 1    blood glucose test strips (ACCU-CHEK GAVI PLUS) strip USE TO TEST ONCE DAILY 100 strip 3    quinapril (ACCUPRIL) 20 MG tablet Take 1 tablet by mouth every night at bedtime 90 tablet 3    spironolactone (ALDACTONE) 50 MG tablet Take 1 tablet by mouth daily 90 tablet 3    DULoxetine (CYMBALTA) 60 MG extended release capsule Take 1 capsule by mouth daily 30 capsule 5    clonazePAM (KLONOPIN) 0.5 MG tablet TAKE 1 TABLET BY MOUTH DAILY AS NEEDED. 30 tablet 0    Cholecalciferol (VITAMIN D) 50 MCG (2000 UT) CAPS capsule Take by mouth as needed      econazole nitrate 1 % cream Apply topically daily.  (Patient taking differently: Apply topically as needed Apply topically daily.) 85 g 0    ACCU-CHEK SOFTCLIX LANCETS MISC USE TO CHECK BLOOD SUGAR THREE TIMES DAILY AS DIRECTED 100 each 1    polyethylene glycol (GLYCOLAX) powder Take 17 g by mouth 2 times daily  1    insulin aspart (NOVOLOG FLEXPEN) 100 UNIT/ML injection pen Inject into the skin 3 times daily (before meals) Counting Carbs - Sliding Scale      Semaglutide (OZEMPIC, 0.25 OR 0.5 MG/DOSE, SC) Inject 0.5 mg into the skin once a week On Sundays      vitamin B-6 (PYRIDOXINE) 100 MG tablet Take 100 mg by mouth daily      TRESIBA FLEXTOUCH 200 UNIT/ML SOPN INJECT 60 UNITS INTO THE SKIN DAILY AS DIRECTED (Patient taking differently: 65 units each morning not taking at all during the night.) 4 pen 5    glucose monitoring kit (FREESTYLE) monitoring kit 1 kit by Does not apply route daily 1 kit 0    blood glucose monitor kit and supplies Use to check sugar  E11.9 1 kit 0    Insulin Pen Needle 32G X 4 MM MISC 1 each by Does not apply route daily 100 each 3    Insulin Syringe-Needle U-100 30G X 5/16\" 0.5 ML MISC 1 each by Does not apply route daily 100 each 3    Lancets MISC Use three times a day. 100 each 3    ASPIRIN LOW DOSE 81 MG EC tablet TAKE 1 TABLET BY MOUTH DAILY 30 tablet 0    Rimegepant Sulfate (NURTEC) 75 MG TBDP Take 1 capsule by mouth daily as needed (migraine) (Patient not taking: Reported on 1/27/2021) 30 tablet 5     No current facility-administered medications for this visit.         Outpatient Medications Marked as Taking for the 3/18/21 encounter (Office Visit) with Adin Morel MD   Medication Sig Dispense Refill    linaclotide (LINZESS) 290 MCG CAPS capsule Take 290 mcg by mouth every morning (before breakfast)      DULoxetine (CYMBALTA) 30 MG extended release capsule TAKE 1 CAPSULE BY MOUTH DAILY 30 capsule 2    levothyroxine (SYNTHROID) 125 MCG tablet TAKE 1 TABLET BY MOUTH DAILY 30 tablet 11    montelukast (SINGULAIR) 10 MG tablet TAKE 1 TABLET BY MOUTH EVERY NIGHT 90 tablet 3    atorvastatin (LIPITOR) 20 MG tablet TAKE 1 TABLET BY MOUTH DAILY 90 tablet 3    topiramate (TOPAMAX) 50 MG tablet TAKE 2 TABLETS BY MOUTH EVERY NIGHT AT BEDTIME 60 tablet 5    omeprazole (PRILOSEC) 40 MG delayed release capsule Take 1 capsule by mouth 2 times daily Take first thing in the morning on an empty stomach. 90 capsule 1    Onabotulinumtoxin A (BOTOX) 200 units injection 155 units IM in Cervical and Facial Region every 90 days      Please fax all correspondence to [phone]  Please call [phone] with any questions. 1 each 3    traZODone (DESYREL) 100 MG tablet TAKE 1 TABLET BY MOUTH EVERY NIGHT 30 tablet 5    famotidine (PEPCID) 20 MG tablet Take 1 tablet by mouth 2 times daily 30 tablet 1    blood glucose test strips (ACCU-CHEK GAVI PLUS) strip USE TO TEST ONCE DAILY 100 strip 3    quinapril (ACCUPRIL) 20 MG tablet Take 1 tablet by mouth every night at bedtime 90 tablet 3    spironolactone (ALDACTONE) 50 MG tablet Take 1 tablet by mouth daily 90 tablet 3    DULoxetine (CYMBALTA) 60 MG extended release capsule Take 1 capsule by mouth daily 30 capsule 5    clonazePAM (KLONOPIN) 0.5 MG tablet TAKE 1 TABLET BY MOUTH DAILY AS NEEDED. 30 tablet 0    Cholecalciferol (VITAMIN D) 50 MCG (2000 UT) CAPS capsule Take by mouth as needed      econazole nitrate 1 % cream Apply topically daily.  (Patient taking differently: Apply topically as needed Apply topically daily.) 85 g 0    ACCU-CHEK SOFTCLIX LANCETS [facility] USE TO CHECK BLOOD SUGAR THREE TIMES DAILY AS DIRECTED 100 each 1    polyethylene glycol (GLYCOLAX) powder Take 17 g by mouth 2 times daily  1    insulin aspart (NOVOLOG FLEXPEN) 100 UNIT/ML injection pen Inject into the skin 3 times daily (before meals) Counting Carbs - Sliding Scale      Semaglutide (OZEMPIC, 0.25 OR 0.5 MG/DOSE, SC) Inject 0.5 mg into the skin once a week On Sundays      vitamin B-6 (PYRIDOXINE) 100 MG tablet Take 100 mg by mouth daily      TRESIBA FLEXTOUCH 200 UNIT/ML SOPN INJECT 60 UNITS INTO THE SKIN DAILY AS DIRECTED (Patient taking differently: 65 units each morning not taking at all during the night.) 4 pen 5    glucose monitoring kit (FREESTYLE) monitoring kit 1 kit by Does not apply route daily 1 kit 0    blood glucose monitor kit and supplies Use to check sugar  E11.9 1 kit 0    Insulin Pen Needle 32G X 4 MM MISC 1 each by Does not apply route daily 100 each 3    Insulin Syringe-Needle U-100 30G X 5/16\" 0.5 ML MISC 1 each by Does not apply route daily 100 each 3    Lancets MISC Use three times a day. 100 each 3    ASPIRIN LOW DOSE 81 MG EC tablet TAKE 1 TABLET BY MOUTH DAILY 30 tablet 0       /74   Pulse 95   Ht 5' 2\" (1.575 m)   Wt 158 lb (71.7 kg)   LMP  (LMP Unknown)   BMI 28.90 kg/m²       Constitutional  well developed, well nourished. Eyes  conjunctiva normal.  Pupils react to light  Ear, nose, throat -hearing intact to finger rub No scars, masses, or lesions over external nose or ears, no atrophy of tongue  Neck-symmetric, no masses noted, no jugular vein distension. No bruits noted. Respiration- chest wall appears symmetric, good expansion,   normal effort without use of accessory muscles  Cardiovascular- RRR  Musculoskeletal  no significant wasting of muscles noted, no bony deformities, gait no gross ataxia  Extremities-no clubbing, cyanosis or edema  Skin  warm, dry, and intact. No rash, erythema, or pallor. Psychiatric  mood, affect, and behavior appear normal.      Neurological exam  Awake, alert, fluent oriented x 3 appropriate affect  Attention and concentration appear appropriate  Recent and remote memory appears unremarkable  Speech normal without dysarthria  No clear issues with language of fund of knowledge    Cranial Nerve Exam   CN II- Visual fields grossly unremarkable. VA adequate.  Discs sharp  CN III, IV,VI- PERRLA, EOMI, No nystagmus, conjugate eye movements, no ptosis  CN VII-no facial asymmetry  CN VIII-Hearing intact   CN IX and X- Palate elevates in midline  CN XI-good shoulder shrug  CN XII-Tongue midline with no fasciculations or fibrillations    Motor Exam  V/V throughout upper and lower extremities bilaterally, no cogwheeling, normal tone      Reflexes   2+ biceps bilaterally  2+ brachioradialis  2+ triceps  2+patella  2+ ankle jerks  Tremors- no tremors in hands or head noted    Gait  Normal base and speed  No ataxia. No Romberg sign    Coordination  Finger to nose and EMMIE-unremarkable    Lab Results   Component Value Date    [id number] 522 05/27/2016     Lab Results   Component Value Date    WBC 13.8 (H) 10/22/2020    HGB 12.5 10/22/2020    HCT 37.7 10/22/2020    MCV 87.1 10/22/2020     10/22/2020     Lab Results   Component Value Date     10/22/2020    K 4.0 10/22/2020     10/22/2020    CO2 22 10/22/2020    BUN 13 10/22/2020    CREATININE 0.7 10/22/2020    GLUCOSE 83 10/22/2020    CALCIUM 9.4 10/22/2020    PROT 8.3 10/22/2020    LABALBU 4.5 10/22/2020    BILITOT 0.4 10/22/2020    ALKPHOS 94 10/22/2020    AST 32 10/22/2020    ALT 17 10/22/2020    LABGLOM >60 10/22/2020    GFRAA >59 10/22/2020    GLOB 4.2 02/15/2017           Assessment    ICD-10-CM    1. Intractable chronic migraine without aura and without status migrainosus  G43.719    2. History of anxiety  Z86.59    3. History of hypertension  Z86.79      Intractable migraines. Continue Botox. I offered her occipital nerve blocks today but she declines. We may revisit that in the future. Anxiety and blood pressure controlled. Follow-up for Botox in a few months. Plan        Return if symptoms worsen or fail to improve.     (Please note that portions of this note were completed with a voice recognition program. Efforts were made to edit the dictations but occasionally words are mis-transcribed.)

## 2021-04-01 RX ORDER — AMITRIPTYLINE HYDROCHLORIDE 25 MG/1
TABLET, FILM COATED ORAL
Qty: 60 TABLET | Refills: 11 | OUTPATIENT
Start: 2021-04-01 | End: 2021-05-01

## 2021-04-09 DIAGNOSIS — G62.9 NEUROPATHY: ICD-10-CM

## 2021-04-09 RX ORDER — DULOXETIN HYDROCHLORIDE 60 MG/1
60 CAPSULE, DELAYED RELEASE ORAL DAILY
Qty: 30 CAPSULE | Refills: 5 | Status: SHIPPED | OUTPATIENT
Start: 2021-04-09 | End: 2021-10-19 | Stop reason: SDUPTHER

## 2021-04-09 NOTE — TELEPHONE ENCOUNTER
Indira Carter called requesting a refill of the below medication which has been pended for you:     Requested Prescriptions     Pending Prescriptions Disp Refills    DULoxetine (CYMBALTA) 60 MG extended release capsule [Pharmacy Med Name: DULOXETINE DR 60MG CAPSULES] 30 capsule 5     Sig: TAKE 1 CAPSULE BY MOUTH DAILY       Last Appointment Date: 1/27/2021  Next Appointment Date: 7/27/2021    Allergies   Allergen Reactions    Claritin-D 12 Hour [Loratadine-Pseudoephedrine Er] Other (See Comments)     \"it intensifies my migraines\"    Demerol Hcl [Meperidine] Other (See Comments)     Aggression    Percocet [Oxycodone-Acetaminophen] Rash

## 2021-05-07 ENCOUNTER — TELEPHONE (OUTPATIENT)
Dept: ENDOCRINOLOGY | Facility: CLINIC | Age: 51
End: 2021-05-07

## 2021-05-10 RX ORDER — CYCLOBENZAPRINE HCL 10 MG
TABLET ORAL
Qty: 30 TABLET | Refills: 3 | Status: SHIPPED | OUTPATIENT
Start: 2021-05-10 | End: 2021-10-11

## 2021-05-10 NOTE — TELEPHONE ENCOUNTER
Paula Kruse called requesting a refill of the below medication which has been pended for you:     Requested Prescriptions     Pending Prescriptions Disp Refills    cyclobenzaprine (FLEXERIL) 10 MG tablet [Pharmacy Med Name: CYCLOBENZAPRINE 10MG TABLETS] 30 tablet 0     Sig: TAKE 1 TABLET BY MOUTH EVERY NIGHT AS NEEDED FOR MUSCLE SPASMS       Last Appointment Date: 1/27/2021  Next Appointment Date: 7/27/2021    Allergies   Allergen Reactions    Claritin-D 12 Hour [Loratadine-Pseudoephedrine Er] Other (See Comments)     \"it intensifies my migraines\"    Demerol Hcl [Meperidine] Other (See Comments)     Aggression    Percocet [Oxycodone-Acetaminophen] Rash

## 2021-05-11 ENCOUNTER — DOCUMENTATION (OUTPATIENT)
Dept: ENDOCRINOLOGY | Facility: CLINIC | Age: 51
End: 2021-05-11

## 2021-05-15 DIAGNOSIS — Z76.0 MEDICATION REFILL: ICD-10-CM

## 2021-05-15 DIAGNOSIS — F41.9 ANXIETY: ICD-10-CM

## 2021-05-15 DIAGNOSIS — E78.5 HYPERLIPIDEMIA, UNSPECIFIED HYPERLIPIDEMIA TYPE: ICD-10-CM

## 2021-05-17 RX ORDER — ATORVASTATIN CALCIUM 20 MG/1
20 TABLET, FILM COATED ORAL DAILY
Qty: 90 TABLET | Refills: 3 | Status: SHIPPED | OUTPATIENT
Start: 2021-05-17 | End: 2022-05-06

## 2021-05-17 RX ORDER — CLONAZEPAM 0.5 MG/1
TABLET ORAL
Qty: 30 TABLET | Refills: 0 | Status: SHIPPED | OUTPATIENT
Start: 2021-05-17 | End: 2022-09-13

## 2021-05-17 RX ORDER — FAMOTIDINE 20 MG/1
20 TABLET, FILM COATED ORAL 2 TIMES DAILY
Qty: 30 TABLET | Refills: 1 | Status: SHIPPED | OUTPATIENT
Start: 2021-05-17 | End: 2021-09-13 | Stop reason: SDUPTHER

## 2021-05-17 NOTE — TELEPHONE ENCOUNTER
Luke Gilford called to request a refill on her medication. Last office visit : 1/27/2021   Next office visit : 7/27/2021     Last UDS:   Amphetamine Screen, Urine   Date Value Ref Range Status   09/06/2019 neg  Final     Barbiturate Screen, Urine   Date Value Ref Range Status   09/06/2019 neg  Final     Benzodiazepine Screen, Urine   Date Value Ref Range Status   09/06/2019 neg  Final     Buprenorphine Urine   Date Value Ref Range Status   09/06/2019 neg  Final     Cocaine Metabolite Screen, Urine   Date Value Ref Range Status   09/06/2019 neg  Final     Gabapentin Screen, Urine   Date Value Ref Range Status   09/06/2019 neg  Final     MDMA, Urine   Date Value Ref Range Status   09/06/2019 neg  Final     Methamphetamine, Urine   Date Value Ref Range Status   09/06/2019 neg  Final     Opiate Scrn, Ur   Date Value Ref Range Status   09/06/2019 neg  Final     Oxycodone Screen, Ur   Date Value Ref Range Status   09/06/2019 neg  Final     PCP Screen, Urine   Date Value Ref Range Status   09/06/2019 neg  Final     Propoxyphene Screen, Urine   Date Value Ref Range Status   09/06/2019 neg  Final     THC Screen, Urine   Date Value Ref Range Status   09/06/2019 neg  Final     Tricyclic Antidepressants, Urine   Date Value Ref Range Status   09/06/2019 neg  Final       Last [facility]: 05/17/2021  Medication Contract: 01/27/2021     Requested Prescriptions     Pending Prescriptions Disp Refills    clonazePAM (KLONOPIN) 0.5 MG tablet 30 tablet 0     Sig: TAKE 1 TABLET BY MOUTH DAILY AS NEEDED.  famotidine (PEPCID) 20 MG tablet 30 tablet 1     Sig: Take 1 tablet by mouth 2 times daily    atorvastatin (LIPITOR) 20 MG tablet 90 tablet 3     Sig: Take 1 tablet by mouth daily         Please approve or refuse this medication.    Curt Brandon MA

## 2021-05-26 ENCOUNTER — HOSPITAL ENCOUNTER (OUTPATIENT)
Dept: PAIN MANAGEMENT | Age: 51
Discharge: HOME | End: 2021-05-26
Payer: COMMERCIAL

## 2021-05-26 VITALS
SYSTOLIC BLOOD PRESSURE: 102 MMHG | TEMPERATURE: 96.5 F | HEART RATE: 87 BPM | RESPIRATION RATE: 20 BRPM | OXYGEN SATURATION: 100 % | DIASTOLIC BLOOD PRESSURE: 70 MMHG

## 2021-05-26 PROCEDURE — 6360000002 HC RX W HCPCS

## 2021-05-26 PROCEDURE — 64615 CHEMODENERV MUSC MIGRAINE: CPT | Performed by: PSYCHIATRY & NEUROLOGY

## 2021-05-26 PROCEDURE — 64615 CHEMODENERV MUSC MIGRAINE: CPT

## 2021-05-26 NOTE — PROGRESS NOTES
Procedure:  Level of Consciousness: [x]Alert [x]Oriented []Disoriented []Lethargic  Anxiety Level: [x]Calm []Anxious []Depressed []Other  Skin: [x]Warm [x]Dry []Cool []Moist []Intact []Other  Cardiovascular: [x]Palpitations: [x]Never []Occasionally []Frequently  Chest Pain: [x]No []Yes  Respiratory:  [x]Unlabored []Labored []Cough ([] Productive []Unproductive)  HCG Required: [x]No []Yes   Results: []Negative []Positive  Knowledge Level:        [x]Patient/Other verbalized understanding of pre-procedure instructions. [x]Assessment of post-op care needs (transportation, responsible caregiver)        [x]Able to discuss health care problems and how to deal with it. Factors that Affect Teaching:        Language Barrier: [x]No []Yes - why:        Hearing Loss:        [x]No []Yes            Corrective Device:  []Yes []No        Vision Loss:           []No [x]Yes            Corrective Device:  [x]Yes []No        Memory Loss:       [x]No []Yes            []Short Term []Long Term  Motivational Level:  [x]Asks Questions                  [x]Extremely Anxious       []Seems Interested               [x]Seems Uninterested                  [x]Denies need for Education  Risk for Injury:  [x]Patient oriented to person, place and time  []History of frequent falls/loss of balance  Nutritional:  []Change in appetite   []Weight Gain   []Weight Loss  Functional:  []Requires assistance with ADL'sPatient states that he/she has ___15___ headaches out of 30 days each month.   Patient states that he/she has __6____ migraines out of 30 days each month.monthly

## 2021-05-26 NOTE — PROCEDURES
[address]    Patient Name: Florencio Santos    : 1970                    Age: 54 y.o. Sex: female    Date: May 26, 2021    Pre-op Diagnosis: Chronic Migraines    Post-op Diagnosis: Chronic Migraines    Procedure: Botox injections x 155 units (45 units wasted)    Performing Procedure:  Chano Bull MD      Patient Vitals for the past 24 hrs:   BP Temp Temp src Pulse Resp SpO2   21 0845 102/70 96.5 °F (35.8 °C) Temporal 87 20 100 %       Previous Injections:   a 52y.o. year old female who is seen for evaluation of her chronic migraines.  Says she has had them since childhood.  Currently having more than 15 days a month with these, lasting more than 4 hours.  Has them several times a week. Yamileth Angulo entire head is throbbing.  There is sensitivity to light, noise and movement along with intermittent nausea and vomiting.  She has tried and failed Topamax, Elavil and propanolol in the past.  She took Aimovig once and it worked well for 2-1/2 weeks. Rayne Baltazar also has diabetic neuropathy.  Denies any focal signs or symptoms suggestive of stroke or seizure.  Currently on Topamax. Today is 2nd botox . Lasted about 1 1/2 months last time  Has more than 15d migraine per month, lasting more than 4 hours per HA. Indications:    Florencio Santos has been treated for problems with chronic migraine headaches. The patient was taken through a conservative course of treatment without complete resolution of symptoms. Botox injection therapy was offered and after the risks and benefits of the procedure were explained to the patient, we had agreed to proceed. The patient was taken to the procedure room and placed in the seated position. The following muscles were identified using palpation and standard landmarks. These muscles were marked and prepped with alcohol.  A total of 155 units were injected after careful aspiration and the following distribution bilaterally:  10 units in 2 sites, procerus 5 units in one site, frontalis 20 units in 4 sites, temporalis 40 unit in 8 sites, occipitals 30 units in 6 sites, cervical paraspinals 20 units in 4 sites, and trapezius 30 units in 6 sites. Discharge: The patient tolerated the procedure well. There were no complications during the procedure and the patient was discharged home with discharge instructions. The patient has been instructed to contact the office should there be any complications or questions to arise between today and their next appointment.     Plan:  [x] Will return to the office in   [] 1 month  [] 4 - 6 weeks  [] 8 weeks   [x] 12 weeks:  [x] Procedure Follow-up   [] Office Visit      Shivani Sanchez MD, 5/26/2021 at 9:48 AM

## 2021-06-03 ENCOUNTER — TELEPHONE (OUTPATIENT)
Dept: ENDOCRINOLOGY | Facility: CLINIC | Age: 51
End: 2021-06-03

## 2021-06-10 DIAGNOSIS — F41.9 ANXIETY: ICD-10-CM

## 2021-06-10 DIAGNOSIS — Z76.0 MEDICATION REFILL: ICD-10-CM

## 2021-06-10 RX ORDER — DULOXETIN HYDROCHLORIDE 30 MG/1
30 CAPSULE, DELAYED RELEASE ORAL DAILY
Qty: 30 CAPSULE | Refills: 2 | Status: SHIPPED | OUTPATIENT
Start: 2021-06-10 | End: 2021-09-08

## 2021-06-10 NOTE — TELEPHONE ENCOUNTER
Requested Prescriptions     Pending Prescriptions Disp Refills    DULoxetine (CYMBALTA) 30 MG extended release capsule [Pharmacy Med Name: DULOXETINE DR 30MG CAPSULES] 30 capsule 2     Sig: TAKE 1 CAPSULE BY MOUTH DAILY       Last Office Visit: 3/18/2021  Next Office Visit: Visit date not found  Last Medication Refill: 3/11/2021 2 refills

## 2021-06-11 RX ORDER — TRAZODONE HYDROCHLORIDE 100 MG/1
TABLET ORAL
Qty: 30 TABLET | Refills: 5 | Status: SHIPPED | OUTPATIENT
Start: 2021-06-11 | End: 2021-07-27 | Stop reason: SDUPTHER

## 2021-07-10 DIAGNOSIS — Z76.0 MEDICATION REFILL: ICD-10-CM

## 2021-07-12 RX ORDER — TOPIRAMATE 50 MG/1
TABLET, FILM COATED ORAL
Qty: 60 TABLET | Refills: 5 | Status: SHIPPED | OUTPATIENT
Start: 2021-07-12 | End: 2021-07-27 | Stop reason: SDUPTHER

## 2021-07-19 DIAGNOSIS — E11.9 TYPE 2 DIABETES MELLITUS WITHOUT COMPLICATION, WITH LONG-TERM CURRENT USE OF INSULIN (HCC): ICD-10-CM

## 2021-07-19 DIAGNOSIS — Z11.59 ENCOUNTER FOR HEPATITIS C SCREENING TEST FOR LOW RISK PATIENT: ICD-10-CM

## 2021-07-19 DIAGNOSIS — Z79.4 TYPE 2 DIABETES MELLITUS WITHOUT COMPLICATION, WITH LONG-TERM CURRENT USE OF INSULIN (HCC): ICD-10-CM

## 2021-07-19 LAB
ALBUMIN SERPL-MCNC: 4.3 G/DL (ref 3.5–5.2)
ALP BLD-CCNC: 123 U/L (ref 35–104)
ALT SERPL-CCNC: 27 U/L (ref 5–33)
ANION GAP SERPL CALCULATED.3IONS-SCNC: 12 MMOL/L (ref 7–19)
AST SERPL-CCNC: 49 U/L (ref 5–32)
BILIRUB SERPL-MCNC: 0.7 MG/DL (ref 0.2–1.2)
BILIRUBIN DIRECT: 0.2 MG/DL (ref 0–0.3)
BILIRUBIN, INDIRECT: 0.5 MG/DL (ref 0.1–1)
BUN BLDV-MCNC: 12 MG/DL (ref 6–20)
CALCIUM SERPL-MCNC: 9.1 MG/DL (ref 8.6–10)
CHLORIDE BLD-SCNC: 95 MMOL/L (ref 98–111)
CHOLESTEROL, TOTAL: 82 MG/DL (ref 160–199)
CO2: 22 MMOL/L (ref 22–29)
CREAT SERPL-MCNC: 0.8 MG/DL (ref 0.5–0.9)
CREATININE URINE: 244.7 MG/DL (ref 4.2–622)
GFR AFRICAN AMERICAN: >59
GFR NON-AFRICAN AMERICAN: >60
GLUCOSE BLD-MCNC: 214 MG/DL (ref 74–109)
HBA1C MFR BLD: 7.8 % (ref 4–6)
HCT VFR BLD CALC: 38.8 % (ref 37–47)
HDLC SERPL-MCNC: 21 MG/DL (ref 65–121)
HEMOGLOBIN: 12.9 G/DL (ref 12–16)
HEPATITIS C ANTIBODY INTERPRETATION: NORMAL
LDL CHOLESTEROL CALCULATED: 18 MG/DL
MCH RBC QN AUTO: 28.5 PG (ref 27–31)
MCHC RBC AUTO-ENTMCNC: 33.2 G/DL (ref 33–37)
MCV RBC AUTO: 85.8 FL (ref 81–99)
MICROALBUMIN UR-MCNC: 1.4 MG/DL (ref 0–19)
MICROALBUMIN/CREAT UR-RTO: 5.7 MG/G
PDW BLD-RTO: 14.5 % (ref 11.5–14.5)
PLATELET # BLD: 185 K/UL (ref 130–400)
PMV BLD AUTO: 10.2 FL (ref 9.4–12.3)
POTASSIUM SERPL-SCNC: 4 MMOL/L (ref 3.5–5)
RBC # BLD: 4.52 M/UL (ref 4.2–5.4)
SODIUM BLD-SCNC: 129 MMOL/L (ref 136–145)
TOTAL PROTEIN: 8.3 G/DL (ref 6.6–8.7)
TRIGL SERPL-MCNC: 215 MG/DL (ref 0–149)
WBC # BLD: 9.7 K/UL (ref 4.8–10.8)

## 2021-07-26 PROBLEM — R12 HEARTBURN: Status: RESOLVED | Noted: 2020-10-28 | Resolved: 2021-07-26

## 2021-07-26 PROBLEM — E87.1 HYPONATREMIA: Status: ACTIVE | Noted: 2021-07-26

## 2021-07-26 ASSESSMENT — ENCOUNTER SYMPTOMS
BACK PAIN: 1
COUGH: 0
BLOOD IN STOOL: 0
NAUSEA: 1
ABDOMINAL PAIN: 0
VOMITING: 0
RHINORRHEA: 0
SINUS PAIN: 0
CONSTIPATION: 0
CHEST TIGHTNESS: 0
SHORTNESS OF BREATH: 0
TROUBLE SWALLOWING: 0
WHEEZING: 0
DIARRHEA: 0

## 2021-07-26 NOTE — PROGRESS NOTES
Lashonda Stoddard ( 1970) is a 54 y.o. female,  Established , here for evaluation of the following chief complaint(s).   6 Month Follow-Up (pt cant get her dexcom supplies and has not been able to check sugar), Neck Pain, Migraine, and Arm Pain (right arm pain)        ASSESSMENT/PLAN:  Problem List        Circulatory    Essential hypertension      BP was low today and patient was profoundly tired, she just came back from [place] to decerase Quinapril 10 mg daily, monitor BP at home            Respiratory    Obstructive sleep apnea      Well-controlled, continue current medications and lifestyle modifications recommended            Endocrine    Hypothyroidism      Well-controlled, continue current medications, medication adherence emphasized and lifestyle modifications recommended         Relevant Medications    levothyroxine (SYNTHROID) 125 MCG tablet    Type 2 diabetes mellitus without complication, with long-term current use of insulin (HCC)      Well-controlled, continue current medications, medication adherence emphasized and lifestyle modifications recommended   Not controlled         Relevant Medications    Insulin Pen Needle 32G X 4 MM MISC    Insulin Syringe-Needle U-100 30G X 5/16\" 0.5 ML MISC    insulin aspart (NOVOLOG FLEXPEN) 100 UNIT/ML injection pen    Semaglutide (OZEMPIC, 0.25 OR 0.5 MG/DOSE, SC)    Insulin Degludec (TRESIBA FLEXTOUCH) 200 UNIT/ML SOPN    Other Relevant Orders    Basic Metabolic Panel    Amb External Referral To Ophthalmology    Hemoglobin A1C    Microalbumin / Creatinine Urine Ratio       Nervous and Auditory    Migraine with aura      Monitored by specialist- no acute findings meriting change in the plan   Advised to call her specialist         Relevant Medications    ASPIRIN LOW DOSE 81 MG EC tablet    DULoxetine (CYMBALTA) 60 MG extended release capsule    cyclobenzaprine (FLEXERIL) 10 MG tablet    clonazePAM (KLONOPIN) 0.5 MG tablet    DULoxetine (CYMBALTA) 30 MG extended release capsule    traZODone (DESYREL) 100 MG tablet    topiramate (TOPAMAX) 50 MG tablet       Other    Anxiety    Relevant Medications    DULoxetine (CYMBALTA) 60 MG extended release capsule    clonazePAM (KLONOPIN) 0.5 MG tablet    DULoxetine (CYMBALTA) 30 MG extended release capsule    traZODone (DESYREL) 100 MG tablet    Hyponatremia    Relevant Orders    Basic Metabolic Panel    Mixed hyperlipidemia    Relevant Medications    ASPIRIN LOW DOSE 81 MG EC tablet    spironolactone (ALDACTONE) 50 MG tablet    atorvastatin (LIPITOR) 20 MG tablet    quinapril (ACCUPRIL) 20 MG tablet    Other Relevant Orders    Lipid Panel    Hepatic Function Panel    Primary insomnia - Primary      Unclear control, continue current medications and restart Trazodone         Relevant Medications    traZODone (DESYREL) 100 MG tablet          Results for orders placed or performed in visit on 07/19/21   Hepatitis C Antibody   Result Value Ref Range    Hep C Ab Interp Non-Reactive    Hemoglobin A1C   Result Value Ref Range    Hemoglobin A1C 7.8 (H) 4.0 - 6.0 %   Microalbumin / Creatinine Urine Ratio   Result Value Ref Range    Microalbumin, Random Urine 1.40 0.00 - 19.00 mg/dL    Creatinine, Ur 244.7 4.2 - 622.0 mg/dL    Microalbumin Creatinine Ratio 5.7 mg/g   Lipid Panel   Result Value Ref Range    Cholesterol, Total 82 (L) 160 - 199 mg/dL    Triglycerides 215 (H) 0 - 149 mg/dL    HDL 21 (L) 65 - 121 mg/dL    LDL Calculated 18 <100 mg/dL   Hepatic Function Panel   Result Value Ref Range    Total Protein 8.3 6.6 - 8.7 g/dL    Albumin 4.3 3.5 - 5.2 g/dL    Alkaline Phosphatase 123 (H) 35 - 104 U/L    ALT 27 5 - 33 U/L    AST 49 (H) 5 - 32 U/L    Total Bilirubin 0.7 0.2 - 1.2 mg/dL    Bilirubin, Direct 0.2 0.0 - 0.3 mg/dL    Bilirubin, Indirect 0.5 0.1 - 1.0 mg/dL   CBC   Result Value Ref Range    WBC 9.7 4.8 - 10.8 K/uL    RBC 4.52 4.20 - 5.40 M/uL    Hemoglobin 12.9 12.0 - 16.0 g/dL    Hematocrit 38.8 37.0 - 47.0 %    MCV 85.8 81.0 - 99.0 fL    MCH 28.5 27.0 - 31.0 pg    MCHC 33.2 33.0 - 37.0 g/dL    RDW 14.5 11.5 - 14.5 %    Platelets 854 363 - 355 K/uL    MPV 10.2 9.4 - 12.3 fL   Basic Metabolic Panel   Result Value Ref Range    Sodium 129 (L) 136 - 145 mmol/L    Potassium 4.0 3.5 - 5.0 mmol/L    Chloride 95 (L) 98 - 111 mmol/L    CO2 22 22 - 29 mmol/L    Anion Gap 12 7 - 19 mmol/L    Glucose 214 (H) 74 - 109 mg/dL    BUN 12 6 - 20 mg/dL    CREATININE 0.8 0.5 - 0.9 mg/dL    GFR Non-African American >60 >60    GFR African American >59 >59    Calcium 9.1 8.6 - 10.0 mg/dL       No follow-ups on file. HPI  43-year-old female who presents for follow-up visit  Chronic neck pain   migraine headache did not go to work today has been taking Topamax for years she does not get relief from triptans and is asking to see if we can give her other medications for migraines  Her neuropathy has been acting worse she was started on Cymbalta 30 and she is requesting to increase it  Jahu 80 controlled  Type e DM slightly worse, needs to watch her diet  Hypothyroidism, clinically euthyroid  Anxiety  PRITESH  Throughout the visit patient kept on repeatedly saying she does not feel good she is very tired she feels dizzy she feels dehydrated despite hydrating due to her recent travel and possibility of being exposed to other Covid patients despite of her vaccination patient was urged to get her retesting for Covid      Review of Systems   Constitutional: Positive for fatigue. Negative for activity change, chills and fever. HENT: Negative for hearing loss, postnasal drip, rhinorrhea, sinus pain and trouble swallowing. Eyes: Negative for visual disturbance. Respiratory: Negative for cough, chest tightness, shortness of breath and wheezing. Cardiovascular: Negative for chest pain, palpitations and leg swelling. Gastrointestinal: Positive for nausea. Negative for abdominal pain, blood in stool, constipation, diarrhea and vomiting.    Endocrine: Negative for cold intolerance. Genitourinary: Positive for dysuria. Negative for frequency and urgency. Musculoskeletal: Positive for arthralgias, back pain, myalgias and neck pain. Allergic/Immunologic: Negative for environmental allergies. Neurological: Positive for dizziness, weakness and headaches. Negative for light-headedness and numbness. Physical Exam  Vitals and nursing note reviewed. Constitutional:       General: She is not in acute distress. HENT:      Head: Normocephalic. Right Ear: External ear normal.      Left Ear: External ear normal.      Nose: Nose normal.   Eyes:      General: No scleral icterus. Conjunctiva/sclera: Conjunctivae normal.      Pupils: Pupils are equal, round, and reactive to light. Neck:      Thyroid: No thyromegaly. Vascular: No JVD. Cardiovascular:      Rate and Rhythm: Normal rate and regular rhythm. Heart sounds: Normal heart sounds. No murmur heard. Pulmonary:      Effort: Pulmonary effort is normal. No respiratory distress. Breath sounds: Normal breath sounds. No wheezing or rales. Abdominal:      General: Bowel sounds are normal. There is no distension. Palpations: Abdomen is soft. Tenderness: There is no abdominal tenderness. Musculoskeletal:         General: No deformity. Normal range of motion. Cervical back: Normal range of motion and neck supple. Thoracic back: Spasms present. Lymphadenopathy:      Cervical: No cervical adenopathy. Skin:     General: Skin is warm and dry. Findings: No rash. Neurological:      Mental Status: She is alert and oriented to person, place, and time. Cranial Nerves: No cranial nerve deficit. Deep Tendon Reflexes: Reflexes normal.   Psychiatric:         Behavior: Behavior normal.         Thought Content:  Thought content normal.         Judgment: Judgment normal.           (Time Documentation Optional [id number])    An electronic signaturewaas used to authenticate this note  -Talia Rodriguez MD on 7/27/2021 at 1:04 PM

## 2021-07-27 ENCOUNTER — OFFICE VISIT (OUTPATIENT)
Dept: INTERNAL MEDICINE | Age: 51
End: 2021-07-27
Payer: COMMERCIAL

## 2021-07-27 VITALS
DIASTOLIC BLOOD PRESSURE: 60 MMHG | BODY MASS INDEX: 29.81 KG/M2 | HEART RATE: 107 BPM | HEIGHT: 62 IN | WEIGHT: 162 LBS | OXYGEN SATURATION: 96 % | SYSTOLIC BLOOD PRESSURE: 128 MMHG

## 2021-07-27 DIAGNOSIS — E11.9 TYPE 2 DIABETES MELLITUS WITHOUT COMPLICATION, WITH LONG-TERM CURRENT USE OF INSULIN (HCC): Chronic | ICD-10-CM

## 2021-07-27 DIAGNOSIS — Z13.0 SCREENING FOR DEFICIENCY ANEMIA: ICD-10-CM

## 2021-07-27 DIAGNOSIS — R53.83 FATIGUE, UNSPECIFIED TYPE: ICD-10-CM

## 2021-07-27 DIAGNOSIS — E03.9 ACQUIRED HYPOTHYROIDISM: ICD-10-CM

## 2021-07-27 DIAGNOSIS — E78.2 MIXED HYPERLIPIDEMIA: ICD-10-CM

## 2021-07-27 DIAGNOSIS — Z76.0 MEDICATION REFILL: ICD-10-CM

## 2021-07-27 DIAGNOSIS — F51.01 PRIMARY INSOMNIA: Primary | ICD-10-CM

## 2021-07-27 DIAGNOSIS — G47.33 OBSTRUCTIVE SLEEP APNEA: ICD-10-CM

## 2021-07-27 DIAGNOSIS — I10 ESSENTIAL HYPERTENSION: ICD-10-CM

## 2021-07-27 DIAGNOSIS — Z79.4 TYPE 2 DIABETES MELLITUS WITHOUT COMPLICATION, WITH LONG-TERM CURRENT USE OF INSULIN (HCC): Chronic | ICD-10-CM

## 2021-07-27 DIAGNOSIS — F41.9 ANXIETY: ICD-10-CM

## 2021-07-27 DIAGNOSIS — E87.1 HYPONATREMIA: ICD-10-CM

## 2021-07-27 DIAGNOSIS — G43.111 INTRACTABLE MIGRAINE WITH AURA WITH STATUS MIGRAINOSUS: ICD-10-CM

## 2021-07-27 LAB — SARS-COV-2, PCR: NOT DETECTED

## 2021-07-27 PROCEDURE — 99214 OFFICE O/P EST MOD 30 MIN: CPT | Performed by: INTERNAL MEDICINE

## 2021-07-27 PROCEDURE — 3051F HG A1C>EQUAL 7.0%<8.0%: CPT | Performed by: INTERNAL MEDICINE

## 2021-07-27 RX ORDER — INSULIN DEGLUDEC 200 U/ML
INJECTION, SOLUTION SUBCUTANEOUS
Qty: 4 PEN | Refills: 5
Start: 2021-07-27

## 2021-07-27 RX ORDER — TRAZODONE HYDROCHLORIDE 100 MG/1
TABLET ORAL
Qty: 30 TABLET | Refills: 5 | Status: SHIPPED | OUTPATIENT
Start: 2021-07-27 | End: 2022-02-07

## 2021-07-27 RX ORDER — TOPIRAMATE 50 MG/1
TABLET, FILM COATED ORAL
Qty: 90 TABLET | Refills: 5 | Status: SHIPPED | OUTPATIENT
Start: 2021-07-27 | End: 2022-05-18 | Stop reason: SDUPTHER

## 2021-07-27 RX ORDER — QUINAPRIL 20 MG/1
10 TABLET ORAL DAILY
Qty: 45 TABLET | Refills: 0
Start: 2021-07-27 | End: 2021-10-18 | Stop reason: ALTCHOICE

## 2021-07-27 SDOH — ECONOMIC STABILITY: FOOD INSECURITY: WITHIN THE PAST 12 MONTHS, THE FOOD YOU BOUGHT JUST DIDN'T LAST AND YOU DIDN'T HAVE MONEY TO GET MORE.: NEVER TRUE

## 2021-07-27 SDOH — ECONOMIC STABILITY: FOOD INSECURITY: WITHIN THE PAST 12 MONTHS, YOU WORRIED THAT YOUR FOOD WOULD RUN OUT BEFORE YOU GOT MONEY TO BUY MORE.: NEVER TRUE

## 2021-07-27 ASSESSMENT — SOCIAL DETERMINANTS OF HEALTH (SDOH): HOW HARD IS IT FOR YOU TO PAY FOR THE VERY BASICS LIKE FOOD, HOUSING, MEDICAL CARE, AND HEATING?: NOT HARD AT ALL

## 2021-07-27 NOTE — ASSESSMENT & PLAN NOTE
Well-controlled, continue current medications, medication adherence emphasized and lifestyle modifications recommended   Not controlled

## 2021-07-27 NOTE — ASSESSMENT & PLAN NOTE
BP was low today and patient was profoundly tired, she just came back from [place] to decerase Quinapril 10 mg daily, monitor BP at home

## 2021-08-02 ENCOUNTER — TELEPHONE (OUTPATIENT)
Dept: ENDOCRINOLOGY | Facility: CLINIC | Age: 51
End: 2021-08-02

## 2021-08-10 ENCOUNTER — DOCUMENTATION (OUTPATIENT)
Dept: ENDOCRINOLOGY | Facility: CLINIC | Age: 51
End: 2021-08-10

## 2021-08-13 ENCOUNTER — TELEPHONE (OUTPATIENT)
Dept: ENDOCRINOLOGY | Facility: CLINIC | Age: 51
End: 2021-08-13

## 2021-09-08 RX ORDER — DULOXETIN HYDROCHLORIDE 30 MG/1
30 CAPSULE, DELAYED RELEASE ORAL DAILY
Qty: 30 CAPSULE | Refills: 2 | Status: SHIPPED | OUTPATIENT
Start: 2021-09-08 | End: 2022-01-06

## 2021-09-13 DIAGNOSIS — L08.9 SKIN INFECTION: ICD-10-CM

## 2021-09-13 DIAGNOSIS — B36.9 FUNGAL SKIN DISEASE: ICD-10-CM

## 2021-09-13 DIAGNOSIS — Z76.0 MEDICATION REFILL: ICD-10-CM

## 2021-09-14 RX ORDER — FAMOTIDINE 20 MG/1
20 TABLET, FILM COATED ORAL 2 TIMES DAILY
Qty: 30 TABLET | Refills: 1 | Status: SHIPPED | OUTPATIENT
Start: 2021-09-14 | End: 2021-12-23

## 2021-09-14 NOTE — TELEPHONE ENCOUNTER
aJnette Keene called requesting a refill of the below medication which has been pended for you:     Requested Prescriptions     Pending Prescriptions Disp Refills    econazole nitrate 1 % cream 85 g 0     Sig: Apply topically daily.     famotidine (PEPCID) 20 MG tablet 30 tablet 1     Sig: Take 1 tablet by mouth 2 times daily       Last Appointment Date: 7/27/2021  Next Appointment Date: Visit date not found    Allergies   Allergen Reactions    Claritin-D 12 Hour [Loratadine-Pseudoephedrine Er] Other (See Comments)     \"it intensifies my migraines\"    Demerol Hcl [Meperidine] Other (See Comments)     Aggression    Percocet [Oxycodone-Acetaminophen] Rash

## 2021-09-27 DIAGNOSIS — L08.9 SKIN INFECTION: ICD-10-CM

## 2021-09-27 DIAGNOSIS — B36.9 FUNGAL SKIN DISEASE: ICD-10-CM

## 2021-10-11 RX ORDER — SPIRONOLACTONE 50 MG/1
50 TABLET, FILM COATED ORAL DAILY
Qty: 90 TABLET | Refills: 1 | Status: SHIPPED | OUTPATIENT
Start: 2021-10-11 | End: 2022-04-06

## 2021-10-11 RX ORDER — CYCLOBENZAPRINE HCL 10 MG
TABLET ORAL
Qty: 90 TABLET | Refills: 1 | Status: SHIPPED | OUTPATIENT
Start: 2021-10-11 | End: 2022-04-06

## 2021-10-11 NOTE — TELEPHONE ENCOUNTER
Arden Miladys called to request a refill on her medication.       Last office visit : 7/27/2021   Next office visit : 10/18/2021    Requested Prescriptions     Pending Prescriptions Disp Refills    spironolactone (ALDACTONE) 50 MG tablet [Pharmacy Med Name: SPIRONOLACTONE 50MG TABLETS] 90 tablet 1     Sig: TAKE 1 TABLET BY MOUTH DAILY    cyclobenzaprine (FLEXERIL) 10 MG tablet [Pharmacy Med Name: CYCLOBENZAPRINE 10MG TABLETS] 90 tablet 1     Sig: TAKE 1 TABLET BY MOUTH EVERY NIGHT AS NEEDED FOR MUSCLE SPASMS            [place], [place]

## 2021-10-13 ENCOUNTER — HOSPITAL ENCOUNTER (OUTPATIENT)
Dept: PAIN MANAGEMENT | Age: 51
Discharge: HOME | End: 2021-10-13
Payer: COMMERCIAL

## 2021-10-13 VITALS
HEART RATE: 103 BPM | TEMPERATURE: 97.3 F | OXYGEN SATURATION: 100 % | RESPIRATION RATE: 18 BRPM | DIASTOLIC BLOOD PRESSURE: 66 MMHG | SYSTOLIC BLOOD PRESSURE: 115 MMHG

## 2021-10-13 PROCEDURE — 64615 CHEMODENERV MUSC MIGRAINE: CPT | Performed by: PSYCHIATRY & NEUROLOGY

## 2021-10-13 PROCEDURE — 64615 CHEMODENERV MUSC MIGRAINE: CPT

## 2021-10-13 PROCEDURE — 6360000002 HC RX W HCPCS

## 2021-10-13 NOTE — PROCEDURES
[address]    Patient Name: Mellisa Freed    : 1970                    Age: 54 y.o. Sex: female    Date: 2021    Pre-op Diagnosis: Chronic Migraines    Post-op Diagnosis: Chronic Migraines    Procedure: Botox injections x 155 units (45 units wasted)    Performing Procedure:  Jackie Omalley MD      Patient Vitals for the past 24 hrs:   BP Temp Temp src Pulse Resp SpO2   10/13/21 1037 115/66 97.3 °F (36.3 °C) Temporal 103 18 100 %       Previous Injections:  Patient had 10/2 headaches/migraines over the past month. Last mri . this is 3rd botox  Indications:    Mellisa Freed has been treated for problems with chronic migraine headaches. The patient was taken through a conservative course of treatment without complete resolution of symptoms. Botox injection therapy was offered and after the risks and benefits of the procedure were explained to the patient, we had agreed to proceed. The patient was taken to the procedure room and placed in the seated position. The following muscles were identified using palpation and standard landmarks. These muscles were marked and prepped with alcohol. A total of 155 units were injected after careful aspiration and the following distribution bilaterally:  10 units in 2 sites, procerus 5 units in one site, frontalis 20 units in 4 sites, temporalis 40 unit in 8 sites, occipitals 30 units in 6 sites, cervical paraspinals 20 units in 4 sites, and trapezius 30 units in 6 sites. Discharge: The patient tolerated the procedure well. There were no complications during the procedure and the patient was discharged home with discharge instructions. The patient has been instructed to contact the office should there be any complications or questions to arise between today and their next appointment.     Plan:  [x] Will return to the office in   [] 1 month  [] 4 - 6 weeks  [] 8 weeks   [x] 12 weeks:  [x] Procedure Follow-up   [] Office Visit      Mitzy Strickland MD, 10/13/2021 at 10:48 AM  [facility]

## 2021-10-13 NOTE — PROGRESS NOTES
Patient states that he/she has __10____ headaches out of 30 days each month.   Patient states that he/she has ___2___ migraines out of 30 days each month.monthly

## 2021-10-18 ENCOUNTER — OFFICE VISIT (OUTPATIENT)
Dept: INTERNAL MEDICINE | Age: 51
End: 2021-10-18
Payer: COMMERCIAL

## 2021-10-18 VITALS
DIASTOLIC BLOOD PRESSURE: 60 MMHG | BODY MASS INDEX: 29.88 KG/M2 | HEIGHT: 62 IN | HEART RATE: 104 BPM | OXYGEN SATURATION: 99 % | SYSTOLIC BLOOD PRESSURE: 96 MMHG | WEIGHT: 162.4 LBS

## 2021-10-18 DIAGNOSIS — Z23 NEEDS FLU SHOT: Primary | ICD-10-CM

## 2021-10-18 DIAGNOSIS — K21.9 GERD WITHOUT ESOPHAGITIS: ICD-10-CM

## 2021-10-18 DIAGNOSIS — M25.519 NECK AND SHOULDER PAIN: ICD-10-CM

## 2021-10-18 DIAGNOSIS — Z76.0 MEDICATION REFILL: ICD-10-CM

## 2021-10-18 DIAGNOSIS — J32.1 CHRONIC FRONTAL SINUSITIS: ICD-10-CM

## 2021-10-18 DIAGNOSIS — M54.2 NECK AND SHOULDER PAIN: ICD-10-CM

## 2021-10-18 DIAGNOSIS — I10 ESSENTIAL HYPERTENSION: ICD-10-CM

## 2021-10-18 PROCEDURE — 99213 OFFICE O/P EST LOW 20 MIN: CPT | Performed by: INTERNAL MEDICINE

## 2021-10-18 PROCEDURE — 90694 VACC AIIV4 NO PRSRV 0.5ML IM: CPT | Performed by: INTERNAL MEDICINE

## 2021-10-18 PROCEDURE — 90471 IMMUNIZATION ADMIN: CPT | Performed by: INTERNAL MEDICINE

## 2021-10-18 RX ORDER — QUINAPRIL 5 1/1
5 TABLET ORAL DAILY
Qty: 30 TABLET | Refills: 5 | Status: SHIPPED | OUTPATIENT
Start: 2021-10-18 | End: 2022-05-03

## 2021-10-18 RX ORDER — FEXOFENADINE HCL 180 MG/1
180 TABLET ORAL DAILY
Qty: 90 TABLET | Refills: 1 | Status: SHIPPED | OUTPATIENT
Start: 2021-10-18 | End: 2022-01-18 | Stop reason: SDUPTHER

## 2021-10-18 RX ORDER — MOXIFLOXACIN HYDROCHLORIDE 400 MG/1
400 TABLET ORAL DAILY
Qty: 10 TABLET | Refills: 0 | Status: SHIPPED | OUTPATIENT
Start: 2021-10-18 | End: 2021-10-28

## 2021-10-18 RX ORDER — MOXIFLOXACIN HYDROCHLORIDE 400 MG/1
400 TABLET ORAL DAILY
Qty: 10 TABLET | Refills: 0 | Status: CANCELLED | OUTPATIENT
Start: 2021-10-18 | End: 2021-10-28

## 2021-10-18 ASSESSMENT — ENCOUNTER SYMPTOMS
RHINORRHEA: 1
SINUS PAIN: 1
COUGH: 1
SINUS PRESSURE: 1

## 2021-10-18 ASSESSMENT — PATIENT HEALTH QUESTIONNAIRE - PHQ9
2. FEELING DOWN, DEPRESSED OR HOPELESS: 0
SUM OF ALL RESPONSES TO PHQ QUESTIONS 1-9: 0
SUM OF ALL RESPONSES TO PHQ9 QUESTIONS 1 & 2: 0
1. LITTLE INTEREST OR PLEASURE IN DOING THINGS: 0
SUM OF ALL RESPONSES TO PHQ QUESTIONS 1-9: 0
SUM OF ALL RESPONSES TO PHQ QUESTIONS 1-9: 0

## 2021-10-18 NOTE — PROGRESS NOTES
Yamileth Coon ( 1970) is a 54 y.o. female, Established , here for evaluation of the following chief complaint(s). Congestion, Cough, and Shoulder Pain        ASSESSMENT/PLAN:  Problem List        Circulatory    Essential hypertension      Well-controlled, changes made today: Has lost weight and has had lower blood pressures than usual occasionally she gets dizzya new prescription was sent            Respiratory    Chronic frontal sinusitis      Uncontrolled, changes made today: Continue montelukast switch to Allegra 180 daily, patient will get been given moxifloxacin for possible sinusitis if there is no improvement she was given a referral to ENT         Relevant Medications    moxifloxacin (AVELOX) 400 MG tablet    fexofenadine (ALLEGRA) 180 MG tablet    Other Relevant Orders    External Referral To ENT       Digestive    GERD without esophagitis      Well-controlled, continue current medications, medication adherence emphasized and lifestyle modifications recommended         Relevant Medications    polyethylene glycol (GLYCOLAX) powder    omeprazole (PRILOSEC) 40 MG delayed release capsule    linaclotide (LINZESS) 290 MCG CAPS capsule    famotidine (PEPCID) 20 MG tablet       Other    Neck and shoulder pain      unable to move R shoulder and would like to see DR Dagoberto Kay         Relevant Orders    External Referral To Orthopedic Surgery          Results for orders placed or performed in visit on 21   COVID-19   Result Value Ref Range    SARS-CoV-2, PCR Not Detected Not Detected       Return in about 3 months (around 2022). Cough  Associated symptoms include postnasal drip and rhinorrhea.    Shoulder Pain       73-year-old female presents for a urgent care visit  Patient has not been able to come to the office due to nasal congestion had to be sure that she did not have a cold  Patient states that she has been having facial pain sinus congestion and yellow-green discharge from her nose has been taking Mucinex decongestant and has persistence of symptoms with facial and wants to know what to do this has been going on for over 2 weeks despite of conservative management is still persisting  Patient also has chronic right shoulder pain she was advised to go for physical therapy however she is not able to exercise and would like to see Dr. Jessa Ratliff  Patient has lost significant amount of weight and has noted lower blood pressures and some dizziness patient's quinapril was decreased to 5 mg daily      Review of Systems   HENT: Positive for postnasal drip, rhinorrhea, sinus pressure and sinus pain. Respiratory: Positive for cough. Musculoskeletal: Positive for arthralgias (R shoulder pain Bear). Neurological: Positive for dizziness. Physical Exam  Vitals reviewed. Constitutional:       Appearance: Normal appearance. HENT:      Right Ear: Tympanic membrane, ear canal and external ear normal.      Left Ear: Tympanic membrane, ear canal and external ear normal.      Nose: Congestion present. Mouth/Throat:      Pharynx: No posterior oropharyngeal erythema. Eyes:      Pupils: Pupils are equal, round, and reactive to light. Neck:      Thyroid: No thyroid mass. Cardiovascular:      Rate and Rhythm: Normal rate and regular rhythm. Pulses: Normal pulses. Heart sounds: Normal heart sounds, S1 normal and S2 normal.   Pulmonary:      Effort: Pulmonary effort is normal.      Breath sounds: Normal breath sounds and air entry. Abdominal:      General: Abdomen is flat. Bowel sounds are normal.      Palpations: Abdomen is soft. Tenderness: There is no abdominal tenderness. Musculoskeletal:      Right shoulder: No swelling. Decreased range of motion. Cervical back: Full passive range of motion without pain, normal range of motion and neck supple. Right lower leg: No edema. Left lower leg: No edema. Neurological:      General: No focal deficit present. Mental Status: She is alert.            (Time Documentation Optional [id number])    An electronic signaturewaas used to authenticate this note  -Piedad Gonzalez MD on 10/18/2021 at 4:45 PM

## 2021-10-18 NOTE — PROGRESS NOTES
After obtaining consent, and per orders of Dr. Shane Singer, injection of Flu given in Left deltoid by Ervin Cool MA. Patient instructed to remain in clinic for 20 minutes afterwards, and to report any adverse reaction to me immediately.

## 2021-10-18 NOTE — ASSESSMENT & PLAN NOTE
Uncontrolled, changes made today: Continue montelukast switch to Allegra 180 daily, patient will get been given moxifloxacin for possible sinusitis if there is no improvement she was given a referral to ENT

## 2021-10-19 DIAGNOSIS — L08.9 SKIN INFECTION: ICD-10-CM

## 2021-10-19 DIAGNOSIS — G62.9 NEUROPATHY: ICD-10-CM

## 2021-10-19 DIAGNOSIS — B36.9 FUNGAL SKIN DISEASE: ICD-10-CM

## 2021-10-20 ENCOUNTER — TELEPHONE (OUTPATIENT)
Dept: INTERNAL MEDICINE | Age: 51
End: 2021-10-20

## 2021-10-20 DIAGNOSIS — L30.4 INTERTRIGO: Primary | ICD-10-CM

## 2021-10-20 RX ORDER — NYSTATIN 100000 U/G
OINTMENT TOPICAL
Qty: 30 G | Refills: 1 | Status: SHIPPED | OUTPATIENT
Start: 2021-10-20 | End: 2022-06-02

## 2021-10-20 RX ORDER — DULOXETIN HYDROCHLORIDE 60 MG/1
60 CAPSULE, DELAYED RELEASE ORAL DAILY
Qty: 30 CAPSULE | Refills: 5 | Status: SHIPPED | OUTPATIENT
Start: 2021-10-20 | End: 2022-05-06

## 2021-10-20 NOTE — TELEPHONE ENCOUNTER
Received a denial on Moxifloxacin they want     cephalsporin  Penicillins  Sulfonamides  Quinolones  tetracyclines

## 2021-11-07 DIAGNOSIS — K59.09 CHRONIC CONSTIPATION: ICD-10-CM

## 2021-11-08 RX ORDER — PRUCALOPRIDE 2 MG/1
1 TABLET, FILM COATED ORAL DAILY
Qty: 30 TABLET | Refills: 11 | OUTPATIENT
Start: 2021-11-08

## 2021-11-08 NOTE — TELEPHONE ENCOUNTER
Electronic refill request received today, but it states it was d/c previously. Last refill request for another prescription, pt wished to have her PCP refill it instead. I left vm for patient that if she does need this refill, she will need to make an apt first.     Last ov 10/28/20, last procedure with Dr Froylan Taylor 7/2019, no f/u scheduled.

## 2021-11-15 ENCOUNTER — TELEPHONE (OUTPATIENT)
Dept: ENDOCRINOLOGY | Facility: CLINIC | Age: 51
End: 2021-11-15

## 2021-11-15 DIAGNOSIS — Z79.4 TYPE 2 DIABETES MELLITUS WITH HYPERGLYCEMIA, WITH LONG-TERM CURRENT USE OF INSULIN (HCC): Primary | ICD-10-CM

## 2021-11-15 DIAGNOSIS — E11.69 MIXED DIABETIC HYPERLIPIDEMIA ASSOCIATED WITH TYPE 2 DIABETES MELLITUS (HCC): ICD-10-CM

## 2021-11-15 DIAGNOSIS — E78.2 MIXED DIABETIC HYPERLIPIDEMIA ASSOCIATED WITH TYPE 2 DIABETES MELLITUS (HCC): ICD-10-CM

## 2021-11-15 DIAGNOSIS — R12 HEARTBURN: ICD-10-CM

## 2021-11-15 DIAGNOSIS — E11.65 TYPE 2 DIABETES MELLITUS WITH HYPERGLYCEMIA, WITH LONG-TERM CURRENT USE OF INSULIN (HCC): Primary | ICD-10-CM

## 2021-11-15 DIAGNOSIS — I15.2 HYPERTENSION ASSOCIATED WITH DIABETES (HCC): ICD-10-CM

## 2021-11-15 DIAGNOSIS — E06.3 HYPOTHYROIDISM DUE TO HASHIMOTO'S THYROIDITIS: ICD-10-CM

## 2021-11-15 DIAGNOSIS — E11.59 HYPERTENSION ASSOCIATED WITH DIABETES (HCC): ICD-10-CM

## 2021-11-15 DIAGNOSIS — E03.8 HYPOTHYROIDISM DUE TO HASHIMOTO'S THYROIDITIS: ICD-10-CM

## 2021-11-16 RX ORDER — OMEPRAZOLE 20 MG/1
20 CAPSULE, DELAYED RELEASE ORAL 2 TIMES DAILY
Qty: 90 CAPSULE | Refills: 1 | Status: SHIPPED | OUTPATIENT
Start: 2021-11-16 | End: 2021-12-05 | Stop reason: SDUPTHER

## 2021-11-16 NOTE — TELEPHONE ENCOUNTER
Vidhi Samuels called to request a refill on her medication. Last office visit : 10/18/2021   Next office visit : 1/18/2022     Requested Prescriptions     Pending Prescriptions Disp Refills    omeprazole (PRILOSEC) 20 MG delayed release capsule 90 capsule 1     Sig: Take 1 capsule by mouth 2 times daily Take first thing in the morning on an empty stomach.             Maurilio Grubbs MA

## 2021-11-30 ENCOUNTER — DOCUMENTATION (OUTPATIENT)
Dept: ENDOCRINOLOGY | Facility: CLINIC | Age: 51
End: 2021-11-30

## 2021-12-05 DIAGNOSIS — R12 HEARTBURN: ICD-10-CM

## 2021-12-06 ENCOUNTER — OFFICE VISIT (OUTPATIENT)
Dept: OTOLARYNGOLOGY | Facility: CLINIC | Age: 51
End: 2021-12-06

## 2021-12-06 VITALS
SYSTOLIC BLOOD PRESSURE: 152 MMHG | WEIGHT: 160 LBS | HEART RATE: 96 BPM | HEIGHT: 62 IN | DIASTOLIC BLOOD PRESSURE: 84 MMHG | BODY MASS INDEX: 29.44 KG/M2 | TEMPERATURE: 97.7 F

## 2021-12-06 DIAGNOSIS — J31.0 CHRONIC RHINITIS: ICD-10-CM

## 2021-12-06 DIAGNOSIS — J32.9 CHRONIC SINUSITIS, UNSPECIFIED LOCATION: Primary | ICD-10-CM

## 2021-12-06 PROCEDURE — 99203 OFFICE O/P NEW LOW 30 MIN: CPT | Performed by: OTOLARYNGOLOGY

## 2021-12-06 RX ORDER — AMOXICILLIN AND CLAVULANATE POTASSIUM 875; 125 MG/1; MG/1
1 TABLET, FILM COATED ORAL EVERY 12 HOURS SCHEDULED
Qty: 20 TABLET | Refills: 0 | Status: SHIPPED | OUTPATIENT
Start: 2021-12-06 | End: 2021-12-20

## 2021-12-06 RX ORDER — MOMETASONE FUROATE 50 UG/1
2 SPRAY, METERED NASAL DAILY
Qty: 1 EACH | Refills: 6 | Status: SHIPPED | OUTPATIENT
Start: 2021-12-06

## 2021-12-06 RX ORDER — PRUCALOPRIDE 2 MG/1
2 TABLET, FILM COATED ORAL DAILY
COMMUNITY
End: 2021-12-07 | Stop reason: SDUPTHER

## 2021-12-06 RX ORDER — OMEPRAZOLE 20 MG/1
20 CAPSULE, DELAYED RELEASE ORAL 2 TIMES DAILY
Qty: 90 CAPSULE | Refills: 1 | Status: SHIPPED | OUTPATIENT
Start: 2021-12-06 | End: 2021-12-07 | Stop reason: SDUPTHER

## 2021-12-06 NOTE — PROGRESS NOTES
Oleg Swan MD  [facility]  [address], [address]  [place] [address]  Fax [phone]  Phone [phone]    Visit Type: NEW PATIENT   Chief Complaint   Patient presents with   • Sinus Problem        HPI  Yon Ochoa is a 51 y.o. female who presents for evaluation of sinusitis.  She has had symptoms of chronic sinus congestion/ pressure and headaches. This has been for several years but worsening.  She has been taking Allegra and montelukast without relief (just switched to Allegra from Singulair.  At her last visit with her primary care physician on 10/18/2020 when she was given a course of Avelox- this cleared her up. Eery 4-6 weeks she gets an acute infection with congestion, pressure and colored drainange usually with coughing.       History     Last Reviewed by Oleg Swan MD on 12/6/2021 at  3:00 PM    Sections Reviewed    Tobacco, Family, Medical, Surgical      Problem list reviewed by Oleg Swan MD on 12/6/2021 at  3:00 PM  Medicines reviewed by Oleg Swan MD on 12/6/2021 at  3:00 PM  Allergies reviewed by Oleg Swan MD on 12/6/2021 at  3:00 PM        Vital Signs:   Temp:  [97.7 °F (36.5 °C)] 97.7 °F (36.5 °C)  Heart Rate:  [96] 96  BP: (152)/(84) 152/84  ENT Physical Exam       Result Review    RESULTS REVIEW    I have reviewed the patients old records in the chart.     Assessment/Plan    Diagnoses and all orders for this visit:    1. Chronic sinusitis, unspecified location (Primary)  -     amoxicillin-clavulanate (AUGMENTIN) 875-125 MG per tablet; Take 1 tablet by mouth Every 12 (Twelve) Hours for 14 days.  Dispense: 20 tablet; Refill: 0  -     mometasone (NASONEX) 50 MCG/ACT nasal spray; 2 sprays into the nostril(s) as directed by provider Daily.  Dispense: 1 each; Refill: 6  -     CT Sinus Without Contrast; Future    2. Chronic rhinitis            We will add  medications and get a CT sinus after completion of treatment to asess the sinuses.  For the best response, use your nasal sprays every day without skipping doses. It may take several weeks before the full effect is acheived.       Return in about 6 weeks (around 1/17/2022).      Oleg Swan MD  12/06/21  15:13 CST

## 2021-12-06 NOTE — TELEPHONE ENCOUNTER
Jovi Roe called to request a refill on her medication. Last office visit : 10/18/2021   Next office visit : 1/18/2022     Requested Prescriptions     Pending Prescriptions Disp Refills    omeprazole (PRILOSEC) 20 MG delayed release capsule 90 capsule 1     Sig: Take 1 capsule by mouth 2 times daily Take first thing in the morning on an empty stomach.             Starr Mora MA

## 2021-12-07 ENCOUNTER — VIRTUAL VISIT (OUTPATIENT)
Dept: GASTROENTEROLOGY | Age: 51
End: 2021-12-07
Payer: COMMERCIAL

## 2021-12-07 DIAGNOSIS — K59.09 CHRONIC CONSTIPATION: Primary | ICD-10-CM

## 2021-12-07 DIAGNOSIS — Z79.4 TYPE 2 DIABETES MELLITUS WITHOUT COMPLICATION, WITH LONG-TERM CURRENT USE OF INSULIN (HCC): ICD-10-CM

## 2021-12-07 DIAGNOSIS — Z76.0 MEDICATION REFILL: ICD-10-CM

## 2021-12-07 DIAGNOSIS — R12 HEARTBURN: ICD-10-CM

## 2021-12-07 DIAGNOSIS — E11.9 TYPE 2 DIABETES MELLITUS WITHOUT COMPLICATION, WITH LONG-TERM CURRENT USE OF INSULIN (HCC): ICD-10-CM

## 2021-12-07 PROCEDURE — 99213 OFFICE O/P EST LOW 20 MIN: CPT | Performed by: NURSE PRACTITIONER

## 2021-12-07 RX ORDER — OMEPRAZOLE 20 MG/1
20 CAPSULE, DELAYED RELEASE ORAL DAILY
Qty: 90 CAPSULE | Refills: 3 | Status: SHIPPED | OUTPATIENT
Start: 2021-12-07 | End: 2022-09-13

## 2021-12-07 RX ORDER — MONTELUKAST SODIUM 10 MG/1
TABLET ORAL
Qty: 90 TABLET | Refills: 1 | OUTPATIENT
Start: 2021-12-07

## 2021-12-07 RX ORDER — PRUCALOPRIDE 2 MG/1
2 TABLET, FILM COATED ORAL DAILY
Qty: 90 TABLET | Refills: 3 | Status: SHIPPED | OUTPATIENT
Start: 2021-12-07 | End: 2022-05-18 | Stop reason: ALTCHOICE

## 2021-12-07 RX ORDER — MONTELUKAST SODIUM 10 MG/1
TABLET ORAL
Qty: 90 TABLET | Refills: 1 | Status: SHIPPED | OUTPATIENT
Start: 2021-12-07 | End: 2022-01-18 | Stop reason: ALTCHOICE

## 2021-12-07 RX ORDER — BLOOD SUGAR DIAGNOSTIC
STRIP MISCELLANEOUS
Qty: 100 STRIP | Refills: 3 | OUTPATIENT
Start: 2021-12-07

## 2021-12-07 RX ORDER — BLOOD SUGAR DIAGNOSTIC
STRIP MISCELLANEOUS
Qty: 100 STRIP | Refills: 3 | Status: SHIPPED | OUTPATIENT
Start: 2021-12-07

## 2021-12-07 RX ORDER — QUINAPRIL 20 MG/1
TABLET ORAL
Qty: 90 TABLET | Refills: 1 | OUTPATIENT
Start: 2021-12-07

## 2021-12-07 ASSESSMENT — ENCOUNTER SYMPTOMS
COUGH: 0
BLOOD IN STOOL: 0
RECTAL PAIN: 0
ABDOMINAL DISTENTION: 0
CONSTIPATION: 1
TROUBLE SWALLOWING: 0
BACK PAIN: 0
SHORTNESS OF BREATH: 0
ABDOMINAL PAIN: 0
VOICE CHANGE: 0
VOMITING: 0
NAUSEA: 0
DIARRHEA: 0
ANAL BLEEDING: 0

## 2021-12-07 NOTE — PROGRESS NOTES
2021    TELEHEALTH EVALUATION -- Audio/Visual (During WDLCQ-65 public health emergency)    HPI:    Julieta Daley (:  1970) has requested an audio/video evaluation for the following concern(s):    Pt made an annual appt for medication refills. On motegrity for chronic constipation. This works great. Has a BM every other day as long as she takes it daily. Needs refills. Has tried and failed Miralax, MOM, fiber supplements, Trulance, and Linzess 290mcg in the past.     Has chronic heartburn. Has been taking prilosec and pepcid daily  This regimen works great for her. However insurance isnt wanting to pay for this anymore for reasons unknown. Has globus sensation and acid reflux without these meds  Has pepcid at home, but needs prilosec refilled    GI scope reports in history per MA per  policy, I reviewed this. Review of Systems   Constitutional: Negative for fatigue and unexpected weight change. HENT: Negative for trouble swallowing and voice change. Respiratory: Negative for cough and shortness of breath. Cardiovascular: Negative for chest pain and palpitations. Gastrointestinal: Positive for constipation. Negative for abdominal distention, abdominal pain, anal bleeding, blood in stool, diarrhea, nausea, rectal pain and vomiting. Genitourinary: Negative for hematuria. Musculoskeletal: Negative for arthralgias, back pain and neck pain. Neurological: Negative for weakness and headaches. Psychiatric/Behavioral: Negative for dysphoric mood. The patient is not nervous/anxious. Prior to Visit Medications    Medication Sig Taking? Authorizing Provider   Prucalopride Succinate (MOTEGRITY) 2 MG TABS Take 2 mg by mouth daily Yes [employer], APRN   omeprazole (PRILOSEC) 20 MG delayed release capsule Take 1 capsule by mouth Daily Take first thing in the morning on an empty stomach.  Yes [employer], APRN   blood glucose test strips (ACCU-CHEK GAVI PLUS) strip USE TO TEST ONCE DAILY  Claudia Valdez MD   montelukast (SINGULAIR) 10 MG tablet TAKE 1 TABLET BY MOUTH EVERY NIGHT  Claudia Valdez MD   DULoxetine (CYMBALTA) 60 MG extended release capsule Take 1 capsule by mouth daily  Claudia Valdez MD   econazole nitrate 1 % cream Apply topically daily. Claudia Valdez MD   nystatin (MYCOSTATIN) 162011 UNIT/GM ointment Apply topically 2 times daily. Patient not taking: Reported on 12/6/2021  Cesar Castro MD   quinapril (ACCUPRIL) 5 MG tablet Take 1 tablet by mouth daily  Claudia Valdez MD   fexofenadine (ALLEGRA) 180 MG tablet Take 1 tablet by mouth daily  Claudia Valdez MD   spironolactone (ALDACTONE) 50 MG tablet TAKE 1 TABLET BY MOUTH DAILY  Claudia Valdez MD   cyclobenzaprine (FLEXERIL) 10 MG tablet TAKE 1 TABLET BY MOUTH EVERY NIGHT AS NEEDED FOR MUSCLE SPASMS  Claudia Valdez MD   famotidine (PEPCID) 20 MG tablet Take 1 tablet by mouth 2 times daily  Claudia Valdez MD   DULoxetine (CYMBALTA) 30 MG extended release capsule TAKE 1 CAPSULE BY MOUTH DAILY  Lanre Delvalle MD   Insulin Degludec (TRESIBA FLEXTOUCH) 200 UNIT/ML SOPN 60 units each morning not taking at all during the night. Claudia Valdez MD   traZODone (DESYREL) 100 MG tablet TAKE 1 TABLET BY MOUTH EVERY NIGHT  Claudia Valdez MD   topiramate (TOPAMAX) 50 MG tablet TAKE 3 TABLETS BY MOUTH EVERY NIGHT AT BEDTIME  Claudia Valdez MD   clonazePAM (KLONOPIN) 0.5 MG tablet TAKE 1 TABLET BY MOUTH DAILY AS NEEDED. Claudia Valdez MD   atorvastatin (LIPITOR) 20 MG tablet Take 1 tablet by mouth daily  Claudia Valdez MD   levothyroxine (SYNTHROID) 125 MCG tablet TAKE 1 TABLET BY MOUTH DAILY  Claudia Valdez MD   Onabotulinumtoxin A (BOTOX) 200 units injection 155 units IM in Cervical and Facial Region every 90 days      Please fax all correspondence to [phone]  Please call [phone] with any questions.   Lanre Delvalle MD   polyethylene glycol (GLYCOLAX) powder Take 17 g by mouth 2 times daily  Historical Provider, MD   insulin aspart (NOVOLOG FLEXPEN) 100 UNIT/ML injection pen Inject into the skin 3 times daily (before meals) Counting Carbs - Sliding Scale  Historical Provider, MD   Semaglutide (OZEMPIC, 0.25 OR 0.5 MG/DOSE, SC) Inject 0.5 mg into the skin once a week On Sundays  Historical Provider, MD   vitamin B-6 (PYRIDOXINE) 100 MG tablet Take 100 mg by mouth daily  Historical Provider, MD   Insulin Pen Needle 32G X 4 MM MISC 1 each by Does not apply route daily  NICANOR Isaac   Insulin Syringe-Needle U-100 30G X 5/16\" 0.5 ML MISC 1 each by Does not apply route daily  NICANOR Isaac   ASPIRIN LOW DOSE 81 MG EC tablet TAKE 1 TABLET BY MOUTH DAILY  Edward Argueta MD       Social History     Tobacco Use    Smoking status: Never Smoker    Smokeless tobacco: Never Used   Vaping Use    Vaping Use: Never used   Substance Use Topics    Alcohol use: Not Currently     Alcohol/week: 0.0 standard drinks     Comment: \"once in a blue moon\", a few times a year    Drug use: No        PHYSICAL EXAMINATION:  [ INSTRUCTIONS:  \"[x]\" Indicates a positive item  \"[]\" Indicates a negative item  -- DELETE ALL ITEMS NOT EXAMINED]  Vital Signs: (As obtained by patient/caregiver or practitioner observation)    Blood pressure-  Heart rate-    Respiratory rate-    Temperature-  Pulse oximetry-     Constitutional: [x] Appears well-developed and well-nourished [x] No apparent distress      [] Abnormal-   Mental status  [x] Alert and awake  [x] Oriented to person/place/time [x]Able to follow commands      Eyes:  EOM    [x]  Normal  [] Abnormal-  Sclera  [x]  Normal  [] Abnormal -         Discharge [x]  None visible  [] Abnormal -    HENT:   [x] Normocephalic, atraumatic.   [] Abnormal   [x] Mouth/Throat: Mucous membranes are moist.     External Ears [x] Normal  [] Abnormal-     Neck: [x] No visualized mass     Pulmonary/Chest: [x] Respiratory effort normal.  [x] No visualized signs of difficulty breathing or respiratory distress        [] Abnormal-      Musculoskeletal:   [] Normal gait with no signs of ataxia         [x] Normal range of motion of neck        [] Abnormal-       Neurological:        [x] No Facial Asymmetry (Cranial nerve 7 motor function) (limited exam to video visit)          [x] No gaze palsy        [] Abnormal-         Skin:        [x] No significant exanthematous lesions or discoloration noted on facial skin         [] Abnormal-            Psychiatric:       [x] Normal Affect [x] No Hallucinations        [] Abnormal-     Other pertinent observable physical exam findings-     ASSESSMENT/PLAN:  1. Chronic constipation  - Prucalopride Succinate (MOTEGRITY) 2 MG TABS; Take 2 mg by mouth daily  Dispense: 90 tablet; Refill: 3    2. Heartburn  - omeprazole (PRILOSEC) 20 MG delayed release capsule; Take 1 capsule by mouth Daily Take first thing in the morning on an empty stomach. Dispense: 90 capsule; Refill: 3    3. I refilled the motegrity and prilosec. Will see if denials are sent to me from her insurance carrier and we will do PA if needed      Rina España, was evaluated through a synchronous (real-time) audio-video encounter. The patient (or guardian if applicable) is aware that this is a billable service. Verbal consent to proceed has been obtained within the past 12 months. The visit was conducted pursuant to the emergency declaration under the [address] [place] [address], [address] [place] [address] authority and the [employer] and [employer] General Act. Patient identification was verified, and a caregiver was present when appropriate. The patient was located in a state where the provider was credentialed to provide care. Total time spent on this encounter: not billed based on time    --NICANOR Perdue on 12/7/2021 at 2:12 PM    An electronic signature was used to authenticate this note.

## 2021-12-07 NOTE — PATIENT INSTRUCTIONS
Patient Education        Constipation: Care Instructions  Your Care Instructions     Constipation means that you have a hard time passing stools (bowel movements). People pass stools from 3 times a day to once every 3 days. What is normal for you may be different. Constipation may occur with pain in the rectum and cramping. The pain may get worse when you try to pass stools. Sometimes there are small amounts of bright red blood on toilet paper or the surface of stools. This is because of enlarged veins near the rectum (hemorrhoids). A few changes in your diet and lifestyle may help you avoid ongoing constipation. Your doctor may also prescribe medicine to help loosen your stool. Some medicines can cause constipation. These include pain medicines and antidepressants. Tell your doctor about all the medicines you take. Your doctor may want to make a medicine change to ease your symptoms. Follow-up care is a key part of your treatment and safety. Be sure to make and go to all appointments, and call your doctor if you are having problems. It's also a good idea to know your test results and keep a list of the medicines you take. How can you care for yourself at home? · Drink plenty of fluids. If you have kidney, heart, or liver disease and have to limit fluids, talk with your doctor before you increase the amount of fluids you drink. · Include high-fiber foods in your diet each day. These include fruits, vegetables, beans, and whole grains. · Get at least 30 minutes of exercise on most days of the week. Walking is a good choice. You also may want to do other activities, such as running, swimming, cycling, or playing tennis or team sports. · Take a fiber supplement, such as Citrucel or Metamucil, every day. Read and follow all instructions on the label. · Schedule time each day for a bowel movement. A daily routine may help. Take your time having your bowel movement.   · Support your feet with a small step stool when you sit on the toilet. This helps flex your hips and places your pelvis in a squatting position. · Your doctor may recommend an over-the-counter laxative to relieve your constipation. Examples are Milk of Magnesia and MiraLax. Read and follow all instructions on the label. Do not use laxatives on a long-term basis. When should you call for help? Call your doctor now or seek immediate medical care if:    · You have new or worse belly pain.     · You have new or worse nausea or vomiting.     · You have blood in your stools. Watch closely for changes in your health, and be sure to contact your doctor if:    · Your constipation is getting worse.     · You do not get better as expected. Where can you learn more? Go to https://[employer]. org and sign in to your [employer] account. Enter 21 [phone] in the [employer] box to learn more about \"Constipation: Care Instructions. \"     If you do not have an account, please click on the \"Sign Up Now\" link. Current as of: July 1, 2021               Content Version: 13.0  © 4238-8404 Healthwise, Incorporated. Care instructions adapted under license by [facility]. If you have questions about a medical condition or this instruction, always ask your healthcare professional. [address] any warranty or liability for your use of this information.

## 2021-12-07 NOTE — TELEPHONE ENCOUNTER
Cindy Margarito called to request a refill on her medication.       Last office visit : 10/18/2021   Next office visit : 1/18/2022     Requested Prescriptions     Pending Prescriptions Disp Refills    quinapril (ACCUPRIL) 20 MG tablet [Pharmacy Med Name: QUINAPRIL 20MG TABLETS] 90 tablet 1     Sig: TAKE 1 TABLET BY MOUTH EVERY NIGHT AT BEDTIME    blood glucose test strips (ACCU-CHEK GAVI PLUS) strip [Pharmacy Med Name: ACCU-CHEK GAVI PLUS STRIPS 100S] 100 strip 3     Sig: USE TO TEST BLOOD SUGAR ONCE DAILY    montelukast (SINGULAIR) 10 MG tablet [Pharmacy Med Name: MONTELUKAST 10MG TABLETS] 90 tablet 1     Sig: TAKE 1 TABLET BY MOUTH EVERY NIGHT            Tika Martínez

## 2021-12-13 ENCOUNTER — APPOINTMENT (OUTPATIENT)
Dept: CT IMAGING | Facility: HOSPITAL | Age: 51
End: 2021-12-13

## 2021-12-14 ENCOUNTER — TELEPHONE (OUTPATIENT)
Dept: GASTROENTEROLOGY | Age: 51
End: 2021-12-14

## 2021-12-14 DIAGNOSIS — K59.09 CHRONIC CONSTIPATION: Primary | ICD-10-CM

## 2021-12-14 RX ORDER — LUBIPROSTONE 24 UG/1
24 CAPSULE, GELATIN COATED ORAL 2 TIMES DAILY WITH MEALS
Qty: 60 CAPSULE | Refills: 11 | Status: SHIPPED | OUTPATIENT
Start: 2021-12-14

## 2021-12-14 NOTE — TELEPHONE ENCOUNTER
I reviewed the denial for motegrity from her insurance. It says she must try and fail amitiza and linzess first. She has tried linzess. Ill escribe amitiza and we will see how this works for her. Make her a 4 week f/u appt please to discuss med efficacy. But if the [place] works well for her she can cancel the f/u appt.  thanks

## 2021-12-14 NOTE — TELEPHONE ENCOUNTER
Thanks Harmony Velasco, this patient has been notified, she now has a FU on 1-19-22 @ 1:30 pm. Abhilash arreaga

## 2021-12-16 ENCOUNTER — DOCUMENTATION (OUTPATIENT)
Dept: ENDOCRINOLOGY | Facility: CLINIC | Age: 51
End: 2021-12-16

## 2021-12-22 DIAGNOSIS — Z76.0 MEDICATION REFILL: ICD-10-CM

## 2021-12-23 RX ORDER — FAMOTIDINE 20 MG/1
TABLET, FILM COATED ORAL
Qty: 180 TABLET | Refills: 1 | Status: SHIPPED | OUTPATIENT
Start: 2021-12-23

## 2021-12-23 NOTE — TELEPHONE ENCOUNTER
Arden Miladys called to request a refill on her medication.       Last office visit : 10/18/2021   Next office visit : 1/18/2022     Requested Prescriptions     Pending Prescriptions Disp Refills    famotidine (PEPCID) 20 MG tablet [Pharmacy Med Name: FAMOTIDINE 20MG TABLETS] 180 tablet 1     Sig: TAKE 1 TABLET BY MOUTH TWICE DAILY            Kavitha Russ MA

## 2022-01-05 RX ORDER — INSULIN DEGLUDEC 200 U/ML
INJECTION, SOLUTION SUBCUTANEOUS
Qty: 15 ML | Refills: 1 | Status: SHIPPED | OUTPATIENT
Start: 2022-01-05 | End: 2022-02-16 | Stop reason: SDUPTHER

## 2022-01-06 RX ORDER — DULOXETIN HYDROCHLORIDE 30 MG/1
30 CAPSULE, DELAYED RELEASE ORAL DAILY
Qty: 30 CAPSULE | Refills: 5 | Status: SHIPPED | OUTPATIENT
Start: 2022-01-06 | End: 2022-05-18 | Stop reason: SDUPTHER

## 2022-01-06 NOTE — TELEPHONE ENCOUNTER
Kirsty Humphrey has requested a refill on her medication.       Last office visit : 1/5/2022  Next office visit : Visit date not found   Last medication refill :9/8/2021 w 2rf      Requested Prescriptions     Pending Prescriptions Disp Refills    DULoxetine (CYMBALTA) 30 MG extended release capsule [Pharmacy Med Name: DULOXETINE DR 30MG CAPSULES] 30 capsule 2     Sig: TAKE 1 CAPSULE BY MOUTH DAILY

## 2022-01-13 DIAGNOSIS — Z13.0 SCREENING FOR DEFICIENCY ANEMIA: ICD-10-CM

## 2022-01-13 DIAGNOSIS — E78.2 MIXED HYPERLIPIDEMIA: ICD-10-CM

## 2022-01-13 DIAGNOSIS — E87.1 HYPONATREMIA: ICD-10-CM

## 2022-01-13 DIAGNOSIS — Z79.4 TYPE 2 DIABETES MELLITUS WITHOUT COMPLICATION, WITH LONG-TERM CURRENT USE OF INSULIN (HCC): Chronic | ICD-10-CM

## 2022-01-13 DIAGNOSIS — E11.9 TYPE 2 DIABETES MELLITUS WITHOUT COMPLICATION, WITH LONG-TERM CURRENT USE OF INSULIN (HCC): Chronic | ICD-10-CM

## 2022-01-13 LAB
ALBUMIN SERPL-MCNC: 4.3 G/DL (ref 3.5–5.2)
ALP BLD-CCNC: 93 U/L (ref 35–104)
ALT SERPL-CCNC: 11 U/L (ref 5–33)
ANION GAP SERPL CALCULATED.3IONS-SCNC: 11 MMOL/L (ref 7–19)
AST SERPL-CCNC: 25 U/L (ref 5–32)
BILIRUB SERPL-MCNC: 0.4 MG/DL (ref 0.2–1.2)
BILIRUBIN DIRECT: 0.1 MG/DL (ref 0–0.3)
BILIRUBIN, INDIRECT: 0.3 MG/DL (ref 0.1–1)
BUN BLDV-MCNC: 17 MG/DL (ref 6–20)
CALCIUM SERPL-MCNC: 9.6 MG/DL (ref 8.6–10)
CHLORIDE BLD-SCNC: 101 MMOL/L (ref 98–111)
CHOLESTEROL, TOTAL: 124 MG/DL (ref 160–199)
CO2: 22 MMOL/L (ref 22–29)
CREAT SERPL-MCNC: 0.8 MG/DL (ref 0.5–0.9)
CREATININE URINE: 136.6 MG/DL (ref 4.2–622)
GFR AFRICAN AMERICAN: >59
GFR NON-AFRICAN AMERICAN: >60
GLUCOSE BLD-MCNC: 220 MG/DL (ref 74–109)
HBA1C MFR BLD: 7.5 % (ref 4–6)
HCT VFR BLD CALC: 38.9 % (ref 37–47)
HDLC SERPL-MCNC: 34 MG/DL (ref 65–121)
HEMOGLOBIN: 12.7 G/DL (ref 12–16)
LDL CHOLESTEROL CALCULATED: 54 MG/DL
MCH RBC QN AUTO: 28.3 PG (ref 27–31)
MCHC RBC AUTO-ENTMCNC: 32.6 G/DL (ref 33–37)
MCV RBC AUTO: 86.6 FL (ref 81–99)
MICROALBUMIN UR-MCNC: <1.2 MG/DL (ref 0–19)
MICROALBUMIN/CREAT UR-RTO: NORMAL MG/G
PDW BLD-RTO: 13.8 % (ref 11.5–14.5)
PLATELET # BLD: 188 K/UL (ref 130–400)
PMV BLD AUTO: 9.9 FL (ref 9.4–12.3)
POTASSIUM SERPL-SCNC: 4.5 MMOL/L (ref 3.5–5)
RBC # BLD: 4.49 M/UL (ref 4.2–5.4)
SODIUM BLD-SCNC: 134 MMOL/L (ref 136–145)
TOTAL PROTEIN: 8.1 G/DL (ref 6.6–8.7)
TRIGL SERPL-MCNC: 178 MG/DL (ref 0–149)
WBC # BLD: 13 K/UL (ref 4.8–10.8)

## 2022-01-17 ASSESSMENT — ENCOUNTER SYMPTOMS
NAUSEA: 1
DIARRHEA: 0
BLOOD IN STOOL: 0
SHORTNESS OF BREATH: 0
VOMITING: 0
RHINORRHEA: 0
COUGH: 0
TROUBLE SWALLOWING: 0
ABDOMINAL PAIN: 0
WHEEZING: 0
BACK PAIN: 1
CHEST TIGHTNESS: 0
CONSTIPATION: 0
SINUS PAIN: 0

## 2022-01-17 NOTE — PROGRESS NOTES
Livia Alpers ( 1970) is a 54 y.o. female,  Established , here for evaluation of the following chief complaint(s).   Diabetes        ASSESSMENT/PLAN:  Problem List        Circulatory    Essential hypertension - Primary       Digestive    GERD without esophagitis    Relevant Medications    polyethylene glycol (GLYCOLAX) powder    Prucalopride Succinate (MOTEGRITY) 2 MG TABS    omeprazole (PRILOSEC) 20 MG delayed release capsule    lubiprostone (AMITIZA) 24 MCG capsule    famotidine (PEPCID) 20 MG tablet    Other Relevant Orders    Hepatic Function Panel       Endocrine    Type 2 diabetes mellitus without complication, with long-term current use of insulin ([facility])    Relevant Medications    Insulin Pen Needle 32G X 4 MM MISC    Insulin Syringe-Needle U-100 30G X 5/16\" 0.5 ML MISC    insulin aspart (NOVOLOG FLEXPEN) 100 UNIT/ML injection pen    Semaglutide (OZEMPIC, 0.25 OR 0.5 MG/DOSE, SC)    Insulin Degludec (TRESIBA FLEXTOUCH) 200 UNIT/ML SOPN    blood glucose test strips (ACCU-CHEK GAVI PLUS) strip    Other Relevant Orders    Basic Metabolic Panel    Hemoglobin A1C    Microalbumin / Creatinine Urine Ratio    HM DIABETES FOOT EXAM (Completed)    Hypothyroidism    Relevant Medications    levothyroxine (SYNTHROID) 125 MCG tablet    Other Relevant Orders    TSH without Reflex       Other    Vitamin D deficiency    Mixed hyperlipidemia    Relevant Medications    ASPIRIN LOW DOSE 81 MG EC tablet    atorvastatin (LIPITOR) 20 MG tablet    spironolactone (ALDACTONE) 50 MG tablet    quinapril (ACCUPRIL) 5 MG tablet    Other Relevant Orders    Lipid Panel    Hepatic Function Panel    Hyponatremia    Relevant Orders    Basic Metabolic Panel          Results for orders placed or performed in visit on 22   Hepatic Function Panel   Result Value Ref Range    Total Protein 8.1 6.6 - 8.7 g/dL    Albumin 4.3 3.5 - 5.2 g/dL    Alkaline Phosphatase 93 35 - 104 U/L    ALT 11 5 - 33 U/L    AST 25 5 - 32 U/L    Total Bilirubin 0.4 0.2 - 1.2 mg/dL    Bilirubin, Direct 0.1 0.0 - 0.3 mg/dL    Bilirubin, Indirect 0.3 0.1 - 1.0 mg/dL   Lipid Panel   Result Value Ref Range    Cholesterol, Total 124 (L) 160 - 199 mg/dL    Triglycerides 178 (H) 0 - 149 mg/dL    HDL 34 (L) 65 - 121 mg/dL    LDL Calculated 54 <100 mg/dL   Microalbumin / Creatinine Urine Ratio   Result Value Ref Range    Microalbumin, Random Urine <1.20 0.00 - 19.00 mg/dL    Creatinine, Ur 136.6 4.2 - 622.0 mg/dL    Microalbumin Creatinine Ratio see below mg/g   Hemoglobin A1C   Result Value Ref Range    Hemoglobin A1C 7.5 (H) 4.0 - 6.0 %   CBC   Result Value Ref Range    WBC 13.0 (H) 4.8 - 10.8 K/uL    RBC 4.49 4.20 - 5.40 M/uL    Hemoglobin 12.7 12.0 - 16.0 g/dL    Hematocrit 38.9 37.0 - 47.0 %    MCV 86.6 81.0 - 99.0 fL    MCH 28.3 27.0 - 31.0 pg    MCHC 32.6 (L) 33.0 - 37.0 g/dL    RDW 13.8 11.5 - 14.5 %    Platelets 363 271 - 489 K/uL    MPV 9.9 9.4 - 12.3 fL   Basic Metabolic Panel   Result Value Ref Range    Sodium 134 (L) 136 - 145 mmol/L    Potassium 4.5 3.5 - 5.0 mmol/L    Chloride 101 98 - 111 mmol/L    CO2 22 22 - 29 mmol/L    Anion Gap 11 7 - 19 mmol/L    Glucose 220 (H) 74 - 109 mg/dL    BUN 17 6 - 20 mg/dL    CREATININE 0.8 0.5 - 0.9 mg/dL    GFR Non-African American >60 >60    GFR African American >59 >59    Calcium 9.6 8.6 - 10.0 mg/dL       Return in about 4 months (around 5/18/2022). HPI    56-year-old female who presents for follow-up visit  Chronic neck pain   migraine headache did not go to work today has been taking Topamax for years she does not get relief from triptans and is asking to see if we can give her other medications for migraines  Her neuropathy has been acting worse she was started on Cymbalta 30 and she is requesting to increase it  GERD well controlled  Type 2 DM slightly worse, needs to watch her diet  Hypothyroidism, clinically euthyroid  Anxiety  PRITESH        Review of Systems   Constitutional: Positive for fatigue.  Negative for activity change, chills and fever. HENT: Negative for hearing loss, postnasal drip, rhinorrhea, sinus pain and trouble swallowing. Eyes: Negative for visual disturbance. Respiratory: Negative for cough, chest tightness, shortness of breath and wheezing. Cardiovascular: Negative for chest pain, palpitations and leg swelling. Gastrointestinal: Positive for nausea. Negative for abdominal pain, blood in stool, constipation, diarrhea and vomiting. Endocrine: Negative for cold intolerance. Genitourinary: Positive for dysuria. Negative for frequency and urgency. Musculoskeletal: Positive for arthralgias, back pain, myalgias and neck pain. Allergic/Immunologic: Negative for environmental allergies. Neurological: Positive for dizziness, weakness and headaches. Negative for light-headedness and numbness. Physical Exam  Vitals and nursing note reviewed. Constitutional:       General: She is not in acute distress. HENT:      Head: Normocephalic. Right Ear: External ear normal.      Left Ear: External ear normal.      Nose: Nose normal.   Eyes:      General: No scleral icterus. Conjunctiva/sclera: Conjunctivae normal.      Pupils: Pupils are equal, round, and reactive to light. Neck:      Thyroid: No thyromegaly. Vascular: No JVD. Cardiovascular:      Rate and Rhythm: Normal rate and regular rhythm. Heart sounds: Normal heart sounds. No murmur heard. Pulmonary:      Effort: Pulmonary effort is normal. No respiratory distress. Breath sounds: Normal breath sounds. No wheezing or rales. Abdominal:      General: Bowel sounds are normal. There is no distension. Palpations: Abdomen is soft. Tenderness: There is no abdominal tenderness. Musculoskeletal:         General: No deformity. Normal range of motion. Cervical back: Normal range of motion and neck supple. Thoracic back: Spasms present. Lymphadenopathy:      Cervical: No cervical adenopathy. Skin:     General: Skin is warm and dry. Findings: No rash. Neurological:      Mental Status: She is alert and oriented to person, place, and time. Cranial Nerves: No cranial nerve deficit. Deep Tendon Reflexes: Reflexes normal.   Psychiatric:         Behavior: Behavior normal.         Thought Content: Thought content normal.         Judgment: Judgment normal.       Visual inspection:  Deformity/amputation: absent  Skin lesions/pre-ulcerative calluses: absent  Edema: right- negative, left- negative    Sensory exam:  Monofilament sensation: abnormal - no sensation  (minimum of 5 random plantar locations tested, avoiding callused areas - > 1 area with absence of sensation is + for neuropathy)    Plus at least one of the following:  Pulses: normal,   Pinprick: Absent  Proprioception: Impaired  Vibration (128 Hz):  Impaired foot      (Time Documentation Optional [id number])    An electronic signaturewaas used to authenticate this note  -María Ellsworth MD on 1/18/2022 at 3:16 PM

## 2022-01-18 ENCOUNTER — OFFICE VISIT (OUTPATIENT)
Dept: INTERNAL MEDICINE | Age: 52
End: 2022-01-18
Payer: COMMERCIAL

## 2022-01-18 VITALS
SYSTOLIC BLOOD PRESSURE: 108 MMHG | OXYGEN SATURATION: 98 % | HEIGHT: 62 IN | BODY MASS INDEX: 29.66 KG/M2 | WEIGHT: 161.2 LBS | DIASTOLIC BLOOD PRESSURE: 72 MMHG | HEART RATE: 125 BPM

## 2022-01-18 DIAGNOSIS — M25.519 NECK AND SHOULDER PAIN: ICD-10-CM

## 2022-01-18 DIAGNOSIS — E55.9 VITAMIN D DEFICIENCY: ICD-10-CM

## 2022-01-18 DIAGNOSIS — E78.2 MIXED HYPERLIPIDEMIA: ICD-10-CM

## 2022-01-18 DIAGNOSIS — K21.9 GERD WITHOUT ESOPHAGITIS: ICD-10-CM

## 2022-01-18 DIAGNOSIS — Z79.4 TYPE 2 DIABETES MELLITUS WITHOUT COMPLICATION, WITH LONG-TERM CURRENT USE OF INSULIN (HCC): ICD-10-CM

## 2022-01-18 DIAGNOSIS — K59.09 CHRONIC CONSTIPATION: ICD-10-CM

## 2022-01-18 DIAGNOSIS — E03.9 ACQUIRED HYPOTHYROIDISM: ICD-10-CM

## 2022-01-18 DIAGNOSIS — J30.89 PERENNIAL ALLERGIC RHINITIS: ICD-10-CM

## 2022-01-18 DIAGNOSIS — E11.9 TYPE 2 DIABETES MELLITUS WITHOUT COMPLICATION, WITH LONG-TERM CURRENT USE OF INSULIN (HCC): ICD-10-CM

## 2022-01-18 DIAGNOSIS — I10 ESSENTIAL HYPERTENSION: Primary | ICD-10-CM

## 2022-01-18 DIAGNOSIS — F51.01 PRIMARY INSOMNIA: ICD-10-CM

## 2022-01-18 DIAGNOSIS — E87.1 HYPONATREMIA: ICD-10-CM

## 2022-01-18 DIAGNOSIS — J32.9 CHRONIC SINUSITIS, UNSPECIFIED LOCATION: Primary | ICD-10-CM

## 2022-01-18 DIAGNOSIS — G47.33 OBSTRUCTIVE SLEEP APNEA: ICD-10-CM

## 2022-01-18 DIAGNOSIS — M54.2 NECK AND SHOULDER PAIN: ICD-10-CM

## 2022-01-18 DIAGNOSIS — Z13.0 SCREENING FOR DEFICIENCY ANEMIA: ICD-10-CM

## 2022-01-18 PROCEDURE — 3051F HG A1C>EQUAL 7.0%<8.0%: CPT | Performed by: INTERNAL MEDICINE

## 2022-01-18 PROCEDURE — 99214 OFFICE O/P EST MOD 30 MIN: CPT | Performed by: INTERNAL MEDICINE

## 2022-01-18 RX ORDER — FEXOFENADINE HCL 180 MG/1
180 TABLET ORAL DAILY
Qty: 90 TABLET | Refills: 1 | Status: SHIPPED | OUTPATIENT
Start: 2022-01-18 | End: 2022-08-09 | Stop reason: SDUPTHER

## 2022-01-18 ASSESSMENT — PATIENT HEALTH QUESTIONNAIRE - PHQ9
SUM OF ALL RESPONSES TO PHQ QUESTIONS 1-9: 0
1. LITTLE INTEREST OR PLEASURE IN DOING THINGS: 0
SUM OF ALL RESPONSES TO PHQ QUESTIONS 1-9: 0
SUM OF ALL RESPONSES TO PHQ QUESTIONS 1-9: 0
2. FEELING DOWN, DEPRESSED OR HOPELESS: 0
SUM OF ALL RESPONSES TO PHQ9 QUESTIONS 1 & 2: 0
SUM OF ALL RESPONSES TO PHQ QUESTIONS 1-9: 0

## 2022-01-19 ENCOUNTER — DOCUMENTATION (OUTPATIENT)
Dept: ENDOCRINOLOGY | Facility: CLINIC | Age: 52
End: 2022-01-19

## 2022-01-19 ENCOUNTER — OFFICE VISIT (OUTPATIENT)
Dept: GASTROENTEROLOGY | Age: 52
End: 2022-01-19
Payer: COMMERCIAL

## 2022-01-19 ENCOUNTER — TELEPHONE (OUTPATIENT)
Dept: GASTROENTEROLOGY | Age: 52
End: 2022-01-19

## 2022-01-19 VITALS
HEART RATE: 101 BPM | HEIGHT: 63 IN | BODY MASS INDEX: 28.35 KG/M2 | WEIGHT: 160 LBS | DIASTOLIC BLOOD PRESSURE: 70 MMHG | SYSTOLIC BLOOD PRESSURE: 115 MMHG | OXYGEN SATURATION: 99 %

## 2022-01-19 DIAGNOSIS — K59.09 CHRONIC CONSTIPATION: Primary | ICD-10-CM

## 2022-01-19 DIAGNOSIS — K58.1 IRRITABLE BOWEL SYNDROME WITH CONSTIPATION: ICD-10-CM

## 2022-01-19 DIAGNOSIS — K59.04 CHRONIC IDIOPATHIC CONSTIPATION: ICD-10-CM

## 2022-01-19 PROCEDURE — 99213 OFFICE O/P EST LOW 20 MIN: CPT | Performed by: NURSE PRACTITIONER

## 2022-01-19 ASSESSMENT — ENCOUNTER SYMPTOMS
TROUBLE SWALLOWING: 0
ABDOMINAL DISTENTION: 0
CONSTIPATION: 1
NAUSEA: 0
COUGH: 0
BLOOD IN STOOL: 0
SHORTNESS OF BREATH: 0
DIARRHEA: 0
VOICE CHANGE: 0
ABDOMINAL PAIN: 0
BACK PAIN: 0
RECTAL PAIN: 0
ANAL BLEEDING: 0
VOMITING: 0

## 2022-01-19 NOTE — PROGRESS NOTES
Subjective:      Miesha Box is a53 y.o. female  Chief Complaint   Patient presents with    Follow-up       HPI  PCP: Jose Marks MD  Referring Provider: Sakina Olivo MD   At last OV appt I refilled her motegrity she had been on for chronic constipation, which has always worked great. However this was denied by her insurance. Her insurance wanted her to try amitiza  Pt reports this was denied by her insurance. We never got this denial.  She has continued taking what she has left of motegrity til something is sent in that is covered. Family HX:    Pt denies family hx of colon polyps, colon CA, inflammatory bowel dx, gastric CA and esophageal CA.     Past Medical History:   Diagnosis Date    Anxiety     Bacteremia 10/17/2019    Bandemia 10/16/2019    Class 2 obesity due to excess calories without serious comorbidity with body mass index (BMI) of 36.0 to 36.9 in adult 12/19/2019    Depression     Diabetes mellitus ([address]     GERD (gastroesophageal reflux disease)     Heartburn 10/28/2020    Hyperglycemia 8/16/2017    Hyperlipidemia     Hypertension     Hypothyroidism     Lower abdominal pain 1/22/2019    Neuropathy 10/16/2019    Obstructive sleep apnea     Osteoarthritis     PONV (postoperative nausea and vomiting)     Skin ulcer of flank with fat layer exposed ([address] 1/8/2020    Staph infection 11/11/2018          Past Surgical History:   Procedure Laterality Date    APPENDECTOMY      as a 1st grader   [address] [place] [address]      1996, 2000, 2003    CHOLECYSTECTOMY, LAPAROSCOPIC      1994    COLONOSCOPY      \"more than 15 + yrs ago\" PER PT RECALL    COLONOSCOPY N/A 7/30/2019    Dr Jose Hayden hemorrhoids-Grade 1, suboptimal prep, 3 yr recall    HYSTERECTOMY, TOTAL ABDOMINAL      May 2012, withOUT Ophorectomy    [place] REVISE MEDIAN N/CARPAL TUNNEL SURG Left 6/16/2017    CARPAL TUNNEL RELEASE performed by Lexi Vanegas MD at [address] N/CARPAL TUNNEL SURG Right 7/21/2017    CARPAL TUNNEL RELEASE performed by Fabiola Orosco MD at [address]      as a 7th grader    UPPER GASTROINTESTINAL ENDOSCOPY N/A 7/30/2019    UPPER GASTROINTESTINAL ENDOSCOPY  7/30/2019    Dr Melchor Huang exam, empiric dil with 54F over wire, neg EoE       Social History     Socioeconomic History    Marital status:      Spouse name: None    Number of children: 4    Years of education: None    Highest education level: None   Occupational History    Occupation: [employer]   Tobacco Use    Smoking status: Never Smoker    Smokeless tobacco: Never Used   Vaping Use    Vaping Use: Never used   Substance and Sexual Activity    Alcohol use: Not Currently     Alcohol/week: 0.0 standard drinks     Comment: \"once in a blue moon\", a few times a year    Drug use: No    Sexual activity: None     Comment: 4   Other Topics Concern    None   Social History Narrative    CODE STATUS: Full Code    HEALTH CARE PROXY: her boyfriend, Mr. Deloris Herring, [phone]    AMBULATES: independently    DOMICILED: lives in a private house, lives with her boyfriend and two of the kids, has dog and cat, has 3-4 steps to enter the home and a ramp, no stairs inside     Social Determinants of Health     Financial Resource Strain: Low Risk     Difficulty of Paying Living Expenses: Not hard at all   Food Insecurity: No Food Insecurity    Worried About Running Out of Food in the Last Year: Never true    Taylor of Food in the Last Year: Never true   Transportation Needs:     Lack of Transportation (Medical): Not on file    Lack of Transportation (Non-Medical):  Not on file   Physical Activity:     Days of Exercise per Week: Not on file    Minutes of Exercise per Session: Not on file   Stress:     Feeling of Stress : Not on file   Social Connections:     Frequency of Communication with Friends and Family: Not on file    Frequency of Social Gatherings with Friends and Family: Not on file    Attends Mandaen Services: Not on file    Active Member of Clubs or Organizations: Not on file    Attends Club or Organization Meetings: Not on file    Marital Status: Not on file   Intimate Partner Violence:     Fear of Current or Ex-Partner: Not on file    Emotionally Abused: Not on file    Physically Abused: Not on file    Sexually Abused: Not on file   Housing Stability:     Unable to Pay for Housing in the Last Year: Not on file    Number of [place] in the Last Year: Not on file    Unstable Housing in the Last Year: Not on file       Allergies   Allergen Reactions    Claritin-D 12 Hour [Loratadine-Pseudoephedrine Er] Other (See Comments)     \"it intensifies my migraines\"    Demerol Hcl [Meperidine] Other (See Comments)     Aggression    Percocet [Oxycodone-Acetaminophen] Rash       Current Outpatient Medications   Medication Sig Dispense Refill    fexofenadine (ALLEGRA) 180 MG tablet Take 1 tablet by mouth daily 90 tablet 1    DULoxetine (CYMBALTA) 30 MG extended release capsule TAKE 1 CAPSULE BY MOUTH DAILY 30 capsule 5    famotidine (PEPCID) 20 MG tablet TAKE 1 TABLET BY MOUTH TWICE DAILY 180 tablet 1    blood glucose test strips (ACCU-CHEK GAVI PLUS) strip USE TO TEST ONCE DAILY 100 strip 3    Prucalopride Succinate (MOTEGRITY) 2 MG TABS Take 2 mg by mouth daily 90 tablet 3    omeprazole (PRILOSEC) 20 MG delayed release capsule Take 1 capsule by mouth Daily Take first thing in the morning on an empty stomach. 90 capsule 3    DULoxetine (CYMBALTA) 60 MG extended release capsule Take 1 capsule by mouth daily 30 capsule 5    econazole nitrate 1 % cream Apply topically daily. 85 g 0    nystatin (MYCOSTATIN) 196688 UNIT/GM ointment Apply topically 2 times daily.  30 g 1    quinapril (ACCUPRIL) 5 MG tablet Take 1 tablet by mouth daily 30 tablet 5    spironolactone (ALDACTONE) 50 MG tablet TAKE 1 TABLET BY MOUTH DAILY 90 tablet 1    cyclobenzaprine (FLEXERIL) 10 MG tablet TAKE 1 TABLET BY MOUTH EVERY NIGHT AS NEEDED FOR MUSCLE SPASMS 90 tablet 1    Insulin Degludec (TRESIBA FLEXTOUCH) 200 UNIT/ML SOPN 60 units each morning not taking at all during the night. 4 pen 5    traZODone (DESYREL) 100 MG tablet TAKE 1 TABLET BY MOUTH EVERY NIGHT 30 tablet 5    topiramate (TOPAMAX) 50 MG tablet TAKE 3 TABLETS BY MOUTH EVERY NIGHT AT BEDTIME 90 tablet 5    clonazePAM (KLONOPIN) 0.5 MG tablet TAKE 1 TABLET BY MOUTH DAILY AS NEEDED. 30 tablet 0    atorvastatin (LIPITOR) 20 MG tablet Take 1 tablet by mouth daily 90 tablet 3    levothyroxine (SYNTHROID) 125 MCG tablet TAKE 1 TABLET BY MOUTH DAILY 30 tablet 11    Onabotulinumtoxin A (BOTOX) 200 units injection 155 units IM in Cervical and Facial Region every 90 days      Please fax all correspondence to [phone]  Please call [phone] with any questions. 1 each 3    polyethylene glycol (GLYCOLAX) powder Take 17 g by mouth 2 times daily  1    insulin aspart (NOVOLOG FLEXPEN) 100 UNIT/ML injection pen Inject into the skin 3 times daily (before meals) Counting Carbs - Sliding Scale      Semaglutide (OZEMPIC, 0.25 OR 0.5 MG/DOSE, SC) Inject 0.5 mg into the skin once a week On Sundays      vitamin B-6 (PYRIDOXINE) 100 MG tablet Take 100 mg by mouth daily      ASPIRIN LOW DOSE 81 MG EC tablet TAKE 1 TABLET BY MOUTH DAILY 30 tablet 0    lubiprostone (AMITIZA) 24 MCG capsule Take 1 capsule by mouth 2 times daily (with meals) (Patient not taking: Reported on 1/19/2022) 60 capsule 11    Insulin Pen Needle 32G X 4 MM MISC 1 each by Does not apply route daily 100 each 3    Insulin Syringe-Needle U-100 30G X 5/16\" 0.5 ML MISC 1 each by Does not apply route daily 100 each 3     No current facility-administered medications for this visit. Review of Systems   Constitutional: Negative for fatigue and unexpected weight change.    HENT: Negative for trouble swallowing and voice change. Respiratory: Negative for cough and shortness of breath. Cardiovascular: Negative for chest pain and palpitations. Gastrointestinal: Positive for constipation. Negative for abdominal distention, abdominal pain, anal bleeding, blood in stool, diarrhea, nausea, rectal pain and vomiting. Genitourinary: Negative for hematuria. Musculoskeletal: Negative for arthralgias, back pain and neck pain. Neurological: Negative for weakness and headaches. Psychiatric/Behavioral: Negative for dysphoric mood. The patient is not nervous/anxious. Objective:     Physical Exam  Vitals and nursing note reviewed. Constitutional:       Appearance: She is well-developed. Comments: /70 (Site: Left Upper Arm)   Pulse 101   Ht 5' 3\" (1.6 m)   Wt 160 lb (72.6 kg)   LMP  (LMP Unknown)   SpO2 99%   BMI 28.34 kg/m²    Eyes:      General: No scleral icterus. Conjunctiva/sclera: Conjunctivae normal.      Pupils: Pupils are equal, round, and reactive to light. Cardiovascular:      Rate and Rhythm: Normal rate and regular rhythm. Heart sounds: Normal heart sounds. No murmur heard. No friction rub. No gallop. Pulmonary:      Effort: Pulmonary effort is normal. No respiratory distress. Breath sounds: Normal breath sounds. Abdominal:      General: Bowel sounds are normal. There is no distension. Palpations: Abdomen is soft. Tenderness: There is no abdominal tenderness. There is no rebound. Neurological:      Mental Status: She is alert and oriented to person, place, and time. Cranial Nerves: No cranial nerve deficit. Psychiatric:         Judgment: Judgment normal.           Assessment:       Diagnosis Orders   1. Chronic constipation     2. Chronic idiopathic constipation     3. Irritable bowel syndrome with constipation           Plan:      1. Will send info to Rachel to see what is going on with her insurance.  In the meantime I gave her 3 weeks worth of motegrity samples til she picks up new med

## 2022-01-19 NOTE — TELEPHONE ENCOUNTER
Rachel please see my OV note on Sarah Wagner from today and find out what her insurance is going to cover for her chronic constipation.  thanks

## 2022-01-19 NOTE — TELEPHONE ENCOUNTER
CALLED PHARMACY, AMITIZA NEEDS A PA, SO THEY ARE GOING TO FAX ME A COVERMYMED FORM TO GET IT STARTED.

## 2022-01-20 NOTE — TELEPHONE ENCOUNTER
1. 20.22    Amitiza has been approved. Called pharmacy, s/w Renée, she said they will need to order this and it will not be ready till tomorrow. Called pt to let her know, pt voiced understanding and  Appreciation. Approval letter attached to this encounter.

## 2022-01-21 ENCOUNTER — HOSPITAL ENCOUNTER (OUTPATIENT)
Dept: CT IMAGING | Facility: HOSPITAL | Age: 52
Discharge: HOME | End: 2022-01-21
Admitting: EMERGENCY MEDICINE

## 2022-01-21 PROCEDURE — 70486 CT MAXILLOFACIAL W/O DYE: CPT

## 2022-01-24 ENCOUNTER — OFFICE VISIT (OUTPATIENT)
Dept: OTOLARYNGOLOGY | Facility: CLINIC | Age: 52
End: 2022-01-24

## 2022-01-24 VITALS
DIASTOLIC BLOOD PRESSURE: 74 MMHG | WEIGHT: 163 LBS | HEART RATE: 114 BPM | TEMPERATURE: 98.2 F | BODY MASS INDEX: 30 KG/M2 | HEIGHT: 62 IN | SYSTOLIC BLOOD PRESSURE: 112 MMHG

## 2022-01-24 DIAGNOSIS — J31.0 CHRONIC RHINITIS: Primary | ICD-10-CM

## 2022-01-24 PROCEDURE — 99213 OFFICE O/P EST LOW 20 MIN: CPT | Performed by: EMERGENCY MEDICINE

## 2022-01-24 RX ORDER — OMEPRAZOLE 40 MG/1
40 CAPSULE, DELAYED RELEASE ORAL DAILY
COMMUNITY

## 2022-01-24 RX ORDER — SPIRONOLACTONE 50 MG/1
50 TABLET, FILM COATED ORAL DAILY
COMMUNITY

## 2022-01-24 NOTE — PROGRESS NOTES
EMILY Britton  [facility]AR [facility]SE & TH[facility]  [address], [address]  [facility] [address]  Fax [phone]  Phone [phone]      Visit Type: FOLLOW UP   Chief Complaint   Patient presents with   • Sinus Problem        HPI  She presents for a follow up evaluation. She has had continued problems with nasal drainage, nasal congestion and sneezing. The symptoms are localized to both nasal cavities. The symptoms severity was described as: mild to moderate The symptoms have been: relatively constant for the last several months There have been no identified factors that aggravate the symptoms. There have been no factors that have improved the symptoms.        History     Last Reviewed by Lorelei Smith APRN on 1/24/2022 at  1:59 PM    Sections Reviewed    Medical, Family, Tobacco, Surgical      Problem list reviewed by Lorelei Smith APRN on 1/24/2022 at  1:59 PM  Medicines reviewed by Lorelei Smith APRN on 1/24/2022 at  1:59 PM  Allergies reviewed by Lorelei Smith APRN on 1/24/2022 at  1:59 PM        Vital Signs:   Temp:  [98.2 °F (36.8 °C)] 98.2 °F (36.8 °C)  Heart Rate:  [114] 114  BP: (112)/(74) 112/74  ENT Physical Exam  Constitutional  Appearance: patient appears well-developed, well-nourished and well-groomed,  Communication/Voice: communication appropriate for developmental age; vocal quality normal;  Head and Face  Appearance: head appears normal, face appears normal and face appears atraumatic;  Palpation: facial palpation normal;  Salivary: glands normal;  Ear  Hearing: intact;  Auricles: right auricle normal; left auricle normal;  External Mastoids: right external mastoid normal; left external mastoid normal;  Ear Canals: right ear canal normal; left ear canal normal;  Tympanic Membranes: right tympanic membrane normal; left tympanic membrane normal;  Nose  Internal Nose: bilateral intranasal mucosa edematous and  erythematous; bilateral inferior turbinates bluish; with hypertrophy;  Oral Cavity/Oropharynx  Lips: normal;  Teeth: normal;  Gums: gingiva normal;  Tongue: normal;  Oral mucosa: normal;  Hard palate: normal;         Result Review    RESULTS REVIEW    I have reviewed the patients old records in the chart.     Assessment/Plan    Diagnoses and all orders for this visit:    1. Chronic rhinitis (Primary)  -     Intradermal Allergy Testing  -     Prick Testing (multi-Test)            For the best response, use your nasal sprays every day without skipping doses. It may take several weeks before the full effect is acheived.   Hold antihistamine containing medications (prescribed and over the counter) 1 week prior to the scheduled allergy testing.       Return in about 4 weeks (around 2/21/2022) for Follow up with EMILY Johnson.      EMILY Britton  01/24/22  14:00 CST

## 2022-01-24 NOTE — PATIENT INSTRUCTIONS
CONTACT INFORMATION:  The main office phone number is [phone]. For emergencies after hours and on weekends, this number will convert over to our answering service and the on call provider will answer. Please try to keep non emergent phone calls/ questions to office hours 9am-5pm Monday through Friday.     Long Play  As an alternative, you can sign up and use the Epic MyChart system for more direct and quicker access for non emergent questions/ problems.  [facility] Long Play allows you to send messages to your doctor, view your test results, renew your prescriptions, schedule appointments, and more. To sign up, go to [employer] and click on the Sign Up Now link in the New User? box. Enter your Long Play Activation Code exactly as it appears below along with the last four digits of your Social Security Number and your Date of Birth () to complete the sign-up process. If you do not sign up before the expiration date, you must request a new code.    Long Play Activation Code: Activation code not generated  Current Long Play Status: Active    If you have questions, you can email [email or web address] or call [phone] to talk to our Long Play staff. Remember, Long Play is NOT to be used for urgent needs. For medical emergencies, dial 911.

## 2022-01-30 ENCOUNTER — HOSPITAL ENCOUNTER (EMERGENCY)
Age: 52
Discharge: HOME | End: 2022-01-31
Payer: COMMERCIAL

## 2022-01-30 DIAGNOSIS — R11.2 NAUSEA AND VOMITING, INTRACTABILITY OF VOMITING NOT SPECIFIED, UNSPECIFIED VOMITING TYPE: ICD-10-CM

## 2022-01-30 DIAGNOSIS — B34.9 VIRAL ILLNESS: Primary | ICD-10-CM

## 2022-01-30 LAB
ALBUMIN SERPL-MCNC: 3.9 G/DL (ref 3.5–5.2)
ALP BLD-CCNC: 91 U/L (ref 35–104)
ALT SERPL-CCNC: 9 U/L (ref 5–33)
ANION GAP SERPL CALCULATED.3IONS-SCNC: 14 MMOL/L (ref 7–19)
AST SERPL-CCNC: 24 U/L (ref 5–32)
BILIRUB SERPL-MCNC: 0.3 MG/DL (ref 0.2–1.2)
BUN BLDV-MCNC: 12 MG/DL (ref 6–20)
CALCIUM SERPL-MCNC: 9.2 MG/DL (ref 8.6–10)
CHLORIDE BLD-SCNC: 99 MMOL/L (ref 98–111)
CO2: 18 MMOL/L (ref 22–29)
CREAT SERPL-MCNC: 0.8 MG/DL (ref 0.5–0.9)
GFR AFRICAN AMERICAN: >59
GFR NON-AFRICAN AMERICAN: >60
GLUCOSE BLD-MCNC: 244 MG/DL (ref 70–99)
GLUCOSE BLD-MCNC: 276 MG/DL (ref 74–109)
LIPASE: 56 U/L (ref 13–60)
PERFORMED ON: ABNORMAL
POTASSIUM REFLEX MAGNESIUM: 4.4 MMOL/L (ref 3.5–5)
SARS-COV-2, NAAT: NOT DETECTED
SODIUM BLD-SCNC: 131 MMOL/L (ref 136–145)
TOTAL PROTEIN: 7.7 G/DL (ref 6.6–8.7)

## 2022-01-30 PROCEDURE — 80053 COMPREHEN METABOLIC PANEL: CPT

## 2022-01-30 PROCEDURE — 96375 TX/PRO/DX INJ NEW DRUG ADDON: CPT

## 2022-01-30 PROCEDURE — 93005 ELECTROCARDIOGRAM TRACING: CPT | Performed by: PHYSICIAN ASSISTANT

## 2022-01-30 PROCEDURE — 87635 SARS-COV-2 COVID-19 AMP PRB: CPT

## 2022-01-30 PROCEDURE — 85025 COMPLETE CBC W/AUTO DIFF WBC: CPT

## 2022-01-30 PROCEDURE — 6360000002 HC RX W HCPCS: Performed by: PHYSICIAN ASSISTANT

## 2022-01-30 PROCEDURE — 82947 ASSAY GLUCOSE BLOOD QUANT: CPT

## 2022-01-30 PROCEDURE — 83690 ASSAY OF LIPASE: CPT

## 2022-01-30 PROCEDURE — 36415 COLL VENOUS BLD VENIPUNCTURE: CPT

## 2022-01-30 PROCEDURE — 2580000003 HC RX 258: Performed by: PHYSICIAN ASSISTANT

## 2022-01-30 PROCEDURE — 96374 THER/PROPH/DIAG INJ IV PUSH: CPT

## 2022-01-30 PROCEDURE — 99282 EMERGENCY DEPT VISIT SF MDM: CPT

## 2022-01-30 RX ORDER — KETOROLAC TROMETHAMINE 30 MG/ML
30 INJECTION, SOLUTION INTRAMUSCULAR; INTRAVENOUS ONCE
Status: COMPLETED | OUTPATIENT
Start: 2022-01-30 | End: 2022-01-30

## 2022-01-30 RX ORDER — 0.9 % SODIUM CHLORIDE 0.9 %
1000 INTRAVENOUS SOLUTION INTRAVENOUS ONCE
Status: COMPLETED | OUTPATIENT
Start: 2022-01-30 | End: 2022-01-31

## 2022-01-30 RX ORDER — METOCLOPRAMIDE HYDROCHLORIDE 5 MG/ML
10 INJECTION INTRAMUSCULAR; INTRAVENOUS ONCE
Status: COMPLETED | OUTPATIENT
Start: 2022-01-30 | End: 2022-01-30

## 2022-01-30 RX ADMIN — KETOROLAC TROMETHAMINE 30 MG: 30 INJECTION, SOLUTION INTRAMUSCULAR; INTRAVENOUS at 23:00

## 2022-01-30 RX ADMIN — SODIUM CHLORIDE 1000 ML: 9 INJECTION, SOLUTION INTRAVENOUS at 23:00

## 2022-01-30 RX ADMIN — METOCLOPRAMIDE HYDROCHLORIDE 10 MG: 5 INJECTION INTRAMUSCULAR; INTRAVENOUS at 22:59

## 2022-01-30 ASSESSMENT — PAIN DESCRIPTION - LOCATION: LOCATION: ABDOMEN

## 2022-01-30 ASSESSMENT — ENCOUNTER SYMPTOMS
COLOR CHANGE: 0
BACK PAIN: 0
RHINORRHEA: 0
EYE PAIN: 0
EYE DISCHARGE: 0
PHOTOPHOBIA: 0
ABDOMINAL DISTENTION: 0
SHORTNESS OF BREATH: 0
NAUSEA: 1
COUGH: 0
ABDOMINAL PAIN: 0
SORE THROAT: 0
APNEA: 0

## 2022-01-30 ASSESSMENT — PAIN SCALES - GENERAL
PAINLEVEL_OUTOF10: 10
PAINLEVEL_OUTOF10: 10

## 2022-01-31 VITALS
DIASTOLIC BLOOD PRESSURE: 72 MMHG | SYSTOLIC BLOOD PRESSURE: 111 MMHG | TEMPERATURE: 97.9 F | OXYGEN SATURATION: 97 % | HEART RATE: 98 BPM | RESPIRATION RATE: 28 BRPM | WEIGHT: 161 LBS | HEIGHT: 63 IN | BODY MASS INDEX: 28.53 KG/M2

## 2022-01-31 LAB
ADENOVIRUS BY PCR: NOT DETECTED
ATYPICAL LYMPHOCYTE RELATIVE PERCENT: 1 % (ref 0–8)
BACTERIA: NEGATIVE /HPF
BASOPHILS ABSOLUTE: 0 K/UL (ref 0–0.2)
BASOPHILS RELATIVE PERCENT: 0 % (ref 0–1)
BILIRUBIN URINE: NEGATIVE
BLOOD, URINE: NEGATIVE
BORDETELLA PARAPERTUSSIS BY PCR: NOT DETECTED
BORDETELLA PERTUSSIS BY PCR: NOT DETECTED
CHLAMYDOPHILIA PNEUMONIAE BY PCR: NOT DETECTED
CLARITY: CLEAR
COLOR: YELLOW
CORONAVIRUS 229E BY PCR: DETECTED
CORONAVIRUS HKU1 BY PCR: NOT DETECTED
CORONAVIRUS NL63 BY PCR: NOT DETECTED
CORONAVIRUS OC43 BY PCR: NOT DETECTED
CRYSTALS, UA: ABNORMAL /HPF
EKG P AXIS: 42 DEGREES
EKG P-R INTERVAL: 152 MS
EKG Q-T INTERVAL: 336 MS
EKG QRS DURATION: 82 MS
EKG QTC CALCULATION (BAZETT): 393 MS
EKG T AXIS: 10 DEGREES
EOSINOPHILS ABSOLUTE: 0.41 K/UL (ref 0–0.6)
EOSINOPHILS RELATIVE PERCENT: 3 % (ref 0–5)
EPITHELIAL CELLS, UA: 7 /HPF (ref 0–5)
GLUCOSE URINE: NEGATIVE MG/DL
HCT VFR BLD CALC: 42.6 % (ref 37–47)
HEMOGLOBIN: 13.8 G/DL (ref 12–16)
HUMAN METAPNEUMOVIRUS BY PCR: NOT DETECTED
HUMAN RHINOVIRUS/ENTEROVIRUS BY PCR: NOT DETECTED
HYALINE CASTS: 2 /HPF (ref 0–8)
IMMATURE GRANULOCYTES #: 0 K/UL
INFLUENZA A BY PCR: NOT DETECTED
INFLUENZA B BY PCR: NOT DETECTED
KETONES, URINE: NEGATIVE MG/DL
LEUKOCYTE ESTERASE, URINE: ABNORMAL
LYMPHOCYTES ABSOLUTE: 6 K/UL (ref 1.1–4.5)
LYMPHOCYTES RELATIVE PERCENT: 43 % (ref 20–40)
MCH RBC QN AUTO: 28 PG (ref 27–31)
MCHC RBC AUTO-ENTMCNC: 32.4 G/DL (ref 33–37)
MCV RBC AUTO: 86.4 FL (ref 81–99)
MONOCYTES ABSOLUTE: 0.1 K/UL (ref 0–0.9)
MONOCYTES RELATIVE PERCENT: 1 % (ref 0–10)
MYCOPLASMA PNEUMONIAE BY PCR: NOT DETECTED
NEUTROPHILS ABSOLUTE: 7.1 K/UL (ref 1.5–7.5)
NEUTROPHILS RELATIVE PERCENT: 52 % (ref 50–65)
NITRITE, URINE: NEGATIVE
PARAINFLUENZA VIRUS 1 BY PCR: NOT DETECTED
PARAINFLUENZA VIRUS 2 BY PCR: NOT DETECTED
PARAINFLUENZA VIRUS 3 BY PCR: NOT DETECTED
PARAINFLUENZA VIRUS 4 BY PCR: NOT DETECTED
PDW BLD-RTO: 13.7 % (ref 11.5–14.5)
PH UA: 6.5 (ref 5–8)
PLATELET # BLD: 216 K/UL (ref 130–400)
PLATELET SLIDE REVIEW: ADEQUATE
PMV BLD AUTO: 9.5 FL (ref 9.4–12.3)
PROTEIN UA: NEGATIVE MG/DL
RBC # BLD: 4.93 M/UL (ref 4.2–5.4)
RBC UA: 0 /HPF (ref 0–4)
RESPIRATORY SYNCYTIAL VIRUS BY PCR: NOT DETECTED
SARS-COV-2, PCR: NOT DETECTED
SPECIFIC GRAVITY UA: 1.01 (ref 1–1.03)
UROBILINOGEN, URINE: 1 E.U./DL
WBC # BLD: 13.6 K/UL (ref 4.8–10.8)
WBC UA: 3 /HPF (ref 0–5)

## 2022-01-31 PROCEDURE — 0202U NFCT DS 22 TRGT SARS-COV-2: CPT

## 2022-01-31 PROCEDURE — 81001 URINALYSIS AUTO W/SCOPE: CPT

## 2022-01-31 NOTE — ED PROVIDER NOTES
Attending Supervisory Note/Shared Visit   I have personally performed a face to face diagnostic evaluation on this patient. I have reviewed the mid-levels findings and agree. Briefly, patient presents here to the ED for evaluation regarding headache, nausea and generalized body aches and malaise. She is concerned that she may have contracted a COVID-19 infection. She is not had any vomiting or diarrhea. On my exam: Patient is alert and speaking. She is resting comfortably in bed at this time. Her vital signs are stable with a room air oxygen saturation of 97%. Patient is negative for COVID-19 infection. Respiratory viral panel noted be positive for coronavirus. Urinalysis unremarkable. FINAL IMPRESSION      1. Viral illness    2.  Nausea and vomiting, intractability of vomiting not specified, unspecified vomiting type          Arthur Bateman MD  Attending Emergency Physician       Arthur Bateman MD  02/24/22 3661

## 2022-01-31 NOTE — ED PROVIDER NOTES
[address] [facility] EMERGENCY DEPT  eMERGENCYdEPARTMENT eNCOUnter      Pt Name: Galdino Forrest  MRN: [id number]  [place] 1970  Date of evaluation: 1/30/2022  Provider:TANVI Blanco    CHIEF COMPLAINT       Chief Complaint   Patient presents with    Nausea    Headache    Abdominal Pain         HISTORY OF PRESENT ILLNESS  (Location/Symptom, Timing/Onset, Context/Setting, Quality, Duration, Modifying Factors, Severity.)   Galdino Forrest is a 54 y.o. female who presents to the emergency department with complaints of headache mild abdominal pain epigastrium with nausea no emesis little bit tachycardic afebrile is going on for a few days concern for COVID she has a history of UTIs MRSA in the past no fever she is a diabetic and states she has not been able to do well with her medications today sugars been running over 200. negative Kussmaul breathing. HPI    Nursing Notes were reviewed and I agree. REVIEW OF SYSTEMS    (2-9 systems for level 4, 10 or more for level 5)     Review of Systems   Constitutional: Positive for fever. Negative for activity change, appetite change and chills. HENT: Negative for congestion, postnasal drip, rhinorrhea and sore throat. Eyes: Negative for photophobia, pain, discharge and visual disturbance. Respiratory: Negative for apnea, cough and shortness of breath. Cardiovascular: Negative for chest pain and leg swelling. Gastrointestinal: Positive for nausea. Negative for abdominal distention and abdominal pain. Abdominal cramping   Genitourinary: Negative for vaginal bleeding. Musculoskeletal: Negative for arthralgias, back pain, joint swelling, neck pain and neck stiffness. Skin: Negative for color change and rash. Neurological: Positive for headaches. Negative for dizziness, syncope and facial asymmetry. Hematological: Negative for adenopathy. Does not bruise/bleed easily. Psychiatric/Behavioral: Negative for agitation, behavioral problems and confusion. Except as noted above the remainder of the review of systems was reviewed and negative.        PAST MEDICAL HISTORY     Past Medical History:   Diagnosis Date    Anxiety     Bacteremia 10/17/2019    Bandemia 10/16/2019    Class 2 obesity due to excess calories without serious comorbidity with body mass index (BMI) of 36.0 to 36.9 in adult 12/19/2019    Depression     Diabetes mellitus ([facility] [address]     GERD (gastroesophageal reflux disease)     Heartburn 10/28/2020    Hyperglycemia 8/16/2017    Hyperlipidemia     Hypertension     Hypothyroidism     Lower abdominal pain 1/22/2019    Neuropathy 10/16/2019    Obstructive sleep apnea     Osteoarthritis     PONV (postoperative nausea and vomiting)     Skin ulcer of flank with fat layer exposed ([facility] [address] 1/8/2020    Staph infection 11/11/2018         SURGICAL HISTORY       Past Surgical History:   Procedure Laterality Date    APPENDECTOMY      as a 3rd grader   [address] [place] [address]      1996, 2000, 2003    CHOLECYSTECTOMY, LAPAROSCOPIC      1994    COLONOSCOPY      \"more than 15 + yrs ago\" PER PT RECALL    COLONOSCOPY N/A 7/30/2019    Dr Damion Lew hemorrhoids-Grade 1, suboptimal prep, 3 yr recall    HYSTERECTOMY, TOTAL ABDOMINAL      May 2012, withOUT Ophorectomy    RI REVISE MEDIAN N/CARPAL TUNNEL SURG Left 6/16/2017    CARPAL TUNNEL RELEASE performed by Pao Palm MD at [address] N/CARPAL TUNNEL SURG Right 7/21/2017    CARPAL TUNNEL RELEASE performed by Pao Palm MD at [address]      as a 5th grader    UPPER GASTROINTESTINAL ENDOSCOPY N/A 7/30/2019    UPPER GASTROINTESTINAL ENDOSCOPY  7/30/2019    Dr Miguel A Robledo exam, empiric dil with 54F over wire, neg EoE         CURRENT MEDICATIONS       Discharge Medication List as of 1/31/2022  3:11 AM      CONTINUE these medications which have NOT CHANGED    Details   fexofenadine (ALLEGRA) 180 MG tablet Take 1 tablet by mouth daily, Disp-90 tablet, R-1Normal      DULoxetine (CYMBALTA) 30 MG extended release capsule TAKE 1 CAPSULE BY MOUTH DAILY, Disp-30 capsule, R-5Normal      famotidine (PEPCID) 20 MG tablet TAKE 1 TABLET BY MOUTH TWICE DAILY, Disp-180 tablet, R-1Normal      lubiprostone (AMITIZA) 24 MCG capsule Take 1 capsule by mouth 2 times daily (with meals), Disp-60 capsule, R-11Normal      blood glucose test strips (ACCU-CHEK GAVI PLUS) strip USE TO TEST ONCE DAILY, Disp-100 strip, R-3Normal      Prucalopride Succinate (MOTEGRITY) 2 MG TABS Take 2 mg by mouth daily, Disp-90 tablet, R-3Normal      omeprazole (PRILOSEC) 20 MG delayed release capsule Take 1 capsule by mouth Daily Take first thing in the morning on an empty stomach., Disp-90 capsule, R-3Normal      econazole nitrate 1 % cream Apply topically daily. , Disp-85 g, R-0, Normal      nystatin (MYCOSTATIN) 183654 UNIT/GM ointment Apply topically 2 times daily. , Disp-30 g, R-1, Normal      quinapril (ACCUPRIL) 5 MG tablet Take 1 tablet by mouth daily, Disp-30 tablet, R-5Normal      spironolactone (ALDACTONE) 50 MG tablet TAKE 1 TABLET BY MOUTH DAILY, Disp-90 tablet, R-1Normal      cyclobenzaprine (FLEXERIL) 10 MG tablet TAKE 1 TABLET BY MOUTH EVERY NIGHT AS NEEDED FOR MUSCLE SPASMS, Disp-90 tablet, R-1Normal      Insulin Degludec (TRESIBA FLEXTOUCH) 200 UNIT/ML SOPN 60 units each morning not taking at all during the night., Disp-4 pen, R-5Adjust Sig      traZODone (DESYREL) 100 MG tablet TAKE 1 TABLET BY MOUTH EVERY NIGHT, Disp-30 tablet, R-5Normal      topiramate (TOPAMAX) 50 MG tablet TAKE 3 TABLETS BY MOUTH EVERY NIGHT AT BEDTIME, Disp-90 tablet, R-5Normal      clonazePAM (KLONOPIN) 0.5 MG tablet TAKE 1 TABLET BY MOUTH DAILY AS NEEDED., Disp-30 tablet, R-0Normal      atorvastatin (LIPITOR) 20 MG tablet Take 1 tablet by mouth daily, Disp-90 tablet, R-3Normal      levothyroxine (SYNTHROID) 125 MCG tablet TAKE 1 TABLET BY MOUTH DAILY, Disp-30 tablet, R-11Normal Onabotulinumtoxin A (BOTOX) 200 units injection 155 units IM in Cervical and Facial Region every 90 days      Please fax all correspondence to [phone]  Please call [phone] with any questions. , Disp-1 each, R-3155 units IM in cervical and facial region every 90 daysNO PRINT      polyethylene glycol (GLYCOLAX) powder Take 17 g by mouth 2 times daily, R-1Historical Med      insulin aspart (NOVOLOG FLEXPEN) 100 UNIT/ML injection pen Inject into the skin 3 times daily (before meals) Counting Carbs - Sliding ScaleHistorical Med      Semaglutide (OZEMPIC, 0.25 OR 0.5 MG/DOSE, SC) Inject 0.5 mg into the skin once a week On SundaysHistorical Med      vitamin B-6 (PYRIDOXINE) 100 MG tablet Take 100 mg by mouth dailyHistorical Med      Insulin Pen Needle 32G X 4 MM MISC DAILY Starting Thu 4/4/2019, Disp-100 each, R-3, Normal      Insulin Syringe-Needle U-100 30G X 5/16\" 0.5 ML MISC DAILY Starting Thu 4/4/2019, Disp-100 each, R-3, Normal      ASPIRIN LOW DOSE 81 MG EC tablet TAKE 1 TABLET BY MOUTH DAILY, Disp-30 tablet, R-0             ALLERGIES     Claritin-d 12 hour [loratadine-pseudoephedrine er], Demerol hcl [meperidine], and Percocet [oxycodone-acetaminophen]    FAMILY HISTORY       Family History   Problem Relation Age of Onset    High Blood Pressure Mother     Cancer Mother         of the brain    Other Mother         second hand smoker    High Blood Pressure Father     High Cholesterol Father     Colon Polyps Father     Other Father         smoker    Diabetes Sister         type 1, from alcohol    High Blood Pressure Sister     High Blood Pressure Brother     Colon Cancer Maternal Aunt     Colon Cancer Paternal Uncle     Colon Cancer Paternal Grandfather     Diabetes Brother         from alcohol    Hypertension Brother     No Known Problems Son     No Known Problems Son     No Known Problems Son     No Known Problems Daughter     Esophageal Cancer Neg Hx     Liver Cancer Neg Hx     Liver Disease Neg Hx     Rectal Cancer Neg Hx     Stomach Cancer Neg Hx           SOCIAL HISTORY       Social History     Socioeconomic History    Marital status:      Spouse name: None    Number of children: 4    Years of education: None    Highest education level: None   Occupational History    Occupation: [employer]   Tobacco Use    Smoking status: Never Smoker    Smokeless tobacco: Never Used   Vaping Use    Vaping Use: Never used   Substance and Sexual Activity    Alcohol use: Not Currently     Alcohol/week: 0.0 standard drinks     Comment: \"once in a blue moon\", a few times a year    Drug use: No    Sexual activity: None     Comment: 4   Other Topics Concern    None   Social History Narrative    CODE STATUS: Full Code    HEALTH CARE PROXY: her boyfriend, Mr. Lucila Kimble, [phone]    AMBULATES: independently    DOMICILED: lives in a private house, lives with her boyfriend and two of the kids, has dog and cat, has 3-4 steps to enter the home and a ramp, no stairs inside     Social Determinants of Health     Financial Resource Strain: Low Risk     Difficulty of Paying Living Expenses: Not hard at all   Food Insecurity: No Food Insecurity    Worried About Running Out of Food in the Last Year: Never true    Taylor of Food in the Last Year: Never true   Transportation Needs:     Lack of Transportation (Medical): Not on file    Lack of Transportation (Non-Medical):  Not on file   Physical Activity:     Days of Exercise per Week: Not on file    Minutes of Exercise per Session: Not on file   Stress:     Feeling of Stress : Not on file   Social Connections:     Frequency of Communication with Friends and Family: Not on file    Frequency of Social Gatherings with Friends and Family: Not on file    Attends [employer] Services: Not on file    Active Member of Clubs or Organizations: Not on file    Attends Club or Organization Meetings: Not on file  Marital Status: Not on file   Intimate Partner Violence:     Fear of Current or Ex-Partner: Not on file    Emotionally Abused: Not on file    Physically Abused: Not on file    Sexually Abused: Not on file   Housing Stability:     Unable to Pay for Housing in the Last Year: Not on file    Number of [place] in the Last Year: Not on file    Unstable Housing in the Last Year: Not on file       SCREENINGS           PHYSICAL EXAM    (up to 7 forlevel 4, 8 or more for level 5)     ED Triage Vitals   BP Temp Temp Source Pulse Resp SpO2 Height Weight   01/30/22 2227 01/30/22 2226 01/30/22 2226 01/30/22 2227 01/30/22 2227 01/30/22 2227 01/30/22 2227 01/30/22 2227   128/89 97.9 °F (36.6 °C) Temporal 119 20 96 % 5' 2.5\" (1.588 m) 161 lb (73 kg)       Physical Exam  Vitals and nursing note reviewed. Constitutional:       General: She is not in acute distress. Appearance: She is well-developed. She is not diaphoretic. HENT:      Head: Normocephalic and atraumatic. Right Ear: External ear normal.      Left Ear: External ear normal.      Mouth/Throat:      Pharynx: No oropharyngeal exudate. Eyes:      General:         Right eye: No discharge. Left eye: No discharge. Pupils: Pupils are equal, round, and reactive to light. Neck:      Thyroid: No thyromegaly. Cardiovascular:      Rate and Rhythm: Normal rate and regular rhythm. Heart sounds: Normal heart sounds. No murmur heard. No friction rub. Pulmonary:      Effort: Pulmonary effort is normal. No respiratory distress. Breath sounds: Normal breath sounds. No stridor. No wheezing. Abdominal:      General: Bowel sounds are normal. There is no distension. Palpations: Abdomen is soft. Tenderness: There is no abdominal tenderness. Musculoskeletal:         General: Normal range of motion. Cervical back: Normal range of motion and neck supple. Skin:     General: Skin is warm and dry.       Capillary Refill: Capillary refill takes less than 2 seconds. Findings: No rash. Neurological:      General: No focal deficit present. Mental Status: She is alert and oriented to person, place, and time. Cranial Nerves: No cranial nerve deficit. Sensory: No sensory deficit. Coordination: Coordination normal.   Psychiatric:         Mood and Affect: Mood normal.         Behavior: Behavior normal.         Thought Content:  Thought content normal.           DIAGNOSTIC RESULTS     RADIOLOGY:   Non-plain film images such as CT, Ultrasound and MRI are read by the radiologist. Plain radiographic images are visualized and preliminarilyinterpreted by No att. providers found with the below findings:      Interpretation per the Radiologist below, if available at the time of this note:    No orders to display       LABS:  Labs Reviewed   RESPIRATORY PANEL, MOLECULAR, WITH COVID-19 - Abnormal; Notable for the following components:       Result Value    Coronavirus 229E by PCR DETECTED (*)     All other components within normal limits   CBC WITH AUTO DIFFERENTIAL - Abnormal; Notable for the following components:    WBC 13.6 (*)     MCHC 32.4 (*)     Lymphocytes % 43.0 (*)     Lymphocytes Absolute 6.0 (*)     All other components within normal limits   COMPREHENSIVE METABOLIC PANEL W/ REFLEX TO MG FOR LOW K - Abnormal; Notable for the following components:    Sodium 131 (*)     CO2 18 (*)     Glucose 276 (*)     All other components within normal limits   URINE RT REFLEX TO CULTURE - Abnormal; Notable for the following components:    Leukocyte Esterase, Urine TRACE (*)     All other components within normal limits   MICROSCOPIC URINALYSIS - Abnormal; Notable for the following components:    Bacteria, UA NEGATIVE (*)     Crystals, UA NEG (*)     All other components within normal limits   POCT GLUCOSE - Abnormal; Notable for the following components:    POC Glucose 244 (*)     All other components within normal limits COVID-19, RAPID   LIPASE       All other labs were within normal range or notreturned as of this dictation. RE-ASSESSMENT        EMERGENCY DEPARTMENT COURSE and DIFFERENTIAL DIAGNOSIS/MDM:   Vitals:    Vitals:    01/30/22 2226 01/30/22 2227 01/30/22 2301 01/31/22 0101   BP:  128/89 114/83 111/72   Pulse:  119 98 98   Resp:  20 24 28   Temp: 97.9 °F (36.6 °C)      TempSrc: Temporal      SpO2:  96% 98% 97%   Weight:  161 lb (73 kg)     Height:  5' 2.5\" (1.588 m)         MDM  At this time we are awaiting urinalysis my attending to follow on that. Her sugar is slightly elevated sounds like she runs around 200 her viral panel was positive for coronavirus 229 correlates with most of her symptoms. I advise as long as her urine is okay no indication for antibiotic at this time more symptomatic control she states understanding I foresee a tentative discharge. PROCEDURES:    Procedures      FINAL IMPRESSION      1. Viral illness    2.  Nausea and vomiting, intractability of vomiting not specified, unspecified vomiting type          DISPOSITION/PLAN   DISPOSITION Decision To Discharge 01/31/2022 02:57:17 AM      PATIENT REFERRED TO:  Janice Pérez MD  [id number] [address]            DISCHARGE MEDICATIONS:  Discharge Medication List as of 1/31/2022  3:11 AM          (Please note that portions of this note were completed with a voice recognition program.  Efforts were made to edit the dictations but occasionallywords are mis-transcribed.)    Clement Hensley [address] [place] [address]  02/01/22 [id number]

## 2022-02-01 ENCOUNTER — TELEPHONE (OUTPATIENT)
Dept: INTERNAL MEDICINE | Age: 52
End: 2022-02-01

## 2022-02-01 DIAGNOSIS — G43.101 MIGRAINE WITH AURA AND WITH STATUS MIGRAINOSUS, NOT INTRACTABLE: Primary | ICD-10-CM

## 2022-02-01 RX ORDER — METOCLOPRAMIDE 10 MG/1
10 TABLET ORAL
Qty: 21 TABLET | Refills: 0 | Status: SHIPPED | OUTPATIENT
Start: 2022-02-01 | End: 2022-05-18 | Stop reason: ALTCHOICE

## 2022-02-01 RX ORDER — HYDROCODONE BITARTRATE AND ACETAMINOPHEN 5; 325 MG/1; MG/1
1 TABLET ORAL EVERY 8 HOURS PRN
Qty: 9 TABLET | Refills: 0 | Status: SHIPPED | OUTPATIENT
Start: 2022-02-01 | End: 2022-02-04

## 2022-02-01 NOTE — PROGRESS NOTES
Patient seen in the emergency room day after she was seen she tested positive through the routine test not the rapid test she continues to have persistent headaches not controlled by her usual medication and she was given Reglan in the emergency room which helped her symptoms of headache she also has nausea vomiting and diarrhea most likely related to Covid she is asking for some relief advised hydration changing her diet to bland diet avoid dairy and cheese and milk and fatty foods and prescription was sent to the pharmacy

## 2022-02-01 NOTE — TELEPHONE ENCOUNTER
S/w pt, states FINE told she has the Omicron variant of COVID and needs a letter faxed to her work with this information. She would like a return call from the nurse and will have that fax number when she is called back.

## 2022-02-05 DIAGNOSIS — F51.01 PRIMARY INSOMNIA: ICD-10-CM

## 2022-02-05 DIAGNOSIS — E03.9 HYPOTHYROIDISM, UNSPECIFIED TYPE: ICD-10-CM

## 2022-02-05 DIAGNOSIS — Z76.0 MEDICATION REFILL: ICD-10-CM

## 2022-02-07 RX ORDER — LEVOTHYROXINE SODIUM 0.12 MG/1
TABLET ORAL
Qty: 90 TABLET | Refills: 1 | Status: SHIPPED | OUTPATIENT
Start: 2022-02-07 | End: 2022-03-09 | Stop reason: SDUPTHER

## 2022-02-07 RX ORDER — TRAZODONE HYDROCHLORIDE 100 MG/1
TABLET ORAL
Qty: 30 TABLET | Refills: 5 | Status: SHIPPED | OUTPATIENT
Start: 2022-02-07 | End: 2022-08-04

## 2022-02-07 NOTE — TELEPHONE ENCOUNTER
Bushra Allyson called to request a refill on her medication.       Last office visit : 1/18/2022   Next office visit : 5/18/2022     Requested Prescriptions     Pending Prescriptions Disp Refills    levothyroxine (SYNTHROID) 125 MCG tablet [Pharmacy Med Name: LEVOTHYROXINE 0.125MG (125MCG) TAB] 90 tablet 1     Sig: TAKE 1 TABLET BY MOUTH DAILY    traZODone (DESYREL) 100 MG tablet [Pharmacy Med Name: TRAZODONE 100MG TABLETS] 90 tablet 1     Sig: TAKE 1 TABLET BY MOUTH EVERY NIGHT            Tika Cruz

## 2022-02-15 ENCOUNTER — PROCEDURE VISIT (OUTPATIENT)
Dept: OTOLARYNGOLOGY | Facility: CLINIC | Age: 52
End: 2022-02-15

## 2022-02-15 DIAGNOSIS — J31.0 CHRONIC RHINITIS: Primary | ICD-10-CM

## 2022-02-15 PROCEDURE — 95024 IQ TESTS W/ALLERGENIC XTRCS: CPT | Performed by: EMERGENCY MEDICINE

## 2022-02-15 PROCEDURE — 95004 PERQ TESTS W/ALRGNC XTRCS: CPT | Performed by: EMERGENCY MEDICINE

## 2022-02-15 NOTE — PROGRESS NOTES
I have reviewed the notes, assessments, and/or procedures performed by Moise Flores, I concur with her/his documentation of Yon Ochoa.

## 2022-02-15 NOTE — PROGRESS NOTES
Moise Flores   Allergy Testing Note:    Allergy Impact:  Allergy symptom severity: severe  Allergy symptom frequency: constant  Season allergy symptoms are worse: spring; fall; winter  Does allergy symptoms interfere with life?: moderately  Does allergy symptoms interfere with sleep?: a little      Allergy Symptoms:  Nasal symptoms: dryness; itching; congestion; drainage; sneezing  Ocular symptoms: tearing  Ear symptoms: dizziness; pressure; itching  Throat symptoms: hoarseness; snoring; tonsillectomy  Mouth symptoms: dryness; itching  Chest symptoms: cough; wheezing      Environmental History:  Occupation:   Home environment: carpeting; hardwood floor  Tobacco use: exposed to second hand smoke  Tobacco use: exposed to second hand smoke  Animal exposures: dogs (Dogs x 4/Inside and Outside/Small Breed)  Home heating: propane  Home cooling: fans; window unit      Allergy History:  Insect allergy reactions: wasp  Previous allergy testing?: no  Previous immunotherapy?: no  Previous treatment in ER for allergic reaction?: no      Allergy Skin Testing:  Allergy testing was performed using the Modified Quantitative Technique. The procedure of allergy testing was explained to the patient including risks of itching burning and reactions both local and systemic. The patient understood and signed a consent. Alcohol prep was used to prepare the skin and a marking pen was used to label the Multi- Test and intradermal panels. Prick testing was performed on the forearms by using the Multitest applicator and applying gentle pressure. After 15 minutes the panels were read for reactions. Intradermal testing follow ups were then performed on the forearms. After 15 minutes, the panels were read for reactions. The results were explained to the patient and questions answered. No complications were noted.    Allergy Testing Results:  Consent: Y    Testing location: Arm    Allergen : Gibson    Testing Nurse/Tech:  Moise Flores ST/AT    Reviewing Physician: Oleg Swan    Testing method: Skin Testing      Endpoints: 0=negative, higher number=higher reaction:  Vial: 3= sublingual vial  Antigen Prick 5 2 EP VIAL    Histamine 7       normal controls     Glycerine Control 0          Eastern Tree Mix(T) 0      7   3        Black [place] (T) 0     6   0        [place] Grass (G) 0     7   3        Talha Grass (G) 0     7   3        [place] Bluegrass (G) 0     7   3        Ragweed Mix (W) 0     7   3        Common Weed (W) 0     6   0        Sary Weed(W) 0     6   0        Mugwort/ Jass (W) 0     6   0        Mold Mix (M) 5    7     5        Phycomycetes (M) 0     7   3        Fusarium (M) 0     6   0        Epicoccum (M) 0     6   0        Candida (M) 7   7     5        Trichophyton (M) 0     9   3        Phoma  (M) 0     6   0        Dust Mite Mix  0   7     5        Cockroach  0     6   0        Dog 0     7   3        Cat 0     7   3        Horse 0     6   0        Feather 0     9   3          Moise Flores  2/15/2022  14:35 CST

## 2022-02-16 ENCOUNTER — TELEMEDICINE (OUTPATIENT)
Dept: ENDOCRINOLOGY | Facility: CLINIC | Age: 52
End: 2022-02-16

## 2022-02-16 DIAGNOSIS — E55.9 VITAMIN D DEFICIENCY: ICD-10-CM

## 2022-02-16 DIAGNOSIS — Z79.4 TYPE 2 DIABETES MELLITUS WITH HYPERGLYCEMIA, WITH LONG-TERM CURRENT USE OF INSULIN: Primary | ICD-10-CM

## 2022-02-16 DIAGNOSIS — E78.2 MIXED HYPERLIPIDEMIA: ICD-10-CM

## 2022-02-16 DIAGNOSIS — E11.65 TYPE 2 DIABETES MELLITUS WITH HYPERGLYCEMIA, WITH LONG-TERM CURRENT USE OF INSULIN: Primary | ICD-10-CM

## 2022-02-16 DIAGNOSIS — E11.42 DIABETIC POLYNEUROPATHY ASSOCIATED WITH TYPE 2 DIABETES MELLITUS: ICD-10-CM

## 2022-02-16 DIAGNOSIS — I10 PRIMARY HYPERTENSION: ICD-10-CM

## 2022-02-16 PROCEDURE — 95251 CONT GLUC MNTR ANALYSIS I&R: CPT | Performed by: NURSE PRACTITIONER

## 2022-02-16 PROCEDURE — 99214 OFFICE O/P EST MOD 30 MIN: CPT | Performed by: NURSE PRACTITIONER

## 2022-02-16 RX ORDER — INSULIN LISPRO 100 [IU]/ML
INJECTION, SOLUTION INTRAVENOUS; SUBCUTANEOUS
Qty: 6 PEN | Refills: 11 | Status: SHIPPED | OUTPATIENT
Start: 2022-02-16 | End: 2022-10-27

## 2022-02-16 RX ORDER — INSULIN DEGLUDEC 200 U/ML
50 INJECTION, SOLUTION SUBCUTANEOUS DAILY
Qty: 15 ML | Refills: 11 | Status: SHIPPED | OUTPATIENT
Start: 2022-02-16 | End: 2023-01-19

## 2022-02-16 NOTE — PROGRESS NOTES
Chief Complaint  Diabetes    Subjective          Yon Ochoa presents to [facility] ENDOCRINOLOGY  History of Present Illness     You have chosen to receive care through a telehealth visit.  Do you consent to use a video/audio connection for your medical care today? Yes              TELEHEALTH VIDEO VISIT     This a video visit due to [facility] current guidelines for social distancing due to the COVID 19 pandemic      51 year old female presents for follow up    Reason diabetes mellitus type 2     Diagnosed in 2008     Timing constant     Quality not controlled     severity moderate          Complications hyperglycemia    Lab Results   Component Value Date    HGBA1C 7.5 (H) 01/13/2022             Current problems burning feet pain     Alleviating factors compliance with insulin     Side effects none     Current diet regular     Exercise none           Current monitoring regimen: home blood tests -Dexcom         checking 4 x daily before dexcom     Now usiing dexcom and able to monitor more frequently and having less hypoglycemia             Home blood sugar records:     Dexcom G6 downloaded             Review of Systems - General ROS: negative      Objective   Vital Signs:   There were no vitals taken for this visit.    Physical Exam  Neurological:      General: No focal deficit present.      Mental Status: She is alert.   Psychiatric:         Mood and Affect: Mood normal.         Thought Content: Thought content normal.         Judgment: Judgment normal.        Result Review :   The following data was reviewed by: EMILY Lopes on 02/16/2022:  Common labs    Common Labsle 7/19/21 7/19/21 7/19/21 7/19/21 7/19/21 1/13/22 1/13/22 1/13/22 1/13/22 1/13/22 1/30/22    1442 1442 1442 1442 1442 1452 1452 1452 1452 1452    Glucose   214 (A)     220 (A)      BUN   12     17      Creatinine   0.8     0.8      eGFR Non African Am   >60     >60      eGFR  Am   >59     >59       Sodium   129 (A)     134 (A)      Potassium   4.0     4.5      Chloride   95 (A)     101      Calcium   9.1     9.6      Albumin     4.3     4.3    Total Bilirubin     0.7     0.4    Alkaline Phosphatase     123 (A)     93    AST (SGOT)     49 (A)     25    ALT (SGPT)     27     11    WBC 9.7     13.0 (A)     13.6 (A)   Hemoglobin 12.9     12.7     13.8   Hematocrit 38.8     38.9     42.6   Platelets 185     188     216   Total Cholesterol    82 (A)     124 (A)     Triglycerides    215 (A)     178 (A)     HDL Cholesterol    21 (A)     34 (A)     LDL Cholesterol     18     54     Hemoglobin A1C  7.8 (A)     7.5 (A)       (A) Abnormal value       Comments are available for some flowsheets but are not being displayed.                     Assessment and Plan    Diagnoses and all orders for this visit:    1. Type 2 diabetes mellitus with hyperglycemia, with long-term current use of insulin (HCC) (Primary)    2. Mixed hyperlipidemia    3. Primary hypertension    4. Diabetic polyneuropathy associated with type 2 diabetes mellitus (HCC)    5. Vitamin D deficiency    Other orders  -     Insulin Lispro, 1 Unit Dial, (Admelog SoloStar) 100 UNIT/ML solution pen-injector; Up to 20 units with meals  Dispense: 6 pen; Refill: 11  -     Insulin Degludec (Tresiba FlexTouch) 200 UNIT/ML solution pen-injector pen injection; Inject 50 Units under the skin into the appropriate area as directed Daily.  Dispense: 15 mL; Refill: 11           Pt has type 2 diabetes   Glycemic Management:         Lab Results   Component Value Date    HGBA1C 7.5 (H) 01/13/2022                           She is post covid and sugars are starting to come down     No changes     occassional postprandial hyperglycemia           Taking Tresiba 50 units              =============================         Taking Admelog    Taking  8 up to 20 units TID                   Plus sliding scale     3 units for every 50 above  150            ========================================     Taking ozempic 0.5 mg once weekly on Sunday-- causing side effects post covid      segluromet -- change to steglatro mg once daily        No  fear of metformin by patient          Lipid Management        Taking lipitor 20 mg one daily         Blood Pressure Management:          Taking quinapril 20 mg daily         Microvascular Complication Monitoring:       Last Eye Exam Evaluation --- August 2019, no DR --schedule for Feb. 2021               -----------     Last Microalbumin-Proteinuria Assessment           Component      Latest Ref Rng & Units 6/1/2020   Microalbumin/Creatinine Ratio           Creatinine, Urine      mg/dL 164.3   Microalbumin, Urine      mg/dL <1.2      -----------        Neuropathy  yes      Refers no Rx at this time     Weight Related:        Decrease caloric intake by 500 calories per day      Bone Health     Vitamin d def.     Taking vitamin d daily        Component      Latest Ref Rng & Units 6/1/2020   25 Hydroxy, Vitamin D      30.0 - 100.0 ng/ml 36.7         Thyroid Health            Oct. 2020      TSH -  0.378         Keep taking levothyroxine 125 mcg daily     Other Diabetes Related Aspects                Lab Results   Component Value Date     [id number] 06/01/2020                          Follow Up   No follow-ups on file.  Patient was given instructions and counseling regarding her condition or for health maintenance advice. Please see specific information pulled into the AVS if appropriate.         This document has been electronically signed by EMILY Lopes on February 16, 2022 16:19 CST.

## 2022-02-21 ENCOUNTER — OFFICE VISIT (OUTPATIENT)
Dept: OTOLARYNGOLOGY | Facility: CLINIC | Age: 52
End: 2022-02-21

## 2022-02-21 VITALS
SYSTOLIC BLOOD PRESSURE: 124 MMHG | WEIGHT: 163.8 LBS | DIASTOLIC BLOOD PRESSURE: 88 MMHG | HEART RATE: 102 BPM | HEIGHT: 62 IN | BODY MASS INDEX: 30.14 KG/M2 | TEMPERATURE: 98.2 F

## 2022-02-21 DIAGNOSIS — J30.2 SEASONAL ALLERGIC RHINITIS, UNSPECIFIED TRIGGER: ICD-10-CM

## 2022-02-21 DIAGNOSIS — J31.0 CHRONIC RHINITIS: Primary | ICD-10-CM

## 2022-02-21 PROCEDURE — 99214 OFFICE O/P EST MOD 30 MIN: CPT | Performed by: EMERGENCY MEDICINE

## 2022-02-21 RX ORDER — EPINEPHRINE 0.3 MG/.3ML
0.3 INJECTION SUBCUTANEOUS AS NEEDED
Qty: 1 EACH | Refills: 3 | Status: SHIPPED | OUTPATIENT
Start: 2022-02-21 | End: 2023-02-06 | Stop reason: SDUPTHER

## 2022-02-21 RX ORDER — CYCLOBENZAPRINE HCL 10 MG
TABLET ORAL
COMMUNITY
Start: 2022-02-06

## 2022-02-21 NOTE — PATIENT INSTRUCTIONS
For the best response, use your nasal sprays every day without skipping doses. It may take several weeks before the full effect is acheived.          CONTACT INFORMATION:  The main office phone number is [phone]. For emergencies after hours and on weekends, this number will convert over to our answering service and the on call provider will answer. Please try to keep non emergent phone calls/ questions to office hours 9am-5pm Monday through Friday.     SIM Digital  As an alternative, you can sign up and use the Epic MyChart system for more direct and quicker access for non emergent questions/ problems.  [facility] [employer] Digital allows you to send messages to your doctor, view your test results, renew your prescriptions, schedule appointments, and more. To sign up, go to [employer] and click on the Sign Up Now link in the New User? box. Enter your SIM Digital Activation Code exactly as it appears below along with the last four digits of your Social Security Number and your Date of Birth () to complete the sign-up process. If you do not sign up before the expiration date, you must request a new code.    SIM Digital Activation Code: Activation code not generated  Current SIM Digital Status: Active    If you have questions, you can email [email or web address] or call [phone] to talk to our [employer] staff. Remember, [employer] is NOT to be used for urgent needs. For medical emergencies, dial 911.

## 2022-02-21 NOTE — PROGRESS NOTES
EMILY Britton     Chief Complaint   Patient presents with   • Follow-up   • Sinus Problem        HPI   Yon Ochoa is a  51 y.o. female who is here for follow up. She has had positive allergy testing recently.     Allergy Impact:  Allergy symptom severity: severe  Allergy symptom frequency: constant  Season allergy symptoms are worse: spring; fall; winter  Does allergy symptoms interfere with life?: moderately  Does allergy symptoms interfere with sleep?: a little      Allergy Symptoms:  Nasal symptoms: dryness; itching; congestion; drainage; sneezing  Ocular symptoms: tearing  Ear symptoms: dizziness; pressure; itching  Throat symptoms: hoarseness; snoring; tonsillectomy  Mouth symptoms: dryness; itching  Chest symptoms: cough; wheezing      Environmental History:  Occupation:   Home environment: carpeting; hardwood floor  Tobacco use: exposed to second hand smoke  Tobacco use: exposed to second hand smoke  Animal exposures: dogs (Dogs x 4/Inside and Outside/Small Breed)  Home heating: propane  Home cooling: fans; window unit      Allergy History:  Insect allergy reactions: wasp  Previous allergy testing?: no  Previous immunotherapy?: no  Previous treatment in ER for allergic reaction?: no          Vital Signs:   Temp:  [98.2 °F (36.8 °C)] 98.2 °F (36.8 °C)  Heart Rate:  [102] 102  BP: (124)/(88) 124/88    Physical Exam   CONSTITUTIONAL: well nourished, well-developed, alert, oriented, in no acute distress   COMMUNICATION AND VOICE: able to communicate normally, normal voice quality  HEAD: normocephalic, no lesions, atraumatic, no tenderness, no masses   FACE: appearance normal, no lesions, no tenderness, no deformities, facial motion symmetric  EYES: ocular motility normal, eyelids normal, orbits normal, no proptosis, conjunctiva normal , pupils equal, round  HEARING: response to conversational voice normal bilaterally   EXTERNAL EARS: auricles without lesions  EXTERNAL NOSE: structure  normal, no tenderness on palpation, no nasal discharge, no lesions, no evidence of trauma, nostrils patent  INTRANASAL EXAM: nasal mucosa with mucosal congestion and erythema, nasal septum without overt anterior deviation  LIPS: structure normal, no tenderness on palpation, no lesions, no evidence of trauma  NECK: neck appearance normal  LYMPH NODES: no lymphadenopathy  CHEST/RESPIRATORY: respiratory effort normal  CARDIOVASCULAR: extremities without cyanosis or edema, no overt jugulovenous distension present  NEUROLOGIC/PSYCHIATRIC: oriented appropriately for age, mood normal, affect appropriate, cranial nerves intact grossly unless specifically mentioned above     Results Review:    Allergy Testing Results:  Consent: Y    Testing location: Arm    Allergen : [employer]    Testing Nurse/Tech: Moise Flores ST/AT    Reviewing Physician: Oleg Swan    Testing method: Skin Testing      Endpoints: 0=negative, higher number=higher reaction:  Vial: 3= sublingual vial  Antigen Prick 5 2 EP VIAL    Histamine 7       normal controls     Glycerine Control 0          Eastern Tree Mix(T) 0      7   3        [place] (T) 0     6   0        [place] Grass (G) 0     7   3        Talha Grass (G) 0     7   3        [place] Bluegrass (G) 0     7   3        Ragweed Mix (W) 0     7   3        Common Weed (W) 0     6   0        Sary Weed(W) 0     6   0        Mugwort/ Jass (W) 0     6   0        Mold Mix (M) 5    7     5        Phycomycetes (M) 0     7   3        Fusarium (M) 0     6   0        Epicoccum (M) 0     6   0        Candida (M) 7   7     5        Trichophyton (M) 0     9   3        Phoma  (M) 0     6   0        Dust Mite Mix  0   7     5        Cockroach  0     6   0        Dog 0     7   3        Cat 0     7   3        Horse 0     6   0        Feather 0     9   3             Assessment   1. Chronic rhinitis    2. Seasonal allergic rhinitis, unspecified trigger        Plan       For the best response, use your  nasal sprays every day without skipping doses. It may take several weeks before the full effect is acheived.        Immunotherapy risks and benefits were discussed with the patient/family at length including but not limited to the risk of reaction including anaphylaxis requiring hospitalization and/or death. The possibility of failure of therapy was also discussed as well as the necessity of having an epi pen with them to receive therapy every time.   Sublingual immunotherapy was discussed as an alternative. Benefits of a better safety profile, allowing for safe home use as well as decreased patient costs (gas and time savings as well as no copays etc) were discussed. Billing was discussed as well as most insurance do not cover sublingual therapy requiring out of pocket billling. Though generally safer than injections, the necessity of having an epi pen with them when they placed the drops was stressed. They were also instructed to never be alone when administering the drops. The fact that aqueous sublingual immunotherapy was not FDA approved was also discussed.   After considering the options of immunotherapy, patient would like to proceed with intradermal immunotherapy.          Return in about 4 months (around 6/21/2022) for Follow up with EMILY Johnson.    EMILY Britton  02/21/22  14:11 CST

## 2022-02-24 ENCOUNTER — CLINICAL SUPPORT NO REQUIREMENTS (OUTPATIENT)
Dept: OTOLARYNGOLOGY | Facility: CLINIC | Age: 52
End: 2022-02-24

## 2022-02-24 DIAGNOSIS — J30.2 SEASONAL ALLERGIC RHINITIS, UNSPECIFIED TRIGGER: Primary | ICD-10-CM

## 2022-02-24 PROCEDURE — 95165 ANTIGEN THERAPY SERVICES: CPT | Performed by: OTOLARYNGOLOGY

## 2022-02-24 NOTE — PROGRESS NOTES
I have reviewed the notes, assessments, and/or procedures of this encounter and I concur with the documentation on Yon Ochoa.      Oleg Swan MD  02/24/22  11:00 AM CST

## 2022-02-24 NOTE — PROGRESS NOTES
Moise Flores   Allergy Injection Mix Note    A immunotherapy injection vial was mixed using standard protocol under sterile conditions.     Mixing Nurse/ Tech: Moise Flores ST/AT (02/24/22 1034)    Vial Series: 1    VIAL 1  [place] Bluegrass: Vial 1 mix 0.2 cc of #: 1  Mold Mix: Vial 1 mix 0.2 cc of #: 3  Trichophyton: Vial 1 mix 0.2 cc of #: 1  Dust Mite Mix: Vial 1 mix 0.2 cc of #: 3  Dog: Vial 1 mix 0.2 cc of #: 1  Cat: Vial 1 mix 0.2 cc of #: 1  Feather: Vial 1 mix 0.2 cc of #: 1  Vial 1 Additions  Antigen Total: 1.4 cc  Glycerine: 1  Dilutent: 2.6 cc  TOTAL: 5           Moise Flores  2/24/2022  10:34 CST

## 2022-03-01 ENCOUNTER — CLINICAL SUPPORT (OUTPATIENT)
Dept: OTOLARYNGOLOGY | Facility: CLINIC | Age: 52
End: 2022-03-01

## 2022-03-01 DIAGNOSIS — J30.2 SEASONAL ALLERGIC RHINITIS, UNSPECIFIED TRIGGER: ICD-10-CM

## 2022-03-01 PROCEDURE — 95115 IMMUNOTHERAPY ONE INJECTION: CPT | Performed by: EMERGENCY MEDICINE

## 2022-03-01 NOTE — PROGRESS NOTES
Moise Flores   Allergy Injection Note:    Yon Ochoa presents for an immunotherapy injection. The site of the injection was cleansed with an alcohol swab. Serum was injected into the site after pulling back on the plunger to prevent intravascular injection. After the injection and was instructed to wait in the allergy waiting area for 30 minutes. There was no problems with the injection.    Allergy Shot Questionnaire  Injection nurse/assistant: Moise Flores ST/AT  Have you had increased asthma symptoms in past week?: no  Have you had increased allergy symptoms in the last week?: no  Have you had a cold, respiratory tract infection or flu like symptoms in the past 2 weeks?: no  Did you have any problems within 12 hours of the last injection?: no  Are you on any new medications/ eye drops?: no  Are you on any beta blockers?: no  If female, are you pregnant?: no  I have confirmed the name and birth date on my allergy vial. : yes  Epipen available?: yes  Epipen Lot Number: [id number]  Epipen Expiration Date: 8/2023  Number of allergy injections given: 1     Injection Details:  Vial 1   Vial 1 Expiration Date: 05/24/22  Vial 1 Series: 1  Shot type: escalation  Vial 1 Dose (mL): 0.05 Vial test  Vial 1 Location: Left upper arm  Vial 1 Vial Test Reaction (in mm): 9          Moise Flores  3/1/2022  13:52 CST

## 2022-03-09 ENCOUNTER — CLINICAL SUPPORT (OUTPATIENT)
Dept: OTOLARYNGOLOGY | Facility: CLINIC | Age: 52
End: 2022-03-09

## 2022-03-09 DIAGNOSIS — J30.2 SEASONAL ALLERGIC RHINITIS, UNSPECIFIED TRIGGER: ICD-10-CM

## 2022-03-09 DIAGNOSIS — E03.9 HYPOTHYROIDISM, UNSPECIFIED TYPE: ICD-10-CM

## 2022-03-09 PROCEDURE — 95115 IMMUNOTHERAPY ONE INJECTION: CPT | Performed by: EMERGENCY MEDICINE

## 2022-03-09 NOTE — PROGRESS NOTES
Moise Flores   Allergy Injection Note:    Yon Ochoa presents for an immunotherapy injection. The site of the injection was cleansed with an alcohol swab. Serum was injected into the site after pulling back on the plunger to prevent intravascular injection. After the injection and was instructed to wait in the allergy waiting area for 30 minutes. There was no problems with the injection.    Allergy Shot Questionnaire  Injection nurse/assistant: Moise Flores ST/AT  Have you had increased asthma symptoms in past week?: no  Have you had increased allergy symptoms in the last week?: no  Have you had a cold, respiratory tract infection or flu like symptoms in the past 2 weeks?: no  Did you have any problems within 12 hours of the last injection?: no  Are you on any new medications/ eye drops?: no  Are you on any beta blockers?: no  If female, are you pregnant?: no  I have confirmed the name and birth date on my allergy vial. : yes  Epipen available?: yes  Epipen Lot Number: [id number]  Epipen Expiration Date: 8/2023  Number of allergy injections given: 1     Injection Details:  Vial 1   Vial 1 Expiration Date: 05/24/22  Vial 1 Series: 1  Shot type: escalation  Vial 1 Dose (mL): 0.1  Vial 1 Location: Right upper arm  Vial 1 Reaction (in mm): <5          Moise Flores  3/9/2022  13:38 CST

## 2022-03-10 RX ORDER — LEVOTHYROXINE SODIUM 0.12 MG/1
TABLET ORAL
Qty: 90 TABLET | Refills: 1 | Status: SHIPPED | OUTPATIENT
Start: 2022-03-10 | End: 2022-05-18

## 2022-03-10 NOTE — TELEPHONE ENCOUNTER
Amado Jimenes called requesting a refill of the below medication which has been pended for you:     Requested Prescriptions     Pending Prescriptions Disp Refills    levothyroxine (SYNTHROID) 125 MCG tablet 90 tablet 1     Sig: TAKE 1 TABLET BY MOUTH DAILY       Last Appointment Date: 1/18/2022  Next Appointment Date: 5/18/2022    Allergies   Allergen Reactions    Claritin-D 12 Hour [Loratadine-Pseudoephedrine Er] Other (See Comments)     \"it intensifies my migraines\"    Demerol Hcl [Meperidine] Other (See Comments)     Aggression    Percocet [Oxycodone-Acetaminophen] Rash

## 2022-03-16 ENCOUNTER — CLINICAL SUPPORT (OUTPATIENT)
Dept: OTOLARYNGOLOGY | Facility: CLINIC | Age: 52
End: 2022-03-16

## 2022-03-16 DIAGNOSIS — J30.2 SEASONAL ALLERGIC RHINITIS, UNSPECIFIED TRIGGER: ICD-10-CM

## 2022-03-16 PROCEDURE — 95115 IMMUNOTHERAPY ONE INJECTION: CPT | Performed by: EMERGENCY MEDICINE

## 2022-03-16 NOTE — PROGRESS NOTES
Moise Flores   Allergy Injection Note:    Yon Ochoa presents for an immunotherapy injection. The site of the injection was cleansed with an alcohol swab. Serum was injected into the site after pulling back on the plunger to prevent intravascular injection. After the injection and was instructed to wait in the allergy waiting area for 30 minutes. There was no problems with the injection.    Allergy Shot Questionnaire  Injection nurse/assistant: Moise Flores ST/AT  Have you had increased asthma symptoms in past week?: no  Have you had increased allergy symptoms in the last week?: no  Have you had a cold, respiratory tract infection or flu like symptoms in the past 2 weeks?: no  Did you have any problems within 12 hours of the last injection?: no  Are you on any new medications/ eye drops?: no  Are you on any beta blockers?: no  If female, are you pregnant?: no  I have confirmed the name and birth date on my allergy vial. : yes  Epipen available?: yes  Epipen Lot Number: [id number]  Epipen Expiration Date: 8/2023  Number of allergy injections given: 1     Injection Details:  Vial 1   Vial 1 Expiration Date: 05/24/22  Vial 1 Series: 1  Shot type: escalation  Vial 1 Dose (mL): 0.2  Vial 1 Location: Left upper arm  Vial 1 Reaction (in mm): <5          Moise Flores  3/16/2022  13:22 CDT

## 2022-03-22 ENCOUNTER — CLINICAL SUPPORT (OUTPATIENT)
Dept: OTOLARYNGOLOGY | Facility: CLINIC | Age: 52
End: 2022-03-22

## 2022-03-22 DIAGNOSIS — J30.2 SEASONAL ALLERGIC RHINITIS, UNSPECIFIED TRIGGER: ICD-10-CM

## 2022-03-22 PROCEDURE — 95115 IMMUNOTHERAPY ONE INJECTION: CPT | Performed by: EMERGENCY MEDICINE

## 2022-03-22 NOTE — PROGRESS NOTES
Moise Flores   Allergy Injection Note:    Yon Ochoa presents for an immunotherapy injection. The site of the injection was cleansed with an alcohol swab. Serum was injected into the site after pulling back on the plunger to prevent intravascular injection. After the injection and was instructed to wait in the allergy waiting area for 30 minutes. There was no problems with the injection.    Allergy Shot Questionnaire  Injection nurse/assistant: Moise Flores ST/AT  Have you had increased asthma symptoms in past week?: no  Have you had increased allergy symptoms in the last week?: no  Have you had a cold, respiratory tract infection or flu like symptoms in the past 2 weeks?: no  Did you have any problems within 12 hours of the last injection?: no  Are you on any new medications/ eye drops?: no  Are you on any beta blockers?: no  If female, are you pregnant?: no  I have confirmed the name and birth date on my allergy vial. : yes  Epipen available?: yes  Epipen Lot Number: [id number]  Epipen Expiration Date: 8/2023  Number of allergy injections given: 1     Injection Details:  Vial 1   Vial 1 Expiration Date: 05/24/22  Vial 1 Series: 1  Shot type: escalation  Vial 1 Dose (mL): 0.3  Vial 1 Location: Right upper arm  Vial 1 Reaction (in mm): <5          Moise Flores  3/22/2022  13:47 CDT

## 2022-03-30 ENCOUNTER — CLINICAL SUPPORT (OUTPATIENT)
Dept: OTOLARYNGOLOGY | Facility: CLINIC | Age: 52
End: 2022-03-30

## 2022-03-30 DIAGNOSIS — J30.2 SEASONAL ALLERGIC RHINITIS, UNSPECIFIED TRIGGER: ICD-10-CM

## 2022-03-30 PROCEDURE — 95115 IMMUNOTHERAPY ONE INJECTION: CPT | Performed by: EMERGENCY MEDICINE

## 2022-03-30 NOTE — PROGRESS NOTES
Moise Flores   Allergy Injection Note:    Yon Ochoa presents for an immunotherapy injection. The site of the injection was cleansed with an alcohol swab. Serum was injected into the site after pulling back on the plunger to prevent intravascular injection. After the injection and was instructed to wait in the allergy waiting area for 30 minutes. There was no problems with the injection.    Allergy Shot Questionnaire  Injection nurse/assistant: oMise Flores ST/AT  Have you had increased asthma symptoms in past week?: no  Have you had increased allergy symptoms in the last week?: no  Have you had a cold, respiratory tract infection or flu like symptoms in the past 2 weeks?: no  Did you have any problems within 12 hours of the last injection?: no  Are you on any new medications/ eye drops?: no  Are you on any beta blockers?: no  If female, are you pregnant?: no  I have confirmed the name and birth date on my allergy vial. : yes  Epipen available?: yes  Epipen Lot Number: [id number]  Epipen Expiration Date: 8/2023  Number of allergy injections given: 1     Injection Details:  Vial 1   Vial 1 Expiration Date: 05/24/22  Vial 1 Series: 1  Shot type: escalation  Vial 1 Dose (mL): 0.4  Vial 1 Location: Left upper arm  Vial 1 Reaction (in mm): <5          Moise Flores  3/30/2022  13:38 CDT

## 2022-04-04 ENCOUNTER — HOSPITAL ENCOUNTER (EMERGENCY)
Age: 52
Discharge: HOME | End: 2022-04-04
Attending: EMERGENCY MEDICINE
Payer: COMMERCIAL

## 2022-04-04 VITALS
DIASTOLIC BLOOD PRESSURE: 88 MMHG | RESPIRATION RATE: 20 BRPM | SYSTOLIC BLOOD PRESSURE: 111 MMHG | WEIGHT: 162 LBS | HEART RATE: 107 BPM | OXYGEN SATURATION: 96 % | HEIGHT: 62 IN | BODY MASS INDEX: 29.81 KG/M2 | TEMPERATURE: 98.6 F

## 2022-04-04 DIAGNOSIS — H66.002 NON-RECURRENT ACUTE SUPPURATIVE OTITIS MEDIA OF LEFT EAR WITHOUT SPONTANEOUS RUPTURE OF TYMPANIC MEMBRANE: Primary | ICD-10-CM

## 2022-04-04 PROCEDURE — 6370000000 HC RX 637 (ALT 250 FOR IP): Performed by: EMERGENCY MEDICINE

## 2022-04-04 PROCEDURE — 99284 EMERGENCY DEPT VISIT MOD MDM: CPT

## 2022-04-04 RX ORDER — AMOXICILLIN AND CLAVULANATE POTASSIUM 500; 125 MG/1; MG/1
1 TABLET, FILM COATED ORAL 3 TIMES DAILY
Qty: 21 TABLET | Refills: 0 | Status: SHIPPED | OUTPATIENT
Start: 2022-04-04 | End: 2022-04-11

## 2022-04-04 RX ORDER — AMOXICILLIN AND CLAVULANATE POTASSIUM 500; 125 MG/1; MG/1
1 TABLET, FILM COATED ORAL ONCE
Status: COMPLETED | OUTPATIENT
Start: 2022-04-04 | End: 2022-04-04

## 2022-04-04 RX ADMIN — AMOXICILLIN AND CLAVULANATE POTASSIUM 1 TABLET: 500; 125 TABLET, FILM COATED ORAL at 02:59

## 2022-04-04 RX ADMIN — IBUPROFEN 600 MG: 400 TABLET ORAL at 02:59

## 2022-04-04 ASSESSMENT — PAIN SCALES - GENERAL
PAINLEVEL_OUTOF10: 10
PAINLEVEL_OUTOF10: 10

## 2022-04-04 ASSESSMENT — PAIN DESCRIPTION - DESCRIPTORS: DESCRIPTORS: POUNDING

## 2022-04-04 ASSESSMENT — PAIN DESCRIPTION - ORIENTATION: ORIENTATION: LEFT

## 2022-04-04 ASSESSMENT — PAIN DESCRIPTION - LOCATION: LOCATION: HEAD;EAR

## 2022-04-04 ASSESSMENT — PAIN - FUNCTIONAL ASSESSMENT: PAIN_FUNCTIONAL_ASSESSMENT: 0-10

## 2022-04-04 NOTE — ED TRIAGE NOTES
Pt states that she has had a headache since last Tuesday. Pt states that she has history of migraines. Pt states that she takes Topamax and does Botox injections. Pt reports left ear pain.

## 2022-04-04 NOTE — ED PROVIDER NOTES
[facility] EMERGENCY DEPT  eMERGENCY dEPARTMENT eNCOUnter      Pt Name: Veronica Schilling  MRN: [id number]  [place] 1970  Date of evaluation: 4/4/2022  Provider: Vicky Araujo MD    [address]       Chief Complaint   Patient presents with    Headache     symptoms started last Tuesday.  Otalgia     left ear pain         HISTORY OF PRESENT ILLNESS   (Location/Symptom, Timing/Onset,Context/Setting, Quality, Duration, Modifying Factors, Severity)  Note limiting factors. Veronica Schilling is a 50 y.o. female who presents to the emergency department for evaluation regarding moderate severity pain in her left ear. States she is also had a fairly significant migraine headache that began several days ago. She has a longstanding history of migraine headaches. She is normally maintained on Topamax along with Botox injections. States that she has not had any specific injury to the left ear. Denies any drainage but does describe feelings of a fullness in the ear. She has not been swimming or diving recently. She is not been on any recent antibiotics. Denies sore throat or nasal congestion. HPI    NursingNotes were reviewed. REVIEW OF SYSTEMS    (2-9 systems for level 4, 10 or more for level 5)     Review of Systems   Constitutional: Negative for chills and fever. HENT: Positive for ear pain. Negative for congestion, ear discharge, sinus pressure, sneezing and sore throat. Eyes: Positive for photophobia. Negative for visual disturbance. Respiratory: Negative for shortness of breath. Cardiovascular: Negative for chest pain. Neurological: Positive for headaches. All other systems reviewed and are negative.            PAST MEDICALHISTORY     Past Medical History:   Diagnosis Date    Anxiety     Bacteremia 10/17/2019    Bandemia 10/16/2019    Class 2 obesity due to excess calories without serious comorbidity with body mass index (BMI) of 36.0 to 36.9 in adult 12/19/2019    Depression     Diabetes mellitus ([facility] [address]     GERD (gastroesophageal reflux disease)     Heartburn 10/28/2020    Hyperglycemia 8/16/2017    Hyperlipidemia     Hypertension     Hypothyroidism     Lower abdominal pain 1/22/2019    Neuropathy 10/16/2019    Obstructive sleep apnea     Osteoarthritis     PONV (postoperative nausea and vomiting)     Skin ulcer of flank with fat layer exposed ([facility] [address] 1/8/2020    Staph infection 11/11/2018         SURGICAL HISTORY       Past Surgical History:   Procedure Laterality Date    APPENDECTOMY      as a 3rd grader   [address] 2000, 2003    CHOLECYSTECTOMY, LAPAROSCOPIC      1994    COLONOSCOPY      \"more than 15 + yrs ago\" PER PT RECALL    COLONOSCOPY N/A 7/30/2019    Dr Taylor Senate hemorrhoids-Grade 1, suboptimal prep, 3 yr recall    HYSTERECTOMY, TOTAL ABDOMINAL      May 2012, withOUT Ophorectomy    MT REVISE MEDIAN N/CARPAL TUNNEL SURG Left 6/16/2017    CARPAL TUNNEL RELEASE performed by Sepideh Zhong MD at [address] [place] [address] Ln N/CARPAL TUNNEL SURG Right 7/21/2017    CARPAL TUNNEL RELEASE performed by Sepideh Zhong MD at [facility]      as a 5th grader    UPPER GASTROINTESTINAL ENDOSCOPY N/A 7/30/2019    UPPER GASTROINTESTINAL ENDOSCOPY  7/30/2019    Dr Hoffman Passprecious exam, empiric dil with 54F over wire, neg EoE         CURRENT MEDICATIONS     Previous Medications    ASPIRIN LOW DOSE 81 MG EC TABLET    TAKE 1 TABLET BY MOUTH DAILY    ATORVASTATIN (LIPITOR) 20 MG TABLET    Take 1 tablet by mouth daily    BLOOD GLUCOSE TEST STRIPS (ACCU-CHEK GAVI PLUS) STRIP    USE TO TEST ONCE DAILY    CLONAZEPAM (KLONOPIN) 0.5 MG TABLET    TAKE 1 TABLET BY MOUTH DAILY AS NEEDED.     CYCLOBENZAPRINE (FLEXERIL) 10 MG TABLET    TAKE 1 TABLET BY MOUTH EVERY NIGHT AS NEEDED FOR MUSCLE SPASMS    DULOXETINE (CYMBALTA) 30 MG EXTENDED RELEASE CAPSULE    TAKE 1 CAPSULE BY MOUTH DAILY    DULOXETINE (CYMBALTA) 60 MG EXTENDED RELEASE CAPSULE    Take 1 capsule by mouth daily    ECONAZOLE NITRATE 1 % CREAM    Apply topically daily. FAMOTIDINE (PEPCID) 20 MG TABLET    TAKE 1 TABLET BY MOUTH TWICE DAILY    FEXOFENADINE (ALLEGRA) 180 MG TABLET    Take 1 tablet by mouth daily    INSULIN ASPART (NOVOLOG FLEXPEN) 100 UNIT/ML INJECTION PEN    Inject into the skin 3 times daily (before meals) Counting Carbs - Sliding Scale    INSULIN DEGLUDEC (TRESIBA FLEXTOUCH) 200 UNIT/ML SOPN    60 units each morning not taking at all during the night. INSULIN PEN NEEDLE 32G X 4 MM MISC    1 each by Does not apply route daily    INSULIN SYRINGE-NEEDLE U-100 30G X 5/16\" 0.5 ML MISC    1 each by Does not apply route daily    LEVOTHYROXINE (SYNTHROID) 125 MCG TABLET    TAKE 1 TABLET BY MOUTH DAILY    LUBIPROSTONE (AMITIZA) 24 MCG CAPSULE    Take 1 capsule by mouth 2 times daily (with meals)    METOCLOPRAMIDE (REGLAN) 10 MG TABLET    Take 1 tablet by mouth 3 times daily (with meals) for 7 days    NYSTATIN (MYCOSTATIN) 245130 UNIT/GM OINTMENT    Apply topically 2 times daily. OMEPRAZOLE (PRILOSEC) 20 MG DELAYED RELEASE CAPSULE    Take 1 capsule by mouth Daily Take first thing in the morning on an empty stomach. ONABOTULINUMTOXIN A (BOTOX) 200 UNITS INJECTION    155 units IM in Cervical and Facial Region every 90 days      Please fax all correspondence to [phone]  Please call [phone] with any questions.     POLYETHYLENE GLYCOL (GLYCOLAX) POWDER    Take 17 g by mouth 2 times daily    PRUCALOPRIDE SUCCINATE (MOTEGRITY) 2 MG TABS    Take 2 mg by mouth daily    QUINAPRIL (ACCUPRIL) 5 MG TABLET    Take 1 tablet by mouth daily    SPIRONOLACTONE (ALDACTONE) 50 MG TABLET    TAKE 1 TABLET BY MOUTH DAILY    TOPIRAMATE (TOPAMAX) 50 MG TABLET    TAKE 3 TABLETS BY MOUTH EVERY NIGHT AT BEDTIME    TRAZODONE (DESYREL) 100 MG TABLET    TAKE 1 TABLET BY MOUTH EVERY NIGHT    VITAMIN B-6 (PYRIDOXINE) 100 MG TABLET    Take 100 mg by mouth daily ALLERGIES     Claritin-d 12 hour [loratadine-pseudoephedrine er], Demerol hcl [meperidine], and Percocet [oxycodone-acetaminophen]    FAMILY HISTORY       Family History   Problem Relation Age of Onset    High Blood Pressure Mother     Cancer Mother         of the brain    Other Mother         second hand smoker    High Blood Pressure Father     High Cholesterol Father     Colon Polyps Father     Other Father         smoker    Diabetes Sister         type 1, from alcohol    High Blood Pressure Sister     High Blood Pressure Brother     Colon Cancer Maternal Aunt     Colon Cancer Paternal Uncle     Colon Cancer Paternal Grandfather     Diabetes Brother         from alcohol    Hypertension Brother     No Known Problems Son     No Known Problems Son     No Known Problems Son     No Known Problems Daughter     Esophageal Cancer Neg Hx     Liver Cancer Neg Hx     Liver Disease Neg Hx     Rectal Cancer Neg Hx     Stomach Cancer Neg Hx           SOCIAL HISTORY       Social History     Socioeconomic History    Marital status:      Spouse name: None    Number of children: 4    Years of education: None    Highest education level: None   Occupational History    Occupation: [employer] Employee   Tobacco Use    Smoking status: Never Smoker    Smokeless tobacco: Never Used   Vaping Use    Vaping Use: Never used   Substance and Sexual Activity    Alcohol use: Not Currently     Alcohol/week: 0.0 standard drinks     Comment: \"once in a blue moon\", a few times a year    Drug use: No    Sexual activity: None     Comment: 4   Other Topics Concern    None   Social History Narrative    CODE STATUS: Full Code    HEALTH CARE PROXY: her boyfriend, Mr. Sol Echeverria, [phone]    AMBULATES: independently    DOMICILED: lives in a private house, lives with her boyfriend and two of the kids, has dog and cat, has 3-4 steps to enter the home and a ramp, no stairs inside     Social Determinants of Health     Financial Resource Strain: Low Risk     Difficulty of Paying Living Expenses: Not hard at all   Food Insecurity: No Food Insecurity    Worried About Running Out of Food in the Last Year: Never true    Taylor of Food in the Last Year: Never true   Transportation Needs:     Lack of Transportation (Medical): Not on file    Lack of Transportation (Non-Medical): Not on file   Physical Activity:     Days of Exercise per Week: Not on file    Minutes of Exercise per Session: Not on file   Stress:     Feeling of Stress : Not on file   Social Connections:     Frequency of Communication with Friends and Family: Not on file    Frequency of Social Gatherings with Friends and Family: Not on file    Attends Jain Services: Not on file    Active Member of [address] [place] [address] [employer] or Organizations: Not on file    Attends Club or Organization Meetings: Not on file    Marital Status: Not on file   Intimate Partner Violence:     Fear of Current or Ex-Partner: Not on file    Emotionally Abused: Not on file    Physically Abused: Not on file    Sexually Abused: Not on file   Housing Stability:     Unable to Pay for Housing in the Last Year: Not on file    Number of [place] in the Last Year: Not on file    Unstable Housing in the Last Year: Not on file       SCREENINGS    [place] Coma Scale  Eye Opening: Spontaneous  Best Verbal Response: Oriented  Best Motor Response: Obeys commands  [place] Coma Scale Score: 15        PHYSICAL EXAM    (up to 7 for level 4, 8 or more for level 5)     ED Triage Vitals [04/04/22 0204]   BP Temp Temp Source Pulse Resp SpO2 Height Weight   111/88 98.6 °F (37 °C) Oral 107 20 96 % 5' 2\" (1.575 m) 162 lb (73.5 kg)       Physical Exam  Vitals and nursing note reviewed. HENT:      Head: Atraumatic. Right Ear: Tympanic membrane normal.      Left Ear: Ear canal normal. No mastoid tenderness. Tympanic membrane is erythematous and bulging.  Tympanic membrane is not perforated. Mouth/Throat:      Mouth: Mucous membranes are moist.      Pharynx: No posterior oropharyngeal erythema. Eyes:      Pupils: Pupils are equal, round, and reactive to light. Cardiovascular:      Rate and Rhythm: Normal rate and regular rhythm. Heart sounds: Normal heart sounds. Pulmonary:      Effort: Pulmonary effort is normal. No respiratory distress. Breath sounds: Normal breath sounds. Abdominal:      General: There is no distension. Tenderness: There is no abdominal tenderness. Musculoskeletal:      Cervical back: Neck supple. Neurological:      General: No focal deficit present. Mental Status: She is alert and oriented to person, place, and time. DIAGNOSTIC RESULTS         LABS:  Labs Reviewed - No data to display    All other labs were within normal range or not returned as of this dictation. EMERGENCY DEPARTMENT COURSE and DIFFERENTIAL DIAGNOSIS/MDM:   Vitals:    Vitals:    04/04/22 0204   BP: 111/88   Pulse: 107   Resp: 20   Temp: 98.6 °F (37 °C)   TempSrc: Oral   SpO2: 96%   Weight: 162 lb (73.5 kg)   Height: 5' 2\" (1.575 m)       MDM      PROCEDURES:  Unless otherwise noted below, none     Procedures    FINAL IMPRESSION      1.  Non-recurrent acute suppurative otitis media of left ear without spontaneous rupture of tympanic membrane          DISPOSITION/PLAN   DISPOSITION Decision To Discharge 04/04/2022 02:42:10 AM      PATIENT REFERRED TO:  Selam Chris MD  [address] [place] [address]  [phone]            DISCHARGE MEDICATIONS:  New Prescriptions    AMOXICILLIN-CLAVULANATE (AUGMENTIN) 500-125 MG PER TABLET    Take 1 tablet by mouth 3 times daily for 7 days          (Please note that portions of this note were completed with a voice recognition program.  Efforts were made to edit thedictations but occasionally words are mis-transcribed.)    Steven Gonzalez MD (electronically signed)  Attending Emergency Physician         Linda Archibald MD  04/17/22 1394

## 2022-04-05 ENCOUNTER — TELEPHONE (OUTPATIENT)
Dept: INTERNAL MEDICINE | Age: 52
End: 2022-04-05

## 2022-04-05 NOTE — TELEPHONE ENCOUNTER
Pt called and wanted to know if you would send her in Ibuprofen she had gone to the ER with a migraine and was given antibiotics for a sinus infection and ibuprofen for ear pain. Pt has taken all the ibuprofen she was given and wanted to know if we would call some in.

## 2022-04-06 ENCOUNTER — CLINICAL SUPPORT (OUTPATIENT)
Dept: OTOLARYNGOLOGY | Facility: CLINIC | Age: 52
End: 2022-04-06

## 2022-04-06 DIAGNOSIS — M54.2 NECK AND SHOULDER PAIN: ICD-10-CM

## 2022-04-06 DIAGNOSIS — I10 ESSENTIAL HYPERTENSION: Primary | ICD-10-CM

## 2022-04-06 DIAGNOSIS — M25.519 NECK AND SHOULDER PAIN: ICD-10-CM

## 2022-04-06 DIAGNOSIS — J30.2 SEASONAL ALLERGIC RHINITIS, UNSPECIFIED TRIGGER: ICD-10-CM

## 2022-04-06 PROCEDURE — 95115 IMMUNOTHERAPY ONE INJECTION: CPT | Performed by: EMERGENCY MEDICINE

## 2022-04-06 RX ORDER — CYCLOBENZAPRINE HCL 10 MG
TABLET ORAL
Qty: 90 TABLET | Refills: 1 | Status: SHIPPED | OUTPATIENT
Start: 2022-04-06 | End: 2022-11-02

## 2022-04-06 RX ORDER — SPIRONOLACTONE 50 MG/1
50 TABLET, FILM COATED ORAL DAILY
Qty: 90 TABLET | Refills: 1 | Status: SHIPPED | OUTPATIENT
Start: 2022-04-06 | End: 2022-10-03

## 2022-04-06 NOTE — PROGRESS NOTES
Moise Flores   Allergy Injection Note:    Yon Ochoa presents for an immunotherapy injection. The site of the injection was cleansed with an alcohol swab. Serum was injected into the site after pulling back on the plunger to prevent intravascular injection. After the injection and was instructed to wait in the allergy waiting area for 30 minutes. There was no problems with the injection.    Allergy Shot Questionnaire  Injection nurse/assistant: Moise Flores ST/AT  Have you had increased asthma symptoms in past week?: no  Have you had increased allergy symptoms in the last week?: no  Have you had a cold, respiratory tract infection or flu like symptoms in the past 2 weeks?: no  Did you have any problems within 12 hours of the last injection?: no  Are you on any new medications/ eye drops?: no  Are you on any beta blockers?: no  If female, are you pregnant?: no  I have confirmed the name and birth date on my allergy vial. : yes  Epipen available?: yes  Epipen Lot Number: [id number]  Epipen Expiration Date: 8/2023  Number of allergy injections given: 1     Injection Details:  Vial 1   Vial 1 Expiration Date: 05/24/22  Vial 1 Series: 1  Shot type: escalation  Vial 1 Dose (mL): 0.5  Vial 1 Location: Right upper arm  Vial 1 Reaction (in mm): <5          Moise Flores  4/6/2022  13:39 CDT

## 2022-04-13 ENCOUNTER — CLINICAL SUPPORT (OUTPATIENT)
Dept: OTOLARYNGOLOGY | Facility: CLINIC | Age: 52
End: 2022-04-13

## 2022-04-13 DIAGNOSIS — J30.2 SEASONAL ALLERGIC RHINITIS, UNSPECIFIED TRIGGER: ICD-10-CM

## 2022-04-13 PROCEDURE — 95115 IMMUNOTHERAPY ONE INJECTION: CPT | Performed by: NURSE PRACTITIONER

## 2022-04-13 NOTE — PROGRESS NOTES
Moise Flores   Allergy Injection Note:    Yon Ochoa presents for an immunotherapy injection. The site of the injection was cleansed with an alcohol swab. Serum was injected into the site after pulling back on the plunger to prevent intravascular injection. After the injection and was instructed to wait in the allergy waiting area for 30 minutes. There was no problems with the injection.    Allergy Shot Questionnaire  Injection nurse/assistant: Moise Flores ST/AT  Have you had increased asthma symptoms in past week?: no  Have you had increased allergy symptoms in the last week?: no  Have you had a cold, respiratory tract infection or flu like symptoms in the past 2 weeks?: no  Did you have any problems within 12 hours of the last injection?: no  Are you on any new medications/ eye drops?: no  Are you on any beta blockers?: no  If female, are you pregnant?: no  I have confirmed the name and birth date on my allergy vial. : yes  Epipen available?: yes  Epipen Lot Number: [id number]  Epipen Expiration Date: 8/2023  Number of allergy injections given: 1     Injection Details:  Vial 1   Vial 1 Expiration Date: 05/24/22  Vial 1 Series: 1  Shot type: escalation  Vial 1 Dose (mL): 0.5  Vial 1 Location: Left upper arm  Vial 1 Reaction (in mm): <5          Moise Flores  4/13/2022  13:10 CDT

## 2022-04-17 ASSESSMENT — ENCOUNTER SYMPTOMS
SINUS PRESSURE: 0
SORE THROAT: 0
PHOTOPHOBIA: 1
SHORTNESS OF BREATH: 0

## 2022-04-20 ENCOUNTER — CLINICAL SUPPORT (OUTPATIENT)
Dept: OTOLARYNGOLOGY | Facility: CLINIC | Age: 52
End: 2022-04-20

## 2022-04-20 DIAGNOSIS — J30.2 SEASONAL ALLERGIC RHINITIS, UNSPECIFIED TRIGGER: ICD-10-CM

## 2022-04-20 PROCEDURE — 95115 IMMUNOTHERAPY ONE INJECTION: CPT | Performed by: EMERGENCY MEDICINE

## 2022-04-20 NOTE — PROGRESS NOTES
Moise Flores   Allergy Injection Note:    Yon Ochoa presents for an immunotherapy injection. The site of the injection was cleansed with an alcohol swab. Serum was injected into the site after pulling back on the plunger to prevent intravascular injection. After the injection and was instructed to wait in the allergy waiting area for 30 minutes. There was no problems with the injection.    Allergy Shot Questionnaire  Injection nurse/assistant: Moise Flores ST/AT  Have you had increased asthma symptoms in past week?: no  Have you had increased allergy symptoms in the last week?: no  Have you had a cold, respiratory tract infection or flu like symptoms in the past 2 weeks?: no  Did you have any problems within 12 hours of the last injection?: no  Are you on any new medications/ eye drops?: no  Are you on any beta blockers?: no  If female, are you pregnant?: no  I have confirmed the name and birth date on my allergy vial. : yes  Epipen available?: yes  Epipen Lot Number: [id number]  Epipen Expiration Date: 8/2023  Number of allergy injections given: 1     Injection Details:  Vial 1   Vial 1 Expiration Date: 05/24/22  Vial 1 Series: 1  Shot type: escalation  Vial 1 Dose (mL): 0.5  Vial 1 Location: Right upper arm  Vial 1 Reaction (in mm): <5          Moise Flores  4/20/2022  13:31 CDT

## 2022-04-26 ENCOUNTER — OFFICE VISIT (OUTPATIENT)
Dept: INTERNAL MEDICINE | Age: 52
End: 2022-04-26
Payer: COMMERCIAL

## 2022-04-26 VITALS
SYSTOLIC BLOOD PRESSURE: 110 MMHG | BODY MASS INDEX: 29.78 KG/M2 | OXYGEN SATURATION: 100 % | HEART RATE: 107 BPM | TEMPERATURE: 98.4 F | DIASTOLIC BLOOD PRESSURE: 64 MMHG | WEIGHT: 162.8 LBS

## 2022-04-26 DIAGNOSIS — R09.81 SINUS CONGESTION: Primary | ICD-10-CM

## 2022-04-26 PROCEDURE — 99213 OFFICE O/P EST LOW 20 MIN: CPT | Performed by: INTERNAL MEDICINE

## 2022-04-26 RX ORDER — PREDNISONE 10 MG/1
TABLET ORAL
Qty: 30 TABLET | Refills: 0 | Status: SHIPPED | OUTPATIENT
Start: 2022-04-26 | End: 2022-05-18 | Stop reason: ALTCHOICE

## 2022-04-26 RX ORDER — AZELASTINE 1 MG/ML
1 SPRAY, METERED NASAL 2 TIMES DAILY
Qty: 60 ML | Refills: 1 | Status: SHIPPED | OUTPATIENT
Start: 2022-04-26

## 2022-04-26 ASSESSMENT — ENCOUNTER SYMPTOMS
VOMITING: 0
COUGH: 0
DIARRHEA: 0
SORE THROAT: 0
ABDOMINAL PAIN: 0
RHINORRHEA: 0

## 2022-04-26 NOTE — PROGRESS NOTES
Robles Medley ( 1970) is a 50 y.o. female, Established , here for evaluation of the following chief complaint(s).   Sinus Problem (drainage ), Ear Fullness (both ears and pain behind left ear (times one month)), and Cough (times 2 weeks )        ASSESSMENT/PLAN:  Problem List        Respiratory    Sinus congestion - Primary      Uncontrolled, changes made today: patient will be providede wit AStelin, steroid taoer, also advised to wear her mask outdoors, wash nasal passages with saline solution         Relevant Medications    azelastine (ASTELIN) 0.1 % nasal spray    predniSONE (DELTASONE) 10 MG tablet          Results for orders placed or performed during the hospital encounter of 22   COVID-19, Rapid    Specimen: Nasopharyngeal Swab   Result Value Ref Range    SARS-CoV-2, NAAT Not Detected Not Detected   Respiratory Panel, Molecular, with COVID-19 (Restricted: peds pts or suitable admitted adults)    Specimen: Nasopharyngeal   Result Value Ref Range    Adenovirus by PCR Not Detected Not Detected    Bordetella parapertussis by PCR Not Detected Not Detected    Bordetella pertussis by PCR Not Detected Not Detected    Chlamydophilia pneumoniae by PCR Not Detected Not Detected    Coronavirus 229E by PCR DETECTED (A) Not Detected    Coronavirus HKU1 by PCR Not Detected Not Detected    Coronavirus NL63 by PCR Not Detected Not Detected    Coronavirus OC43 by PCR Not Detected Not Detected    SARS-CoV-2, PCR Not Detected Not Detected    Human Metapneumovirus by PCR Not Detected Not Detected    Human Rhinovirus/Enterovirus by PCR Not Detected Not Detected    Influenza A by PCR Not Detected Not Detected    Influenza B by PCR Not Detected Not Detected    Mycoplasma pneumoniae by PCR Not Detected Not Detected    Parainfluenza Virus 1 by PCR Not Detected Not Detected    Parainfluenza Virus 2 by PCR Not Detected Not Detected    Parainfluenza Virus 3 by PCR Not Detected Not Detected    Parainfluenza Virus 4 by PCR Not Detected Not Detected    Respiratory Syncytial Virus by PCR Not Detected Not Detected   CBC Auto Differential   Result Value Ref Range    WBC 13.6 (H) 4.8 - 10.8 K/uL    RBC 4.93 4.20 - 5.40 M/uL    Hemoglobin 13.8 12.0 - 16.0 g/dL    Hematocrit 42.6 37.0 - 47.0 %    MCV 86.4 81.0 - 99.0 fL    MCH 28.0 27.0 - 31.0 pg    MCHC 32.4 (L) 33.0 - 37.0 g/dL    RDW 13.7 11.5 - 14.5 %    Platelets 537 886 - 116 K/uL    MPV 9.5 9.4 - 12.3 fL    PLATELET SLIDE REVIEW Adequate     Neutrophils % 52.0 50.0 - 65.0 %    Lymphocytes % 43.0 (H) 20.0 - 40.0 %    Monocytes % 1.0 0.0 - 10.0 %    Eosinophils % 3.0 0.0 - 5.0 %    Basophils % 0.0 0.0 - 1.0 %    Neutrophils Absolute 7.1 1.5 - 7.5 K/uL    Immature Granulocytes # 0.0 K/uL    Lymphocytes Absolute 6.0 (H) 1.1 - 4.5 K/uL    Monocytes Absolute 0.10 0.00 - 0.90 K/uL    Eosinophils Absolute 0.41 0.00 - 0.60 K/uL    Basophils Absolute 0.00 0.00 - 0.20 K/uL    Atypical Lymphocytes Relative 1 0 - 8 %   Comprehensive Metabolic Panel w/ Reflex to MG   Result Value Ref Range    Sodium 131 (L) 136 - 145 mmol/L    Potassium reflex Magnesium 4.4 3.5 - 5.0 mmol/L    Chloride 99 98 - 111 mmol/L    CO2 18 (L) 22 - 29 mmol/L    Anion Gap 14 7 - 19 mmol/L    Glucose 276 (H) 74 - 109 mg/dL    BUN 12 6 - 20 mg/dL    CREATININE 0.8 0.5 - 0.9 mg/dL    GFR Non-African American >60 >60    GFR African American >59 >59    Calcium 9.2 8.6 - 10.0 mg/dL    Total Protein 7.7 6.6 - 8.7 g/dL    Albumin 3.9 3.5 - 5.2 g/dL    Total Bilirubin 0.3 0.2 - 1.2 mg/dL    Alkaline Phosphatase 91 35 - 104 U/L    ALT 9 5 - 33 U/L    AST 24 5 - 32 U/L   Lipase   Result Value Ref Range    Lipase 56 13 - 60 U/L   Urinalysis Reflex to Culture    Specimen: Urine, clean catch   Result Value Ref Range    Color, UA YELLOW Straw/Yellow    Clarity, UA Clear Clear    Glucose, Ur Negative Negative mg/dL    Bilirubin Urine Negative Negative    Ketones, Urine Negative Negative mg/dL    Specific Gravity, UA 1.006 1.005 - 1.030 Blood, Urine Negative Negative    pH, UA 6.5 5.0 - 8.0    Protein, UA Negative Negative mg/dL    Urobilinogen, Urine 1.0 <2.0 E.U./dL    Nitrite, Urine Negative Negative    Leukocyte Esterase, Urine TRACE (A) Negative   Microscopic Urinalysis   Result Value Ref Range    Bacteria, UA NEGATIVE (A) None Seen /HPF    Crystals, UA NEG (A) None Seen /HPF    Hyaline Casts, UA 2 0 - 8 /HPF    WBC, UA 3 0 - 5 /HPF    RBC, UA 0 0 - 4 /HPF    Epithelial Cells, UA 7 0 - 5 /HPF   POCT Glucose   Result Value Ref Range    POC Glucose 244 (H) 70 - 99 mg/dl    Performed on AccuChek    EKG 12 Lead   Result Value Ref Range    P-R Interval 152 ms    QRS Duration 82 ms    Q-T Interval 336 ms    QTc Calculation (Bazett) 393 ms    P Axis 42 degrees    T Axis 10 degrees       No follow-ups on file. Ear Fullness   There is pain in both ears. This is a recurrent problem. The current episode started in the past 7 days. The problem occurs constantly. The problem has been waxing and waning. There has been no fever. The pain is at a severity of 4/10. The pain is mild. Associated symptoms include hearing loss and neck pain. Pertinent negatives include no abdominal pain, coughing, diarrhea, ear discharge, headaches, rash, rhinorrhea, sore throat or vomiting. She has tried antibiotics and NSAIDs for the symptoms. The treatment provided no relief. Her past medical history is significant for a chronic ear infection and hearing loss. There is no history of a tympanostomy tube. Review of Systems   HENT: Positive for hearing loss. Negative for ear discharge, rhinorrhea and sore throat. Respiratory: Negative for cough. Gastrointestinal: Negative for abdominal pain, diarrhea and vomiting. Musculoskeletal: Positive for neck pain. Skin: Negative for rash. Neurological: Negative for headaches. Physical Exam  Constitutional:       Appearance: She is normal weight. HENT:      Head: Normocephalic.       Right Ear: Tympanic membrane normal.      Left Ear: Tympanic membrane normal.      Nose: No congestion or rhinorrhea. Mouth/Throat:      Mouth: Mucous membranes are moist.      Pharynx: No oropharyngeal exudate or posterior oropharyngeal erythema. Eyes:      Pupils: Pupils are equal, round, and reactive to light. Cardiovascular:      Rate and Rhythm: Normal rate and regular rhythm. Pulses: Normal pulses. Heart sounds: Normal heart sounds. Pulmonary:      Effort: Pulmonary effort is normal. No respiratory distress. Breath sounds: Normal breath sounds. No wheezing or rales. Abdominal:      General: Abdomen is flat. Bowel sounds are normal. There is no distension. Palpations: Abdomen is soft. Tenderness: There is no abdominal tenderness. Musculoskeletal:         General: Normal range of motion. Cervical back: Normal range of motion and neck supple. No rigidity or tenderness. Neurological:      General: No focal deficit present. Mental Status: She is alert.            (Time Documentation Optional [id number])    An electronic signaturewaas used to authenticate this note  -Shaunna Ballard MD on 4/26/2022 at 5:08 PM

## 2022-04-26 NOTE — ASSESSMENT & PLAN NOTE
Uncontrolled, changes made today: patient will be providede wit AStelin, steroid taoer, also advised to wear her mask outdoors, wash nasal passages with saline solution

## 2022-04-27 ENCOUNTER — CLINICAL SUPPORT (OUTPATIENT)
Dept: OTOLARYNGOLOGY | Facility: CLINIC | Age: 52
End: 2022-04-27

## 2022-04-27 DIAGNOSIS — J30.2 SEASONAL ALLERGIC RHINITIS, UNSPECIFIED TRIGGER: ICD-10-CM

## 2022-04-27 PROCEDURE — 95115 IMMUNOTHERAPY ONE INJECTION: CPT | Performed by: EMERGENCY MEDICINE

## 2022-04-27 NOTE — PROGRESS NOTES
Moise Flores   Allergy Injection Note:    Yon Ochoa presents for an immunotherapy injection. The site of the injection was cleansed with an alcohol swab. Serum was injected into the site after pulling back on the plunger to prevent intravascular injection. After the injection and was instructed to wait in the allergy waiting area for 30 minutes. There was no problems with the injection.    Allergy Shot Questionnaire  Injection nurse/assistant: Moise Flores ST/AT  Have you had increased asthma symptoms in past week?: no  Have you had increased allergy symptoms in the last week?: no  Have you had a cold, respiratory tract infection or flu like symptoms in the past 2 weeks?: no  Did you have any problems within 12 hours of the last injection?: no  Are you on any new medications/ eye drops?: no  Are you on any beta blockers?: no  If female, are you pregnant?: no  I have confirmed the name and birth date on my allergy vial. : yes  Epipen available?: yes  Epipen Lot Number: [id number]  Epipen Expiration Date: 8/2023  Number of allergy injections given: 1     Injection Details:  Vial 1   Vial 1 Expiration Date: 05/24/22  Vial 1 Series: 1  Shot type: escalation  Vial 1 Dose (mL): 0.5  Vial 1 Location: Left upper arm  Vial 1 Reaction (in mm): <5          Moise Flores  4/27/2022  13:17 CDT

## 2022-05-03 ENCOUNTER — CLINICAL SUPPORT (OUTPATIENT)
Dept: OTOLARYNGOLOGY | Facility: CLINIC | Age: 52
End: 2022-05-03

## 2022-05-03 DIAGNOSIS — J30.2 SEASONAL ALLERGIC RHINITIS, UNSPECIFIED TRIGGER: ICD-10-CM

## 2022-05-03 DIAGNOSIS — I10 ESSENTIAL HYPERTENSION: Primary | ICD-10-CM

## 2022-05-03 PROCEDURE — 95115 IMMUNOTHERAPY ONE INJECTION: CPT | Performed by: EMERGENCY MEDICINE

## 2022-05-03 RX ORDER — QUINAPRIL 5 1/1
5 TABLET ORAL DAILY
Qty: 30 TABLET | Refills: 5 | Status: SHIPPED | OUTPATIENT
Start: 2022-05-03 | End: 2022-10-24

## 2022-05-03 NOTE — PROGRESS NOTES
Moise Flores   Allergy Injection Note:    Yon Ochoa presents for an immunotherapy injection. The site of the injection was cleansed with an alcohol swab. Serum was injected into the site after pulling back on the plunger to prevent intravascular injection. After the injection and was instructed to wait in the allergy waiting area for 30 minutes. There was no problems with the injection.    Allergy Shot Questionnaire  Injection nurse/assistant: Moise Flores ST/AT  Have you had increased asthma symptoms in past week?: no  Have you had increased allergy symptoms in the last week?: no  Have you had a cold, respiratory tract infection or flu like symptoms in the past 2 weeks?: no  Did you have any problems within 12 hours of the last injection?: no  Are you on any new medications/ eye drops?: no  Are you on any beta blockers?: no  If female, are you pregnant?: no  I have confirmed the name and birth date on my allergy vial. : yes  Epipen available?: yes  Epipen Lot Number: [id number]  Epipen Expiration Date: 8/2023  Number of allergy injections given: 1     Injection Details:  Vial 1   Vial 1 Expiration Date: 05/24/22  Vial 1 Series: 1  Shot type: escalation  Vial 1 Dose (mL): 0.5  Vial 1 Location: Right upper arm  Vial 1 Reaction (in mm): <5          Moise Flores  5/3/2022  13:03 CDT

## 2022-05-03 NOTE — TELEPHONE ENCOUNTER
Clara Carbone called requesting a refill of the below medication which has been pended for you:     Requested Prescriptions     Pending Prescriptions Disp Refills    quinapril (ACCUPRIL) 5 MG tablet [Pharmacy Med Name: QUINAPRIL 5MG TABLETS] 30 tablet 5     Sig: TAKE 1 TABLET BY MOUTH DAILY       Last Appointment Date: 4/26/2022  Next Appointment Date: 5/18/2022    Allergies   Allergen Reactions    Claritin-D 12 Hour [Loratadine-Pseudoephedrine Er] Other (See Comments)     \"it intensifies my migraines\"    Demerol Hcl [Meperidine] Other (See Comments)     Aggression    Percocet [Oxycodone-Acetaminophen] Rash

## 2022-05-06 DIAGNOSIS — G62.9 NEUROPATHY: ICD-10-CM

## 2022-05-06 DIAGNOSIS — E78.5 HYPERLIPIDEMIA, UNSPECIFIED HYPERLIPIDEMIA TYPE: ICD-10-CM

## 2022-05-06 RX ORDER — DULOXETIN HYDROCHLORIDE 60 MG/1
60 CAPSULE, DELAYED RELEASE ORAL DAILY
Qty: 30 CAPSULE | Refills: 5 | Status: SHIPPED | OUTPATIENT
Start: 2022-05-06 | End: 2022-05-18 | Stop reason: ALTCHOICE

## 2022-05-06 RX ORDER — ATORVASTATIN CALCIUM 20 MG/1
20 TABLET, FILM COATED ORAL DAILY
Qty: 90 TABLET | Refills: 1 | Status: SHIPPED | OUTPATIENT
Start: 2022-05-06 | End: 2022-11-02

## 2022-05-06 NOTE — TELEPHONE ENCOUNTER
Requested Prescriptions     Pending Prescriptions Disp Refills    atorvastatin (LIPITOR) 20 MG tablet [Pharmacy Med Name: ATORVASTATIN 20MG TABLETS] 90 tablet 3     Sig: TAKE 1 TABLET BY MOUTH DAILY    DULoxetine (CYMBALTA) 60 MG extended release capsule [Pharmacy Med Name: DULOXETINE DR 60MG CAPSULES] 30 capsule 5     Sig: TAKE 1 CAPSULE BY MOUTH DAILY

## 2022-05-10 ENCOUNTER — CLINICAL SUPPORT (OUTPATIENT)
Dept: OTOLARYNGOLOGY | Facility: CLINIC | Age: 52
End: 2022-05-10

## 2022-05-10 DIAGNOSIS — J30.2 SEASONAL ALLERGIC RHINITIS, UNSPECIFIED TRIGGER: ICD-10-CM

## 2022-05-10 PROCEDURE — 95115 IMMUNOTHERAPY ONE INJECTION: CPT | Performed by: EMERGENCY MEDICINE

## 2022-05-10 NOTE — PROGRESS NOTES
Moise Flores   Allergy Injection Note:    Yon Ochoa presents for an immunotherapy injection. The site of the injection was cleansed with an alcohol swab. Serum was injected into the site after pulling back on the plunger to prevent intravascular injection. After the injection and was instructed to wait in the allergy waiting area for 30 minutes. There was no problems with the injection.    Allergy Shot Questionnaire  Injection nurse/assistant: Moise Flores ST/AT  Have you had increased asthma symptoms in past week?: no  Have you had increased allergy symptoms in the last week?: no  Have you had a cold, respiratory tract infection or flu like symptoms in the past 2 weeks?: no  Did you have any problems within 12 hours of the last injection?: no  Are you on any new medications/ eye drops?: no  Are you on any beta blockers?: no  If female, are you pregnant?: no  I have confirmed the name and birth date on my allergy vial. : yes  Epipen available?: yes  Epipen Lot Number: [id number]  Epipen Expiration Date: 8/2023  Number of allergy injections given: 1     Injection Details:  Vial 1   Vial 1 Expiration Date: 05/24/22  Vial 1 Series: 1  Shot type: escalation  Vial 1 Dose (mL): 0.5  Vial 1 Location: Left upper arm  Vial 1 Reaction (in mm): <5          Moise Flores  5/10/2022  13:36 CDT

## 2022-05-11 DIAGNOSIS — Z79.4 TYPE 2 DIABETES MELLITUS WITHOUT COMPLICATION, WITH LONG-TERM CURRENT USE OF INSULIN (HCC): ICD-10-CM

## 2022-05-11 DIAGNOSIS — E78.2 MIXED HYPERLIPIDEMIA: ICD-10-CM

## 2022-05-11 DIAGNOSIS — K21.9 GERD WITHOUT ESOPHAGITIS: ICD-10-CM

## 2022-05-11 DIAGNOSIS — E03.9 ACQUIRED HYPOTHYROIDISM: ICD-10-CM

## 2022-05-11 DIAGNOSIS — E87.1 HYPONATREMIA: ICD-10-CM

## 2022-05-11 DIAGNOSIS — E11.9 TYPE 2 DIABETES MELLITUS WITHOUT COMPLICATION, WITH LONG-TERM CURRENT USE OF INSULIN (HCC): ICD-10-CM

## 2022-05-11 DIAGNOSIS — Z13.0 SCREENING FOR DEFICIENCY ANEMIA: ICD-10-CM

## 2022-05-11 LAB
ALBUMIN SERPL-MCNC: 3.9 G/DL (ref 3.5–5.2)
ALP BLD-CCNC: 85 U/L (ref 35–104)
ALT SERPL-CCNC: 14 U/L (ref 5–33)
ANION GAP SERPL CALCULATED.3IONS-SCNC: 14 MMOL/L (ref 7–19)
AST SERPL-CCNC: 20 U/L (ref 5–32)
BILIRUB SERPL-MCNC: 0.3 MG/DL (ref 0.2–1.2)
BILIRUBIN DIRECT: 0.1 MG/DL (ref 0–0.3)
BILIRUBIN, INDIRECT: 0.2 MG/DL (ref 0.1–1)
BUN BLDV-MCNC: 15 MG/DL (ref 6–20)
CALCIUM SERPL-MCNC: 8.5 MG/DL (ref 8.6–10)
CHLORIDE BLD-SCNC: 104 MMOL/L (ref 98–111)
CHOLESTEROL, TOTAL: 153 MG/DL (ref 160–199)
CO2: 18 MMOL/L (ref 22–29)
CREAT SERPL-MCNC: 0.8 MG/DL (ref 0.5–0.9)
CREATININE URINE: 106.9 MG/DL (ref 4.2–622)
GFR AFRICAN AMERICAN: >59
GFR NON-AFRICAN AMERICAN: >60
GLUCOSE BLD-MCNC: 281 MG/DL (ref 74–109)
HBA1C MFR BLD: 8.2 % (ref 4–6)
HCT VFR BLD CALC: 35 % (ref 37–47)
HDLC SERPL-MCNC: 36 MG/DL (ref 65–121)
HEMOGLOBIN: 11.4 G/DL (ref 12–16)
LDL CHOLESTEROL CALCULATED: ABNORMAL MG/DL
LDL CHOLESTEROL DIRECT: 58 MG/DL
MCH RBC QN AUTO: 28.2 PG (ref 27–31)
MCHC RBC AUTO-ENTMCNC: 32.6 G/DL (ref 33–37)
MCV RBC AUTO: 86.6 FL (ref 81–99)
MICROALBUMIN UR-MCNC: <1.2 MG/DL (ref 0–19)
MICROALBUMIN/CREAT UR-RTO: NORMAL MG/G
OSMOLALITY URINE: 749 MOSM/KG (ref 250–1200)
PDW BLD-RTO: 13.5 % (ref 11.5–14.5)
PLATELET # BLD: 141 K/UL (ref 130–400)
PMV BLD AUTO: 9.7 FL (ref 9.4–12.3)
POTASSIUM SERPL-SCNC: 4.5 MMOL/L (ref 3.5–5)
RBC # BLD: 4.04 M/UL (ref 4.2–5.4)
SODIUM BLD-SCNC: 136 MMOL/L (ref 136–145)
SODIUM URINE: 144 MMOL/L
TOTAL PROTEIN: 6.7 G/DL (ref 6.6–8.7)
TRIGL SERPL-MCNC: 546 MG/DL (ref 0–149)
TSH SERPL DL<=0.05 MIU/L-ACNC: 6.93 UIU/ML (ref 0.27–4.2)
WBC # BLD: 11 K/UL (ref 4.8–10.8)

## 2022-05-12 NOTE — RESULT ENCOUNTER NOTE
Patient has no protein in her urine which is good  And her osmolality is normal that means that she does not have too much water in her bladder despite of her sodium being low  Cholesterol panel in good range thyroid function elevated will be discussed during follow-up visit, liver function in normal range, kidney function normal range fasting sugar 276, patient's hemoglobin A1c from 1 year ago which was 6.9 is now 8.2 patient needs to do better about checking her Premeal sugars and also her food consumption we will be discussing this she must bring blood sugar results for her follow-up visit

## 2022-05-13 ENCOUNTER — CLINICAL SUPPORT NO REQUIREMENTS (OUTPATIENT)
Dept: OTOLARYNGOLOGY | Facility: CLINIC | Age: 52
End: 2022-05-13

## 2022-05-13 DIAGNOSIS — J30.2 SEASONAL ALLERGIC RHINITIS, UNSPECIFIED TRIGGER: Primary | ICD-10-CM

## 2022-05-13 PROCEDURE — 95165 ANTIGEN THERAPY SERVICES: CPT | Performed by: EMERGENCY MEDICINE

## 2022-05-13 NOTE — PROGRESS NOTES
Moise Flores   Allergy Injection Mix Note    A immunotherapy injection vial was mixed using standard protocol under sterile conditions.     Mixing Nurse/ Tech: Moise Flores ST/At (05/13/22 1102)    Vial Series: 2    VIAL 1  [place]: Vial 1 mix 0.2 cc of #: concentrate  Mold Mix: Vial 1 mix 0.2 cc of #: 2  Trichophyton: Vial 1 mix 0.2 cc of #: concentrate  Dust Mite Mix: Vial 1 mix 0.2 cc of #: 2  Dog: Vial 1 mix 0.2 cc of #: concentrate  Cat: Vial 1 mix 0.2 cc of #: concentrate  Feather: Vial 1 mix 0.2 cc of #: concentrate  Vial 1 Additions  Antigen Total: 1.4 cc  Glycerine: 0  Dilutent: 3.6 cc  TOTAL: 5           Moise Flores  5/13/2022  11:02 CDT

## 2022-05-17 ENCOUNTER — CLINICAL SUPPORT (OUTPATIENT)
Dept: OTOLARYNGOLOGY | Facility: CLINIC | Age: 52
End: 2022-05-17

## 2022-05-17 DIAGNOSIS — J30.2 SEASONAL ALLERGIC RHINITIS, UNSPECIFIED TRIGGER: ICD-10-CM

## 2022-05-17 PROCEDURE — 95115 IMMUNOTHERAPY ONE INJECTION: CPT | Performed by: EMERGENCY MEDICINE

## 2022-05-17 NOTE — PROGRESS NOTES
Moise Flores   Allergy Injection Note:    Yon Ochoa presents for an immunotherapy injection. The site of the injection was cleansed with an alcohol swab. Serum was injected into the site after pulling back on the plunger to prevent intravascular injection. After the injection and was instructed to wait in the allergy waiting area for 30 minutes. There was no problems with the injection.    Allergy Shot Questionnaire  Injection nurse/assistant: Moise Flores ST/AT  Have you had increased asthma symptoms in past week?: no  Have you had increased allergy symptoms in the last week?: no  Have you had a cold, respiratory tract infection or flu like symptoms in the past 2 weeks?: no  Did you have any problems within 12 hours of the last injection?: no  Are you on any new medications/ eye drops?: no  Are you on any beta blockers?: no  If female, are you pregnant?: no  I have confirmed the name and birth date on my allergy vial. : yes  Epipen available?: yes  Epipen Lot Number: [id number]  Epipen Expiration Date: 8/2023  Number of allergy injections given: 1     Injection Details:  Vial 1   Vial 1 Expiration Date: 05/24/22  Vial 1 Series: 1  Shot type: escalation  Vial 1 Dose (mL): 0.5  Vial 1 Location: Right upper arm  Vial 1 Reaction (in mm): <5          Moise Flores  5/17/2022  13:29 CDT

## 2022-05-18 ENCOUNTER — OFFICE VISIT (OUTPATIENT)
Dept: INTERNAL MEDICINE | Age: 52
End: 2022-05-18
Payer: COMMERCIAL

## 2022-05-18 VITALS
WEIGHT: 166.6 LBS | DIASTOLIC BLOOD PRESSURE: 78 MMHG | OXYGEN SATURATION: 100 % | SYSTOLIC BLOOD PRESSURE: 110 MMHG | HEIGHT: 62 IN | BODY MASS INDEX: 30.66 KG/M2 | HEART RATE: 111 BPM

## 2022-05-18 DIAGNOSIS — Z79.4 TYPE 2 DIABETES MELLITUS WITHOUT COMPLICATION, WITH LONG-TERM CURRENT USE OF INSULIN (HCC): ICD-10-CM

## 2022-05-18 DIAGNOSIS — E03.9 HYPOTHYROIDISM, UNSPECIFIED TYPE: ICD-10-CM

## 2022-05-18 DIAGNOSIS — G62.9 NEUROPATHY: ICD-10-CM

## 2022-05-18 DIAGNOSIS — G43.111 INTRACTABLE MIGRAINE WITH AURA WITH STATUS MIGRAINOSUS: ICD-10-CM

## 2022-05-18 DIAGNOSIS — F51.01 PRIMARY INSOMNIA: ICD-10-CM

## 2022-05-18 DIAGNOSIS — E11.9 TYPE 2 DIABETES MELLITUS WITHOUT COMPLICATION, WITH LONG-TERM CURRENT USE OF INSULIN (HCC): ICD-10-CM

## 2022-05-18 DIAGNOSIS — K21.9 GERD WITHOUT ESOPHAGITIS: ICD-10-CM

## 2022-05-18 DIAGNOSIS — Z76.0 MEDICATION REFILL: ICD-10-CM

## 2022-05-18 DIAGNOSIS — Z12.31 BREAST CANCER SCREENING BY MAMMOGRAM: Primary | ICD-10-CM

## 2022-05-18 DIAGNOSIS — I10 ESSENTIAL HYPERTENSION: ICD-10-CM

## 2022-05-18 DIAGNOSIS — E78.2 MIXED HYPERLIPIDEMIA: ICD-10-CM

## 2022-05-18 DIAGNOSIS — E87.1 HYPONATREMIA: ICD-10-CM

## 2022-05-18 PROCEDURE — 3052F HG A1C>EQUAL 8.0%<EQUAL 9.0%: CPT | Performed by: INTERNAL MEDICINE

## 2022-05-18 PROCEDURE — 99214 OFFICE O/P EST MOD 30 MIN: CPT | Performed by: INTERNAL MEDICINE

## 2022-05-18 RX ORDER — DULOXETIN HYDROCHLORIDE 30 MG/1
30 CAPSULE, DELAYED RELEASE ORAL EVERY MORNING
Qty: 30 CAPSULE | Refills: 5 | Status: SHIPPED | OUTPATIENT
Start: 2022-05-18 | End: 2022-09-13

## 2022-05-18 RX ORDER — DULOXETIN HYDROCHLORIDE 60 MG/1
60 CAPSULE, DELAYED RELEASE ORAL NIGHTLY
Qty: 30 CAPSULE | Refills: 5 | Status: SHIPPED | OUTPATIENT
Start: 2022-05-18 | End: 2022-09-13

## 2022-05-18 RX ORDER — LEVOTHYROXINE SODIUM 137 UG/1
137 TABLET ORAL DAILY
Qty: 90 TABLET | Refills: 0 | Status: SHIPPED | OUTPATIENT
Start: 2022-05-18 | End: 2022-08-17

## 2022-05-18 RX ORDER — TOPIRAMATE 50 MG/1
TABLET, FILM COATED ORAL
Qty: 90 TABLET | Refills: 5 | Status: SHIPPED | OUTPATIENT
Start: 2022-05-18 | End: 2022-08-03 | Stop reason: SDUPTHER

## 2022-05-18 ASSESSMENT — ENCOUNTER SYMPTOMS
BLOOD IN STOOL: 0
CHEST TIGHTNESS: 0
TROUBLE SWALLOWING: 0
BACK PAIN: 1
CONSTIPATION: 0
WHEEZING: 0
SINUS PAIN: 0
SHORTNESS OF BREATH: 0
ABDOMINAL PAIN: 0
COUGH: 0
NAUSEA: 1
RHINORRHEA: 0
DIARRHEA: 0
VOMITING: 0

## 2022-05-18 NOTE — ASSESSMENT & PLAN NOTE
Unclear control, continue current medications   Triglycerides over 500 patient says that a true fasting she had eaten from working she was advised blood work

## 2022-05-18 NOTE — PROGRESS NOTES
Lashonda Stoddard ( 1970) is a 50 y.o. female,  Established , here for evaluation of the following chief complaint(s).   Follow-up (4 months )        ASSESSMENT/PLAN:  Problem List        High    Type 2 diabetes mellitus without complication, with long-term current use of insulin ([facility] [address]      Monitored by specialist- no acute findings meriting change in the plan   A1c somewhat not controlled patient's fasting has gone up she is followed by endocrinology she is on an insulin nightly as well as Premeal coverage her Premeal used to be NovoLog and she is on lispro and she says she does not like lispro again she patient was counseled that this needs to be inject and eat she understands that she will try to do better along with that her lipids have gotten out of control this was not a true 12-hour fasting specimen         Relevant Medications    Insulin Pen Needle 32G X 4 MM MISC    Insulin Syringe-Needle U-100 30G X 5/16\" 0.5 ML MISC    Insulin Degludec (TRESIBA FLEXTOUCH) 200 UNIT/ML SOPN    blood glucose test strips (ACCU-CHEK GAVI PLUS) strip    Primary insomnia      Well-controlled, continue current medications, medication adherence emphasized and lifestyle modifications recommended         Relevant Medications    traZODone (DESYREL) 100 MG tablet    Neuropathy      At goal, continue current medications         Relevant Medications    DULoxetine (CYMBALTA) 60 MG extended release capsule    DULoxetine (CYMBALTA) 30 MG extended release capsule    Mixed hyperlipidemia      Unclear control, continue current medications   Triglycerides over 500 patient says that a true fasting she had eaten from working she was advised blood work         Relevant Medications    ASPIRIN LOW DOSE 81 MG EC tablet    spironolactone (ALDACTONE) 50 MG tablet    quinapril (ACCUPRIL) 5 MG tablet    atorvastatin (LIPITOR) 20 MG tablet    Migraine with aura      At goal,          Relevant Medications    ASPIRIN LOW DOSE 81 MG EC tablet clonazePAM (KLONOPIN) 0.5 MG tablet    traZODone (DESYREL) 100 MG tablet    cyclobenzaprine (FLEXERIL) 10 MG tablet    topiramate (TOPAMAX) 50 MG tablet    DULoxetine (CYMBALTA) 60 MG extended release capsule    DULoxetine (CYMBALTA) 30 MG extended release capsule    Hypothyroidism      Uncontrolled, changes made today: Patient's TSH 6 she had no symptoms clinically she is euthyroid however she says that her legs feel heavy and that was patient levothyroxine will be from 1 25-1 30         Relevant Medications    levothyroxine (SYNTHROID) 137 MCG tablet    Hyponatremia      Urinary sodium and osmolality is normal         GERD without esophagitis      Well-controlled, continue current medications         Relevant Medications    polyethylene glycol (GLYCOLAX) powder    lubiprostone (AMITIZA) 24 MCG capsule    famotidine (PEPCID) 20 MG tablet    Essential hypertension      Well-controlled, continue current medications, medication adherence emphasized and lifestyle modifications recommended         Relevant Medications    spironolactone (ALDACTONE) 50 MG tablet    quinapril (ACCUPRIL) 5 MG tablet          Results for orders placed or performed in visit on 05/11/22   TSH without Reflex   Result Value Ref Range    TSH 6.930 (H) 0.270 - 4.200 uIU/mL   Hepatic Function Panel   Result Value Ref Range    Total Protein 6.7 6.6 - 8.7 g/dL    Albumin 3.9 3.5 - 5.2 g/dL    Alkaline Phosphatase 85 35 - 104 U/L    ALT 14 5 - 33 U/L    AST 20 5 - 32 U/L    Total Bilirubin 0.3 0.2 - 1.2 mg/dL    Bilirubin, Direct 0.1 0.0 - 0.3 mg/dL    Bilirubin, Indirect 0.2 0.1 - 1.0 mg/dL   Lipid Panel   Result Value Ref Range    Cholesterol, Total 153 (L) 160 - 199 mg/dL    Triglycerides 546 (H) 0 - 149 mg/dL    HDL 36 (L) 65 - 121 mg/dL    LDL Calculated see below <100 mg/dL   Microalbumin / Creatinine Urine Ratio   Result Value Ref Range    Microalbumin, Random Urine <1.20 0.00 - 19.00 mg/dL    Creatinine, Ur 106.9 4.2 - 622.0 mg/dL Microalbumin Creatinine Ratio see below mg/g   Hemoglobin A1C   Result Value Ref Range    Hemoglobin A1C 8.2 (H) 4.0 - 6.0 %   CBC   Result Value Ref Range    WBC 11.0 (H) 4.8 - 10.8 K/uL    RBC 4.04 (L) 4.20 - 5.40 M/uL    Hemoglobin 11.4 (L) 12.0 - 16.0 g/dL    Hematocrit 35.0 (L) 37.0 - 47.0 %    MCV 86.6 81.0 - 99.0 fL    MCH 28.2 27.0 - 31.0 pg    MCHC 32.6 (L) 33.0 - 37.0 g/dL    RDW 13.5 11.5 - 14.5 %    Platelets 913 318 - 205 K/uL    MPV 9.7 9.4 - 12.3 fL   Basic Metabolic Panel   Result Value Ref Range    Sodium 136 136 - 145 mmol/L    Potassium 4.5 3.5 - 5.0 mmol/L    Chloride 104 98 - 111 mmol/L    CO2 18 (L) 22 - 29 mmol/L    Anion Gap 14 7 - 19 mmol/L    Glucose 281 (H) 74 - 109 mg/dL    BUN 15 6 - 20 mg/dL    CREATININE 0.8 0.5 - 0.9 mg/dL    GFR Non-African American >60 >60    GFR African American >59 >59    Calcium 8.5 (L) 8.6 - 10.0 mg/dL   Sodium, urine, random   Result Value Ref Range    Sodium, Ur 144.0 mmol/L   Osmolality, Urine   Result Value Ref Range    Osmolality, Ur 749 250 - 1200 mOsm/kg   LDL Cholesterol, Direct   Result Value Ref Range    LDL Direct 58 (L) <100 mg/dL       Return in about 4 months (around 9/18/2022).     HPI  44-year-old female who presents for follow-up visit  Chronic neck pain currently in good control   migraine headache did not go to work today has been taking Topamax for years she does not get relief from triptans and is asking to see if we can give her other medications for migraines  Her neuropathy has been acting worse she was started on Cymbalta 30 and she is requesting to increase it  GERD well controlled  Type 2 DM slightly worse, needs to watch her diet she is managed by her endocrinologist and she is on 60 units of Toujeo at night and she is on lispro she thinks she is not covering herself enough with lispro she is can have an appointment with Bisi Mathews in the next  Hypothyroidism, clinically euthyroid feels that her legs are heavy and that was the last time she felt when her thyroid was normal  Anxiety  PRITESH        Review of Systems   Constitutional: Positive for fatigue. Negative for activity change, chills and fever. HENT: Negative for hearing loss, postnasal drip, rhinorrhea, sinus pain and trouble swallowing. Eyes: Negative for visual disturbance. Respiratory: Negative for cough, chest tightness, shortness of breath and wheezing. Cardiovascular: Negative for chest pain, palpitations and leg swelling. Gastrointestinal: Positive for nausea. Negative for abdominal pain, blood in stool, constipation, diarrhea and vomiting. Endocrine: Negative for cold intolerance. Genitourinary: Positive for dysuria. Negative for frequency and urgency. Musculoskeletal: Positive for arthralgias, back pain, myalgias and neck pain. Allergic/Immunologic: Negative for environmental allergies. Neurological: Positive for dizziness, weakness and headaches. Negative for light-headedness and numbness. Legs feel heavy       Physical Exam  Vitals and nursing note reviewed. Constitutional:       General: She is not in acute distress. HENT:      Head: Normocephalic. Right Ear: External ear normal.      Left Ear: External ear normal.      Nose: Nose normal.   Eyes:      General: No scleral icterus. Conjunctiva/sclera: Conjunctivae normal.      Pupils: Pupils are equal, round, and reactive to light. Neck:      Thyroid: No thyromegaly. Vascular: No JVD. Cardiovascular:      Rate and Rhythm: Normal rate and regular rhythm. Heart sounds: Normal heart sounds. No murmur heard. Pulmonary:      Effort: Pulmonary effort is normal. No respiratory distress. Breath sounds: Normal breath sounds. No wheezing or rales. Abdominal:      General: Bowel sounds are normal. There is no distension. Palpations: Abdomen is soft. Tenderness: There is no abdominal tenderness. Musculoskeletal:         General: No deformity. Normal range of motion. Cervical back: Normal range of motion and neck supple. Thoracic back: Spasms present. Lymphadenopathy:      Cervical: No cervical adenopathy. Skin:     General: Skin is warm and dry. Findings: No rash. Neurological:      Mental Status: She is alert and oriented to person, place, and time. Cranial Nerves: No cranial nerve deficit. Deep Tendon Reflexes: Reflexes normal.   Psychiatric:         Behavior: Behavior normal.         Thought Content: Thought content normal.         Judgment: Judgment normal.       Visual inspection:  Deformity/amputation: absent  Skin lesions/pre-ulcerative calluses: absent  Edema: right- negative, left- negative    Sensory exam:  Monofilament sensation: abnormal - no sensation  (minimum of 5 random plantar locations tested, avoiding callused areas - > 1 area with absence of sensation is + for neuropathy)    Plus at least one of the following:  Pulses: normal,   Pinprick: Absent  Proprioception: Impaired  Vibration (128 Hz):  Impaired foot      (Time Documentation Optional [id number])    An electronic signaturewaas used to authenticate this note  -Shaunna Ballard MD on 5/18/2022 at 2:16 PM

## 2022-05-18 NOTE — ASSESSMENT & PLAN NOTE
Uncontrolled, changes made today: Patient's TSH 6 she had no symptoms clinically she is euthyroid however she says that her legs feel heavy and that was patient levothyroxine will be from 1 25-1 30

## 2022-05-18 NOTE — ASSESSMENT & PLAN NOTE
Monitored by specialist- no acute findings meriting change in the plan   A1c somewhat not controlled patient's fasting has gone up she is followed by endocrinology she is on an insulin nightly as well as Premeal coverage her Premeal used to be NovoLog and she is on lispro and she says she does not like lispro again she patient was counseled that this needs to be inject and eat she understands that she will try to do better along with that her lipids have gotten out of control this was not a true 12-hour fasting specimen

## 2022-05-24 ENCOUNTER — CLINICAL SUPPORT (OUTPATIENT)
Dept: OTOLARYNGOLOGY | Facility: CLINIC | Age: 52
End: 2022-05-24

## 2022-05-24 DIAGNOSIS — J30.2 SEASONAL ALLERGIC RHINITIS, UNSPECIFIED TRIGGER: ICD-10-CM

## 2022-05-24 PROCEDURE — 95115 IMMUNOTHERAPY ONE INJECTION: CPT | Performed by: EMERGENCY MEDICINE

## 2022-05-24 NOTE — PROGRESS NOTES
Moise Flores   Allergy Injection Note:    Yon Ochoa presents for an immunotherapy injection. The site of the injection was cleansed with an alcohol swab. Serum was injected into the site after pulling back on the plunger to prevent intravascular injection. After the injection and was instructed to wait in the allergy waiting area for 30 minutes. There was no problems with the injection.    Allergy Shot Questionnaire  Injection nurse/assistant: Moise Flores ST/AT  Have you had increased asthma symptoms in past week?: no  Have you had increased allergy symptoms in the last week?: no  Have you had a cold, respiratory tract infection or flu like symptoms in the past 2 weeks?: no  Did you have any problems within 12 hours of the last injection?: no  Are you on any new medications/ eye drops?: no  Are you on any beta blockers?: no  If female, are you pregnant?: no  I have confirmed the name and birth date on my allergy vial. : yes  Epipen available?: yes  Epipen Lot Number: [id number]  Epipen Expiration Date: 8/2023  Number of allergy injections given: 1     Injection Details:  Vial 1   Vial 1 Expiration Date: 08/13/22  Vial 1 Series: 2  Shot type: escalation  Vial 1 Dose (mL): 0.05 Vial test  Vial 1 Location: Left lower arm  Vial 1 Vial Test Reaction (in mm): 9          Moise Flores  5/24/2022  13:20 CDT

## 2022-06-01 ENCOUNTER — OFFICE VISIT (OUTPATIENT)
Dept: OTOLARYNGOLOGY | Facility: CLINIC | Age: 52
End: 2022-06-01

## 2022-06-01 DIAGNOSIS — J30.2 SEASONAL ALLERGIC RHINITIS, UNSPECIFIED TRIGGER: Primary | ICD-10-CM

## 2022-06-01 PROCEDURE — 95115 IMMUNOTHERAPY ONE INJECTION: CPT | Performed by: EMERGENCY MEDICINE

## 2022-06-01 NOTE — PROGRESS NOTES
Angelia E Cyril, RN   Allergy Injection Note:    Yon Ochoa presents for an immunotherapy injection. The site of the injection was cleansed with an alcohol swab. Serum was injected into the site after pulling back on the plunger to prevent intravascular injection. After the injection and was instructed to wait in the allergy waiting area for 30 minutes. There was no problems with the injection.    Allergy Shot Questionnaire  Injection nurse/assistant: Angelia Plascencia RN,BSN  Have you had increased asthma symptoms in past week?: no  Have you had increased allergy symptoms in the last week?: no  Have you had a cold, respiratory tract infection or flu like symptoms in the past 2 weeks?: no  Did you have any problems within 12 hours of the last injection?: no  Are you on any new medications/ eye drops?: no  Are you on any beta blockers?: no  If female, are you pregnant?: no  I have confirmed the name and birth date on my allergy vial. : yes  Epipen available?: yes  Epipen Lot Number: [id number]  Epipen Expiration Date: 8/2023  Number of allergy injections given: 1     Injection Details:  Vial 1   Vial 1 Expiration Date: 08/13/22  Vial 1 Series: 2  Shot type: escalation  Vial 1 Dose (mL): 0.1  Vial 1 Location: Right upper arm  Vial 1 Reaction (in mm): <5          Angelia Nam RN  6/1/2022  13:18 CDT

## 2022-06-01 NOTE — PROGRESS NOTES
I have reviewed the notes, assessments, and/or procedures performed by Angelia Nam, I concur with her/his documentation of Yon Ochoa.

## 2022-06-02 ENCOUNTER — HOSPITAL ENCOUNTER (OUTPATIENT)
Dept: WOMENS IMAGING | Age: 52
Discharge: HOME | End: 2022-06-02
Payer: COMMERCIAL

## 2022-06-02 ENCOUNTER — HOSPITAL ENCOUNTER (OUTPATIENT)
Dept: PREADMISSION TESTING | Age: 52
Discharge: HOME | End: 2022-06-06
Payer: COMMERCIAL

## 2022-06-02 VITALS — HEIGHT: 62 IN | BODY MASS INDEX: 29.08 KG/M2 | WEIGHT: 158 LBS

## 2022-06-02 DIAGNOSIS — Z12.31 BREAST CANCER SCREENING BY MAMMOGRAM: ICD-10-CM

## 2022-06-02 LAB
EKG P AXIS: 50 DEGREES
EKG P-R INTERVAL: 156 MS
EKG Q-T INTERVAL: 368 MS
EKG QRS DURATION: 80 MS
EKG QTC CALCULATION (BAZETT): 390 MS
EKG T AXIS: 4 DEGREES

## 2022-06-02 PROCEDURE — 77063 BREAST TOMOSYNTHESIS BI: CPT | Performed by: RADIOLOGY

## 2022-06-02 PROCEDURE — 77067 SCR MAMMO BI INCL CAD: CPT

## 2022-06-02 PROCEDURE — 93005 ELECTROCARDIOGRAM TRACING: CPT | Performed by: ORTHOPAEDIC SURGERY

## 2022-06-02 PROCEDURE — 93010 ELECTROCARDIOGRAM REPORT: CPT | Performed by: INTERNAL MEDICINE

## 2022-06-02 PROCEDURE — 77067 SCR MAMMO BI INCL CAD: CPT | Performed by: RADIOLOGY

## 2022-06-02 RX ORDER — INSULIN LISPRO 100 [IU]/ML
INJECTION, SOLUTION INTRAVENOUS; SUBCUTANEOUS
COMMUNITY

## 2022-06-07 ENCOUNTER — OFFICE VISIT (OUTPATIENT)
Dept: OTOLARYNGOLOGY | Facility: CLINIC | Age: 52
End: 2022-06-07

## 2022-06-07 DIAGNOSIS — J30.2 SEASONAL ALLERGIC RHINITIS, UNSPECIFIED TRIGGER: Primary | ICD-10-CM

## 2022-06-07 PROCEDURE — 95115 IMMUNOTHERAPY ONE INJECTION: CPT | Performed by: EMERGENCY MEDICINE

## 2022-06-07 NOTE — PROGRESS NOTES
Angelia E Cyril, RN   Allergy Injection Note:    Yon Ochoa presents for an immunotherapy injection. The site of the injection was cleansed with an alcohol swab. Serum was injected into the site after pulling back on the plunger to prevent intravascular injection. After the injection and was instructed to wait in the allergy waiting area for 30 minutes. There was no problems with the injection.    Allergy Shot Questionnaire  Injection nurse/assistant: Angelia Plascencia RN,BSN  Have you had increased asthma symptoms in past week?: no  Have you had increased allergy symptoms in the last week?: no  Have you had a cold, respiratory tract infection or flu like symptoms in the past 2 weeks?: no  Did you have any problems within 12 hours of the last injection?: no  Are you on any new medications/ eye drops?: no  Are you on any beta blockers?: no  If female, are you pregnant?: no  I have confirmed the name and birth date on my allergy vial. : yes  Epipen available?: yes  Epipen Lot Number: [id number]  Epipen Expiration Date: 8/2023  Number of allergy injections given: 1     Injection Details:  Vial 1   Vial 1 Expiration Date: 08/13/22  Vial 1 Series: 2  Shot type: escalation  Vial 1 Dose (mL): 0.2  Vial 1 Location: Left upper arm  Vial 1 Reaction (in mm): <5          Angelia Nam RN  6/7/2022  09:11 CDT

## 2022-06-08 ENCOUNTER — ANESTHESIA (OUTPATIENT)
Dept: OPERATING ROOM | Age: 52
End: 2022-06-08
Payer: COMMERCIAL

## 2022-06-08 ENCOUNTER — ANESTHESIA EVENT (OUTPATIENT)
Dept: OPERATING ROOM | Age: 52
End: 2022-06-08
Payer: COMMERCIAL

## 2022-06-08 ENCOUNTER — HOSPITAL ENCOUNTER (OUTPATIENT)
Age: 52
Setting detail: OUTPATIENT SURGERY
Discharge: HOME | End: 2022-06-08
Attending: ORTHOPAEDIC SURGERY | Admitting: ORTHOPAEDIC SURGERY
Payer: COMMERCIAL

## 2022-06-08 VITALS
HEIGHT: 62 IN | WEIGHT: 158 LBS | HEART RATE: 83 BPM | RESPIRATION RATE: 18 BRPM | BODY MASS INDEX: 29.08 KG/M2 | DIASTOLIC BLOOD PRESSURE: 72 MMHG | OXYGEN SATURATION: 95 % | SYSTOLIC BLOOD PRESSURE: 103 MMHG | TEMPERATURE: 97.2 F

## 2022-06-08 DIAGNOSIS — M75.01 ADHESIVE CAPSULITIS OF RIGHT SHOULDER: Primary | ICD-10-CM

## 2022-06-08 LAB
GLUCOSE BLD-MCNC: 113 MG/DL (ref 70–99)
PERFORMED ON: ABNORMAL

## 2022-06-08 PROCEDURE — 3600000101 HC MANIP SHOULDER UNDER ANESTHESI: Performed by: ORTHOPAEDIC SURGERY

## 2022-06-08 PROCEDURE — 6360000002 HC RX W HCPCS: Performed by: ANESTHESIOLOGY

## 2022-06-08 PROCEDURE — 7100000010 HC PHASE II RECOVERY - FIRST 15 MIN: Performed by: ORTHOPAEDIC SURGERY

## 2022-06-08 PROCEDURE — 6360000002 HC RX W HCPCS: Performed by: NURSE ANESTHETIST, CERTIFIED REGISTERED

## 2022-06-08 PROCEDURE — 6360000002 HC RX W HCPCS: Performed by: ORTHOPAEDIC SURGERY

## 2022-06-08 PROCEDURE — 7100000000 HC PACU RECOVERY - FIRST 15 MIN: Performed by: ORTHOPAEDIC SURGERY

## 2022-06-08 PROCEDURE — 76942 ECHO GUIDE FOR BIOPSY: CPT | Performed by: NURSE ANESTHETIST, CERTIFIED REGISTERED

## 2022-06-08 PROCEDURE — 7100000011 HC PHASE II RECOVERY - ADDTL 15 MIN: Performed by: ORTHOPAEDIC SURGERY

## 2022-06-08 PROCEDURE — 7100000001 HC PACU RECOVERY - ADDTL 15 MIN: Performed by: ORTHOPAEDIC SURGERY

## 2022-06-08 PROCEDURE — 2580000003 HC RX 258: Performed by: ANESTHESIOLOGY

## 2022-06-08 PROCEDURE — 3700000000 HC ANESTHESIA ATTENDED CARE: Performed by: ORTHOPAEDIC SURGERY

## 2022-06-08 PROCEDURE — 6360000002 HC RX W HCPCS

## 2022-06-08 PROCEDURE — 2500000003 HC RX 250 WO HCPCS: Performed by: NURSE ANESTHETIST, CERTIFIED REGISTERED

## 2022-06-08 PROCEDURE — 2500000003 HC RX 250 WO HCPCS: Performed by: ORTHOPAEDIC SURGERY

## 2022-06-08 PROCEDURE — 2709999900 HC NON-CHARGEABLE SUPPLY: Performed by: ORTHOPAEDIC SURGERY

## 2022-06-08 PROCEDURE — 82947 ASSAY GLUCOSE BLOOD QUANT: CPT

## 2022-06-08 RX ORDER — BUPIVACAINE HYDROCHLORIDE 5 MG/ML
INJECTION, SOLUTION PERINEURAL PRN
Status: DISCONTINUED | OUTPATIENT
Start: 2022-06-08 | End: 2022-06-08 | Stop reason: ALTCHOICE

## 2022-06-08 RX ORDER — BETAMETHASONE SODIUM PHOSPHATE AND BETAMETHASONE ACETATE 3; 3 MG/ML; MG/ML
INJECTION, SUSPENSION INTRA-ARTICULAR; INTRALESIONAL; INTRAMUSCULAR; SOFT TISSUE PRN
Status: DISCONTINUED | OUTPATIENT
Start: 2022-06-08 | End: 2022-06-08 | Stop reason: ALTCHOICE

## 2022-06-08 RX ORDER — LIDOCAINE HYDROCHLORIDE 10 MG/ML
INJECTION, SOLUTION INFILTRATION; PERINEURAL PRN
Status: DISCONTINUED | OUTPATIENT
Start: 2022-06-08 | End: 2022-06-08 | Stop reason: SDUPTHER

## 2022-06-08 RX ORDER — HYDROMORPHONE HYDROCHLORIDE 1 MG/ML
0.25 INJECTION, SOLUTION INTRAMUSCULAR; INTRAVENOUS; SUBCUTANEOUS EVERY 5 MIN PRN
Status: DISCONTINUED | OUTPATIENT
Start: 2022-06-08 | End: 2022-06-08 | Stop reason: HOSPADM

## 2022-06-08 RX ORDER — HYDROMORPHONE HYDROCHLORIDE 1 MG/ML
0.5 INJECTION, SOLUTION INTRAMUSCULAR; INTRAVENOUS; SUBCUTANEOUS EVERY 5 MIN PRN
Status: DISCONTINUED | OUTPATIENT
Start: 2022-06-08 | End: 2022-06-08 | Stop reason: HOSPADM

## 2022-06-08 RX ORDER — SODIUM CHLORIDE 0.9 % (FLUSH) 0.9 %
5-40 SYRINGE (ML) INJECTION PRN
Status: DISCONTINUED | OUTPATIENT
Start: 2022-06-08 | End: 2022-06-08 | Stop reason: HOSPADM

## 2022-06-08 RX ORDER — HYDROMORPHONE HYDROCHLORIDE 2 MG/1
2 TABLET ORAL SEE ADMIN INSTRUCTIONS
Qty: 80 TABLET | Refills: 0 | Status: SHIPPED | OUTPATIENT
Start: 2022-06-08 | End: 2022-06-11

## 2022-06-08 RX ORDER — METOCLOPRAMIDE HYDROCHLORIDE 5 MG/ML
10 INJECTION INTRAMUSCULAR; INTRAVENOUS
Status: DISCONTINUED | OUTPATIENT
Start: 2022-06-08 | End: 2022-06-08 | Stop reason: HOSPADM

## 2022-06-08 RX ORDER — MIDAZOLAM HYDROCHLORIDE 1 MG/ML
2 INJECTION INTRAMUSCULAR; INTRAVENOUS
Status: DISCONTINUED | OUTPATIENT
Start: 2022-06-08 | End: 2022-06-08 | Stop reason: HOSPADM

## 2022-06-08 RX ORDER — FAMOTIDINE 20 MG/1
20 TABLET, FILM COATED ORAL ONCE
Status: DISCONTINUED | OUTPATIENT
Start: 2022-06-08 | End: 2022-06-08 | Stop reason: HOSPADM

## 2022-06-08 RX ORDER — SODIUM CHLORIDE 9 MG/ML
INJECTION, SOLUTION INTRAVENOUS PRN
Status: DISCONTINUED | OUTPATIENT
Start: 2022-06-08 | End: 2022-06-08 | Stop reason: HOSPADM

## 2022-06-08 RX ORDER — ROPIVACAINE HYDROCHLORIDE 5 MG/ML
INJECTION, SOLUTION EPIDURAL; INFILTRATION; PERINEURAL
Status: COMPLETED | OUTPATIENT
Start: 2022-06-08 | End: 2022-06-08

## 2022-06-08 RX ORDER — PROPOFOL 10 MG/ML
INJECTION, EMULSION INTRAVENOUS PRN
Status: DISCONTINUED | OUTPATIENT
Start: 2022-06-08 | End: 2022-06-08 | Stop reason: SDUPTHER

## 2022-06-08 RX ORDER — MORPHINE SULFATE 4 MG/ML
4 INJECTION, SOLUTION INTRAMUSCULAR; INTRAVENOUS EVERY 5 MIN PRN
Status: CANCELLED | OUTPATIENT
Start: 2022-06-08

## 2022-06-08 RX ORDER — ROPIVACAINE HYDROCHLORIDE 5 MG/ML
INJECTION, SOLUTION EPIDURAL; INFILTRATION; PERINEURAL
Status: DISCONTINUED
Start: 2022-06-08 | End: 2022-06-08 | Stop reason: HOSPADM

## 2022-06-08 RX ORDER — DIPHENHYDRAMINE HYDROCHLORIDE 50 MG/ML
12.5 INJECTION INTRAMUSCULAR; INTRAVENOUS
Status: DISCONTINUED | OUTPATIENT
Start: 2022-06-08 | End: 2022-06-08 | Stop reason: HOSPADM

## 2022-06-08 RX ORDER — SODIUM CHLORIDE 0.9 % (FLUSH) 0.9 %
5-40 SYRINGE (ML) INJECTION EVERY 12 HOURS SCHEDULED
Status: DISCONTINUED | OUTPATIENT
Start: 2022-06-08 | End: 2022-06-08 | Stop reason: HOSPADM

## 2022-06-08 RX ORDER — MORPHINE SULFATE 2 MG/ML
2 INJECTION, SOLUTION INTRAMUSCULAR; INTRAVENOUS EVERY 5 MIN PRN
Status: CANCELLED | OUTPATIENT
Start: 2022-06-08

## 2022-06-08 RX ORDER — SODIUM CHLORIDE, SODIUM LACTATE, POTASSIUM CHLORIDE, CALCIUM CHLORIDE 600; 310; 30; 20 MG/100ML; MG/100ML; MG/100ML; MG/100ML
INJECTION, SOLUTION INTRAVENOUS CONTINUOUS
Status: DISCONTINUED | OUTPATIENT
Start: 2022-06-08 | End: 2022-06-08 | Stop reason: HOSPADM

## 2022-06-08 RX ORDER — LIDOCAINE HYDROCHLORIDE 10 MG/ML
1 INJECTION, SOLUTION EPIDURAL; INFILTRATION; INTRACAUDAL; PERINEURAL
Status: DISCONTINUED | OUTPATIENT
Start: 2022-06-08 | End: 2022-06-08 | Stop reason: HOSPADM

## 2022-06-08 RX ORDER — MIDAZOLAM HYDROCHLORIDE 1 MG/ML
INJECTION INTRAMUSCULAR; INTRAVENOUS
Status: COMPLETED
Start: 2022-06-08 | End: 2022-06-08

## 2022-06-08 RX ORDER — SCOLOPAMINE TRANSDERMAL SYSTEM 1 MG/1
1 PATCH, EXTENDED RELEASE TRANSDERMAL
Status: DISCONTINUED | OUTPATIENT
Start: 2022-06-08 | End: 2022-06-08 | Stop reason: HOSPADM

## 2022-06-08 RX ORDER — MIDAZOLAM HYDROCHLORIDE 1 MG/ML
2 INJECTION INTRAMUSCULAR; INTRAVENOUS ONCE
Status: DISCONTINUED | OUTPATIENT
Start: 2022-06-08 | End: 2022-06-08 | Stop reason: HOSPADM

## 2022-06-08 RX ADMIN — MIDAZOLAM HYDROCHLORIDE 2 MG: 1 INJECTION, SOLUTION INTRAMUSCULAR; INTRAVENOUS at 06:50

## 2022-06-08 RX ADMIN — PROPOFOL 20 MG: 10 INJECTION, EMULSION INTRAVENOUS at 07:09

## 2022-06-08 RX ADMIN — PROPOFOL 30 MG: 10 INJECTION, EMULSION INTRAVENOUS at 07:07

## 2022-06-08 RX ADMIN — LIDOCAINE HYDROCHLORIDE 50 MG: 10 INJECTION, SOLUTION INFILTRATION; PERINEURAL at 07:07

## 2022-06-08 RX ADMIN — PROPOFOL 20 MG: 10 INJECTION, EMULSION INTRAVENOUS at 07:10

## 2022-06-08 RX ADMIN — SODIUM CHLORIDE, POTASSIUM CHLORIDE, SODIUM LACTATE AND CALCIUM CHLORIDE: 600; 310; 30; 20 INJECTION, SOLUTION INTRAVENOUS at 06:15

## 2022-06-08 RX ADMIN — ROPIVACAINE HYDROCHLORIDE 20 ML: 5 INJECTION, SOLUTION EPIDURAL; INFILTRATION; PERINEURAL at 06:47

## 2022-06-08 RX ADMIN — MIDAZOLAM HYDROCHLORIDE 2 MG: 1 INJECTION INTRAMUSCULAR; INTRAVENOUS at 06:50

## 2022-06-08 ASSESSMENT — LIFESTYLE VARIABLES: SMOKING_STATUS: 0

## 2022-06-08 ASSESSMENT — ENCOUNTER SYMPTOMS: SHORTNESS OF BREATH: 0

## 2022-06-08 ASSESSMENT — PAIN - FUNCTIONAL ASSESSMENT: PAIN_FUNCTIONAL_ASSESSMENT: 0-10

## 2022-06-08 NOTE — ANESTHESIA POSTPROCEDURE EVALUATION
Department of Anesthesiology  Postprocedure Note    Patient: Emmy Carroll  MRN: [id number]  YOB: 1970  Date of evaluation: 6/8/2022  Time:  7:15 AM     Procedure Summary     Date: 06/08/22 Room / Location: PACU [address]    Anesthesia Start: 2346 Anesthesia Stop:     Procedures:       RIGHT SHOULDER MANIPULATION (Right )      NINFA INJECTION (Right ) Diagnosis:       Adhesive bursitis of right shoulder      (M75.01)    Surgeons: Gray Lo MD Responsible Provider: NICANOR Conrad CRNA    Anesthesia Type: general, regional ASA Status: 3          Anesthesia Type: No value filed. Kristopher Phase I: Kristopher Score: 10    Kristopher Phase II:      Last vitals: Reviewed and per EMR flowsheets. Anesthesia Post Evaluation    Patient location during evaluation: PACU  Patient participation: waiting for patient participation  Level of consciousness: awake and lethargic  Pain score: 0  Airway patency: patent  Nausea & Vomiting: no nausea and no vomiting  Complications: no  Cardiovascular status: blood pressure returned to baseline  Respiratory status: acceptable  Hydration status: euvolemic  Comments: Pt transported with oxygen.   Report to RN

## 2022-06-08 NOTE — ANESTHESIA PRE PROCEDURE
Department of Anesthesiology  Preprocedure Note       Name:  Ric Rendon   Age:  50 y.o.  :  1970                                          MRN:  [id number]         Date:  2022      Surgeon: Coy Jin):  Edelmira Dixon MD    Procedure: Procedure(s):  RIGHT SHOULDER MANIPULATION  NINFA INJECTION    Medications prior to admission:   Prior to Admission medications    Medication Sig Start Date End Date Taking?  Authorizing Provider   insulin lispro (INSULIN LISPRO) 100 UNIT/ML SOLN injection vial Inject into the skin 3 times daily (before meals)    Historical Provider, MD   topiramate (TOPAMAX) 50 MG tablet TAKE 3 TABLETS BY MOUTH EVERY NIGHT AT BEDTIME  Patient taking differently: Take 100 mg by mouth nightly  22   Claudia Valdez MD   levothyroxine (SYNTHROID) 137 MCG tablet Take 1 tablet by mouth Daily TAKE 1 TABLET BY MOUTH DAILY 22  Claudia Valdez MD   DULoxetine (CYMBALTA) 60 MG extended release capsule Take 1 capsule by mouth at bedtime 22  Claudia Valdez MD   DULoxetine (CYMBALTA) 30 MG extended release capsule Take 1 capsule by mouth every morning 22  Claudia Valdez MD   atorvastatin (LIPITOR) 20 MG tablet TAKE 1 TABLET BY MOUTH DAILY 22  Claudia Valdez MD   quinapril (ACCUPRIL) 5 MG tablet TAKE 1 TABLET BY MOUTH DAILY  Patient taking differently: Take 5 mg by mouth nightly  5/3/22 5/3/23  Claudia Valdez MD   azelastine (ASTELIN) 0.1 % nasal spray 1 spray by Nasal route 2 times daily Use in each nostril as directed  Patient taking differently: 1 spray by Nasal route 2 times daily as needed Use in each nostril as directed 22   Claudia Valdez MD   spironolactone (ALDACTONE) 50 MG tablet TAKE 1 TABLET BY MOUTH DAILY 22  Claudia Valdez MD   cyclobenzaprine (FLEXERIL) 10 MG tablet TAKE 1 TABLET BY MOUTH EVERY NIGHT AS NEEDED FOR MUSCLE SPASMS  Patient taking differently: Take 10 mg by mouth 2 times daily as needed  4/6/22   Mimi Masters MD   traZODone (DESYREL) 100 MG tablet TAKE 1 TABLET BY MOUTH EVERY NIGHT  Patient taking differently: Take 100 mg by mouth nightly TAKE 1 TABLET BY MOUTH EVERY NIGHT 2/7/22   Claudia Valdez MD   fexofenadine (ALLEGRA) 180 MG tablet Take 1 tablet by mouth daily 1/18/22   Claudia Valdez MD   famotidine (PEPCID) 20 MG tablet TAKE 1 TABLET BY MOUTH TWICE DAILY  Patient taking differently: Take 20 mg by mouth 2 times daily  12/23/21   Claudia Valdez MD   lubiprostone (AMITIZA) 24 MCG capsule Take 1 capsule by mouth 2 times daily (with meals) 12/14/21   NICANOR Russo   blood glucose test strips (ACCU-CHEK GAVI PLUS) strip USE TO TEST ONCE DAILY 12/7/21   Claudia Valdez MD   omeprazole (PRILOSEC) 20 MG delayed release capsule Take 1 capsule by mouth Daily Take first thing in the morning on an empty stomach. 12/7/21 4/26/22  NICANOR Russo   Insulin Degludec (TRESIBA FLEXTOUCH) 200 UNIT/ML SOPN 60 units each morning not taking at all during the night. 7/27/21   Claudia Valdez MD   clonazePAM (KLONOPIN) 0.5 MG tablet TAKE 1 TABLET BY MOUTH DAILY AS NEEDED. Patient taking differently: Take 0.5 mg by mouth 2 times daily as needed. TAKE 1 TABLET BY MOUTH DAILY AS NEEDED. 5/17/21 7/3/22  Claudia Valdez MD   Onabotulinumtoxin A (BOTOX) 200 units injection 155 units IM in Cervical and Facial Region every 90 days      Please fax all correspondence to [phone]  Please call [phone] with any questions.  1/25/21   Johnson Clay MD   polyethylene glycol [facility]) powder Take 17 g by mouth 2 times daily 10/21/19   Historical Provider, MD   vitamin B-6 (PYRIDOXINE) 100 MG tablet Take 100 mg by mouth daily    Historical Provider, MD   Insulin Pen Needle 32G X 4 MM MISC 1 each by Does not apply route daily 4/4/19   Murray Saenz, NICANOR   Insulin Syringe-Needle U-100 30G X 5/16\" 0.5 ML MISC 1 each by Does not apply route daily 4/4/19   NICANOR Isaac   ASPIRIN LOW DOSE 81 MG EC tablet TAKE 1 TABLET BY MOUTH DAILY  Patient taking differently: Take 81 mg by mouth daily  2/12/16   Melba Bustillos MD       Current medications:    Current Facility-Administered Medications   Medication Dose Route Frequency Provider Last Rate Last Admin    lactated ringers infusion   IntraVENous Continuous Ninoska Pace  mL/hr at 06/08/22 0626 NoRateChange at 06/08/22 0626    midazolam (VERSED) injection 2 mg  2 mg IntraVENous Once Ninoska Pace DO        midazolam (VERSED) 2 MG/2ML injection             ropivacaine (NAROPIN) 0.5% injection             betamethasone acetate-betamethasone sodium phosphate (CELESTONE) injection    PRN José Rubio MD   6 mg at 06/08/22 0710    bupivacaine (MARCAINE) 0.5 % injection    PRN José Rubio MD   5 mL at 06/08/22 0715       Allergies:     Allergies   Allergen Reactions    Claritin-D 12 Hour [Loratadine-Pseudoephedrine Er] Other (See Comments)     \"it intensifies my migraines\"    Demerol Hcl [Meperidine] Other (See Comments)     Aggression    Percocet [Oxycodone-Acetaminophen] Rash       Problem List:    Patient Active Problem List   Diagnosis Code    Hypothyroidism E03.9    Vitamin D deficiency E55.9    Essential hypertension I10    Mixed hyperlipidemia E78.2    Carpal tunnel syndrome on right G56.01    Hypoesthesia of skin R20.1    Type 2 diabetes mellitus without complication, with long-term current use of insulin (HCC) E11.9, Z79.4    Skin infection L08.9    Chronic constipation K59.09    GERD without esophagitis K21.9    Family history of polyps in the colon Z83.71    Neuropathy G62.9    Polypharmacy Z79.899    Superficial mixed comedonal and inflammatory acne vulgaris L70.0    MRSA (methicillin resistant staph aureus) culture positive Z22.322    Anxiety F41.9    Class 2 obesity due to excess calories without serious comorbidity with body mass index (BMI) of 36.0 to 36.9 in adult E66.09, Z68.36    Migraine with aura G43. 109    Obstructive sleep apnea G47.33    Neck and shoulder pain M54.2, M25.519    Dysphagia R13.10    Hyponatremia E87.1    Primary insomnia F51.01    Chronic frontal sinusitis J32.1    Chronic idiopathic constipation K59.04    Irritable bowel syndrome with constipation K58.1    Sinus congestion R09.81       Past Medical History:        Diagnosis Date    Anxiety     Bacteremia 10/17/2019    Bandemia 10/16/2019    Class 2 obesity due to excess calories without serious comorbidity with body mass index (BMI) of 36.0 to 36.9 in adult 12/19/2019    Depression     Diabetes mellitus ([address]     GERD (gastroesophageal reflux disease)     Heartburn 10/28/2020    Hyperglycemia 8/16/2017    Hyperlipidemia     Hypertension     Hypothyroidism     Lower abdominal pain 1/22/2019    Neuropathy 10/16/2019    Obstructive sleep apnea     no machine    Osteoarthritis     Skin ulcer of flank with fat layer exposed ([address] 1/8/2020    Staph infection 11/11/2018       Past Surgical History:        Procedure Laterality Date    APPENDECTOMY      as a 1st grader   [address], 2003    CHOLECYSTECTOMY, LAPAROSCOPIC      1994    COLONOSCOPY      \"more than 15 + yrs ago\" PER PT RECALL    COLONOSCOPY N/A 7/30/2019    Dr Elvin Barlow hemorrhoids-Grade 1, suboptimal prep, 3 yr recall    HYSTERECTOMY, TOTAL ABDOMINAL (CERVIX REMOVED)      May 2012, withOUT Ophorectomy    [place] REVISE MEDIAN N/CARPAL TUNNEL SURG Left 6/16/2017    CARPAL TUNNEL RELEASE performed by Rafaela Medley MD at [address] N/CARPAL TUNNEL SURG Right 7/21/2017    CARPAL TUNNEL RELEASE performed by Rafaela Medley MD at [address]      as a 5th grader    UPPER GASTROINTESTINAL ENDOSCOPY N/A 7/30/2019    UPPER GASTROINTESTINAL ENDOSCOPY  7/30/2019    Dr Gianfranco Conley exam, empiric dil with 54F over wire, neg EoE       Social History:    Social History     Tobacco Use    Smoking status: Never Smoker    Smokeless tobacco: Never Used   Substance Use Topics    Alcohol use: Not Currently     Alcohol/week: 0.0 standard drinks                                Counseling given: Not Answered      Vital Signs (Current):   Vitals:    06/08/22 0603   BP: 114/69   Pulse: 93   Resp: 20   Temp: 97.2 °F (36.2 °C)   TempSrc: Temporal   SpO2: 100%   Weight: 158 lb (71.7 kg)   Height: 5' 2\" (1.575 m)                                              BP Readings from Last 3 Encounters:   06/08/22 114/69   05/18/22 110/78   04/26/22 110/64       NPO Status: Time of last liquid consumption: 1115                        Time of last solid consumption: 1930                        Date of last liquid consumption: 06/07/22                        Date of last solid food consumption: 06/07/22    BMI:   Wt Readings from Last 3 Encounters:   06/08/22 158 lb (71.7 kg)   06/02/22 158 lb (71.7 kg)   05/18/22 166 lb 9.6 oz (75.6 kg)     Body mass index is 28.9 kg/m².     CBC:   Lab Results   Component Value Date    WBC 11.0 05/11/2022    RBC 4.04 05/11/2022    HGB 11.4 05/11/2022    HCT 35.0 05/11/2022    HCT 39.4 02/20/2011    MCV 86.6 05/11/2022    RDW 13.5 05/11/2022     05/11/2022     02/20/2011       CMP:   Lab Results   Component Value Date     05/11/2022     02/20/2011    K 4.5 05/11/2022    K 4.4 01/30/2022    K 4.2 02/20/2011     05/11/2022     02/20/2011    CO2 18 05/11/2022    BUN 15 05/11/2022    CREATININE 0.8 05/11/2022    CREATININE 0.6 02/20/2011    GFRAA >59 05/11/2022    LABGLOM >60 05/11/2022    GLUCOSE 281 05/11/2022    PROT 6.7 05/11/2022    PROT 7.9 02/20/2011    CALCIUM 8.5 05/11/2022    BILITOT 0.3 05/11/2022    ALKPHOS 85 05/11/2022    ALKPHOS 59 02/20/2011    AST 20 05/11/2022    ALT 14 05/11/2022       POC Tests:   Recent Labs     06/08/22  0608   POCGLU 113*       Coags: No results found for: PROTIME, INR, APTT    HCG (If Applicable):   Lab Results   Component Value Date    PREGTESTUR Negative 10/16/2019        ABGs: No results found for: PHART, PO2ART, RBO2YRQ, GZC2QLV, BEART, F9HTOGAL     Type & Screen (If Applicable):  No results found for: LABABO, LABRH    Drug/Infectious Status (If Applicable):  No results found for: HIV, HEPCAB    COVID-19 Screening (If Applicable):   Lab Results   Component Value Date    COVID19 Not Detected 01/31/2022    COVID19 Not Detected 01/30/2022           Anesthesia Evaluation  Patient summary reviewed and Nursing notes reviewed no history of anesthetic complications:   Airway: Mallampati: II  TM distance: >3 FB   Neck ROM: full  Mouth opening: > = 3 FB   Dental: normal exam   (+) other      Pulmonary:Negative Pulmonary ROS and normal exam  breath sounds clear to auscultation  (+) sleep apnea:      (-) shortness of breath and not a current smoker          Patient did not smoke on day of surgery. Cardiovascular:    (+) hypertension:,     (-) CAD,  angina and  CHF    NYHA Classification: I  ECG reviewed  Rhythm: regular  Rate: normal           Beta Blocker:  Not on Beta Blocker         Neuro/Psych:   Negative Neuro/Psych ROS  (+) neuromuscular disease:, headaches:, psychiatric history:depression/anxiety    (-) seizures and CVA           GI/Hepatic/Renal: Neg GI/Hepatic/Renal ROS       (-) hiatal hernia and GERD       Endo/Other: Negative Endo/Other ROS   (+) DiabetesType II DM, , hypothyroidism::., .          Pt had PAT visit. Abdominal:       Abdomen: soft. Vascular: Other Findings:           Anesthesia Plan      general and regional     ASA 3     (Iv zofran within 30 min of closing )  Induction: intravenous. BIS  MIPS: Postoperative opioids intended and Prophylactic antiemetics administered. Anesthetic plan and risks discussed with patient. Use of blood products discussed with patient whom.    Plan discussed with CRNA.    Attending anesthesiologist reviewed and agrees with Pre Eval content                Adams Chan MD   6/8/2022

## 2022-06-08 NOTE — BRIEF OP NOTE
Brief Postoperative Note      Patient: Ana Olivas  YOB: 1970  MRN: [id number]    Date of Procedure: 6/8/2022    Pre-Op Diagnosis: M75.01    Post-Op Diagnosis: Same       Procedure(s):  RIGHT SHOULDER MANIPULATION  NINFA INJECTION    Surgeon(s):  Kayla Diez MD    Assistant:  * No surgical staff found *    Anesthesia: Choice    Estimated Blood Loss (mL): Minimal    Complications: None    Specimens:   * No specimens in log *    Implants:  * No implants in log *      Drains: * No LDAs found *    Findings:     Electronically signed by Kayla Diez MD on 6/8/2022 at 7:18 AM

## 2022-06-08 NOTE — H&P
[facility] ([facility]) Pre-Operative History and Physical    Patient Name: Kerry Rouse  : 1970    /69   Pulse 93   Temp 97.2 °F (36.2 °C) (Temporal)   Resp 20   Ht 5' 2\" (1.575 m)   Wt 158 lb (71.7 kg)   LMP  (LMP Unknown)   SpO2 100%   BMI 28.90 kg/m²     Pre-Operative Diagnosis:  dhesive capsulitis R shoulder    Proposed Surgical Procedure:   shoulder manipulation      Past Medical Hisotry:       Diagnosis Date    Anxiety     Bacteremia 10/17/2019    Bandemia 10/16/2019    Class 2 obesity due to excess calories without serious comorbidity with body mass index (BMI) of 36.0 to 36.9 in adult 2019    Depression     Diabetes mellitus ([address]     GERD (gastroesophageal reflux disease)     Heartburn 10/28/2020    Hyperglycemia 2017    Hyperlipidemia     Hypertension     Hypothyroidism     Lower abdominal pain 2019    Neuropathy 10/16/2019    Obstructive sleep apnea     no machine    Osteoarthritis     Skin ulcer of flank with fat layer exposed ([address] 2020    Staph infection 2018     Past Surgical History:       Procedure Laterality Date    APPENDECTOMY      as a 1st grader   [address] ,     CHOLECYSTECTOMY, LAPAROSCOPIC          COLONOSCOPY      \"more than 15 + yrs ago\" PER PT RECALL    COLONOSCOPY N/A 2019    Dr Golden Silence hemorrhoids-Grade 1, suboptimal prep, 3 yr recall    HYSTERECTOMY, TOTAL ABDOMINAL (CERVIX REMOVED)      May 2012, withOUT Ophorectomy    [place] REVISE MEDIAN N/CARPAL TUNNEL SURG Left 2017    CARPAL TUNNEL RELEASE performed by Keeley Ricardo MD at [address] N/CARPAL TUNNEL SURG Right 2017    CARPAL TUNNEL RELEASE performed by Keeley Ricardo MD at [address]      as a 7th grader    UPPER GASTROINTESTINAL ENDOSCOPY N/A 2019    UPPER GASTROINTESTINAL ENDOSCOPY  2019    Dr Emiliano Mata exam, empiric dil with 54F over wire, neg EoE       Medications:   Prior to Admission medications    Medication Sig Start Date End Date Taking?  Authorizing Provider   insulin lispro (INSULIN LISPRO) 100 UNIT/ML SOLN injection vial Inject into the skin 3 times daily (before meals)    Historical Provider, MD   topiramate (TOPAMAX) 50 MG tablet TAKE 3 TABLETS BY MOUTH EVERY NIGHT AT BEDTIME  Patient taking differently: Take 100 mg by mouth nightly  5/18/22   Claudia Valdez MD   levothyroxine (SYNTHROID) 137 MCG tablet Take 1 tablet by mouth Daily TAKE 1 TABLET BY MOUTH DAILY 5/18/22 8/16/22  Claudia Valdez MD   DULoxetine (CYMBALTA) 60 MG extended release capsule Take 1 capsule by mouth at bedtime 5/18/22 6/17/22  Claudia Valdez MD   DULoxetine (CYMBALTA) 30 MG extended release capsule Take 1 capsule by mouth every morning 5/18/22 6/17/22  Claudia Valdez MD   atorvastatin (LIPITOR) 20 MG tablet TAKE 1 TABLET BY MOUTH DAILY 5/6/22 6/5/22  Claudia Valdez MD   quinapril (ACCUPRIL) 5 MG tablet TAKE 1 TABLET BY MOUTH DAILY  Patient taking differently: Take 5 mg by mouth nightly  5/3/22 5/3/23  Claudia Valdez MD   azelastine (ASTELIN) 0.1 % nasal spray 1 spray by Nasal route 2 times daily Use in each nostril as directed  Patient taking differently: 1 spray by Nasal route 2 times daily as needed Use in each nostril as directed 4/26/22   Claudia Valdez MD   spironolactone (ALDACTONE) 50 MG tablet TAKE 1 TABLET BY MOUTH DAILY 4/6/22 7/5/22  Claudia Valdez MD   cyclobenzaprine (FLEXERIL) 10 MG tablet TAKE 1 TABLET BY MOUTH EVERY NIGHT AS NEEDED FOR MUSCLE SPASMS  Patient taking differently: Take 10 mg by mouth 2 times daily as needed  4/6/22   Claudia Valdez MD   traZODone (DESYREL) 100 MG tablet TAKE 1 TABLET BY MOUTH EVERY NIGHT  Patient taking differently: Take 100 mg by mouth nightly TAKE 1 TABLET BY MOUTH EVERY NIGHT 2/7/22   Claudia Valdez MD   fexofenadine (ALLEGRA) 180 MG tablet Take [oxycodone-acetaminophen]    Social History:   Tobacco:  reports that she has never smoked. She has never used smokeless tobacco.   Alcohol:  reports previous alcohol use. Review of Systems:  General: Denies any fever or chills  EYES: Denies any diplopia  ENT: Tinnitus or vertigo  Resp: Denies any shortness of breath, cough or wheezing  Cardiac: Denies any chest pain, palpitations, claudication or edema  GI: Denies any melena, hematochezia, hematemesis or pyrosis  : Denies any frequency, urgency, hesitancy or incontinence  Musculoskeletal: Denies back pain, joint pain, myalgias  Heme: Denies bruising or bleeding easily  Endocrine: Denies any history of diabetes or thyroid disease  Psych: Denies anxiety or depression  Neuro: Denies any focal motor or sensory deficits    NEUROLOGIC: CN II-XI grossly intact, no motor or sensory deficits   PSYCH: mood and affect are normal with a normal rate and tone of speech    Physical Exam:  Vitals: /69   Pulse 93   Temp 97.2 °F (36.2 °C) (Temporal)   Resp 20   Ht 5' 2\" (1.575 m)   Wt 158 lb (71.7 kg)   LMP  (LMP Unknown)   SpO2 100%   BMI 28.90 kg/m²   CONSTITUTIONAL: Alert, appropriate, no acute distress. EYES: Non icteric, EOM intact, pupils equal round and reactive to light  ENT: Mucus membranes moist, no oral pharyngeal lesions, nares patent   NECK: Supple, no masses, no JVD, trachea mid line   CHEST/LUNGS: CTA bilaterally, normal respiratory effort   CARDIOVASCULAR: RRR, no murmurs,  2+ DP and radial pulses bilaterally  ABDOMEN: soft, nontender  EXTREMITIES: warm, well perfused, no edema   SKIN: warm, dry with no rashes or lesions  LYMPH: No cervical or inguinal lymphadenopathy    General Appearance: Patient is well nourished, well developed    HEENT: Normal    CARDIOVASCULAR: Normal S1 and S2.  Regular rhythm. No murmurs, gallops, or rubs. PULMONARY: Normal.    GASTROINTESTINAL: Soft, non-tender, normal bowel sounds.   No bruits, organomegaly or masses.     EXTREMITIES: positive exam findings: lateral joint line tenderness, tender over tibial tubercle, ecchymosis noted entire limb and ROM limited to approximately 80 degrees    MUSCULOSKELETAL: Tenderness over right hip(s)    NEUROLOGICAL: gait and coordination normal or speech normal    Diagnostic Studies:      Labs: CBC with Differential:    Lab Results   Component Value Date    WBC 11.0 05/11/2022    RBC 4.04 05/11/2022    HGB 11.4 05/11/2022    HCT 35.0 05/11/2022    HCT 39.4 02/20/2011     05/11/2022     02/20/2011    MCV 86.6 05/11/2022    MCH 28.2 05/11/2022    MCHC 32.6 05/11/2022    RDW 13.5 05/11/2022    BANDSPCT 11 10/15/2019    LYMPHOPCT 43.0 01/30/2022    MONOPCT 1.0 01/30/2022    EOSPCT 2.9 02/20/2011    BASOPCT 0.0 01/30/2022    MONOSABS 0.10 01/30/2022    LYMPHSABS 6.0 01/30/2022    EOSABS 0.41 01/30/2022    BASOSABS 0.00 01/30/2022     BMP:    Lab Results   Component Value Date     05/11/2022     02/20/2011    K 4.5 05/11/2022    K 4.4 01/30/2022    K 4.2 02/20/2011     05/11/2022     02/20/2011    CO2 18 05/11/2022    BUN 15 05/11/2022    LABALBU 3.9 05/11/2022    LABALBU 4.6 02/20/2011    CREATININE 0.8 05/11/2022    CREATININE 0.6 02/20/2011    CALCIUM 8.5 05/11/2022    GFRAA >59 05/11/2022    LABGLOM >60 05/11/2022    GLUCOSE 281 05/11/2022     Sodium:    Lab Results   Component Value Date     05/11/2022     02/20/2011     Potassium:    Lab Results   Component Value Date    K 4.5 05/11/2022    K 4.4 01/30/2022    K 4.2 02/20/2011     BUN/Creatinine:    Lab Results   Component Value Date    BUN 15 05/11/2022    CREATININE 0.8 05/11/2022    CREATININE 0.6 02/20/2011     PT/INR:  No results found for: PROTIME, INR  PTT:  No results found for: APTT, PTT[APTT}  U/A:    Lab Results   Component Value Date    NITRITE - 09/30/2019    COLORU YELLOW 01/31/2022    PHUR 6.5 01/31/2022    WBCUA 3 01/31/2022    RBCUA 0 01/31/2022    YEAST Present 06/12/2020    BACTERIA NEGATIVE 01/31/2022    CLARITYU Clear 01/31/2022    SPECGRAV 1.006 01/31/2022    LEUKOCYTESUR TRACE 01/31/2022    UROBILINOGEN 1.0 01/31/2022    BILIRUBINUR Negative 01/31/2022    BILIRUBINUR - 09/30/2019    BLOODU Negative 01/31/2022    GLUCOSEU Negative 01/31/2022     HgBA1c:  No components found for: HGBA1C        PLAN:  shoulder manipulation      Electronically signed by Sarita Cruz MD on 6/8/2022 at 6:49 AM

## 2022-06-08 NOTE — ANESTHESIA PROCEDURE NOTES
Peripheral Block    Patient location during procedure: holding area  Start time: 6/8/2022 6:47 AM  End time: 6/8/2022 6:47 AM  Staffing  Performed: anesthesiologist   Anesthesiologist: Diogenes Avery MD  Preanesthetic Checklist  Completed: patient identified, IV checked, site marked, risks and benefits discussed, surgical/procedural consents, equipment checked, pre-op evaluation, timeout performed, anesthesia consent given, oxygen available and monitors applied/VS acknowledged  Peripheral Block  Patient position: supine  Prep: ChloraPrep  Patient monitoring: cardiac monitor, continuous pulse ox, frequent blood pressure checks and IV access  Block type: Brachial plexus  Laterality: right  Injection technique: single-shot  Guidance: ultrasound guided  Interscalene  Provider prep: sterile gloves and mask  Needle  Needle type: short-bevel   Needle gauge: 22 G  Needle length: 5 cm  Needle localization: ultrasound guidance  Test dose: negative  Assessment  Injection assessment: negative aspiration for heme, no paresthesia on injection and local visualized surrounding nerve on ultrasound  Slow fractionated injection: yes  Hemodynamics: stable  Additional Notes  Needle was introduced aprroximately the C5-C6 region and using a inplane imaging technique the needle was directed thru the middle scalene muscle and brought to bear adjacent to the brachial plexus. Needle advancement was under direct vision at all times . Local Anesthesia was injected and all vascular structures were avoided. The local anesthetic hydrodissected in a perineural fashion. Ropivicaine 0.5% injected in divided doses, total of 20 cc injected. The plexus appeared anatomically normal and no obvious pathology was noted.    Medications Administered  Ropivacaine (NAROPIN) injection 0.5%, 20 mL  Reason for block: post-op pain management

## 2022-06-08 NOTE — PROGRESS NOTES
CLINICAL PHARMACY NOTE: MEDS TO BEDS    Total # of Prescriptions Filled: 1   The following medications were delivered to the patient:  · Hydromorphone 2mg    Additional Documentation:  The patient paid with a credit card on the [employer] machine, the medications were handed to the patients daughter in the patients room in [facility].

## 2022-06-15 ENCOUNTER — CLINICAL SUPPORT (OUTPATIENT)
Dept: OTOLARYNGOLOGY | Facility: CLINIC | Age: 52
End: 2022-06-15

## 2022-06-15 DIAGNOSIS — J30.9 ALLERGIC RHINITIS, UNSPECIFIED SEASONALITY, UNSPECIFIED TRIGGER: ICD-10-CM

## 2022-06-15 PROCEDURE — 95115 IMMUNOTHERAPY ONE INJECTION: CPT | Performed by: EMERGENCY MEDICINE

## 2022-06-15 NOTE — PROGRESS NOTES
Lexi A Peek, RN   Allergy Injection Note:    Yon Ochoa presents for an immunotherapy injection. The site of the injection was cleansed with an alcohol swab. Serum was injected into the site after pulling back on the plunger to prevent intravascular injection. After the injection and was instructed to wait in the allergy waiting area for 30 minutes. There was no problems with the injection.    Allergy Shot Questionnaire  Injection nurse/assistant: Lexi Arceo RN  Have you had increased asthma symptoms in past week?: no  Have you had increased allergy symptoms in the last week?: no  Have you had a cold, respiratory tract infection or flu like symptoms in the past 2 weeks?: no  Did you have any problems within 12 hours of the last injection?: no  Are you on any new medications/ eye drops?: no  Are you on any beta blockers?: no  If female, are you pregnant?: no  I have confirmed the name and birth date on my allergy vial. : yes  Epipen available?: yes  Epipen Lot Number: [id number]  Epipen Expiration Date: 8/2023  Number of allergy injections given: 1     Injection Details:  Vial 1   Vial 1 Expiration Date: 08/13/22  Vial 1 Series: 2  Shot type: escalation  Vial 1 Dose (mL): 0.3  Vial 1 Location: Right upper arm  Vial 1 Reaction (in mm): <5          Lexi Arceo RN  6/15/2022  12:59 CDT

## 2022-06-22 NOTE — PROGRESS NOTES
I have reviewed the notes, assessments, and/or procedures performed by Lexi Arceo, I concur with her/his documentation of Yon Ochoa.

## 2022-06-29 ENCOUNTER — CLINICAL SUPPORT (OUTPATIENT)
Dept: OTOLARYNGOLOGY | Facility: CLINIC | Age: 52
End: 2022-06-29

## 2022-06-29 DIAGNOSIS — J30.2 SEASONAL ALLERGIC RHINITIS, UNSPECIFIED TRIGGER: ICD-10-CM

## 2022-06-29 PROCEDURE — 95115 IMMUNOTHERAPY ONE INJECTION: CPT | Performed by: NURSE PRACTITIONER

## 2022-06-29 NOTE — PROGRESS NOTES
Moise Flores   Allergy Injection Note:    Yon Ochoa presents for an immunotherapy injection. The site of the injection was cleansed with an alcohol swab. Serum was injected into the site after pulling back on the plunger to prevent intravascular injection. After the injection and was instructed to wait in the allergy waiting area for 30 minutes. There was no problems with the injection.    Allergy Shot Questionnaire  Injection nurse/assistant: Moise Flores ST/AT  Have you had increased asthma symptoms in past week?: no  Have you had increased allergy symptoms in the last week?: no  Have you had a cold, respiratory tract infection or flu like symptoms in the past 2 weeks?: no  Did you have any problems within 12 hours of the last injection?: no  Are you on any new medications/ eye drops?: no  Are you on any beta blockers?: no  If female, are you pregnant?: no  I have confirmed the name and birth date on my allergy vial. : yes  Epipen available?: yes  Epipen Lot Number: [id number]  Epipen Expiration Date: 8/2023  Number of allergy injections given: 1     Injection Details:  Vial 1   Vial 1 Expiration Date: 08/13/22  Vial 1 Series: 2  Shot type: escalation  Vial 1 Dose (mL): 0.4  Vial 1 Location: Left upper arm  Vial 1 Reaction (in mm): <5          Moise Flores  6/29/2022  13:14 CDT

## 2022-07-06 ENCOUNTER — CLINICAL SUPPORT (OUTPATIENT)
Dept: OTOLARYNGOLOGY | Facility: CLINIC | Age: 52
End: 2022-07-06

## 2022-07-06 DIAGNOSIS — J30.2 SEASONAL ALLERGIC RHINITIS, UNSPECIFIED TRIGGER: ICD-10-CM

## 2022-07-06 PROCEDURE — 95115 IMMUNOTHERAPY ONE INJECTION: CPT | Performed by: NURSE PRACTITIONER

## 2022-07-06 NOTE — PROGRESS NOTES
Moise Flores   Allergy Injection Note:    Yon Ochoa presents for an immunotherapy injection. The site of the injection was cleansed with an alcohol swab. Serum was injected into the site after pulling back on the plunger to prevent intravascular injection. After the injection and was instructed to wait in the allergy waiting area for 30 minutes. There was no problems with the injection.    Allergy Shot Questionnaire  Injection nurse/assistant: Moise Flores ST/AT  Have you had increased asthma symptoms in past week?: no  Have you had increased allergy symptoms in the last week?: no  Have you had a cold, respiratory tract infection or flu like symptoms in the past 2 weeks?: no  Did you have any problems within 12 hours of the last injection?: no  Are you on any new medications/ eye drops?: no  Are you on any beta blockers?: no  If female, are you pregnant?: no  I have confirmed the name and birth date on my allergy vial. : yes  Epipen available?: yes  Epipen Lot Number: [id number]  Epipen Expiration Date: 8/2023  Number of allergy injections given: 1     Injection Details:  Vial 1   Vial 1 Expiration Date: 08/13/22  Vial 1 Series: 2  Shot type: escalation  Vial 1 Dose (mL): 0.5  Vial 1 Location: Right upper arm  Vial 1 Reaction (in mm): <5          Moise Flores  7/6/2022  13:24 CDT

## 2022-07-13 ENCOUNTER — CLINICAL SUPPORT (OUTPATIENT)
Dept: OTOLARYNGOLOGY | Facility: CLINIC | Age: 52
End: 2022-07-13

## 2022-07-13 DIAGNOSIS — J30.2 SEASONAL ALLERGIC RHINITIS, UNSPECIFIED TRIGGER: ICD-10-CM

## 2022-07-13 PROCEDURE — 95115 IMMUNOTHERAPY ONE INJECTION: CPT | Performed by: NURSE PRACTITIONER

## 2022-07-13 NOTE — PROGRESS NOTES
Moise Flores   Allergy Injection Note:    Yon Ochoa presents for an immunotherapy injection. The site of the injection was cleansed with an alcohol swab. Serum was injected into the site after pulling back on the plunger to prevent intravascular injection. After the injection and was instructed to wait in the allergy waiting area for 30 minutes. There was no problems with the injection.    Allergy Shot Questionnaire  Injection nurse/assistant: Moise Flores ST/AT  Have you had increased asthma symptoms in past week?: no  Have you had increased allergy symptoms in the last week?: no  Have you had a cold, respiratory tract infection or flu like symptoms in the past 2 weeks?: no  Did you have any problems within 12 hours of the last injection?: no  Are you on any new medications/ eye drops?: no  Are you on any beta blockers?: no  If female, are you pregnant?: no  I have confirmed the name and birth date on my allergy vial. : yes  Epipen available?: yes  Epipen Lot Number: [id number]  Epipen Expiration Date: 8/2023  Number of allergy injections given: 1     Injection Details:  Vial 1   Vial 1 Expiration Date: 08/13/22  Vial 1 Series: 2  Shot type: escalation  Vial 1 Dose (mL): 0.5  Vial 1 Location: Left upper arm  Vial 1 Reaction (in mm): <5          Moise Flores  7/13/2022  14:21 CDT

## 2022-07-20 ENCOUNTER — CLINICAL SUPPORT (OUTPATIENT)
Dept: OTOLARYNGOLOGY | Facility: CLINIC | Age: 52
End: 2022-07-20

## 2022-07-20 DIAGNOSIS — J30.2 SEASONAL ALLERGIC RHINITIS, UNSPECIFIED TRIGGER: ICD-10-CM

## 2022-07-20 PROCEDURE — 95115 IMMUNOTHERAPY ONE INJECTION: CPT | Performed by: EMERGENCY MEDICINE

## 2022-07-20 NOTE — PROGRESS NOTES
Moise Flores   Allergy Injection Note:    Yon Ochoa presents for an immunotherapy injection. The site of the injection was cleansed with an alcohol swab. Serum was injected into the site after pulling back on the plunger to prevent intravascular injection. After the injection and was instructed to wait in the allergy waiting area for 30 minutes. There was no problems with the injection.    Allergy Shot Questionnaire  Injection nurse/assistant: Moise Flores ST/AT  Have you had increased asthma symptoms in past week?: no  Have you had increased allergy symptoms in the last week?: no  Have you had a cold, respiratory tract infection or flu like symptoms in the past 2 weeks?: no  Did you have any problems within 12 hours of the last injection?: no  Are you on any new medications/ eye drops?: no  Are you on any beta blockers?: no  If female, are you pregnant?: no  I have confirmed the name and birth date on my allergy vial. : yes  Epipen available?: yes  Epipen Lot Number: [id number]  Epipen Expiration Date: 8/2023  Number of allergy injections given: 1     Injection Details:  Vial 1   Vial 1 Expiration Date: 08/13/22  Vial 1 Series: 2  Shot type: escalation  Vial 1 Dose (mL): 0.5  Vial 1 Location: Right upper arm  Vial 1 Reaction (in mm): <5          Moise Flores  7/20/2022  13:34 CDT

## 2022-07-27 ENCOUNTER — CLINICAL SUPPORT (OUTPATIENT)
Dept: OTOLARYNGOLOGY | Facility: CLINIC | Age: 52
End: 2022-07-27

## 2022-07-27 DIAGNOSIS — J30.2 SEASONAL ALLERGIC RHINITIS, UNSPECIFIED TRIGGER: ICD-10-CM

## 2022-07-27 PROCEDURE — 95115 IMMUNOTHERAPY ONE INJECTION: CPT | Performed by: EMERGENCY MEDICINE

## 2022-07-27 NOTE — PROGRESS NOTES
Moise Flores   Allergy Injection Note:    Yon Ochoa presents for an immunotherapy injection. The site of the injection was cleansed with an alcohol swab. Serum was injected into the site after pulling back on the plunger to prevent intravascular injection. After the injection and was instructed to wait in the allergy waiting area for 30 minutes. There was no problems with the injection.    Allergy Shot Questionnaire  Injection nurse/assistant: Moise Flores ST/AT  Have you had increased asthma symptoms in past week?: no  Have you had increased allergy symptoms in the last week?: no  Have you had a cold, respiratory tract infection or flu like symptoms in the past 2 weeks?: no  Did you have any problems within 12 hours of the last injection?: no  Are you on any new medications/ eye drops?: no  Are you on any beta blockers?: no  If female, are you pregnant?: no  I have confirmed the name and birth date on my allergy vial. : yes  Epipen available?: yes  Epipen Lot Number: [id number]  Epipen Expiration Date: 8/2023  Number of allergy injections given: 1     Injection Details:  Vial 1   Vial 1 Expiration Date: 08/13/22  Vial 1 Series: 2  Shot type: escalation  Vial 1 Dose (mL): 0.5  Vial 1 Location: Left upper arm  Vial 1 Reaction (in mm): <5          Moise Flores  7/27/2022  13:57 CDT

## 2022-08-03 ENCOUNTER — CLINICAL SUPPORT (OUTPATIENT)
Dept: OTOLARYNGOLOGY | Facility: CLINIC | Age: 52
End: 2022-08-03

## 2022-08-03 DIAGNOSIS — J30.2 SEASONAL ALLERGIC RHINITIS, UNSPECIFIED TRIGGER: ICD-10-CM

## 2022-08-03 DIAGNOSIS — Z76.0 MEDICATION REFILL: ICD-10-CM

## 2022-08-03 PROCEDURE — 95115 IMMUNOTHERAPY ONE INJECTION: CPT | Performed by: EMERGENCY MEDICINE

## 2022-08-03 RX ORDER — TOPIRAMATE 50 MG/1
TABLET, FILM COATED ORAL
Qty: 90 TABLET | Refills: 5 | Status: SHIPPED | OUTPATIENT
Start: 2022-08-03 | End: 2022-09-05 | Stop reason: SDUPTHER

## 2022-08-03 NOTE — PROGRESS NOTES
Moise Flores   Allergy Injection Note:    Yon Ochoa presents for an immunotherapy injection. The site of the injection was cleansed with an alcohol swab. Serum was injected into the site after pulling back on the plunger to prevent intravascular injection. After the injection and was instructed to wait in the allergy waiting area for 30 minutes. There was no problems with the injection.    Allergy Shot Questionnaire  Injection nurse/assistant: Moise Flores ST/AT  Have you had increased asthma symptoms in past week?: no  Have you had increased allergy symptoms in the last week?: no  Have you had a cold, respiratory tract infection or flu like symptoms in the past 2 weeks?: no  Did you have any problems within 12 hours of the last injection?: no  Are you on any new medications/ eye drops?: no  Are you on any beta blockers?: no  If female, are you pregnant?: no  I have confirmed the name and birth date on my allergy vial. : yes  Epipen available?: yes  Epipen Lot Number: [id number]  Epipen Expiration Date: 8/2023  Number of allergy injections given: 1     Injection Details:  Vial 1   Vial 1 Expiration Date: 08/13/22  Vial 1 Series: 2  Shot type: escalation  Vial 1 Dose (mL): 0.5  Vial 1 Location: Right upper arm  Vial 1 Reaction (in mm): <5          Moise Flores  8/3/2022  15:32 CDT

## 2022-08-03 NOTE — TELEPHONE ENCOUNTER
Requested Prescriptions     Pending Prescriptions Disp Refills    topiramate (TOPAMAX) 50 MG tablet 90 tablet 5     Sig: TAKE 3 TABLETS BY MOUTH EVERY NIGHT AT BEDTIME

## 2022-08-04 DIAGNOSIS — E03.9 HYPOTHYROIDISM, UNSPECIFIED TYPE: ICD-10-CM

## 2022-08-04 DIAGNOSIS — F51.01 PRIMARY INSOMNIA: ICD-10-CM

## 2022-08-04 RX ORDER — LEVOTHYROXINE SODIUM 0.12 MG/1
TABLET ORAL
Qty: 90 TABLET | Refills: 1 | OUTPATIENT
Start: 2022-08-04

## 2022-08-04 RX ORDER — TRAZODONE HYDROCHLORIDE 100 MG/1
TABLET ORAL
Qty: 30 TABLET | Refills: 5 | Status: SHIPPED | OUTPATIENT
Start: 2022-08-04

## 2022-08-04 NOTE — TELEPHONE ENCOUNTER
Maki Eon called requesting a refill of the below medication which has been pended for you:     Requested Prescriptions     Pending Prescriptions Disp Refills    traZODone (DESYREL) 100 MG tablet [Pharmacy Med Name: TRAZODONE 100MG TABLETS] 30 tablet 5     Sig: TAKE 1 TABLET BY MOUTH EVERY NIGHT    levothyroxine (SYNTHROID) 125 MCG tablet [Pharmacy Med Name: LEVOTHYROXINE 0.125MG (125MCG) TAB] 90 tablet 1     Sig: TAKE 1 TABLET BY MOUTH DAILY       Last Appointment Date: 5/18/2022  Next Appointment Date: 9/20/2022    Allergies   Allergen Reactions    Claritin-D 12 Hour [Loratadine-Pseudoephedrine Er] Other (See Comments)     \"it intensifies my migraines\"    Demerol Hcl [Meperidine] Other (See Comments)     Aggression    Percocet [Oxycodone-Acetaminophen] Rash

## 2022-08-09 DIAGNOSIS — J30.89 PERENNIAL ALLERGIC RHINITIS: ICD-10-CM

## 2022-08-09 RX ORDER — FEXOFENADINE HCL 180 MG/1
180 TABLET ORAL DAILY
Qty: 90 TABLET | Refills: 3 | Status: SHIPPED | OUTPATIENT
Start: 2022-08-09

## 2022-08-09 NOTE — TELEPHONE ENCOUNTER
Desmond Jacob called requesting a refill of the below medication which has been pended for you:     Requested Prescriptions     Pending Prescriptions Disp Refills    fexofenadine (ALLEGRA) 180 MG tablet 90 tablet 1     Sig: Take 1 tablet by mouth in the morning.        Last Appointment Date: 5/18/2022  Next Appointment Date: Visit date not found    Allergies   Allergen Reactions    Claritin-D 12 Hour [Loratadine-Pseudoephedrine Er] Other (See Comments)     \"it intensifies my migraines\"    Demerol Hcl [Meperidine] Other (See Comments)     Aggression    Percocet [Oxycodone-Acetaminophen] Rash

## 2022-08-10 ENCOUNTER — CLINICAL SUPPORT (OUTPATIENT)
Dept: OTOLARYNGOLOGY | Facility: CLINIC | Age: 52
End: 2022-08-10

## 2022-08-10 DIAGNOSIS — J30.2 SEASONAL ALLERGIC RHINITIS, UNSPECIFIED TRIGGER: ICD-10-CM

## 2022-08-10 PROCEDURE — 95115 IMMUNOTHERAPY ONE INJECTION: CPT | Performed by: EMERGENCY MEDICINE

## 2022-08-10 NOTE — PROGRESS NOTES
Moise Flores   Allergy Injection Note:    Yon Ochoa presents for an immunotherapy injection. The site of the injection was cleansed with an alcohol swab. Serum was injected into the site after pulling back on the plunger to prevent intravascular injection. After the injection and was instructed to wait in the allergy waiting area for 30 minutes. There was no problems with the injection.    Allergy Shot Questionnaire  Injection nurse/assistant: Moise Flores ST/AT  Have you had increased asthma symptoms in past week?: no  Have you had increased allergy symptoms in the last week?: no  Have you had a cold, respiratory tract infection or flu like symptoms in the past 2 weeks?: no  Did you have any problems within 12 hours of the last injection?: no  Are you on any new medications/ eye drops?: no  Are you on any beta blockers?: no  If female, are you pregnant?: no  I have confirmed the name and birth date on my allergy vial. : yes  Epipen available?: yes  Epipen Lot Number: [id number]  Epipen Expiration Date: 8/2023  Number of allergy injections given: 1     Injection Details:  Vial 1   Vial 1 Expiration Date: 08/13/22  Vial 1 Series: 2  Shot type: escalation  Vial 1 Dose (mL): 0.5  Vial 1 Location: Left upper arm  Vial 1 Reaction (in mm): <5          Moise Flores  8/10/2022  14:20 CDT

## 2022-08-12 ENCOUNTER — CLINICAL SUPPORT NO REQUIREMENTS (OUTPATIENT)
Dept: OTOLARYNGOLOGY | Facility: CLINIC | Age: 52
End: 2022-08-12

## 2022-08-12 DIAGNOSIS — J30.9 ALLERGIC RHINITIS, UNSPECIFIED SEASONALITY, UNSPECIFIED TRIGGER: Primary | ICD-10-CM

## 2022-08-12 PROCEDURE — 95165 ANTIGEN THERAPY SERVICES: CPT | Performed by: OTOLARYNGOLOGY

## 2022-08-12 NOTE — PROGRESS NOTES
Moise Flores   Allergy Injection Mix Note    A immunotherapy injection vial was mixed using standard protocol under sterile conditions.     Mixing Nurse/ Tech: Moise Flores ST/AT (08/12/22 1229)    Vial Series: 3    VIAL 1  [place]: Vial 1 mix 0.2 cc of #: concentrate  Mold Mix: Vial 1 mix 0.2 cc of #: 1  Trichophyton: Vial 1 mix 0.2 cc of #: concentrate  Dust Mite Mix: Vial 1 mix 0.2 cc of #: 1  Dog: Vial 1 mix 0.2 cc of #: concentrate  Cat: Vial 1 mix 0.2 cc of #: concentrate  Feather: Vial 1 mix 0.2 cc of #: concentrate  Vial 1 Additions  Antigen Total: 1.4 cc  Glycerine: 0  Dilutent: 3.6 cc  TOTAL: 5           Moise Flores  8/12/2022  12:30 CDT

## 2022-08-15 NOTE — PROGRESS NOTES
I have reviewed the notes, assessments, and/or procedures of this encounter and I concur with the documentation on Yon Ochoa.      Oleg Swan MD  08/15/22  4:33 PM CDT

## 2022-08-17 ENCOUNTER — CLINICAL SUPPORT (OUTPATIENT)
Dept: OTOLARYNGOLOGY | Facility: CLINIC | Age: 52
End: 2022-08-17

## 2022-08-17 DIAGNOSIS — J30.9 ALLERGIC RHINITIS, UNSPECIFIED SEASONALITY, UNSPECIFIED TRIGGER: ICD-10-CM

## 2022-08-17 DIAGNOSIS — E03.9 HYPOTHYROIDISM, UNSPECIFIED TYPE: ICD-10-CM

## 2022-08-17 PROCEDURE — 95115 IMMUNOTHERAPY ONE INJECTION: CPT | Performed by: EMERGENCY MEDICINE

## 2022-08-17 RX ORDER — LEVOTHYROXINE SODIUM 137 UG/1
TABLET ORAL
Qty: 90 TABLET | Refills: 1 | Status: SHIPPED | OUTPATIENT
Start: 2022-08-17

## 2022-08-17 NOTE — PROGRESS NOTES
Moise Flores   Allergy Injection Note:    Yon Ochoa presents for an immunotherapy injection. The site of the injection was cleansed with an alcohol swab. Serum was injected into the site after pulling back on the plunger to prevent intravascular injection. After the injection and was instructed to wait in the allergy waiting area for 30 minutes. There was no problems with the injection.    Allergy Shot Questionnaire  Injection nurse/assistant: Moise Flores ST/AT  Have you had increased asthma symptoms in past week?: no  Have you had increased allergy symptoms in the last week?: no  Have you had a cold, respiratory tract infection or flu like symptoms in the past 2 weeks?: no  Did you have any problems within 12 hours of the last injection?: no  Are you on any new medications/ eye drops?: no  Are you on any beta blockers?: no  If female, are you pregnant?: no  I have confirmed the name and birth date on my allergy vial. : yes  Epipen available?: yes  Epipen Lot Number: [id number]  Epipen Expiration Date: 8/2023  Number of allergy injections given: 1     Injection Details:  Vial 1   Vial 1 Expiration Date: 11/12/22  Vial 1 Series: 3  Shot type: escalation  Vial 1 Dose (mL): 0.05 Vial test  Vial 1 Location: Left upper arm  Vial 1 Vial Test Reaction (in mm): 9          Moise Flores  8/17/2022  15:13 CDT

## 2022-08-23 ENCOUNTER — OFFICE VISIT (OUTPATIENT)
Dept: OTOLARYNGOLOGY | Facility: CLINIC | Age: 52
End: 2022-08-23

## 2022-08-23 VITALS — HEART RATE: 101 BPM | DIASTOLIC BLOOD PRESSURE: 80 MMHG | SYSTOLIC BLOOD PRESSURE: 120 MMHG | TEMPERATURE: 98.4 F

## 2022-08-23 DIAGNOSIS — J31.0 CHRONIC RHINITIS: ICD-10-CM

## 2022-08-23 DIAGNOSIS — J30.2 SEASONAL ALLERGIC RHINITIS, UNSPECIFIED TRIGGER: Primary | ICD-10-CM

## 2022-08-23 PROCEDURE — 99214 OFFICE O/P EST MOD 30 MIN: CPT | Performed by: EMERGENCY MEDICINE

## 2022-08-23 RX ORDER — MONTELUKAST SODIUM 10 MG/1
10 TABLET ORAL NIGHTLY
Qty: 30 TABLET | Refills: 11 | Status: SHIPPED | OUTPATIENT
Start: 2022-08-23 | End: 2022-10-27

## 2022-08-23 NOTE — PROGRESS NOTES
EMILY Britton [facility]  [address], [address]  Located within [facility] [address]  Fax [phone]  Phone [phone]      Visit Type: FOLLOW UP   Chief Complaint   Patient presents with   • Allergic Rhinitis        HPI  She presents for a follow up evaluation. She has had continued problems with nasal drainage. The symptoms are not localized to a particular location. She reports the symptoms are worse in the morning.  She drinks 6-8 bottles of water daily.  She does not drink caffeine.  She does have reflux, she takes her prilosec at bedtime.    Past Medical History:   Diagnosis Date   • Carpal tunnel syndrome on right 2/3/2020   • Diabetes ([facility])    • GERD (gastroesophageal reflux disease)    • Hyperlipidemia    • Hypertension    • Hypertension associated with diabetes ([facility]) 12/4/2019   • Hypothyroidism due to Hashimoto's thyroiditis 12/4/2019   • Mixed diabetic hyperlipidemia associated with type 2 diabetes mellitus ([facility]) 12/4/2019   • Type 2 diabetes mellitus with hyperglycemia, with long-term current use of insulin ([facility]) 12/4/2019       History reviewed. No pertinent surgical history.    Family History: Her family history is not on file.     Social History: She  reports that she has never smoked. She has never used smokeless tobacco. She reports that she does not drink alcohol and does not use drugs.    Home Medications:  DULoxetine, Dexcom G6 Sensor, EPINEPHrine, Insulin Degludec, Insulin Lispro (1 Unit Dial), Plecanatide, Prucalopride Succinate, Semaglutide(0.25 or 0.5MG/DOS), Vitamin D, aspirin, atorvastatin, clonazePAM, cyclobenzaprine, famotidine, insulin aspart, levothyroxine, mometasone, montelukast, omeprazole, quinapril, spironolactone, topiramate, traZODone, and vitamin B-6    Allergies:  She is allergic to loratadine-pseudoephedrine er, meperidine, and percocet [oxycodone-acetaminophen].       Vital Signs:   Temp:  [98.4  °F (36.9 °C)] 98.4 °F (36.9 °C)  Heart Rate:  [101] 101  BP: (120)/(80) 120/80  ENT Physical Exam  Constitutional  Appearance: patient appears well-developed, well-nourished and well-groomed,  Communication/Voice: communication appropriate for developmental age; vocal quality normal;  Head and Face  Appearance: head appears normal, face appears normal and face appears atraumatic;  Palpation: facial palpation normal;  Salivary: glands normal;  Ear  Hearing: intact;  Auricles: right auricle normal; left auricle normal;  External Mastoids: right external mastoid normal; left external mastoid normal;  Ear Canals: right ear canal normal; left ear canal normal;  Tympanic Membranes: right tympanic membrane normal; left tympanic membrane normal;  Nose  External Nose: nares patent bilaterally; external nose normal;  Internal Nose: nasal mucosa normal; septum normal; bilateral inferior turbinates normal;  Oral Cavity/Oropharynx  Lips: normal;  Teeth: normal;  Gums: gingiva normal;  Tongue: normal;  Oral mucosa: normal;  Hard palate: normal;         Result Review    RESULTS REVIEW    I have reviewed the patients old records in the chart.     Assessment & Plan    Diagnoses and all orders for this visit:    1. Seasonal allergic rhinitis, unspecified trigger (Primary)    2. Chronic rhinitis    Other orders  -     montelukast (SINGULAIR) 10 MG tablet; Take 1 tablet by mouth Every Night for 30 days.  Dispense: 30 tablet; Refill: 11       Increase water/ non caffeinated drink intake to at least 6-8 glasses a day. Decrease caffeine intake. Plain Mucinex over the counter as needed to thin out secretions.  Sleep with the head of bed on an incline. No large meals 1-2 hrs prior to sleep. Avoid fatty meals and caffine or alcohol prior to sleep.   Take proton pump inhibitor (Omeprazole, Prilosec, Prevacid, Protonix etc) 30-60 minutes prior o the last meal of the day.     Continue nasal sprays as previously prescribed.       \    Return in  about 6 months (around 2/23/2023) for Follow up with EMILY Johnson.      EMILY Britton  08/23/22  15:40 CDT

## 2022-08-24 ENCOUNTER — CLINICAL SUPPORT (OUTPATIENT)
Dept: OTOLARYNGOLOGY | Facility: CLINIC | Age: 52
End: 2022-08-24

## 2022-08-24 DIAGNOSIS — J30.9 ALLERGIC RHINITIS, UNSPECIFIED SEASONALITY, UNSPECIFIED TRIGGER: ICD-10-CM

## 2022-08-24 PROCEDURE — 95115 IMMUNOTHERAPY ONE INJECTION: CPT | Performed by: EMERGENCY MEDICINE

## 2022-08-24 NOTE — PROGRESS NOTES
Moise Flores   Allergy Injection Note:    Yon Ochoa presents for an immunotherapy injection. The site of the injection was cleansed with an alcohol swab. Serum was injected into the site after pulling back on the plunger to prevent intravascular injection. After the injection and was instructed to wait in the allergy waiting area for 30 minutes. There was no problems with the injection.    Allergy Shot Questionnaire  Injection nurse/assistant: Moise Flores ST/AT  Have you had increased asthma symptoms in past week?: no  Have you had increased allergy symptoms in the last week?: no  Have you had a cold, respiratory tract infection or flu like symptoms in the past 2 weeks?: no  Did you have any problems within 12 hours of the last injection?: no  Are you on any new medications/ eye drops?: no  Are you on any beta blockers?: no  If female, are you pregnant?: no  I have confirmed the name and birth date on my allergy vial. : yes  Epipen available?: yes  Epipen Lot Number: [id number]  Epipen Expiration Date: 8/2023  Number of allergy injections given: 1     Injection Details:  Vial 1   Vial 1 Expiration Date: 11/12/22  Vial 1 Series: 3  Shot type: escalation  Vial 1 Dose (mL): 0.1  Vial 1 Location: Right upper arm  Vial 1 Reaction (in mm): <5          Moise Flores  8/24/2022  14:14 CDT

## 2022-08-31 ENCOUNTER — CLINICAL SUPPORT (OUTPATIENT)
Dept: OTOLARYNGOLOGY | Facility: CLINIC | Age: 52
End: 2022-08-31

## 2022-08-31 DIAGNOSIS — J30.9 ALLERGIC RHINITIS, UNSPECIFIED SEASONALITY, UNSPECIFIED TRIGGER: ICD-10-CM

## 2022-08-31 PROCEDURE — 95115 IMMUNOTHERAPY ONE INJECTION: CPT | Performed by: EMERGENCY MEDICINE

## 2022-08-31 NOTE — PROGRESS NOTES
Moise Flores   Allergy Injection Note:    oYn Ochoa presents for an immunotherapy injection. The site of the injection was cleansed with an alcohol swab. Serum was injected into the site after pulling back on the plunger to prevent intravascular injection. After the injection and was instructed to wait in the allergy waiting area for 30 minutes. There was no problems with the injection.    Allergy Shot Questionnaire  Injection nurse/assistant: Moise Flores ST/AT  Have you had increased asthma symptoms in past week?: no  Have you had increased allergy symptoms in the last week?: no  Have you had a cold, respiratory tract infection or flu like symptoms in the past 2 weeks?: no  Did you have any problems within 12 hours of the last injection?: no  Are you on any new medications/ eye drops?: no  Are you on any beta blockers?: no  If female, are you pregnant?: no  I have confirmed the name and birth date on my allergy vial. : yes  Epipen available?: yes  Epipen Lot Number: [id number]  Epipen Expiration Date: 8/2023  Number of allergy injections given: 1     Injection Details:  Vial 1   Vial 1 Expiration Date: 11/12/22  Vial 1 Series: 5  Shot type: escalation  Vial 1 Dose (mL): 0.2  Vial 1 Location: Left upper arm  Vial 1 Reaction (in mm): <5          Moise Flores  8/31/2022  14:49 CDT

## 2022-09-05 DIAGNOSIS — Z76.0 MEDICATION REFILL: ICD-10-CM

## 2022-09-06 RX ORDER — TOPIRAMATE 50 MG/1
TABLET, FILM COATED ORAL
Qty: 90 TABLET | Refills: 5 | Status: SHIPPED | OUTPATIENT
Start: 2022-09-06

## 2022-09-07 ENCOUNTER — TELEPHONE (OUTPATIENT)
Dept: OTOLARYNGOLOGY | Facility: CLINIC | Age: 52
End: 2022-09-07

## 2022-09-07 NOTE — TELEPHONE ENCOUNTER
Caller: ADDI LARSEN     Relationship: SELF     Best call back number: [phone]    What is the best time to reach you: ANYTIME     PT CALLED TO CANCEL ALLERGY SHOT SCHEDULED FOR TODAY, APOLOGIZED FOR LATE CANCELLATION AS SHE WAS SICK.

## 2022-09-12 ASSESSMENT — ENCOUNTER SYMPTOMS
VOMITING: 0
DIARRHEA: 0
CONSTIPATION: 0
WHEEZING: 0
CHEST TIGHTNESS: 0
TROUBLE SWALLOWING: 0
NAUSEA: 1
ABDOMINAL PAIN: 0
SHORTNESS OF BREATH: 0
BLOOD IN STOOL: 0
BACK PAIN: 1
RHINORRHEA: 0
SINUS PAIN: 0
COUGH: 0

## 2022-09-13 ENCOUNTER — OFFICE VISIT (OUTPATIENT)
Dept: PRIMARY CARE CLINIC | Age: 52
End: 2022-09-13
Payer: COMMERCIAL

## 2022-09-13 VITALS
BODY MASS INDEX: 31.1 KG/M2 | HEIGHT: 62 IN | OXYGEN SATURATION: 99 % | WEIGHT: 169 LBS | HEART RATE: 78 BPM | DIASTOLIC BLOOD PRESSURE: 64 MMHG | SYSTOLIC BLOOD PRESSURE: 130 MMHG

## 2022-09-13 DIAGNOSIS — I10 ESSENTIAL HYPERTENSION: ICD-10-CM

## 2022-09-13 DIAGNOSIS — F51.01 PRIMARY INSOMNIA: ICD-10-CM

## 2022-09-13 DIAGNOSIS — K58.1 IRRITABLE BOWEL SYNDROME WITH CONSTIPATION: ICD-10-CM

## 2022-09-13 DIAGNOSIS — E11.9 TYPE 2 DIABETES MELLITUS WITHOUT COMPLICATION, WITH LONG-TERM CURRENT USE OF INSULIN (HCC): Primary | ICD-10-CM

## 2022-09-13 DIAGNOSIS — E03.9 ACQUIRED HYPOTHYROIDISM: ICD-10-CM

## 2022-09-13 DIAGNOSIS — Z79.4 TYPE 2 DIABETES MELLITUS WITHOUT COMPLICATION, WITH LONG-TERM CURRENT USE OF INSULIN (HCC): Primary | ICD-10-CM

## 2022-09-13 DIAGNOSIS — G47.33 OBSTRUCTIVE SLEEP APNEA: ICD-10-CM

## 2022-09-13 DIAGNOSIS — Z12.11 SCREENING FOR COLON CANCER: ICD-10-CM

## 2022-09-13 PROCEDURE — 99214 OFFICE O/P EST MOD 30 MIN: CPT | Performed by: INTERNAL MEDICINE

## 2022-09-13 PROCEDURE — 3052F HG A1C>EQUAL 8.0%<EQUAL 9.0%: CPT | Performed by: INTERNAL MEDICINE

## 2022-09-13 SDOH — ECONOMIC STABILITY: FOOD INSECURITY: WITHIN THE PAST 12 MONTHS, THE FOOD YOU BOUGHT JUST DIDN'T LAST AND YOU DIDN'T HAVE MONEY TO GET MORE.: NEVER TRUE

## 2022-09-13 SDOH — ECONOMIC STABILITY: FOOD INSECURITY: WITHIN THE PAST 12 MONTHS, YOU WORRIED THAT YOUR FOOD WOULD RUN OUT BEFORE YOU GOT MONEY TO BUY MORE.: NEVER TRUE

## 2022-09-13 ASSESSMENT — PATIENT HEALTH QUESTIONNAIRE - PHQ9
SUM OF ALL RESPONSES TO PHQ QUESTIONS 1-9: 0
2. FEELING DOWN, DEPRESSED OR HOPELESS: 0
SUM OF ALL RESPONSES TO PHQ QUESTIONS 1-9: 0
SUM OF ALL RESPONSES TO PHQ9 QUESTIONS 1 & 2: 0
SUM OF ALL RESPONSES TO PHQ QUESTIONS 1-9: 0
1. LITTLE INTEREST OR PLEASURE IN DOING THINGS: 0
SUM OF ALL RESPONSES TO PHQ QUESTIONS 1-9: 0

## 2022-09-13 ASSESSMENT — SOCIAL DETERMINANTS OF HEALTH (SDOH): HOW HARD IS IT FOR YOU TO PAY FOR THE VERY BASICS LIKE FOOD, HOUSING, MEDICAL CARE, AND HEATING?: NOT HARD AT ALL

## 2022-09-13 NOTE — PATIENT INSTRUCTIONS
Please schedule FOLLOW UP VISIT in  4  months  Have lab work 2 weeks before scheduled Follow up Visit

## 2022-09-14 ENCOUNTER — CLINICAL SUPPORT (OUTPATIENT)
Dept: OTOLARYNGOLOGY | Facility: CLINIC | Age: 52
End: 2022-09-14

## 2022-09-14 DIAGNOSIS — J30.9 ALLERGIC RHINITIS, UNSPECIFIED SEASONALITY, UNSPECIFIED TRIGGER: ICD-10-CM

## 2022-09-14 PROCEDURE — 95115 IMMUNOTHERAPY ONE INJECTION: CPT | Performed by: EMERGENCY MEDICINE

## 2022-09-14 NOTE — PROGRESS NOTES
Moise Flores   Allergy Injection Note:    Yon Ochoa presents for an immunotherapy injection. The site of the injection was cleansed with an alcohol swab. Serum was injected into the site after pulling back on the plunger to prevent intravascular injection. After the injection and was instructed to wait in the allergy waiting area for 30 minutes. There was no problems with the injection.    Allergy Shot Questionnaire  Injection nurse/assistant: Moise Flores ST/AT  Have you had increased asthma symptoms in past week?: no  Have you had increased allergy symptoms in the last week?: no  Have you had a cold, respiratory tract infection or flu like symptoms in the past 2 weeks?: no  Did you have any problems within 12 hours of the last injection?: no  Are you on any new medications/ eye drops?: no  Are you on any beta blockers?: no  If female, are you pregnant?: no  I have confirmed the name and birth date on my allergy vial. : yes  Epipen available?: yes  Epipen Lot Number: [id number]  Epipen Expiration Date: 8/2023  Number of allergy injections given: 1     Injection Details:  Vial 1   Vial 1 Expiration Date: 11/12/22  Vial 1 Series: 3  Shot type: escalation  Vial 1 Dose (mL): 0.3  Vial 1 Location: Right upper arm  Vial 1 Reaction (in mm): <5          Moise Flores  9/14/2022  14:32 CDT

## 2022-09-15 DIAGNOSIS — E11.9 TYPE 2 DIABETES MELLITUS WITHOUT COMPLICATION, WITH LONG-TERM CURRENT USE OF INSULIN (HCC): Primary | ICD-10-CM

## 2022-09-15 DIAGNOSIS — Z79.4 TYPE 2 DIABETES MELLITUS WITHOUT COMPLICATION, WITH LONG-TERM CURRENT USE OF INSULIN (HCC): Primary | ICD-10-CM

## 2022-09-15 RX ORDER — FLASH GLUCOSE SENSOR
1 KIT MISCELLANEOUS
Qty: 1 EACH | Refills: 5 | Status: SHIPPED | OUTPATIENT
Start: 2022-09-15

## 2022-09-15 RX ORDER — FLASH GLUCOSE SCANNING READER
1 EACH MISCELLANEOUS ONCE
Qty: 1 EACH | Refills: 0 | Status: SHIPPED | OUTPATIENT
Start: 2022-09-15 | End: 2022-09-15

## 2022-09-21 ENCOUNTER — CLINICAL SUPPORT (OUTPATIENT)
Dept: OTOLARYNGOLOGY | Facility: CLINIC | Age: 52
End: 2022-09-21

## 2022-09-21 DIAGNOSIS — J30.9 ALLERGIC RHINITIS, UNSPECIFIED SEASONALITY, UNSPECIFIED TRIGGER: ICD-10-CM

## 2022-09-21 PROCEDURE — 95115 IMMUNOTHERAPY ONE INJECTION: CPT | Performed by: EMERGENCY MEDICINE

## 2022-09-21 NOTE — PROGRESS NOTES
Moise Flores   Allergy Injection Note:    Yon Ochoa presents for an immunotherapy injection. The site of the injection was cleansed with an alcohol swab. Serum was injected into the site after pulling back on the plunger to prevent intravascular injection. After the injection and was instructed to wait in the allergy waiting area for 30 minutes. There was no problems with the injection.    Allergy Shot Questionnaire  Injection nurse/assistant: Moise Flores ST/AT  Have you had increased asthma symptoms in past week?: no  Have you had increased allergy symptoms in the last week?: no  Have you had a cold, respiratory tract infection or flu like symptoms in the past 2 weeks?: no  Did you have any problems within 12 hours of the last injection?: no  Are you on any new medications/ eye drops?: no  Are you on any beta blockers?: no  If female, are you pregnant?: no  I have confirmed the name and birth date on my allergy vial. : yes  Epipen available?: yes  Epipen Lot Number: [id number]  Epipen Expiration Date: 8/2023  Number of allergy injections given: 1     Injection Details:  Vial 1   Vial 1 Expiration Date: 11/12/22  Vial 1 Series: 3  Shot type: escalation  Vial 1 Dose (mL): 0.4  Vial 1 Location: Left upper arm  Vial 1 Reaction (in mm): <5          Moise Flores  9/21/2022  14:05 CDT

## 2022-09-28 ENCOUNTER — CLINICAL SUPPORT (OUTPATIENT)
Dept: OTOLARYNGOLOGY | Facility: CLINIC | Age: 52
End: 2022-09-28

## 2022-09-28 DIAGNOSIS — J30.9 ALLERGIC RHINITIS, UNSPECIFIED SEASONALITY, UNSPECIFIED TRIGGER: ICD-10-CM

## 2022-09-28 PROCEDURE — 95115 IMMUNOTHERAPY ONE INJECTION: CPT | Performed by: EMERGENCY MEDICINE

## 2022-09-28 NOTE — PROGRESS NOTES
Moise Flores   Allergy Injection Note:    Yon Ochoa presents for an immunotherapy injection. The site of the injection was cleansed with an alcohol swab. Serum was injected into the site after pulling back on the plunger to prevent intravascular injection. After the injection and was instructed to wait in the allergy waiting area for 30 minutes. There was no problems with the injection.    Allergy Shot Questionnaire  Injection nurse/assistant: Moise Flores ST/AT  Have you had increased asthma symptoms in past week?: no  Have you had increased allergy symptoms in the last week?: no  Have you had a cold, respiratory tract infection or flu like symptoms in the past 2 weeks?: no  Did you have any problems within 12 hours of the last injection?: no  Are you on any new medications/ eye drops?: no  Are you on any beta blockers?: no  If female, are you pregnant?: no  I have confirmed the name and birth date on my allergy vial. : yes  Epipen available?: yes  Epipen Lot Number: [id number]  Epipen Expiration Date: 8/2023  Number of allergy injections given: 1     Injection Details:  Vial 1   Vial 1 Expiration Date: 11/12/22  Vial 1 Series: 3  Shot type: escalation  Vial 1 Dose (mL): 0.5  Vial 1 Location: Right upper arm  Vial 1 Reaction (in mm): <5          Moise Flores  9/28/2022  14:29 CDT

## 2022-10-04 ENCOUNTER — DOCUMENTATION (OUTPATIENT)
Dept: ENDOCRINOLOGY | Facility: CLINIC | Age: 52
End: 2022-10-04

## 2022-10-05 ENCOUNTER — CLINICAL SUPPORT (OUTPATIENT)
Dept: OTOLARYNGOLOGY | Facility: CLINIC | Age: 52
End: 2022-10-05

## 2022-10-05 DIAGNOSIS — J30.9 ALLERGIC RHINITIS, UNSPECIFIED SEASONALITY, UNSPECIFIED TRIGGER: ICD-10-CM

## 2022-10-05 PROCEDURE — 95115 IMMUNOTHERAPY ONE INJECTION: CPT | Performed by: NURSE PRACTITIONER

## 2022-10-05 NOTE — PROGRESS NOTES
Moise Flores   Allergy Injection Note:    Yon Ochoa presents for an immunotherapy injection. The site of the injection was cleansed with an alcohol swab. Serum was injected into the site after pulling back on the plunger to prevent intravascular injection. After the injection and was instructed to wait in the allergy waiting area for 30 minutes. There was no problems with the injection.    Allergy Shot Questionnaire  Injection nurse/assistant: Moise Flores ST/AT  Have you had increased asthma symptoms in past week?: no  Have you had increased allergy symptoms in the last week?: no  Have you had a cold, respiratory tract infection or flu like symptoms in the past 2 weeks?: no  Did you have any problems within 12 hours of the last injection?: no  Are you on any new medications/ eye drops?: no  Are you on any beta blockers?: no  If female, are you pregnant?: no  I have confirmed the name and birth date on my allergy vial. : yes  Epipen available?: yes  Epipen Lot Number: [id number]  Epipen Expiration Date: 8/2023  Number of allergy injections given: 1     Injection Details:  Vial 1   Vial 1 Expiration Date: 11/12/22  Vial 1 Series: 3  Shot type: escalation  Vial 1 Dose (mL): 0.5  Vial 1 Location: Left upper arm  Vial 1 Reaction (in mm): <5          Moise Flores  10/5/2022  14:39 CDT

## 2022-10-12 ENCOUNTER — CLINICAL SUPPORT (OUTPATIENT)
Dept: OTOLARYNGOLOGY | Facility: CLINIC | Age: 52
End: 2022-10-12

## 2022-10-12 DIAGNOSIS — J30.9 ALLERGIC RHINITIS, UNSPECIFIED SEASONALITY, UNSPECIFIED TRIGGER: ICD-10-CM

## 2022-10-12 PROCEDURE — 95115 IMMUNOTHERAPY ONE INJECTION: CPT | Performed by: EMERGENCY MEDICINE

## 2022-10-12 NOTE — PROGRESS NOTES
Moise Flores   Allergy Injection Note:    Yon Ochoa presents for an immunotherapy injection. The site of the injection was cleansed with an alcohol swab. Serum was injected into the site after pulling back on the plunger to prevent intravascular injection. After the injection and was instructed to wait in the allergy waiting area for 30 minutes. There was no problems with the injection.    Allergy Shot Questionnaire  Injection nurse/assistant: Moise Flores ST/AT  Have you had increased asthma symptoms in past week?: no  Have you had increased allergy symptoms in the last week?: no  Have you had a cold, respiratory tract infection or flu like symptoms in the past 2 weeks?: no  Did you have any problems within 12 hours of the last injection?: no  Are you on any new medications/ eye drops?: no  Are you on any beta blockers?: no  If female, are you pregnant?: no  I have confirmed the name and birth date on my allergy vial. : yes  Epipen available?: yes  Epipen Lot Number: [id number]  Epipen Expiration Date: 8/2023  Number of allergy injections given: 1     Injection Details:  Vial 1   Vial 1 Expiration Date: 11/12/22  Vial 1 Series: 3  Shot type: escalation  Vial 1 Dose (mL): 0.5  Vial 1 Location: Left upper arm  Vial 1 Reaction (in mm): <5          Moise Flores  10/12/2022  14:03 CDT    
I have reviewed the notes, assessments, and/or procedures performed by Moise Flores, I concur with her/his documentation of Yon Ochoa.    
97

## 2022-10-19 ENCOUNTER — CLINICAL SUPPORT (OUTPATIENT)
Dept: OTOLARYNGOLOGY | Facility: CLINIC | Age: 52
End: 2022-10-19

## 2022-10-19 ENCOUNTER — TELEPHONE (OUTPATIENT)
Dept: ENDOCRINOLOGY | Facility: CLINIC | Age: 52
End: 2022-10-19

## 2022-10-19 DIAGNOSIS — J30.9 ALLERGIC RHINITIS, UNSPECIFIED SEASONALITY, UNSPECIFIED TRIGGER: ICD-10-CM

## 2022-10-19 PROCEDURE — 95115 IMMUNOTHERAPY ONE INJECTION: CPT | Performed by: EMERGENCY MEDICINE

## 2022-10-19 NOTE — TELEPHONE ENCOUNTER
Pt called and left a voicemail regarding the RX for her Dexcom refill. She stated that [employer] is having trouble getting this approved and she has no way of checking her sugar right now.    Please advise.    Thanks

## 2022-10-19 NOTE — PROGRESS NOTES
Moise Flores   Allergy Injection Note:    Yon Ochoa presents for an immunotherapy injection. The site of the injection was cleansed with an alcohol swab. Serum was injected into the site after pulling back on the plunger to prevent intravascular injection. After the injection and was instructed to wait in the allergy waiting area for 30 minutes. There was no problems with the injection.    Allergy Shot Questionnaire  Injection nurse/assistant: Moise Flores ST/AT  Have you had increased asthma symptoms in past week?: no  Have you had increased allergy symptoms in the last week?: no  Have you had a cold, respiratory tract infection or flu like symptoms in the past 2 weeks?: no  Did you have any problems within 12 hours of the last injection?: no  Are you on any new medications/ eye drops?: no  Are you on any beta blockers?: no  If female, are you pregnant?: no  I have confirmed the name and birth date on my allergy vial. : yes  Epipen available?: yes  Epipen Lot Number: [id number]  Epipen Expiration Date: 8/2023  Number of allergy injections given: 1     Injection Details:  Vial 1   Vial 1 Expiration Date: 11/12/22  Vial 1 Series: 3  Shot type: escalation  Vial 1 Dose (mL): 0.5  Vial 1 Location: Right upper arm  Vial 1 Reaction (in mm): <5          Moise Flores  10/19/2022  14:42 CDT

## 2022-10-21 DIAGNOSIS — E11.65 TYPE 2 DIABETES MELLITUS WITH HYPERGLYCEMIA, WITH LONG-TERM CURRENT USE OF INSULIN: Primary | ICD-10-CM

## 2022-10-21 DIAGNOSIS — Z79.4 TYPE 2 DIABETES MELLITUS WITH HYPERGLYCEMIA, WITH LONG-TERM CURRENT USE OF INSULIN: Primary | ICD-10-CM

## 2022-10-21 NOTE — TELEPHONE ENCOUNTER
Called patient and informed her that [facility] denied her Dexcom because she has not been seen within the last 6 months.    Pt was told we would call in a BG meter, lancets, and test strips to [facility] pharmacy on [address] in [place]    Pts information was given to  to see if we could get her in earlier so that we could get her dexcom approved.

## 2022-10-22 DIAGNOSIS — I10 ESSENTIAL HYPERTENSION: ICD-10-CM

## 2022-10-24 ENCOUNTER — TELEPHONE (OUTPATIENT)
Dept: ENDOCRINOLOGY | Facility: CLINIC | Age: 52
End: 2022-10-24

## 2022-10-24 DIAGNOSIS — E11.65 TYPE 2 DIABETES MELLITUS WITH HYPERGLYCEMIA, WITH LONG-TERM CURRENT USE OF INSULIN: Primary | ICD-10-CM

## 2022-10-24 DIAGNOSIS — Z79.4 TYPE 2 DIABETES MELLITUS WITH HYPERGLYCEMIA, WITH LONG-TERM CURRENT USE OF INSULIN: Primary | ICD-10-CM

## 2022-10-24 RX ORDER — BLOOD-GLUCOSE METER
KIT MISCELLANEOUS
Qty: 1 EACH | Refills: 0 | Status: SHIPPED | OUTPATIENT
Start: 2022-10-24

## 2022-10-24 RX ORDER — LANCETS
EACH MISCELLANEOUS
Qty: 100 EACH | Refills: 5 | Status: SHIPPED | OUTPATIENT
Start: 2022-10-24

## 2022-10-24 RX ORDER — QUINAPRIL 5 1/1
5 TABLET ORAL NIGHTLY
Qty: 30 TABLET | Refills: 11 | Status: SHIPPED | OUTPATIENT
Start: 2022-10-24 | End: 2022-11-23

## 2022-10-24 NOTE — TELEPHONE ENCOUNTER
[facility] in [place] called stating that they received the RX for new testing kits but the previous brand the pt was on is no longer made. They are requesting a prescription for One Touch lancets and test strips. Phone [phone], fax [phone].    Thanks

## 2022-10-24 NOTE — TELEPHONE ENCOUNTER
Patient called and states the prescription Familia sent in needs to be changed as the one she sent is not available at the pharmacy. Patient is requesting a ACCU-check glucometer that will need a prior authorization and then a separate script for the lancets and test strips.    Requesting to be sent to   [facility] on [address] in [place]    Please Advise

## 2022-10-26 ENCOUNTER — CLINICAL SUPPORT (OUTPATIENT)
Dept: OTOLARYNGOLOGY | Facility: CLINIC | Age: 52
End: 2022-10-26

## 2022-10-26 DIAGNOSIS — J30.9 ALLERGIC RHINITIS, UNSPECIFIED SEASONALITY, UNSPECIFIED TRIGGER: ICD-10-CM

## 2022-10-26 PROCEDURE — 95115 IMMUNOTHERAPY ONE INJECTION: CPT | Performed by: EMERGENCY MEDICINE

## 2022-10-26 NOTE — PROGRESS NOTES
Moise Flores   Allergy Injection Note:    Yon Ochoa presents for an immunotherapy injection. The site of the injection was cleansed with an alcohol swab. Serum was injected into the site after pulling back on the plunger to prevent intravascular injection. After the injection and was instructed to wait in the allergy waiting area for 30 minutes. There was no problems with the injection.    Allergy Shot Questionnaire  Injection nurse/assistant: Moise Flores ST/AT  Have you had increased asthma symptoms in past week?: no  Have you had increased allergy symptoms in the last week?: no  Have you had a cold, respiratory tract infection or flu like symptoms in the past 2 weeks?: no  Did you have any problems within 12 hours of the last injection?: no  Are you on any new medications/ eye drops?: no  Are you on any beta blockers?: no  If female, are you pregnant?: no  I have confirmed the name and birth date on my allergy vial. : yes  Epipen available?: yes  Epipen Lot Number: [id number]  Epipen Expiration Date: 8/2023  Number of allergy injections given: 1     Injection Details:  Vial 1   Vial 1 Expiration Date: 11/12/22  Vial 1 Series: 3  Shot type: escalation  Vial 1 Dose (mL): 0.5  Vial 1 Location: Left upper arm  Vial 1 Reaction (in mm): <5          Moise Flores  10/26/2022  13:57 CDT

## 2022-10-27 ENCOUNTER — LAB (OUTPATIENT)
Dept: LAB | Facility: HOSPITAL | Age: 52
End: 2022-10-27

## 2022-10-27 ENCOUNTER — OFFICE VISIT (OUTPATIENT)
Dept: ENDOCRINOLOGY | Facility: CLINIC | Age: 52
End: 2022-10-27

## 2022-10-27 VITALS
HEIGHT: 62 IN | WEIGHT: 172 LBS | OXYGEN SATURATION: 95 % | HEART RATE: 89 BPM | SYSTOLIC BLOOD PRESSURE: 112 MMHG | BODY MASS INDEX: 31.65 KG/M2 | DIASTOLIC BLOOD PRESSURE: 62 MMHG

## 2022-10-27 DIAGNOSIS — E03.8 HYPOTHYROIDISM DUE TO HASHIMOTO'S THYROIDITIS: ICD-10-CM

## 2022-10-27 DIAGNOSIS — B37.9 YEAST INFECTION: ICD-10-CM

## 2022-10-27 DIAGNOSIS — E11.59 HYPERTENSION ASSOCIATED WITH DIABETES: ICD-10-CM

## 2022-10-27 DIAGNOSIS — E11.65 TYPE 2 DIABETES MELLITUS WITH HYPERGLYCEMIA, WITH LONG-TERM CURRENT USE OF INSULIN: Primary | ICD-10-CM

## 2022-10-27 DIAGNOSIS — Z79.4 TYPE 2 DIABETES MELLITUS WITH HYPERGLYCEMIA, WITH LONG-TERM CURRENT USE OF INSULIN: Primary | ICD-10-CM

## 2022-10-27 DIAGNOSIS — E11.69 MIXED DIABETIC HYPERLIPIDEMIA ASSOCIATED WITH TYPE 2 DIABETES MELLITUS: ICD-10-CM

## 2022-10-27 DIAGNOSIS — I10 PRIMARY HYPERTENSION: ICD-10-CM

## 2022-10-27 DIAGNOSIS — E06.3 HYPOTHYROIDISM DUE TO HASHIMOTO'S THYROIDITIS: ICD-10-CM

## 2022-10-27 DIAGNOSIS — E78.2 MIXED DIABETIC HYPERLIPIDEMIA ASSOCIATED WITH TYPE 2 DIABETES MELLITUS: ICD-10-CM

## 2022-10-27 DIAGNOSIS — Z79.4 TYPE 2 DIABETES MELLITUS WITH HYPERGLYCEMIA, WITH LONG-TERM CURRENT USE OF INSULIN: ICD-10-CM

## 2022-10-27 DIAGNOSIS — I15.2 HYPERTENSION ASSOCIATED WITH DIABETES: ICD-10-CM

## 2022-10-27 DIAGNOSIS — E11.65 TYPE 2 DIABETES MELLITUS WITH HYPERGLYCEMIA, WITH LONG-TERM CURRENT USE OF INSULIN: ICD-10-CM

## 2022-10-27 LAB
25(OH)D3 SERPL-MCNC: 29.4 NG/ML (ref 30–100)
ALBUMIN SERPL-MCNC: 4.2 G/DL (ref 3.5–5.2)
ALBUMIN UR-MCNC: 1.5 MG/DL
ALBUMIN/GLOB SERPL: 1.1 G/DL
ALP SERPL-CCNC: 79 U/L (ref 39–117)
ALT SERPL W P-5'-P-CCNC: 9 U/L (ref 1–33)
ANION GAP SERPL CALCULATED.3IONS-SCNC: 9 MMOL/L (ref 5–15)
AST SERPL-CCNC: 27 U/L (ref 1–32)
BASOPHILS # BLD AUTO: 0.09 10*3/MM3 (ref 0–0.2)
BASOPHILS NFR BLD AUTO: 0.8 % (ref 0–1.5)
BILIRUB SERPL-MCNC: 0.4 MG/DL (ref 0–1.2)
BUN SERPL-MCNC: 19 MG/DL (ref 6–20)
BUN/CREAT SERPL: 22.6 (ref 7–25)
CALCIUM SPEC-SCNC: 9.3 MG/DL (ref 8.6–10.5)
CHLORIDE SERPL-SCNC: 104 MMOL/L (ref 98–107)
CHOLEST SERPL-MCNC: 114 MG/DL (ref 0–200)
CO2 SERPL-SCNC: 22 MMOL/L (ref 22–29)
CREAT SERPL-MCNC: 0.84 MG/DL (ref 0.57–1)
CREAT UR-MCNC: 77 MG/DL
CREAT UR-MCNC: 82.2 MG/DL
DEPRECATED RDW RBC AUTO: 41.6 FL (ref 37–54)
EGFRCR SERPLBLD CKD-EPI 2021: 84.8 ML/MIN/1.73
EOSINOPHIL # BLD AUTO: 0.16 10*3/MM3 (ref 0–0.4)
EOSINOPHIL NFR BLD AUTO: 1.5 % (ref 0.3–6.2)
ERYTHROCYTE [DISTWIDTH] IN BLOOD BY AUTOMATED COUNT: 13.4 % (ref 12.3–15.4)
GLOBULIN UR ELPH-MCNC: 3.7 GM/DL
GLUCOSE SERPL-MCNC: 115 MG/DL (ref 65–99)
HBA1C MFR BLD: 7.7 % (ref 4.8–5.6)
HCT VFR BLD AUTO: 40 % (ref 34–46.6)
HDLC SERPL-MCNC: 33 MG/DL (ref 40–60)
HGB BLD-MCNC: 13 G/DL (ref 12–15.9)
IMM GRANULOCYTES # BLD AUTO: 0.13 10*3/MM3 (ref 0–0.05)
IMM GRANULOCYTES NFR BLD AUTO: 1.2 % (ref 0–0.5)
LDLC SERPL CALC-MCNC: 48 MG/DL (ref 0–100)
LDLC/HDLC SERPL: 1.23 {RATIO}
LYMPHOCYTES # BLD AUTO: 3.74 10*3/MM3 (ref 0.7–3.1)
LYMPHOCYTES NFR BLD AUTO: 35.2 % (ref 19.6–45.3)
MCH RBC QN AUTO: 27.5 PG (ref 26.6–33)
MCHC RBC AUTO-ENTMCNC: 32.5 G/DL (ref 31.5–35.7)
MCV RBC AUTO: 84.7 FL (ref 79–97)
MICROALBUMIN/CREAT UR: 19.5 MG/G
MONOCYTES # BLD AUTO: 0.56 10*3/MM3 (ref 0.1–0.9)
MONOCYTES NFR BLD AUTO: 5.3 % (ref 5–12)
NEUTROPHILS NFR BLD AUTO: 5.93 10*3/MM3 (ref 1.7–7)
NEUTROPHILS NFR BLD AUTO: 56 % (ref 42.7–76)
NRBC BLD AUTO-RTO: 0 /100 WBC (ref 0–0.2)
PLATELET # BLD AUTO: 207 10*3/MM3 (ref 140–450)
PMV BLD AUTO: 9.9 FL (ref 6–12)
POTASSIUM SERPL-SCNC: 4.4 MMOL/L (ref 3.5–5.2)
PROT ?TM UR-MCNC: 8.2 MG/DL
PROT SERPL-MCNC: 7.9 G/DL (ref 6–8.5)
PROT/CREAT UR: 99.8 MG/G CREA (ref 0–200)
RBC # BLD AUTO: 4.72 10*6/MM3 (ref 3.77–5.28)
SODIUM SERPL-SCNC: 135 MMOL/L (ref 136–145)
TRIGL SERPL-MCNC: 202 MG/DL (ref 0–150)
TSH SERPL DL<=0.05 MIU/L-ACNC: 1.84 UIU/ML (ref 0.27–4.2)
VIT B12 BLD-MCNC: 409 PG/ML (ref 211–946)
VLDLC SERPL-MCNC: 33 MG/DL (ref 5–40)
WBC NRBC COR # BLD: 10.61 10*3/MM3 (ref 3.4–10.8)

## 2022-10-27 PROCEDURE — 84156 ASSAY OF PROTEIN URINE: CPT

## 2022-10-27 PROCEDURE — 80061 LIPID PANEL: CPT

## 2022-10-27 PROCEDURE — 83036 HEMOGLOBIN GLYCOSYLATED A1C: CPT

## 2022-10-27 PROCEDURE — 84443 ASSAY THYROID STIM HORMONE: CPT

## 2022-10-27 PROCEDURE — 85025 COMPLETE CBC W/AUTO DIFF WBC: CPT

## 2022-10-27 PROCEDURE — 82043 UR ALBUMIN QUANTITATIVE: CPT

## 2022-10-27 PROCEDURE — 82570 ASSAY OF URINE CREATININE: CPT

## 2022-10-27 PROCEDURE — 36415 COLL VENOUS BLD VENIPUNCTURE: CPT

## 2022-10-27 PROCEDURE — 82306 VITAMIN D 25 HYDROXY: CPT

## 2022-10-27 PROCEDURE — 80053 COMPREHEN METABOLIC PANEL: CPT

## 2022-10-27 PROCEDURE — 82607 VITAMIN B-12: CPT

## 2022-10-27 PROCEDURE — 99214 OFFICE O/P EST MOD 30 MIN: CPT | Performed by: NURSE PRACTITIONER

## 2022-10-27 RX ORDER — NYSTATIN 100000 [USP'U]/G
POWDER TOPICAL
Qty: 90 G | Refills: 1 | Status: SHIPPED | OUTPATIENT
Start: 2022-10-27 | End: 2023-10-27

## 2022-10-27 RX ORDER — DULOXETIN HYDROCHLORIDE 60 MG/1
60 CAPSULE, DELAYED RELEASE ORAL DAILY
COMMUNITY

## 2022-10-27 RX ORDER — FLUCONAZOLE 150 MG/1
150 TABLET ORAL ONCE
Qty: 1 TABLET | Refills: 0 | Status: SHIPPED | OUTPATIENT
Start: 2022-10-27 | End: 2022-10-27

## 2022-10-27 RX ORDER — PROCHLORPERAZINE 25 MG/1
1 SUPPOSITORY RECTAL
Qty: 1 EACH | Refills: 3 | Status: SHIPPED | OUTPATIENT
Start: 2022-10-27 | End: 2023-02-07 | Stop reason: SDUPTHER

## 2022-10-27 RX ORDER — INSULIN LISPRO 100 [IU]/ML
25 INJECTION, SOLUTION INTRAVENOUS; SUBCUTANEOUS 3 TIMES DAILY
COMMUNITY
End: 2023-01-16

## 2022-10-27 RX ORDER — PROCHLORPERAZINE 25 MG/1
1 SUPPOSITORY RECTAL AS NEEDED
Qty: 9 EACH | Refills: 3 | Status: SHIPPED | OUTPATIENT
Start: 2022-10-27 | End: 2023-02-07 | Stop reason: SDUPTHER

## 2022-10-27 RX ORDER — LUBIPROSTONE 24 UG/1
24 CAPSULE ORAL 2 TIMES DAILY WITH MEALS
COMMUNITY

## 2022-10-27 RX ORDER — FEXOFENADINE HCL 180 MG/1
180 TABLET ORAL DAILY
COMMUNITY

## 2022-10-27 RX ORDER — PROCHLORPERAZINE 25 MG/1
1 SUPPOSITORY RECTAL ONCE
Qty: 1 EACH | Refills: 1 | Status: SHIPPED | OUTPATIENT
Start: 2022-10-27 | End: 2022-10-27

## 2022-10-27 NOTE — PROGRESS NOTES
"Chief Complaint  Diabetes    Subjective          Yon Ochoa presents to [facility] END[facility]CR[facility]OLOG[facility]  History of Present Illness       In office visit     52 year old female presents for follow up     Reason diabetes mellitus type 2     Diagnosed in 2008     Timing constant     Quality not controlled     severity moderate     Complications hyperglycemia    Current problems burning feet pain     Alleviating factors compliance with insulin     Side effects none        Current monitoring regimen: home blood tests -        checking 4 x daily before dexcom       She is out of dexcom supplies , she was doing better when had the dexcom     Running high       Review of Systems - General ROS: negative            Objective   Vital Signs:   /62   Pulse 89   Ht 157.5 cm (62\")   Wt 78 kg (172 lb)   SpO2 95%   BMI 31.46 kg/m²     Physical Exam  Neurological:      General: No focal deficit present.      Mental Status: She is alert.   Psychiatric:         Mood and Affect: Mood normal.         Thought Content: Thought content normal.         Judgment: Judgment normal.        Result Review :   The following data was reviewed by: EMILY Lopes on 02/16/2022:  Common labs    Common Labs 1/13/22 1/13/22 1/13/22 1/13/22 1/13/22 1/30/22 5/11/22 5/11/22 5/11/22 5/11/22 5/11/22 5/11/22    1452 1452 1452 1452 1452  1427 1427 1427 1427 1427 1427   Glucose   220 (A)      281 (A)      BUN   17      15      Creatinine   0.8      0.8      eGFR Non African Am   >60      >60      eGFR  Am   >59      >59      Sodium   134 (A)      136      Potassium   4.5      4.5      Chloride   101      104      Calcium   9.6      8.5 (A)      Albumin     4.3     3.9     Total Bilirubin     0.4     0.3     Alkaline Phosphatase     93     85     AST (SGOT)     25     20     ALT (SGPT)     11     14     WBC 13.0 (A)     13.6 (A) 11.0 (A)        Hemoglobin 12.7     13.8 11.4 (A)        Hematocrit 38.9     " 42.6 35.0 (A)        Platelets 188     216 141        Total Cholesterol    124 (A)       153 (A)    Triglycerides    178 (A)       546 (A)    HDL Cholesterol    34 (A)       36 (A)    LDL Cholesterol     54       see below 58 (A)   Hemoglobin A1C  7.5 (A)      8.2 (A)       (A) Abnormal value       Comments are available for some flowsheets but are not being displayed.                       Assessment and Plan    Diagnoses and all orders for this visit:    1. Type 2 diabetes mellitus with hyperglycemia, with long-term current use of insulin (HCC) (Primary)  -     CBC & Differential  -     Comprehensive Metabolic Panel  -     Hemoglobin A1c  -     TSH  -     Vitamin D,25-Hydroxy    2. Primary hypertension  -     CBC & Differential  -     Comprehensive Metabolic Panel  -     Hemoglobin A1c  -     TSH  -     Vitamin D,25-Hydroxy    3. Mixed diabetic hyperlipidemia associated with type 2 diabetes mellitus (HCC)  -     CBC & Differential  -     Comprehensive Metabolic Panel  -     Hemoglobin A1c  -     TSH  -     Vitamin D,25-Hydroxy    4. Hypothyroidism due to Hashimoto's thyroiditis  -     CBC & Differential  -     Comprehensive Metabolic Panel  -     Hemoglobin A1c  -     TSH  -     Vitamin D,25-Hydroxy    Other orders  -     Continuous Blood Gluc Transmit (Dexcom G6 Transmitter) misc; 1 each Every 3 (Three) Months.  Dispense: 1 each; Refill: 3  -     Continuous Blood Gluc Sensor (Dexcom G6 Sensor); 1 each As Needed (glucose control). Every 10 days  Dispense: 9 each; Refill: 3  -     Continuous Blood Gluc  (Dexcom G6 ) device; 1 each 1 (One) Time for 1 dose.  Dispense: 1 each; Refill: 1  -     fluconazole (Diflucan) 150 MG tablet; Take 1 tablet by mouth 1 (One) Time for 1 dose.  Dispense: 1 tablet; Refill: 0  -     nystatin (MYCOSTATIN) 218572 UNIT/GM powder; Apply to affected area 3 times daily  Dispense: 90 g; Refill: 1           Pt has type 2 diabetes   Glycemic Management:         Diabetes mellitus  type 2     Lab Results   Component Value Date    HGBA1C 8.2 (H) 05/11/2022           Called in dexcom       She will restart today     Will adjust insulin based on bg readings       Taking Tresiba 50 units            Taking Humalog     Taking  8 up to 20 units TID before each meal         Plus sliding scale     3 units for every 50 above 150            ========================================     Taking ozempic 0.5 mg once weekly on Sunday-- causing side effects post covid      segluromet -- change to steglatro mg once daily        No  fear of metformin by patient          Lipid Management        Taking lipitor 20 mg one daily         Blood Pressure Management:          Taking quinapril 20 mg daily         Microvascular Complication Monitoring:           Last Eye Exam Evaluation --- August 2019, no DR --schedule for Feb. 2021               -----------     Last Microalbumin-Proteinuria Assessment           Component      Latest Ref Rng & Units 6/1/2020   Microalbumin/Creatinine Ratio           Creatinine, Urine      mg/dL 164.3   Microalbumin, Urine      mg/dL <1.2      -----------        Neuropathy      yes      Refers no Rx at this time     Weight Related:      Obesity     Body mass index is 31.46 kg/m².       Bone Health     Vitamin d def.     Taking vitamin d daily        Component      Latest Ref Rng & Units 6/1/2020   25 Hydroxy, Vitamin D      30.0 - 100.0 ng/ml 36.7         Thyroid Health           Lab Results   Component Value Date    TSH 6.930 (H) 05/11/2022              taking levothyroxine 125 mcg daily         Other Diabetes Related Aspects      Lab Results   Component Value Date    [id number] 559 06/01/2020           Yeast infection    Called in diflucan and nystatin       Labs today         Follow Up   Return in about 3 months (around 1/27/2023) for Recheck.  Patient was given instructions and counseling regarding her condition or for health maintenance advice. Please see specific information pulled  into the AVS if appropriate.         This document has been electronically signed by EMILY Lopes on October 27, 2022 09:48 CDT.

## 2022-10-28 ENCOUNTER — TELEPHONE (OUTPATIENT)
Dept: ENDOCRINOLOGY | Facility: CLINIC | Age: 52
End: 2022-10-28

## 2022-10-28 NOTE — TELEPHONE ENCOUNTER
Called Yon and gave her the results but she was asking about her flagged levels in her CMP and CBC panels

## 2022-10-28 NOTE — TELEPHONE ENCOUNTER
----- Message from EMILY Lopes sent at 10/28/2022  8:47 AM CDT -----  Urine test was negative for protein, A1c was 7.7%, thyroid level was normal

## 2022-11-02 ENCOUNTER — CLINICAL SUPPORT (OUTPATIENT)
Dept: OTOLARYNGOLOGY | Facility: CLINIC | Age: 52
End: 2022-11-02

## 2022-11-02 DIAGNOSIS — M54.2 NECK AND SHOULDER PAIN: ICD-10-CM

## 2022-11-02 DIAGNOSIS — J30.9 ALLERGIC RHINITIS, UNSPECIFIED SEASONALITY, UNSPECIFIED TRIGGER: Primary | ICD-10-CM

## 2022-11-02 DIAGNOSIS — E78.5 HYPERLIPIDEMIA, UNSPECIFIED HYPERLIPIDEMIA TYPE: ICD-10-CM

## 2022-11-02 DIAGNOSIS — M25.519 NECK AND SHOULDER PAIN: ICD-10-CM

## 2022-11-02 PROCEDURE — 95115 IMMUNOTHERAPY ONE INJECTION: CPT | Performed by: EMERGENCY MEDICINE

## 2022-11-02 RX ORDER — ATORVASTATIN CALCIUM 20 MG/1
20 TABLET, FILM COATED ORAL DAILY
Qty: 90 TABLET | Refills: 3 | Status: SHIPPED | OUTPATIENT
Start: 2022-11-02 | End: 2022-12-02

## 2022-11-02 RX ORDER — CYCLOBENZAPRINE HCL 10 MG
10 TABLET ORAL 2 TIMES DAILY PRN
Qty: 60 TABLET | Refills: 2 | Status: SHIPPED | OUTPATIENT
Start: 2022-11-02 | End: 2022-12-02

## 2022-11-02 NOTE — PROGRESS NOTES
Moise Flores   Allergy Injection Note:    Yon Ochoa presents for an immunotherapy injection. The site of the injection was cleansed with an alcohol swab. Serum was injected into the site after pulling back on the plunger to prevent intravascular injection. After the injection and was instructed to wait in the allergy waiting area for 30 minutes. There was no problems with the injection.    Allergy Shot Questionnaire  Injection nurse/assistant: Moise Flores ST/AT  Have you had increased asthma symptoms in past week?: no  Have you had increased allergy symptoms in the last week?: no  Have you had a cold, respiratory tract infection or flu like symptoms in the past 2 weeks?: no  Did you have any problems within 12 hours of the last injection?: no  Are you on any new medications/ eye drops?: no  Are you on any beta blockers?: no  If female, are you pregnant?: no  I have confirmed the name and birth date on my allergy vial. : yes  Epipen available?: yes  Epipen Lot Number: [id number]  Epipen Expiration Date: 8/2023  Number of allergy injections given: 1     Injection Details:  Vial 1   Vial 1 Expiration Date: 11/12/22  Vial 1 Series: 3  Shot type: escalation  Vial 1 Dose (mL): 0.5  Vial 1 Location: Right upper arm  Vial 1 Reaction (in mm): <5          Moise Flores  11/2/2022  14:36 CDT

## 2022-11-09 ENCOUNTER — CLINICAL SUPPORT (OUTPATIENT)
Dept: OTOLARYNGOLOGY | Facility: CLINIC | Age: 52
End: 2022-11-09

## 2022-11-09 DIAGNOSIS — J30.9 ALLERGIC RHINITIS, UNSPECIFIED SEASONALITY, UNSPECIFIED TRIGGER: Primary | ICD-10-CM

## 2022-11-09 PROCEDURE — 95115 IMMUNOTHERAPY ONE INJECTION: CPT | Performed by: EMERGENCY MEDICINE

## 2022-11-09 NOTE — PROGRESS NOTES
Moise Flores   Allergy Injection Note:    Yon Ochoa presents for an immunotherapy injection. The site of the injection was cleansed with an alcohol swab. Serum was injected into the site after pulling back on the plunger to prevent intravascular injection. After the injection and was instructed to wait in the allergy waiting area for 30 minutes. There was no problems with the injection.    Allergy Shot Questionnaire  Injection nurse/assistant: Moise Flores ST/AT  Have you had increased asthma symptoms in past week?: no  Have you had increased allergy symptoms in the last week?: no  Have you had a cold, respiratory tract infection or flu like symptoms in the past 2 weeks?: no  Did you have any problems within 12 hours of the last injection?: no  Are you on any new medications/ eye drops?: no  Are you on any beta blockers?: no  If female, are you pregnant?: no  I have confirmed the name and birth date on my allergy vial. : yes  Epipen available?: yes  Epipen Lot Number: [id number]  Epipen Expiration Date: 8/2023  Number of allergy injections given: 1     Injection Details:  Vial 1   Vial 1 Expiration Date: 11/12/22  Vial 1 Series: 3  Shot type: escalation  Vial 1 Dose (mL): 0.5  Vial 1 Location: Left upper arm  Vial 1 Reaction (in mm): <5          Moise Flores  11/9/2022  14:08 CST

## 2022-11-11 ENCOUNTER — CLINICAL SUPPORT NO REQUIREMENTS (OUTPATIENT)
Dept: OTOLARYNGOLOGY | Facility: CLINIC | Age: 52
End: 2022-11-11

## 2022-11-11 DIAGNOSIS — J30.9 ALLERGIC RHINITIS, UNSPECIFIED SEASONALITY, UNSPECIFIED TRIGGER: Primary | ICD-10-CM

## 2022-11-11 PROCEDURE — 95165 ANTIGEN THERAPY SERVICES: CPT | Performed by: OTOLARYNGOLOGY

## 2022-11-11 NOTE — PROGRESS NOTES
Moise Flores   Allergy Injection Mix Note    A immunotherapy injection vial was mixed using standard protocol under sterile conditions.     Mixing Nurse/ Tech: Moise Flores ST/AT (11/11/22 1304)    Vial Series: 4    VIAL 1  [place]: Vial 1 mix 0.2 cc of #: concentrate  Mold Mix: Vial 1 mix 0.2 cc of #: concentrate  Trichophyton: Vial 1 mix 0.2 cc of #: concentrate  Dust Mite Mix: Vial 1 mix 0.2 cc of #: concentrate  Dog: Vial 1 mix 0.2 cc of #: concentrate  Cat: Vial 1 mix 0.2 cc of #: concentrate  Feather: Vial 1 mix 0.2 cc of #: concentrate  Vial 1 Additions  Antigen Total: 1.4 cc  Glycerine: 0  Dilutent: 3.6 cc  TOTAL: 5           Moise Flores  11/11/2022  13:05 CST

## 2022-11-11 NOTE — PROGRESS NOTES
I have reviewed the notes, assessments, and/or procedures of this encounter and I concur with the documentation on Yon Ochoa.      Oleg Swan MD  11/11/22  4:20 PM CST

## 2022-11-16 ENCOUNTER — CLINICAL SUPPORT (OUTPATIENT)
Dept: OTOLARYNGOLOGY | Facility: CLINIC | Age: 52
End: 2022-11-16

## 2022-11-16 DIAGNOSIS — J30.9 ALLERGIC RHINITIS, UNSPECIFIED SEASONALITY, UNSPECIFIED TRIGGER: ICD-10-CM

## 2022-11-16 PROCEDURE — 95115 IMMUNOTHERAPY ONE INJECTION: CPT | Performed by: EMERGENCY MEDICINE

## 2022-11-16 NOTE — PROGRESS NOTES
Moise Flores   Allergy Injection Note:    Yon Ochoa presents for an immunotherapy injection. The site of the injection was cleansed with an alcohol swab. Serum was injected into the site after pulling back on the plunger to prevent intravascular injection. After the injection and was instructed to wait in the allergy waiting area for 30 minutes. There was no problems with the injection.    Allergy Shot Questionnaire  Injection nurse/assistant: Moise Flores ST/AT  Have you had increased asthma symptoms in past week?: no  Have you had increased allergy symptoms in the last week?: no  Have you had a cold, respiratory tract infection or flu like symptoms in the past 2 weeks?: no  Did you have any problems within 12 hours of the last injection?: no  Are you on any new medications/ eye drops?: no  Are you on any beta blockers?: no  If female, are you pregnant?: no  I have confirmed the name and birth date on my allergy vial. : yes  Epipen available?: yes  Epipen Lot Number: [id number]  Epipen Expiration Date: 8/2023  Number of allergy injections given: 1     Injection Details:  Vial 1   Vial 1 Expiration Date: 02/11/23  Vial 1 Series: 4  Shot type: escalation  Vial 1 Dose (mL): 0.05 Vial test  Vial 1 Location: Left upper arm  Vial 1 Vial Test Reaction (in mm): 9          Moise Flores  11/16/2022  14:34 CST

## 2022-11-30 ENCOUNTER — CLINICAL SUPPORT (OUTPATIENT)
Dept: OTOLARYNGOLOGY | Facility: CLINIC | Age: 52
End: 2022-11-30

## 2022-11-30 DIAGNOSIS — J30.9 ALLERGIC RHINITIS, UNSPECIFIED SEASONALITY, UNSPECIFIED TRIGGER: ICD-10-CM

## 2022-11-30 PROCEDURE — 95115 IMMUNOTHERAPY ONE INJECTION: CPT | Performed by: EMERGENCY MEDICINE

## 2022-11-30 NOTE — PROGRESS NOTES
Moise Flores   Allergy Injection Note:    Yon Ochoa presents for an immunotherapy injection. The site of the injection was cleansed with an alcohol swab. Serum was injected into the site after pulling back on the plunger to prevent intravascular injection. After the injection and was instructed to wait in the allergy waiting area for 30 minutes. There was no problems with the injection.    Allergy Shot Questionnaire  Injection nurse/assistant: Moise Flores ST/AT  Have you had increased asthma symptoms in past week?: no  Have you had increased allergy symptoms in the last week?: no  Have you had a cold, respiratory tract infection or flu like symptoms in the past 2 weeks?: no  Did you have any problems within 12 hours of the last injection?: no  Are you on any new medications/ eye drops?: no  Are you on any beta blockers?: no  If female, are you pregnant?: no  I have confirmed the name and birth date on my allergy vial. : yes  Epipen available?: yes  Epipen Lot Number: [id number]  Epipen Expiration Date: 8/2023  Number of allergy injections given: 1     Injection Details:  Vial 1   Vial 1 Expiration Date: 02/11/23  Vial 1 Series: 4  Shot type: escalation  Vial 1 Dose (mL): 0.1  Vial 1 Location: Right upper arm  Vial 1 Reaction (in mm): <5          Moise Flores  11/30/2022  14:53 CST

## 2022-12-02 DIAGNOSIS — R12 HEARTBURN: ICD-10-CM

## 2022-12-05 RX ORDER — OMEPRAZOLE 20 MG/1
CAPSULE, DELAYED RELEASE ORAL
Qty: 90 CAPSULE | Refills: 3 | Status: SHIPPED | OUTPATIENT
Start: 2022-12-05

## 2022-12-14 ENCOUNTER — CLINICAL SUPPORT (OUTPATIENT)
Dept: OTOLARYNGOLOGY | Facility: CLINIC | Age: 52
End: 2022-12-14

## 2022-12-14 DIAGNOSIS — J30.9 ALLERGIC RHINITIS, UNSPECIFIED SEASONALITY, UNSPECIFIED TRIGGER: ICD-10-CM

## 2022-12-14 PROCEDURE — 95115 IMMUNOTHERAPY ONE INJECTION: CPT | Performed by: NURSE PRACTITIONER

## 2022-12-14 NOTE — PROGRESS NOTES
Moise Flores   Allergy Injection Note:    Yon Ochoa presents for an immunotherapy injection. The site of the injection was cleansed with an alcohol swab. Serum was injected into the site after pulling back on the plunger to prevent intravascular injection. After the injection and was instructed to wait in the allergy waiting area for 30 minutes. There was no problems with the injection.    Allergy Shot Questionnaire  Injection nurse/assistant: Moise Flores ST/AT  Have you had increased asthma symptoms in past week?: no  Have you had increased allergy symptoms in the last week?: no  Have you had a cold, respiratory tract infection or flu like symptoms in the past 2 weeks?: no  Did you have any problems within 12 hours of the last injection?: no  Are you on any new medications/ eye drops?: no  Are you on any beta blockers?: no  If female, are you pregnant?: no  I have confirmed the name and birth date on my allergy vial. : yes  Epipen available?: yes  Epipen Lot Number: [id number]  Epipen Expiration Date: 8/2023  Number of allergy injections given: 1     Injection Details:  Vial 1   Vial 1 Expiration Date: 02/11/23  Vial 1 Series: 4  Shot type: escalation  Vial 1 Dose (mL): 0.2  Vial 1 Location: Left upper arm  Vial 1 Reaction (in mm): <5          Moise Flores  12/14/2022  14:43 CST

## 2022-12-20 ENCOUNTER — OFFICE VISIT (OUTPATIENT)
Dept: PRIMARY CARE CLINIC | Age: 52
End: 2022-12-20
Payer: COMMERCIAL

## 2022-12-20 ENCOUNTER — DOCUMENTATION (OUTPATIENT)
Dept: ENDOCRINOLOGY | Facility: CLINIC | Age: 52
End: 2022-12-20

## 2022-12-20 VITALS
RESPIRATION RATE: 20 BRPM | WEIGHT: 167 LBS | OXYGEN SATURATION: 98 % | SYSTOLIC BLOOD PRESSURE: 100 MMHG | HEIGHT: 62 IN | BODY MASS INDEX: 30.73 KG/M2 | DIASTOLIC BLOOD PRESSURE: 70 MMHG | TEMPERATURE: 97.9 F | HEART RATE: 91 BPM

## 2022-12-20 DIAGNOSIS — N39.0 ACUTE UPPER URINARY TRACT INFECTION: Primary | ICD-10-CM

## 2022-12-20 DIAGNOSIS — N39.0 ACUTE UPPER URINARY TRACT INFECTION: ICD-10-CM

## 2022-12-20 LAB
RSV ANTIGEN: NEGATIVE
SARS-COV-2, PCR: NOT DETECTED

## 2022-12-20 PROCEDURE — 86756 RESPIRATORY VIRUS ANTIBODY: CPT | Performed by: INTERNAL MEDICINE

## 2022-12-20 PROCEDURE — 3074F SYST BP LT 130 MM HG: CPT | Performed by: INTERNAL MEDICINE

## 2022-12-20 PROCEDURE — 3078F DIAST BP <80 MM HG: CPT | Performed by: INTERNAL MEDICINE

## 2022-12-20 PROCEDURE — 99213 OFFICE O/P EST LOW 20 MIN: CPT | Performed by: INTERNAL MEDICINE

## 2022-12-20 RX ORDER — METHYLPREDNISOLONE 4 MG/1
TABLET ORAL
Qty: 1 KIT | Refills: 0 | Status: SHIPPED | OUTPATIENT
Start: 2022-12-20 | End: 2022-12-26

## 2022-12-20 RX ORDER — AZITHROMYCIN 250 MG/1
250 TABLET, FILM COATED ORAL SEE ADMIN INSTRUCTIONS
Qty: 6 TABLET | Refills: 0 | Status: SHIPPED | OUTPATIENT
Start: 2022-12-20 | End: 2022-12-25

## 2022-12-22 PROBLEM — N39.0 ACUTE UPPER URINARY TRACT INFECTION: Status: ACTIVE | Noted: 2022-12-22

## 2022-12-22 ASSESSMENT — ENCOUNTER SYMPTOMS
SHORTNESS OF BREATH: 1
HEMOPTYSIS: 0
COUGH: 1
RHINORRHEA: 1
HEARTBURN: 0
SORE THROAT: 1
WHEEZING: 0

## 2022-12-22 NOTE — ASSESSMENT & PLAN NOTE
Borderline controlled, changes made today: Patient has persistent cough most likely from postviral infection nasal congestion with purulent sputum will cover with antibiotic advised to use saline nasal sprays Flonase for congestion advised to get tested for COVID   Rest hydrate use saline nasal sprays

## 2022-12-22 NOTE — PROGRESS NOTES
Probe results? Please advise, thanks   Ravinder Armstrong ( 1970) is a 50 y.o. female, Established / Ines Frias, here for evaluation of the following chief complaint(s). Cough and Congestion      Patient was encouraged and advised to be compliant with all  medications leads an active lifestyle and promote maintaining a healthy weight, encouraged not to use cigarettes, laboratory results discussed and reviewed with patient's during this visit   ASSESSMENT/PLAN:  Problem List          Genitourinary    Acute upper urinary tract infection - Primary      Borderline controlled, changes made today: Patient has persistent cough most likely from postviral infection nasal congestion with purulent sputum will cover with antibiotic advised to use saline nasal sprays Flonase for congestion advised to get tested for COVID   Rest hydrate use saline nasal sprays         Relevant Medications    methylPREDNISolone (MEDROL DOSEPACK) 4 MG tablet    azithromycin (ZITHROMAX) 250 MG tablet    Other Relevant Orders    POCT RSV (Completed)         No flowsheet data found. PHQ Scores 2022 2022 10/18/2021 2021 2020   PHQ2 Score 0 0 0 0 2   PHQ9 Score 0 0 0 0 2       Results for orders placed or performed in visit on 22   COVID-19   Result Value Ref Range    SARS-CoV-2, PCR Not Detected Not Detected   POCT RSV   Result Value Ref Range    RSV Antigen Negative        No follow-ups on file. Cough  This is a recurrent problem. The current episode started 1 to 4 weeks ago. The problem has been waxing and waning. The problem occurs every few hours. The cough is Productive of sputum. Associated symptoms include ear congestion, nasal congestion, postnasal drip, rhinorrhea, a sore throat and shortness of breath. Pertinent negatives include no chest pain, chills, ear pain, fever, headaches, heartburn, hemoptysis, myalgias, rash, sweats, weight loss or wheezing. Nothing aggravates the symptoms. Risk factors for lung disease include occupational exposure.  She

## 2022-12-23 DIAGNOSIS — K59.09 CHRONIC CONSTIPATION: ICD-10-CM

## 2022-12-27 RX ORDER — LUBIPROSTONE 24 UG/1
CAPSULE, GELATIN COATED ORAL
Qty: 60 CAPSULE | Refills: 11 | OUTPATIENT
Start: 2022-12-27

## 2022-12-27 NOTE — TELEPHONE ENCOUNTER
Last visit [facility] 1-19-22. No FU scheduled. I tried to call this patient to get her Annual OV scheduled, she did not answer. I notified the Pharmacy this patient will need an OV for future medication refills and to please have her call and get scheduled.  Abhilash arreaga

## 2022-12-29 ENCOUNTER — CLINICAL SUPPORT (OUTPATIENT)
Dept: OTOLARYNGOLOGY | Facility: CLINIC | Age: 52
End: 2022-12-29

## 2022-12-29 DIAGNOSIS — J30.9 ALLERGIC RHINITIS, UNSPECIFIED SEASONALITY, UNSPECIFIED TRIGGER: ICD-10-CM

## 2022-12-29 PROCEDURE — 95115 IMMUNOTHERAPY ONE INJECTION: CPT | Performed by: EMERGENCY MEDICINE

## 2022-12-29 NOTE — PROGRESS NOTES
Moise Flores   Allergy Injection Note:    Yon Ochoa presents for an immunotherapy injection. The site of the injection was cleansed with an alcohol swab. Serum was injected into the site after pulling back on the plunger to prevent intravascular injection. After the injection and was instructed to wait in the allergy waiting area for 30 minutes. There was no problems with the injection.    Allergy Shot Questionnaire  Injection nurse/assistant: Moise Flores ST/AT  Have you had increased asthma symptoms in past week?: no  Have you had increased allergy symptoms in the last week?: no  Have you had a cold, respiratory tract infection or flu like symptoms in the past 2 weeks?: no  Did you have any problems within 12 hours of the last injection?: no  Are you on any new medications/ eye drops?: no  Are you on any beta blockers?: no  If female, are you pregnant?: no  I have confirmed the name and birth date on my allergy vial. : yes  Epipen available?: yes  Epipen Lot Number: [id number]  Epipen Expiration Date: 8/2023  Number of allergy injections given: 1     Injection Details:  Vial 1   Vial 1 Expiration Date: 02/11/23  Vial 1 Series: 4  Shot type: escalation  Vial 1 Dose (mL): 0.2 (repeated dose due to 2 week lapse.)  Vial 1 Location: Right upper arm  Vial 1 Reaction (in mm): <5          Moise Flores  12/29/2022  14:07 CST

## 2023-01-03 ENCOUNTER — DOCUMENTATION (OUTPATIENT)
Dept: ENDOCRINOLOGY | Facility: CLINIC | Age: 53
End: 2023-01-03
Payer: COMMERCIAL

## 2023-01-03 NOTE — PROGRESS NOTES
TRESIBA FLEXTOUCH 200 UNIT/ML APPROVED FROM 1/3/23 TO 1/2/24    SENT TO [facility] REC

## 2023-01-04 ENCOUNTER — OFFICE VISIT (OUTPATIENT)
Age: 53
End: 2023-01-04
Payer: COMMERCIAL

## 2023-01-04 VITALS
HEIGHT: 62 IN | BODY MASS INDEX: 32.31 KG/M2 | DIASTOLIC BLOOD PRESSURE: 58 MMHG | SYSTOLIC BLOOD PRESSURE: 96 MMHG | RESPIRATION RATE: 22 BRPM | TEMPERATURE: 100.7 F | WEIGHT: 175.6 LBS | OXYGEN SATURATION: 97 % | HEART RATE: 126 BPM

## 2023-01-04 DIAGNOSIS — Z11.52 ENCOUNTER FOR SCREENING FOR COVID-19: ICD-10-CM

## 2023-01-04 DIAGNOSIS — R50.9 FEVER, UNSPECIFIED FEVER CAUSE: ICD-10-CM

## 2023-01-04 DIAGNOSIS — J10.1 INFLUENZA A: Primary | ICD-10-CM

## 2023-01-04 DIAGNOSIS — R52 BODY ACHES: ICD-10-CM

## 2023-01-04 LAB
INFLUENZA A ANTIBODY: POSITIVE
INFLUENZA B ANTIBODY: NEGATIVE
S PYO AG THROAT QL: NORMAL

## 2023-01-04 PROCEDURE — 99213 OFFICE O/P EST LOW 20 MIN: CPT | Performed by: NURSE PRACTITIONER

## 2023-01-04 PROCEDURE — 87880 STREP A ASSAY W/OPTIC: CPT | Performed by: NURSE PRACTITIONER

## 2023-01-04 PROCEDURE — 3078F DIAST BP <80 MM HG: CPT | Performed by: NURSE PRACTITIONER

## 2023-01-04 PROCEDURE — 87804 INFLUENZA ASSAY W/OPTIC: CPT | Performed by: NURSE PRACTITIONER

## 2023-01-04 PROCEDURE — 3074F SYST BP LT 130 MM HG: CPT | Performed by: NURSE PRACTITIONER

## 2023-01-04 RX ORDER — OSELTAMIVIR PHOSPHATE 75 MG/1
75 CAPSULE ORAL 2 TIMES DAILY
Qty: 10 CAPSULE | Refills: 0 | Status: SHIPPED | OUTPATIENT
Start: 2023-01-04 | End: 2023-01-09

## 2023-01-04 ASSESSMENT — ENCOUNTER SYMPTOMS
TROUBLE SWALLOWING: 0
SINUS PRESSURE: 0
CHEST TIGHTNESS: 0
ABDOMINAL PAIN: 0
COUGH: 1
WHEEZING: 0
EYE PAIN: 0
SORE THROAT: 0
STRIDOR: 0
COLOR CHANGE: 0
ABDOMINAL DISTENTION: 0
EYE DISCHARGE: 0
SHORTNESS OF BREATH: 0

## 2023-01-05 ENCOUNTER — TELEPHONE (OUTPATIENT)
Dept: INTERNAL MEDICINE | Age: 53
End: 2023-01-05

## 2023-01-05 ENCOUNTER — TELEPHONE (OUTPATIENT)
Dept: PRIMARY CARE CLINIC | Age: 53
End: 2023-01-05

## 2023-01-05 LAB — SARS-COV-2, PCR: NOT DETECTED

## 2023-01-05 NOTE — PATIENT INSTRUCTIONS
1.  Patient positive for Influenza A  2. Encourage fluids, tylenol/Motrin, and rest  3. No school/work for 7 days from symptom onset  4. Tamiflu sent per patient request  5. Educated on common secondary infections  6. If high persistent fever, Shortness of breath, dehydration, or lethargy occurs go to ER    Patient verbalized understanding and agrees to treatment plan.

## 2023-01-05 NOTE — PROGRESS NOTES
[address] [facility] [address] [id number]  Dept: [phone]  Dept Fax: [phone]  Loc: [phone]    Art Mason is a 50 y.o. female who presents today for her medical conditions/complaints as noted below. Art Mason is complaining of Cough, Generalized Body Aches, and Fever (Cough started yesterday and the other symptoms started today.)        HPI:   Cough  Associated symptoms include chills and a fever. Pertinent negatives include no chest pain, rash, sore throat, shortness of breath or wheezing. Generalized Body Aches  Associated symptoms include chills, congestion, coughing, fatigue and a fever. Pertinent negatives include no abdominal pain, arthralgias, chest pain, neck pain, numbness, rash, sore throat or weakness. Fever   Associated symptoms include congestion and coughing. Pertinent negatives include no abdominal pain, chest pain, rash, sore throat, urinary pain or wheezing. Roldan Soliman presents to the office complaining of cough, congestion, body aches, and fever. Patient states that symptoms started around 430 today. Patient states she had a minor cough yesterday. She denies any shortness of breath.     Past Medical History:   Diagnosis Date    Anxiety     Bacteremia 10/17/2019    Bandemia 10/16/2019    Class 2 obesity due to excess calories without serious comorbidity with body mass index (BMI) of 36.0 to 36.9 in adult 12/19/2019    Depression     Diabetes mellitus ([address]     GERD (gastroesophageal reflux disease)     Heartburn 10/28/2020    Hyperglycemia 8/16/2017    Hyperlipidemia     Hypertension     Hypothyroidism     Lower abdominal pain 1/22/2019    Neuropathy 10/16/2019    Obstructive sleep apnea     no machine    Osteoarthritis     Skin ulcer of flank with fat layer exposed ([address] 1/8/2020    Staph infection 11/11/2018       Past Surgical History:   Procedure Laterality Date    APPENDECTOMY      as a 3rd grader    [address], 2000, 2003    CHOLECYSTECTOMY, LAPAROSCOPIC      1994    COLONOSCOPY      \"more than 15 + yrs ago\" PER PT RECALL    COLONOSCOPY N/A 7/30/2019    Dr Gloria Healy hemorrhoids-Grade 1, suboptimal prep, 3 yr recall    HYSTERECTOMY, TOTAL ABDOMINAL (CERVIX REMOVED)      May 2012, withOUT Ophorectomy    MEDICATION INJECTION Right 6/8/2022    NINFA INJECTION performed by Loy Velasco MD at [address] [facility] NEUROPLASTY &/TRANSPOS MEDIAN NRV CARPAL Cherelle Kirks Left 6/16/2017    CARPAL TUNNEL RELEASE performed by Loy Velasco MD at [address] [facility] NEUROPLASTY &/TRANSPOS MEDIAN NRV CARPAL Cherelle Kirks Right 7/21/2017    CARPAL TUNNEL RELEASE performed by Loy Velasco MD at [address] [facility] [address] Right 6/8/2022    RIGHT SHOULDER MANIPULATION performed by Loy Velasco MD at [address]      as a 5th grader    UPPER GASTROINTESTINAL ENDOSCOPY N/A 7/30/2019    UPPER GASTROINTESTINAL ENDOSCOPY  7/30/2019    Dr Tree Arellano exam, empiric dil with 54F over wire, neg EoE       Family History   Problem Relation Age of Onset    High Blood Pressure Mother     Cancer Mother         of the brain    Other Mother         second hand smoker    High Blood Pressure Father     High Cholesterol Father     Colon Polyps Father     Other Father         smoker    Diabetes Sister         type 1, from alcohol    High Blood Pressure Sister     High Blood Pressure Brother     Colon Cancer Maternal Aunt     Colon Cancer Paternal Uncle     Colon Cancer Paternal Grandfather     Diabetes Brother         from alcohol    Hypertension Brother     No Known Problems Son     No Known Problems Son     No Known Problems Son     No Known Problems Daughter     Esophageal Cancer Neg Hx     Liver Cancer Neg Hx     Liver Disease Neg Hx     Rectal Cancer Neg Hx     Stomach Cancer Neg Hx        Social History     Tobacco Use    Smoking status: Never    Smokeless tobacco: Never Substance Use Topics    Alcohol use: Not Currently     Alcohol/week: 0.0 standard drinks        Current Outpatient Medications   Medication Sig Dispense Refill    oseltamivir (TAMIFLU) 75 MG capsule Take 1 capsule by mouth 2 times daily for 5 days 10 capsule 0    omeprazole (PRILOSEC) 20 MG delayed release capsule TAKE 1 CAPSULE BY MOUTH DAILY FIRST THING IN THE MORNING ON AN EMPTY STOMACH 90 capsule 3    Continuous Blood Gluc Sensor (FREESTYLE MARJORIE 14 DAY SENSOR) MISC 1 box by Does not apply route every 30 days DX 11.9 one box every 14 days 1 each 5    ibuprofen (ADVIL;MOTRIN) 800 MG tablet Take 1 tablet by mouth 2 times daily as needed for Pain 60 tablet 0    topiramate (TOPAMAX) 50 MG tablet TAKE 3 TABLETS BY MOUTH EVERY NIGHT AT BEDTIME 90 tablet 5    levothyroxine (SYNTHROID) 137 MCG tablet TAKE 1 TABLET BY MOUTH DAILY 90 tablet 1    fexofenadine (ALLEGRA) 180 MG tablet Take 1 tablet by mouth in the morning. 90 tablet 3    traZODone (DESYREL) 100 MG tablet TAKE 1 TABLET BY MOUTH EVERY NIGHT 30 tablet 5    insulin lispro (HUMALOG) 100 UNIT/ML SOLN injection vial Inject into the skin 3 times daily (before meals)      azelastine (ASTELIN) 0.1 % nasal spray 1 spray by Nasal route 2 times daily Use in each nostril as directed (Patient taking differently: 1 spray by Nasal route 2 times daily as needed Use in each nostril as directed) 60 mL 1    famotidine (PEPCID) 20 MG tablet TAKE 1 TABLET BY MOUTH TWICE DAILY (Patient taking differently: Take 20 mg by mouth 2 times daily) 180 tablet 1    lubiprostone (AMITIZA) 24 MCG capsule Take 1 capsule by mouth 2 times daily (with meals) 60 capsule 11    blood glucose test strips (ACCU-CHEK GAVI PLUS) strip USE TO TEST ONCE DAILY 100 strip 3    Insulin Degludec (TRESIBA FLEXTOUCH) 200 UNIT/ML SOPN 60 units each morning not taking at all during the night.  4 pen 5    Onabotulinumtoxin A (BOTOX) 200 units injection 155 units IM in Cervical and Facial Region every 90 days      Please fax all correspondence to [phone]  Please call [phone] with any questions. 1 each 3    polyethylene glycol (GLYCOLAX) powder Take 17 g by mouth 2 times daily  1    vitamin B-6 (PYRIDOXINE) 100 MG tablet Take 100 mg by mouth daily      Insulin Pen Needle 32G X 4 MM MISC 1 each by Does not apply route daily 100 each 3    Insulin Syringe-Needle U-100 30G X 5/16\" 0.5 ML MISC 1 each by Does not apply route daily 100 each 3    ASPIRIN LOW DOSE 81 MG EC tablet TAKE 1 TABLET BY MOUTH DAILY (Patient taking differently: Take 81 mg by mouth daily) 30 tablet 0    atorvastatin (LIPITOR) 20 MG tablet TAKE 1 TABLET BY MOUTH DAILY 90 tablet 3    quinapril (ACCUPRIL) 5 MG tablet Take 1 tablet by mouth nightly 30 tablet 11    spironolactone (ALDACTONE) 50 MG tablet TAKE 1 TABLET BY MOUTH DAILY 90 tablet 3    DULoxetine (CYMBALTA) 60 MG extended release capsule Take 1 capsule by mouth at bedtime 30 capsule 5    DULoxetine (CYMBALTA) 30 MG extended release capsule Take 1 capsule by mouth every morning 30 capsule 5    clonazePAM (KLONOPIN) 0.5 MG tablet TAKE 1 TABLET BY MOUTH DAILY AS NEEDED. (Patient taking differently: Take 0.5 mg by mouth 2 times daily as needed. TAKE 1 TABLET BY MOUTH DAILY AS NEEDED.) 30 tablet 0     No current facility-administered medications for this visit.        Allergies   Allergen Reactions    Claritin-D 12 Hour [Loratadine-Pseudoephedrine Er] Other (See Comments)     \"it intensifies my migraines\"    Demerol Hcl [Meperidine] Other (See Comments)     Aggression    Percocet [Oxycodone-Acetaminophen] Rash       Health Maintenance   Topic Date Due    Diabetic retinal exam  07/27/2022    Colorectal Cancer Screen  07/30/2022    Diabetic foot exam  01/18/2023    Pneumococcal 0-64 years Vaccine (2 - PCV) 09/30/2023 (Originally 8/15/2018)    COVID-19 Vaccine (4 - Booster for Westly Poisson series) 09/30/2023 (Originally 1/8/2022)    Hepatitis B vaccine (1 of 3 - Risk 3-dose series) 12/20/2023 (Originally 2/26/1989)    Flu vaccine (1) 12/20/2023 (Originally 8/1/2022)    Shingles vaccine (1 of 2) 12/20/2023 (Originally 2/26/2022)    GFR test (Diabetes, CKD 3-4, OR last GFR 15-59)  05/11/2023    Depression Screen  09/13/2023    A1C test (Diabetic or Prediabetic)  10/27/2023    Diabetic Alb to Cr ratio (uACR) test  10/27/2023    Lipids  10/27/2023    Breast cancer screen  06/02/2024    DTaP/Tdap/Td vaccine (2 - Td or Tdap) 04/14/2026    Hepatitis C screen  Completed    HIV screen  Addressed    Hepatitis A vaccine  Aged Out    Hib vaccine  Aged Out    Meningococcal (ACWY) vaccine  Aged Out       Subjective:   Review of Systems   Constitutional:  Positive for chills, fatigue and fever. HENT:  Positive for congestion. Negative for sinus pressure, sore throat and trouble swallowing. Eyes:  Negative for pain and discharge. Respiratory:  Positive for cough. Negative for chest tightness, shortness of breath, wheezing and stridor. Cardiovascular:  Negative for chest pain and palpitations. Gastrointestinal:  Negative for abdominal distention and abdominal pain. Genitourinary:  Negative for difficulty urinating, dysuria and hematuria. Musculoskeletal:  Negative for arthralgias, neck pain and neck stiffness. Skin:  Negative for color change and rash. Neurological:  Negative for dizziness, syncope, speech difficulty, weakness and numbness. Psychiatric/Behavioral:  Negative for confusion and suicidal ideas. Objective    Physical Exam  Vitals and nursing note reviewed. Constitutional:       General: She is not in acute distress. Appearance: She is ill-appearing. Comments: Patient was asleep upon provider entrance to room, patient tearful on exam.    HENT:      Head: Normocephalic. Right Ear: Tympanic membrane, ear canal and external ear normal.      Left Ear: Tympanic membrane, ear canal and external ear normal.      Nose: Nose normal. No congestion.       Mouth/Throat: Mouth: Mucous membranes are moist.      Pharynx: Oropharynx is clear. Posterior oropharyngeal erythema (mild) present. Eyes:      Conjunctiva/sclera: Conjunctivae normal.      Pupils: Pupils are equal, round, and reactive to light. Cardiovascular:      Rate and Rhythm: Normal rate and regular rhythm. Pulses: Normal pulses. Heart sounds: Normal heart sounds. No murmur heard. Pulmonary:      Effort: Pulmonary effort is normal. No respiratory distress. Breath sounds: Normal breath sounds. No stridor. No wheezing. Abdominal:      General: Abdomen is flat. Bowel sounds are normal. There is no distension. Tenderness: There is no abdominal tenderness. Musculoskeletal:         General: No swelling or deformity. Normal range of motion. Cervical back: Normal range of motion. No rigidity or tenderness. Skin:     General: Skin is warm and dry. Findings: No rash. Neurological:      General: No focal deficit present. Mental Status: She is alert and oriented to person, place, and time. Sensory: No sensory deficit. BP (!) 96/58   Pulse (!) 126   Temp (!) 100.7 °F (38.2 °C) (Temporal)   Resp 22   Ht 5' 2\" (1.575 m)   Wt 175 lb 9.6 oz (79.7 kg)   LMP  (LMP Unknown)   SpO2 97%   BMI 32.12 kg/m²     Assessment         Diagnosis Orders   1. Influenza A  oseltamivir (TAMIFLU) 75 MG capsule      2. Encounter for screening for COVID-19  COVID-19    POCT Influenza A/B    POCT rapid strep A      3. Fever, unspecified fever cause  POCT Influenza A/B    POCT rapid strep A      4. Body aches  POCT Influenza A/B    POCT rapid strep A          Plan   1. Patient positive for Influenza A  2. Encourage fluids, tylenol/Motrin, and rest  3. No school/work for 7 days from symptom onset  4. Tamiflu sent per patient request  5. Educated on common secondary infections  6.  If high persistent fever, Shortness of breath, dehydration, or lethargy occurs go to ER    Patient verbalized understanding and agrees to treatment plan. Orders Placed This Encounter   Procedures    COVID-19     Scheduling Instructions:      1) Due to current limited availability of the COVID-19 test, tests will be prioritized based on responses to questions above. Testing may be delayed due to volume. 2) Print and instruct patient to adhere to CDC home isolation program. (Link Above)              3) Set up or refer patient for a monitoring program.              4) Have patient sign up for and leverage MyChart (if not previously done). Order Specific Question:   Is this test for diagnosis or screening? Answer:   Screening     Order Specific Question:   Symptomatic for COVID-19 as defined by CDC? Answer:   No     Order Specific Question:   Date of Symptom Onset     Answer:   N/A     Order Specific Question:   Hospitalized for COVID-19? Answer:   No     Order Specific Question:   Admitted to ICU for COVID-19? Answer:   No     Order Specific Question:   Employed in healthcare setting? Answer:   Unknown     Order Specific Question:   Resident in a congregate (group) care setting? Answer:   Unknown     Order Specific Question:   Pregnant? Answer:   No     Order Specific Question:   Previously tested for COVID-19? Answer:   Yes    COVID-19    COVID-19    POCT Influenza A/B    POCT rapid strep A       Results for orders placed or performed in visit on 01/04/23   POCT Influenza A/B   Result Value Ref Range    Influenza A Ab positive     Influenza B Ab negative    POCT rapid strep A   Result Value Ref Range    Strep A Ag None Detected None Detected       Orders Placed This Encounter   Medications    oseltamivir (TAMIFLU) 75 MG capsule     Sig: Take 1 capsule by mouth 2 times daily for 5 days     Dispense:  10 capsule     Refill:  0      New Prescriptions    OSELTAMIVIR (TAMIFLU) 75 MG CAPSULE    Take 1 capsule by mouth 2 times daily for 5 days        No follow-ups on file. Discussed use, benefits, and side effects of any prescribed medications. All patient questions were answered. Patient voiced understanding of care plan. Patient was given educational materials - see patient instructions below. There are no Patient Instructions on file for this visit.       Electronically signed by NICANOR Stoner CNP on 1/4/2023 at 7:17 PM

## 2023-01-11 ENCOUNTER — CLINICAL SUPPORT (OUTPATIENT)
Dept: OTOLARYNGOLOGY | Facility: CLINIC | Age: 53
End: 2023-01-11
Payer: COMMERCIAL

## 2023-01-11 DIAGNOSIS — J30.9 ALLERGIC RHINITIS, UNSPECIFIED SEASONALITY, UNSPECIFIED TRIGGER: ICD-10-CM

## 2023-01-11 PROCEDURE — 95115 IMMUNOTHERAPY ONE INJECTION: CPT | Performed by: EMERGENCY MEDICINE

## 2023-01-11 NOTE — PROGRESS NOTES
Moise Flores   Allergy Injection Note:    Yon Ochoa presents for an immunotherapy injection. The site of the injection was cleansed with an alcohol swab. Serum was injected into the site after pulling back on the plunger to prevent intravascular injection. After the injection and was instructed to wait in the allergy waiting area for 30 minutes. There was no problems with the injection.    Allergy Shot Questionnaire  Injection nurse/assistant: Moise Flores ST/AT  Have you had increased asthma symptoms in past week?: no  Have you had increased allergy symptoms in the last week?: no  Have you had a cold, respiratory tract infection or flu like symptoms in the past 2 weeks?: no  Did you have any problems within 12 hours of the last injection?: no  Are you on any new medications/ eye drops?: no  Are you on any beta blockers?: no  If female, are you pregnant?: no  I have confirmed the name and birth date on my allergy vial. : yes  Epipen available?: yes  Epipen Lot Number: [id number]  Epipen Expiration Date: 8/2023  Number of allergy injections given: 1     Injection Details:  Vial 1   Vial 1 Expiration Date: 02/11/23  Vial 1 Series: 4  Shot type: escalation  Vial 1 Dose (mL): 0.3  Vial 1 Location: Left upper arm  Vial 1 Reaction (in mm): <5          Moise Flores  1/11/2023  14:31 CST

## 2023-01-11 NOTE — TELEPHONE ENCOUNTER
Called patient with instructions to use OTC medication for her symptoms. Patient verbalized understanding.
She can buy any over the counter cough syrup, her insurance will not pay for any cough syrup  The headache and cough are eric of the flu, I would recommend any over the counter cough and cold formula
The patient called and said she went to Urgent Care yesterday. She tested positive for flu , now she has a bad HA and cough. Can something be sent into her pharmacy for these symptoms. Please advise.
No

## 2023-01-15 PROBLEM — Z91.199 NONCOMPLIANCE WITH CPAP TREATMENT: Status: ACTIVE | Noted: 2023-01-15

## 2023-01-15 PROBLEM — E78.1 HYPERTRIGLYCERIDEMIA: Status: ACTIVE | Noted: 2023-01-15

## 2023-01-15 PROBLEM — Z91.14 NONCOMPLIANCE WITH CPAP TREATMENT: Status: ACTIVE | Noted: 2023-01-15

## 2023-01-16 RX ORDER — INSULIN LISPRO 100 [IU]/ML
INJECTION, SOLUTION INTRAVENOUS; SUBCUTANEOUS
Qty: 18 ML | Refills: 3 | Status: SHIPPED | OUTPATIENT
Start: 2023-01-16

## 2023-01-18 ENCOUNTER — CLINICAL SUPPORT (OUTPATIENT)
Dept: OTOLARYNGOLOGY | Facility: CLINIC | Age: 53
End: 2023-01-18
Payer: COMMERCIAL

## 2023-01-18 DIAGNOSIS — J30.9 ALLERGIC RHINITIS, UNSPECIFIED SEASONALITY, UNSPECIFIED TRIGGER: ICD-10-CM

## 2023-01-18 PROCEDURE — 95115 IMMUNOTHERAPY ONE INJECTION: CPT | Performed by: EMERGENCY MEDICINE

## 2023-01-18 NOTE — PROGRESS NOTES
Moise Flores   Allergy Injection Note:    Yon Ochoa presents for an immunotherapy injection. The site of the injection was cleansed with an alcohol swab. Serum was injected into the site after pulling back on the plunger to prevent intravascular injection. After the injection and was instructed to wait in the allergy waiting area for 30 minutes. There was no problems with the injection.    Allergy Shot Questionnaire  Injection nurse/assistant: Moise Flores ST/AT  Have you had increased asthma symptoms in past week?: no  Have you had increased allergy symptoms in the last week?: no  Have you had a cold, respiratory tract infection or flu like symptoms in the past 2 weeks?: no  Did you have any problems within 12 hours of the last injection?: no  Are you on any new medications/ eye drops?: no  Are you on any beta blockers?: no  If female, are you pregnant?: no  I have confirmed the name and birth date on my allergy vial. : yes  Epipen available?: yes  Epipen Lot Number: [id number]  Epipen Expiration Date: 8/2023  Number of allergy injections given: 1     Injection Details:  Vial 1   Vial 1 Expiration Date: 02/11/23  Vial 1 Series: 4  Shot type: escalation  Vial 1 Dose (mL): 0.4  Vial 1 Location: Right upper arm  Vial 1 Reaction (in mm): <5          Moise Flores  1/18/2023  14:08 CST

## 2023-01-19 DIAGNOSIS — G62.9 NEUROPATHY: ICD-10-CM

## 2023-01-19 RX ORDER — INSULIN DEGLUDEC 200 U/ML
INJECTION, SOLUTION SUBCUTANEOUS
Qty: 15 ML | Refills: 11 | Status: SHIPPED | OUTPATIENT
Start: 2023-01-19

## 2023-01-19 RX ORDER — DULOXETIN HYDROCHLORIDE 30 MG/1
CAPSULE, DELAYED RELEASE ORAL
Qty: 30 CAPSULE | Refills: 5 | OUTPATIENT
Start: 2023-01-19

## 2023-01-25 ENCOUNTER — CLINICAL SUPPORT (OUTPATIENT)
Dept: OTOLARYNGOLOGY | Facility: CLINIC | Age: 53
End: 2023-01-25
Payer: COMMERCIAL

## 2023-01-25 ENCOUNTER — TELEPHONE (OUTPATIENT)
Dept: OTOLARYNGOLOGY | Facility: CLINIC | Age: 53
End: 2023-01-25

## 2023-01-25 DIAGNOSIS — J30.9 ALLERGIC RHINITIS, UNSPECIFIED SEASONALITY, UNSPECIFIED TRIGGER: ICD-10-CM

## 2023-01-25 PROCEDURE — 95115 IMMUNOTHERAPY ONE INJECTION: CPT | Performed by: EMERGENCY MEDICINE

## 2023-01-25 NOTE — TELEPHONE ENCOUNTER
[facility] received a fax that requires your attention. The document has been indexed to the patient’s chart for your review.      Reason for sending: PRESCRIPTION REFILL REQUEST FOR EPINEPHRINE 0.3MG INJ    Documents Description: EXT MED [id number]    Name of Sender: [facility]    Date Indexed: 01.25.23    Notes (if needed):

## 2023-01-25 NOTE — PROGRESS NOTES
Moise Flores   Allergy Injection Note:    Yon Ochoa presents for an immunotherapy injection. The site of the injection was cleansed with an alcohol swab. Serum was injected into the site after pulling back on the plunger to prevent intravascular injection. After the injection and was instructed to wait in the allergy waiting area for 30 minutes. There was no problems with the injection.    Allergy Shot Questionnaire  Injection nurse/assistant: Moise Flores ST/AT  Have you had increased asthma symptoms in past week?: no  Have you had increased allergy symptoms in the last week?: no  Have you had a cold, respiratory tract infection or flu like symptoms in the past 2 weeks?: no  Did you have any problems within 12 hours of the last injection?: no  Are you on any new medications/ eye drops?: no  Are you on any beta blockers?: no  If female, are you pregnant?: no  I have confirmed the name and birth date on my allergy vial. : yes  Epipen available?: yes  Epipen Lot Number: [id number]  Epipen Expiration Date: 8/2023  Number of allergy injections given: 1     Injection Details:  Vial 1   Vial 1 Expiration Date: 02/11/23  Vial 1 Series: 4  Shot type: escalation  Vial 1 Dose (mL): 0.5  Vial 1 Location: Left upper arm  Vial 1 Reaction (in mm): <5          Moise Flores  1/25/2023  14:47 CST

## 2023-01-31 DIAGNOSIS — G62.9 NEUROPATHY: ICD-10-CM

## 2023-01-31 DIAGNOSIS — F51.01 PRIMARY INSOMNIA: ICD-10-CM

## 2023-01-31 RX ORDER — DULOXETIN HYDROCHLORIDE 30 MG/1
30 CAPSULE, DELAYED RELEASE ORAL EVERY MORNING
Qty: 30 CAPSULE | Refills: 5 | Status: SHIPPED | OUTPATIENT
Start: 2023-01-31 | End: 2023-03-02

## 2023-01-31 RX ORDER — DULOXETIN HYDROCHLORIDE 60 MG/1
60 CAPSULE, DELAYED RELEASE ORAL NIGHTLY
Qty: 30 CAPSULE | Refills: 5 | Status: SHIPPED | OUTPATIENT
Start: 2023-01-31 | End: 2023-03-02

## 2023-01-31 RX ORDER — TRAZODONE HYDROCHLORIDE 100 MG/1
TABLET ORAL
Qty: 30 TABLET | Refills: 5 | Status: SHIPPED | OUTPATIENT
Start: 2023-01-31

## 2023-02-02 DIAGNOSIS — M25.519 NECK AND SHOULDER PAIN: ICD-10-CM

## 2023-02-02 DIAGNOSIS — M54.2 NECK AND SHOULDER PAIN: ICD-10-CM

## 2023-02-02 DIAGNOSIS — E03.9 HYPOTHYROIDISM, UNSPECIFIED TYPE: ICD-10-CM

## 2023-02-02 RX ORDER — LEVOTHYROXINE SODIUM 137 UG/1
137 TABLET ORAL DAILY
Qty: 90 TABLET | Refills: 3 | Status: SHIPPED | OUTPATIENT
Start: 2023-02-02 | End: 2023-05-03

## 2023-02-03 ENCOUNTER — APPOINTMENT (OUTPATIENT)
Dept: CT IMAGING | Age: 53
End: 2023-02-03
Payer: COMMERCIAL

## 2023-02-03 ENCOUNTER — HOSPITAL ENCOUNTER (EMERGENCY)
Age: 53
Discharge: HOME | End: 2023-02-03
Attending: EMERGENCY MEDICINE
Payer: COMMERCIAL

## 2023-02-03 VITALS
SYSTOLIC BLOOD PRESSURE: 114 MMHG | HEART RATE: 98 BPM | DIASTOLIC BLOOD PRESSURE: 77 MMHG | WEIGHT: 162 LBS | OXYGEN SATURATION: 98 % | HEIGHT: 62 IN | RESPIRATION RATE: 18 BRPM | TEMPERATURE: 98.3 F | BODY MASS INDEX: 29.81 KG/M2

## 2023-02-03 DIAGNOSIS — S09.90XA CLOSED HEAD INJURY, INITIAL ENCOUNTER: Primary | ICD-10-CM

## 2023-02-03 DIAGNOSIS — R11.2 NAUSEA VOMITING AND DIARRHEA: ICD-10-CM

## 2023-02-03 DIAGNOSIS — R19.7 NAUSEA VOMITING AND DIARRHEA: ICD-10-CM

## 2023-02-03 LAB
ALBUMIN SERPL-MCNC: 4.1 G/DL (ref 3.5–5.2)
ALP BLD-CCNC: 91 U/L (ref 35–104)
ALT SERPL-CCNC: 13 U/L (ref 5–33)
ANION GAP SERPL CALCULATED.3IONS-SCNC: 11 MMOL/L (ref 7–19)
AST SERPL-CCNC: 31 U/L (ref 5–32)
BASOPHILS ABSOLUTE: 0 K/UL (ref 0–0.2)
BASOPHILS RELATIVE PERCENT: 0.3 % (ref 0–1)
BILIRUB SERPL-MCNC: 0.7 MG/DL (ref 0.2–1.2)
BUN BLDV-MCNC: 19 MG/DL (ref 6–20)
CALCIUM SERPL-MCNC: 8.6 MG/DL (ref 8.6–10)
CHLORIDE BLD-SCNC: 101 MMOL/L (ref 98–111)
CO2: 19 MMOL/L (ref 22–29)
CREAT SERPL-MCNC: 0.7 MG/DL (ref 0.5–0.9)
EOSINOPHILS ABSOLUTE: 0.1 K/UL (ref 0–0.6)
EOSINOPHILS RELATIVE PERCENT: 1 % (ref 0–5)
GFR SERPL CREATININE-BSD FRML MDRD: >60 ML/MIN/{1.73_M2}
GLUCOSE BLD-MCNC: 233 MG/DL (ref 74–109)
HCT VFR BLD CALC: 46.5 % (ref 37–47)
HEMOGLOBIN: 14.8 G/DL (ref 12–16)
IMMATURE GRANULOCYTES #: 0 K/UL
LIPASE: 38 U/L (ref 13–60)
LYMPHOCYTES ABSOLUTE: 1.4 K/UL (ref 1.1–4.5)
LYMPHOCYTES RELATIVE PERCENT: 13.4 % (ref 20–40)
MCH RBC QN AUTO: 28.6 PG (ref 27–31)
MCHC RBC AUTO-ENTMCNC: 31.8 G/DL (ref 33–37)
MCV RBC AUTO: 89.8 FL (ref 81–99)
MONOCYTES ABSOLUTE: 0.5 K/UL (ref 0–0.9)
MONOCYTES RELATIVE PERCENT: 4.6 % (ref 0–10)
NEUTROPHILS ABSOLUTE: 8.4 K/UL (ref 1.5–7.5)
NEUTROPHILS RELATIVE PERCENT: 80.4 % (ref 50–65)
PDW BLD-RTO: 13.6 % (ref 11.5–14.5)
PLATELET # BLD: 148 K/UL (ref 130–400)
PMV BLD AUTO: 9.6 FL (ref 9.4–12.3)
POTASSIUM SERPL-SCNC: 4.4 MMOL/L (ref 3.5–5)
RBC # BLD: 5.18 M/UL (ref 4.2–5.4)
SODIUM BLD-SCNC: 131 MMOL/L (ref 136–145)
TOTAL PROTEIN: 7.3 G/DL (ref 6.6–8.7)
WBC # BLD: 10.5 K/UL (ref 4.8–10.8)

## 2023-02-03 PROCEDURE — 99284 EMERGENCY DEPT VISIT MOD MDM: CPT

## 2023-02-03 PROCEDURE — 96374 THER/PROPH/DIAG INJ IV PUSH: CPT

## 2023-02-03 PROCEDURE — 6360000002 HC RX W HCPCS: Performed by: EMERGENCY MEDICINE

## 2023-02-03 PROCEDURE — 96375 TX/PRO/DX INJ NEW DRUG ADDON: CPT

## 2023-02-03 PROCEDURE — 2580000003 HC RX 258: Performed by: EMERGENCY MEDICINE

## 2023-02-03 PROCEDURE — 85025 COMPLETE CBC W/AUTO DIFF WBC: CPT

## 2023-02-03 PROCEDURE — 80053 COMPREHEN METABOLIC PANEL: CPT

## 2023-02-03 PROCEDURE — 83690 ASSAY OF LIPASE: CPT

## 2023-02-03 PROCEDURE — 96361 HYDRATE IV INFUSION ADD-ON: CPT

## 2023-02-03 PROCEDURE — 70450 CT HEAD/BRAIN W/O DYE: CPT | Performed by: RADIOLOGY

## 2023-02-03 PROCEDURE — 36415 COLL VENOUS BLD VENIPUNCTURE: CPT

## 2023-02-03 PROCEDURE — 70450 CT HEAD/BRAIN W/O DYE: CPT

## 2023-02-03 RX ORDER — SODIUM CHLORIDE, SODIUM LACTATE, POTASSIUM CHLORIDE, AND CALCIUM CHLORIDE .6; .31; .03; .02 G/100ML; G/100ML; G/100ML; G/100ML
1000 INJECTION, SOLUTION INTRAVENOUS ONCE
Status: COMPLETED | OUTPATIENT
Start: 2023-02-03 | End: 2023-02-03

## 2023-02-03 RX ORDER — ONDANSETRON 4 MG/1
4 TABLET, ORALLY DISINTEGRATING ORAL 3 TIMES DAILY PRN
Qty: 21 TABLET | Refills: 0 | Status: SHIPPED | OUTPATIENT
Start: 2023-02-03

## 2023-02-03 RX ORDER — CYCLOBENZAPRINE HCL 10 MG
TABLET ORAL
Qty: 60 TABLET | Refills: 0 | Status: SHIPPED | OUTPATIENT
Start: 2023-02-03

## 2023-02-03 RX ORDER — ONDANSETRON 2 MG/ML
4 INJECTION INTRAMUSCULAR; INTRAVENOUS ONCE
Status: COMPLETED | OUTPATIENT
Start: 2023-02-03 | End: 2023-02-03

## 2023-02-03 RX ORDER — MORPHINE SULFATE 4 MG/ML
4 INJECTION, SOLUTION INTRAMUSCULAR; INTRAVENOUS ONCE
Status: COMPLETED | OUTPATIENT
Start: 2023-02-03 | End: 2023-02-03

## 2023-02-03 RX ADMIN — ONDANSETRON 4 MG: 2 INJECTION INTRAMUSCULAR; INTRAVENOUS at 16:13

## 2023-02-03 RX ADMIN — MORPHINE SULFATE 4 MG: 4 INJECTION, SOLUTION INTRAMUSCULAR; INTRAVENOUS at 16:37

## 2023-02-03 RX ADMIN — SODIUM CHLORIDE, POTASSIUM CHLORIDE, SODIUM LACTATE AND CALCIUM CHLORIDE 1000 ML: 600; 310; 30; 20 INJECTION, SOLUTION INTRAVENOUS at 16:15

## 2023-02-03 ASSESSMENT — PAIN DESCRIPTION - LOCATION
LOCATION: HEAD
LOCATION: HEAD

## 2023-02-03 ASSESSMENT — ENCOUNTER SYMPTOMS
NAUSEA: 1
SORE THROAT: 0
ABDOMINAL PAIN: 0
DIARRHEA: 1
VOMITING: 1
COUGH: 0
RHINORRHEA: 0
SHORTNESS OF BREATH: 0
BACK PAIN: 0

## 2023-02-03 ASSESSMENT — PAIN SCALES - GENERAL
PAINLEVEL_OUTOF10: 10
PAINLEVEL_OUTOF10: 9

## 2023-02-03 ASSESSMENT — PAIN - FUNCTIONAL ASSESSMENT: PAIN_FUNCTIONAL_ASSESSMENT: 0-10

## 2023-02-03 ASSESSMENT — PAIN DESCRIPTION - DESCRIPTORS: DESCRIPTORS: SHARP

## 2023-02-03 NOTE — ED PROVIDER NOTES
16:38 signed out to me by Dr. Peter Dixon due to end of shift. Patient is resting at this time. Patient has labs pending including lipase and CMP. Patient has negative head CT and CBC returned. He has received morphine, Zofran, and lactated Ringer's 1 L. MD  02/08/23 0730

## 2023-02-03 NOTE — ED PROVIDER NOTES
[facility] EMERGENCY DEPT  eMERGENCY dEPARTMENT eNCOUnter      Pt Name: Cara Reyes  MRN: [id number]  [place] 1970  Date of evaluation: 2/3/2023  Provider: Anisa Lozada MD    [address] [place] [address]       Chief Complaint   Patient presents with    Head Injury     Pt arrived to the ed after fall on Wednesday. Pt states she blacked out and is now c/o headache. Pt denies blood thinner use. Nausea & Vomiting     Pt also c/o N/V/D. Onset this AM.          HISTORY OF PRESENT ILLNESS   (Location/Symptom, Timing/Onset,Context/Setting, Quality, Duration, Modifying Factors, Severity)  Note limiting factors. Cara Reyes is a 50 y.o. female who presents to the emergency department after a fall. Patient states she fell 2 days ago on the ice and fell backwards striking her occipital region with brief loss of consciousness she believes. She is not on anticoagulants. No neck or back pain. She became concerned because in the night she woke around 1 AM and has been having watery diarrhea and vomiting. She states that she got thinking about it more more and was concerned it possibly was related to her head injury. She called her primary care physician office today who directed her to come to the emergency department for further evaluation. HPI    NursingNotes were reviewed. REVIEW OF SYSTEMS    (2-9 systems for level 4, 10 or more for level 5)     Review of Systems   Constitutional:  Positive for fatigue. Negative for chills and fever. HENT:  Negative for rhinorrhea and sore throat. Eyes:  Negative for visual disturbance. Respiratory:  Negative for cough and shortness of breath. Cardiovascular:  Negative for chest pain and leg swelling. Gastrointestinal:  Positive for diarrhea, nausea and vomiting. Negative for abdominal pain. Genitourinary:  Negative for dysuria, frequency and urgency. Musculoskeletal:  Negative for back pain and neck pain. Neurological:  Positive for headaches.  Negative for dizziness, facial asymmetry, speech difficulty, weakness and numbness. All other systems reviewed and are negative.          PAST MEDICALHISTORY     Past Medical History:   Diagnosis Date    Anxiety     Bacteremia 10/17/2019    Bandemia 10/16/2019    Class 2 obesity due to excess calories without serious comorbidity with body mass index (BMI) of 36.0 to 36.9 in adult 12/19/2019    Depression     Diabetes mellitus ([address]     GERD (gastroesophageal reflux disease)     Heartburn 10/28/2020    Hyperglycemia 8/16/2017    Hyperlipidemia     Hypertension     Hypothyroidism     Lower abdominal pain 1/22/2019    Neuropathy 10/16/2019    Obstructive sleep apnea     no machine    Osteoarthritis     Skin ulcer of flank with fat layer exposed ([facility] [address] 1/8/2020    Staph infection 11/11/2018         SURGICAL HISTORY       Past Surgical History:   Procedure Laterality Date    APPENDECTOMY      as a 1st grader    [address], [address], 2003    CHOLECYSTECTOMY, LAPAROSCOPIC      1994    COLONOSCOPY      \"more than 15 + yrs ago\" PER PT RECALL    COLONOSCOPY N/A 7/30/2019    Dr June Johns hemorrhoids-Grade 1, suboptimal prep, 3 yr recall    HYSTERECTOMY, TOTAL ABDOMINAL (CERVIX REMOVED)      May 2012, withOUT Ophorectomy    MEDICATION INJECTION Right 6/8/2022    NINFA INJECTION performed by Geri Rico MD at [address] [facility] NEUROPLASTY &/TRANSPOS MEDIAN NRV CARPAL TUNNE Left 6/16/2017    CARPAL TUNNEL RELEASE performed by Geri Rico MD at [address] [facility] NEUROPLASTY &/TRANSPOS MEDIAN NRV CARPAL Donice Poke Right 7/21/2017    CARPAL TUNNEL RELEASE performed by Geri Rico MD at [address] Right 6/8/2022    RIGHT SHOULDER MANIPULATION performed by Geri Rico MD at [address]      as a 7th grader    UPPER GASTROINTESTINAL ENDOSCOPY N/A 7/30/2019    UPPER GASTROINTESTINAL ENDOSCOPY  7/30/2019    Dr Stanton See exam, empiric dil with 54F over wire, neg EoE         CURRENT MEDICATIONS     Previous Medications    ASPIRIN LOW DOSE 81 MG EC TABLET    TAKE 1 TABLET BY MOUTH DAILY    ATORVASTATIN (LIPITOR) 20 MG TABLET    TAKE 1 TABLET BY MOUTH DAILY    AZELASTINE (ASTELIN) 0.1 % NASAL SPRAY    1 spray by Nasal route 2 times daily Use in each nostril as directed    BLOOD GLUCOSE TEST STRIPS (ACCU-CHEK GAVI PLUS) STRIP    USE TO TEST ONCE DAILY    CLONAZEPAM (KLONOPIN) 0.5 MG TABLET    TAKE 1 TABLET BY MOUTH DAILY AS NEEDED. CONTINUOUS BLOOD GLUC SENSOR (FREESTYLE MARJORIE 14 DAY SENSOR) MISC    1 box by Does not apply route every 30 days DX 11.9 one box every 14 days    CYCLOBENZAPRINE (FLEXERIL) 10 MG TABLET    TAKE 1 TABLET BY MOUTH TWICE DAILY AS NEEDED FOR MUSCLE SPASMS    DULOXETINE (CYMBALTA) 30 MG EXTENDED RELEASE CAPSULE    TAKE 1 CAPSULE BY MOUTH EVERY MORNING    DULOXETINE (CYMBALTA) 60 MG EXTENDED RELEASE CAPSULE    TAKE 1 CAPSULE BY MOUTH AT BEDTIME    FAMOTIDINE (PEPCID) 20 MG TABLET    TAKE 1 TABLET BY MOUTH TWICE DAILY    FEXOFENADINE (ALLEGRA) 180 MG TABLET    Take 1 tablet by mouth in the morning. IBUPROFEN (ADVIL;MOTRIN) 800 MG TABLET    Take 1 tablet by mouth 2 times daily as needed for Pain    INSULIN DEGLUDEC (TRESIBA FLEXTOUCH) 200 UNIT/ML SOPN    60 units each morning not taking at all during the night.     INSULIN LISPRO (HUMALOG) 100 UNIT/ML SOLN INJECTION VIAL    Inject into the skin 3 times daily (before meals)    INSULIN PEN NEEDLE 32G X 4 MM MISC    1 each by Does not apply route daily    INSULIN SYRINGE-NEEDLE U-100 30G X 5/16\" 0.5 ML MISC    1 each by Does not apply route daily    LEVOTHYROXINE (SYNTHROID) 137 MCG TABLET    Take 1 tablet by mouth Daily    LUBIPROSTONE (AMITIZA) 24 MCG CAPSULE    Take 1 capsule by mouth 2 times daily (with meals)    OMEPRAZOLE (PRILOSEC) 20 MG DELAYED RELEASE CAPSULE    TAKE 1 CAPSULE BY MOUTH DAILY FIRST THING IN THE MORNING ON AN EMPTY STOMACH    ONABOTULINUMTOXIN A (BOTOX) 200 UNITS INJECTION    155 units IM in Cervical and Facial Region every 90 days      Please fax all correspondence to [phone]  Please call [phone] with any questions.     POLYETHYLENE GLYCOL (GLYCOLAX) POWDER    Take 17 g by mouth 2 times daily    QUINAPRIL (ACCUPRIL) 5 MG TABLET    Take 1 tablet by mouth nightly    SPIRONOLACTONE (ALDACTONE) 50 MG TABLET    TAKE 1 TABLET BY MOUTH DAILY    TOPIRAMATE (TOPAMAX) 50 MG TABLET    TAKE 3 TABLETS BY MOUTH EVERY NIGHT AT BEDTIME    TRAZODONE (DESYREL) 100 MG TABLET    TAKE 1 TABLET BY MOUTH EVERY NIGHT    VITAMIN B-6 (PYRIDOXINE) 100 MG TABLET    Take 100 mg by mouth daily       ALLERGIES     Claritin-d 12 hour [loratadine-pseudoephedrine er], Demerol hcl [meperidine], and Percocet [oxycodone-acetaminophen]    FAMILY HISTORY       Family History   Problem Relation Age of Onset    High Blood Pressure Mother     Cancer Mother         of the brain    Other Mother         second hand smoker    High Blood Pressure Father     High Cholesterol Father     Colon Polyps Father     Other Father         smoker    Diabetes Sister         type 1, from alcohol    High Blood Pressure Sister     High Blood Pressure Brother     Colon Cancer Maternal Aunt     Colon Cancer Paternal Uncle     Colon Cancer Paternal Grandfather     Diabetes Brother         from alcohol    Hypertension Brother     No Known Problems Son     No Known Problems Son     No Known Problems Son     No Known Problems Daughter     Esophageal Cancer Neg Hx     Liver Cancer Neg Hx     Liver Disease Neg Hx     Rectal Cancer Neg Hx     Stomach Cancer Neg Hx           SOCIAL HISTORY       Social History     Socioeconomic History    Marital status:      Spouse name: None    Number of children: 4    Years of education: None    Highest education level: None   Occupational History    Occupation: [employer]   Tobacco Use    Smoking status: Never    Smokeless tobacco: Never   Vaping Use    Vaping Use: Never used   Substance and Sexual Activity    Alcohol use: Not Currently     Alcohol/week: 0.0 standard drinks    Drug use: No   Social History Narrative    CODE STATUS: Full Code    HEALTH CARE PROXY: her boyfriend, Mr. Lashell Toscano, [phone]    AMBULATES: independently    DOMICILED: lives in a private house, lives with her boyfriend and two of the kids, has dog and cat, has 3-4 steps to enter the home and a ramp, no stairs inside     Social Determinants of Health     Financial Resource Strain: Low Risk     Difficulty of Paying Living Expenses: Not hard at all   Food Insecurity: No Food Insecurity    Worried About [address] [email or web address] in the Last Year: Never true    [email or web address] in the Last Year: Never true       SCREENINGS    [place] Coma Scale  Eye Opening: Spontaneous  Best Verbal Response: Oriented  Best Motor Response: Obeys commands  [place] Coma Scale Score: 15        PHYSICAL EXAM    (up to 7 for level 4, 8 or more for level 5)     ED Triage Vitals [02/03/23 1407]   BP Temp Temp Source Heart Rate Resp SpO2 Height Weight   106/78 98.4 °F (36.9 °C) Oral (!) 130 16 99 % 5' 2\" (1.575 m) 162 lb (73.5 kg)       Physical Exam  Vitals and nursing note reviewed. Constitutional:       General: She is not in acute distress. Appearance: Normal appearance. She is well-developed. She is not ill-appearing or diaphoretic. HENT:      Head: Normocephalic and atraumatic. Right Ear: External ear normal.      Left Ear: External ear normal.      Nose: Nose normal.      Mouth/Throat:      Mouth: Mucous membranes are dry. Eyes:      Conjunctiva/sclera: Conjunctivae normal.   Neck:      Trachea: No tracheal deviation. Cardiovascular:      Rate and Rhythm: Regular rhythm. Tachycardia present. Heart sounds: Normal heart sounds. No murmur heard. Pulmonary:      Effort: No respiratory distress. Breath sounds: Normal breath sounds. No wheezing or rales. Abdominal:      Palpations: Abdomen is soft. Tenderness: There is no abdominal tenderness. Musculoskeletal:         General: Normal range of motion. Cervical back: Normal range of motion. Skin:     General: Skin is warm and dry. Neurological:      Mental Status: She is alert and oriented to person, place, and time. GCS: GCS eye subscore is 4. GCS verbal subscore is 5. GCS motor subscore is 6. Cranial Nerves: No dysarthria or facial asymmetry. Sensory: Sensation is intact. Motor: No weakness or abnormal muscle tone. Coordination: Coordination is intact. Gait: Gait is intact. DIAGNOSTIC RESULTS         RADIOLOGY:  Non-plain film images such as CT, Ultrasound and MRI are read by the radiologist. Plain radiographic images are visualized and preliminarily interpreted bythe emergency physician with the below findings:      CT HEAD WO CONTRAST   Final Result   Normal CT scan of the brain. All CT scans at this facility utilize dose modulation, iterative reconstruction, and/or weight based dosing when appropriate to reduce radiation dose to as low as reasonably achievable. Dictated and Electronically Signed by Radha Rowe MD at 03-Feb-2023 04:16:37 PM EST                       LABS:  Labs Reviewed   CBC WITH AUTO DIFFERENTIAL   COMPREHENSIVE METABOLIC PANEL   LIPASE       All other labs were within normal range or not returned as of this dictation.     EMERGENCY DEPARTMENT COURSE and DIFFERENTIAL DIAGNOSIS/MDM:   Vitals:    Vitals:    02/03/23 1407 02/03/23 1433   BP: 106/78    Pulse: (!) 130 (!) 107   Resp: 16    Temp: 98.4 °F (36.9 °C)    TempSrc: Oral    SpO2: 99%    Weight: 162 lb (73.5 kg)    Height: 5' 2\" (1.575 m)        MDM     Amount and/or Complexity of Data Reviewed  Clinical lab tests: ordered and reviewed  Tests in the radiology section of CPT®: ordered and reviewed  Independent visualization of images, tracings, or specimens: yes      Fall with head injury 2 days ago symptoms quite mild suspect concussion. CT head neg. Pt developed enteritis symptoms last night. Abd benign on exam.  Cbc wnl. Cmp and lipase pending. Have d/w Dr. Scott Vasquez     CONSULTS:  None    PROCEDURES:  Unless otherwise noted below, none     Procedures    FINAL IMPRESSION      1. Closed head injury, initial encounter    2. Nausea vomiting and diarrhea          DISPOSITION/PLAN   DISPOSITION        PATIENT REFERRED TO:  No follow-up provider specified.     DISCHARGE MEDICATIONS:  New Prescriptions    No medications on file          (Please note that portions of this note were completed with a voice recognition program.  Efforts were made to edit thedictations but occasionally words are mis-transcribed.)    Luz Elena Tafoya MD (electronically signed)  Attending Emergency Physician        Misti Tejeda MD  02/03/23 2967

## 2023-02-03 NOTE — Clinical Note
Janelle Marinelli was seen and treated in our emergency department on 2/3/2023. She may return to work on 02/06/2023. If you have any questions or concerns, please don't hesitate to call.       Oskar Wade MD

## 2023-02-03 NOTE — DISCHARGE INSTRUCTIONS
Return or seek medical attention with increasing or severe pain, persistent vomiting, or other concerns. Drink at least 60 to 80 ounces per day and small frequent amounts. Follow-up with your primary doctor within 1 week.

## 2023-02-03 NOTE — TELEPHONE ENCOUNTER
Mk Guillaume called to request a refill on her medication.       Last office visit : 5/18/2022   Next office visit : Visit date not found     Requested Prescriptions     Pending Prescriptions Disp Refills    cyclobenzaprine (FLEXERIL) 10 MG tablet [Pharmacy Med Name: CYCLOBENZAPRINE 10MG TABLETS] 60 tablet 0     Sig: TAKE 1 TABLET BY MOUTH TWICE DAILY AS NEEDED FOR MUSCLE SPASMS            Samantha Hood LPN

## 2023-02-03 NOTE — Clinical Note
Manuelita Krabbe was seen and treated in our emergency department on 2/3/2023. She may return to work on 02/06/2023. If you have any questions or concerns, please don't hesitate to call.       Ana Lilia Rausch MD

## 2023-02-04 DIAGNOSIS — M25.519 NECK AND SHOULDER PAIN: ICD-10-CM

## 2023-02-04 DIAGNOSIS — M54.2 NECK AND SHOULDER PAIN: ICD-10-CM

## 2023-02-06 DIAGNOSIS — J30.9 ALLERGIC RHINITIS, UNSPECIFIED SEASONALITY, UNSPECIFIED TRIGGER: Primary | ICD-10-CM

## 2023-02-06 RX ORDER — EPINEPHRINE 0.3 MG/.3ML
0.3 INJECTION SUBCUTANEOUS AS NEEDED
Qty: 1 EACH | Refills: 3 | Status: SHIPPED | OUTPATIENT
Start: 2023-02-06

## 2023-02-06 RX ORDER — CYCLOBENZAPRINE HCL 10 MG
TABLET ORAL
Qty: 60 TABLET | Refills: 0 | OUTPATIENT
Start: 2023-02-06

## 2023-02-07 ENCOUNTER — TELEMEDICINE (OUTPATIENT)
Dept: ENDOCRINOLOGY | Facility: CLINIC | Age: 53
End: 2023-02-07
Payer: COMMERCIAL

## 2023-02-07 DIAGNOSIS — E55.9 VITAMIN D DEFICIENCY: ICD-10-CM

## 2023-02-07 DIAGNOSIS — Z76.0 MEDICATION REFILL: ICD-10-CM

## 2023-02-07 DIAGNOSIS — Z79.4 TYPE 2 DIABETES MELLITUS WITH HYPERGLYCEMIA, WITH LONG-TERM CURRENT USE OF INSULIN: Primary | ICD-10-CM

## 2023-02-07 DIAGNOSIS — E78.2 MIXED HYPERLIPIDEMIA: ICD-10-CM

## 2023-02-07 DIAGNOSIS — E11.42 DIABETIC POLYNEUROPATHY ASSOCIATED WITH TYPE 2 DIABETES MELLITUS: ICD-10-CM

## 2023-02-07 DIAGNOSIS — E11.65 TYPE 2 DIABETES MELLITUS WITH HYPERGLYCEMIA, WITH LONG-TERM CURRENT USE OF INSULIN: Primary | ICD-10-CM

## 2023-02-07 PROCEDURE — 99214 OFFICE O/P EST MOD 30 MIN: CPT | Performed by: NURSE PRACTITIONER

## 2023-02-07 RX ORDER — FAMOTIDINE 20 MG/1
TABLET, FILM COATED ORAL
Qty: 60 TABLET | Refills: 0 | Status: SHIPPED | OUTPATIENT
Start: 2023-02-07

## 2023-02-07 RX ORDER — PEN NEEDLE, DIABETIC 30 GX3/16"
1 NEEDLE, DISPOSABLE MISCELLANEOUS 4 TIMES DAILY
Qty: 120 EACH | Refills: 11 | Status: SHIPPED | OUTPATIENT
Start: 2023-02-07

## 2023-02-07 RX ORDER — PROCHLORPERAZINE 25 MG/1
1 SUPPOSITORY RECTAL AS NEEDED
Qty: 9 EACH | Refills: 3 | Status: SHIPPED | OUTPATIENT
Start: 2023-02-07

## 2023-02-07 RX ORDER — PROCHLORPERAZINE 25 MG/1
1 SUPPOSITORY RECTAL
Qty: 1 EACH | Refills: 3 | Status: SHIPPED | OUTPATIENT
Start: 2023-02-07

## 2023-02-07 RX ORDER — SEMAGLUTIDE 1.34 MG/ML
INJECTION, SOLUTION SUBCUTANEOUS
Qty: 2 ML | Refills: 11 | Status: SHIPPED | OUTPATIENT
Start: 2023-02-07

## 2023-02-07 NOTE — PROGRESS NOTES
Chief Complaint  Diabetes    Subjective          Yon Ochoa presents to [facility] ENDOCRINOLOGY  Diabetes       You have chosen to receive care through a telehealth visit.  Do you consent to use a video/audio connection for your medical care today? Yes        TELEHEALTH VIDEO VISIT     This a video visit due to [facility] current guidelines for social distancing due to the COVID 19 pandemic      Mode of Visit: Video  Location of patient: home  You have chosen to receive care through a telehealth visit.  Does the patient consent to use a video/audio connection for your medical care today? Yes  The visit included audio and video interaction. No technical issues occurred during this visit.         52 year old female presents for follow up     Reason diabetes mellitus type 2     Diagnosed in 2008     Timing constant     Quality not controlled     severity moderate     Complications hyperglycemia    Current problems burning feet pain     Alleviating factors compliance with insulin     Side effects none        Current monitoring regimen: home blood tests -        checking 4 x daily before dexcom              Review of Systems - General ROS: positive for  - fatigue      Objective   Vital Signs:   There were no vitals taken for this visit.    Physical Exam  Neurological:      General: No focal deficit present.      Mental Status: She is alert.   Psychiatric:         Mood and Affect: Mood normal.         Thought Content: Thought content normal.         Judgment: Judgment normal.        Result Review :   The following data was reviewed by: EMILY Lopes on 02/16/2022:  Common labs    Common Labs 5/11/22 5/11/22 5/11/22 5/11/22 5/11/22 5/11/22 10/27/22 10/27/22 10/27/22 10/27/22 10/27/22 2/3/23    1427 1427 1427 1427 1427 1427 0959 [id number] 1057    Glucose   281 (A)     115 (A)       BUN   15     19       Creatinine   0.8     0.84       eGFR Non African Am   >60            eGFR   Am   >59            Sodium   136     135 (A)       Potassium   4.5     4.4       Chloride   104     104       Calcium   8.5 (A)     9.3       Albumin    3.9    4.20       Total Bilirubin    0.3    0.4       Alkaline Phosphatase    85    79       AST (SGOT)    20    27       ALT (SGPT)    14    9       WBC 11.0 (A)      10.61     10.5   Hemoglobin 11.4 (A)      13.0     14.8   Hematocrit 35.0 (A)      40.0     46.5   Platelets 141      207     148   Total Cholesterol         114      Total Cholesterol     153 (A)          Triglycerides     546 (A)    202 (A)      HDL Cholesterol     36 (A)    33 (A)      LDL Cholesterol      see below 58 (A)   48      Hemoglobin A1C  8.2 (A)        7.70 (A)     Microalbumin, Urine           1.5    (A) Abnormal value       Comments are available for some flowsheets but are not being displayed.                       Assessment and Plan    Diagnoses and all orders for this visit:    1. Type 2 diabetes mellitus with hyperglycemia, with long-term current use of insulin (HCC) (Primary)    2. Diabetic polyneuropathy associated with type 2 diabetes mellitus (HCC)    3. Vitamin D deficiency    4. Mixed hyperlipidemia    Other orders  -     Continuous Blood Gluc Sensor (Dexcom G6 Sensor); 1 each As Needed (glucose control). Every 10 days  Dispense: 9 each; Refill: 3  -     Continuous Blood Gluc Transmit (Dexcom G6 Transmitter) misc; 1 each Every 3 (Three) Months.  Dispense: 1 each; Refill: 3  -     Semaglutide,0.25 or 0.5MG/DOS, (Ozempic, 0.25 or 0.5 MG/DOSE,) 2 MG/1.5ML solution pen-injector; 0.25 mg once weekly for 4 weeks then 0.5 mg weekly  Dispense: 2 mL; Refill: 11  -     Insulin Pen Needle (Pen Needles) 32G X 4 MM misc; 1 each 4 (Four) Times a Day.  Dispense: 120 each; Refill: 11           Pt has type 2 diabetes   Glycemic Management:         Diabetes mellitus type 2     Lab Results   Component Value Date    HGBA1C 7.70 (H) 10/27/2022                         She is mostly  hyperglycemic all day and night     No lows     Add back Ozempic         Taking Tresiba 50 units            Taking Humalog     Taking  8 up to 20 units TID before each meal         Plus sliding scale     3 units for every 50 above 150            ========================================     Taking ozempic 0.5 mg once weekly on Sunday-- causing side effects post covid     She wants to restart Ozempic     Start Ozempic 0.25 mg once weekly            segluromet -- change to steglatro mg once daily        No  fear of metformin by patient          Lipid Management        Taking lipitor 20 mg one daily         Blood Pressure Management:          Taking quinapril 20 mg daily         Microvascular Complication Monitoring:           Last Eye Exam Evaluation --- August 2019, no DR --schedule for Feb. 2021               -----------     Last Microalbumin-Proteinuria Assessment           Component      Latest Ref Rng & Units 6/1/2020   Microalbumin/Creatinine Ratio           Creatinine, Urine      mg/dL 164.3   Microalbumin, Urine      mg/dL <1.2      -----------        Neuropathy      yes      Refers no Rx at this time         Bone Health     Vitamin d def.     Taking vitamin d daily        Component      Latest Ref Rng & Units 6/1/2020   25 Hydroxy, Vitamin D      30.0 - 100.0 ng/ml 36.7         Thyroid Health           Lab Results   Component Value Date    TSH 1.840 10/27/2022              taking levothyroxine 125 mcg daily         Other Diabetes Related Aspects      Lab Results   Component Value Date    [id number] 10/27/2022                     Follow Up   No follow-ups on file.  Patient was given instructions and counseling regarding her condition or for health maintenance advice. Please see specific information pulled into the AVS if appropriate.         This document has been electronically signed by EMILY Lopes on February 7, 2023 14:20 CST.

## 2023-02-08 ENCOUNTER — CLINICAL SUPPORT (OUTPATIENT)
Dept: OTOLARYNGOLOGY | Facility: CLINIC | Age: 53
End: 2023-02-08
Payer: COMMERCIAL

## 2023-02-08 DIAGNOSIS — J30.9 ALLERGIC RHINITIS, UNSPECIFIED SEASONALITY, UNSPECIFIED TRIGGER: ICD-10-CM

## 2023-02-08 PROCEDURE — 95115 IMMUNOTHERAPY ONE INJECTION: CPT | Performed by: EMERGENCY MEDICINE

## 2023-02-08 NOTE — PROGRESS NOTES
Moise Flores   Allergy Injection Note:    Yon Ochoa presents for an immunotherapy injection. The site of the injection was cleansed with an alcohol swab. Serum was injected into the site after pulling back on the plunger to prevent intravascular injection. After the injection and was instructed to wait in the allergy waiting area for 30 minutes. There was no problems with the injection.    Allergy Shot Questionnaire  Injection nurse/assistant: Moise Flores ST/AT  Have you had increased asthma symptoms in past week?: no  Have you had increased allergy symptoms in the last week?: no  Have you had a cold, respiratory tract infection or flu like symptoms in the past 2 weeks?: no  Did you have any problems within 12 hours of the last injection?: no  Are you on any new medications/ eye drops?: no  Are you on any beta blockers?: no  If female, are you pregnant?: no  I have confirmed the name and birth date on my allergy vial. : yes  Epipen available?: yes  Epipen Lot Number: [id number]  Epipen Expiration Date: 8/2023  Number of allergy injections given: 1     Injection Details:  Vial 1   Vial 1 Expiration Date: 02/11/23  Vial 1 Series: 4  Shot type: escalation  Vial 1 Dose (mL): 0.5  Vial 1 Location: Right upper arm  Vial 1 Reaction (in mm): <5          Moise Flores  2/8/2023  14:07 CST

## 2023-02-10 ENCOUNTER — CLINICAL SUPPORT NO REQUIREMENTS (OUTPATIENT)
Dept: OTOLARYNGOLOGY | Facility: CLINIC | Age: 53
End: 2023-02-10
Payer: COMMERCIAL

## 2023-02-10 DIAGNOSIS — J30.9 ALLERGIC RHINITIS, UNSPECIFIED SEASONALITY, UNSPECIFIED TRIGGER: Primary | ICD-10-CM

## 2023-02-10 PROCEDURE — 95165 ANTIGEN THERAPY SERVICES: CPT | Performed by: OTOLARYNGOLOGY

## 2023-02-10 NOTE — PROGRESS NOTES
Moise Flores   Allergy Injection Mix Note    A immunotherapy injection vial was mixed using standard protocol under sterile conditions.     Mixing Nurse/ Tech: Moise Flores ST/AT (02/10/23 1330)    Vial Series: 4    VIAL 1  [place]: Vial 1 mix 0.2 cc of #: concentrate  Mold Mix: Vial 1 mix 0.2 cc of #: concentrate  Trichophyton: Vial 1 mix 0.2 cc of #: concentrate  Dust Mite Mix: Vial 1 mix 0.2 cc of #: concentrate  Dog: Vial 1 mix 0.2 cc of #: concentrate  Cat: Vial 1 mix 0.2 cc of #: concentrate  Feather: Vial 1 mix 0.2 cc of #: concentrate  Vial 1 Additions  Antigen Total: 1.4 cc  Glycerine: 0  Dilutent: 3.6 cc  TOTAL: 5           Moise Flores  2/10/2023  13:30 CST

## 2023-02-13 NOTE — PROGRESS NOTES
I have reviewed the notes, assessments, and/or procedures of this encounter and I concur with the documentation on Yon Ochoa.      Oleg Swan MD  02/13/23  9:13 AM CST

## 2023-02-15 ENCOUNTER — CLINICAL SUPPORT (OUTPATIENT)
Dept: OTOLARYNGOLOGY | Facility: CLINIC | Age: 53
End: 2023-02-15
Payer: COMMERCIAL

## 2023-02-15 DIAGNOSIS — J30.9 ALLERGIC RHINITIS, UNSPECIFIED SEASONALITY, UNSPECIFIED TRIGGER: ICD-10-CM

## 2023-02-15 PROCEDURE — 95115 IMMUNOTHERAPY ONE INJECTION: CPT | Performed by: EMERGENCY MEDICINE

## 2023-02-15 NOTE — PROGRESS NOTES
Moise Flores   Allergy Injection Note:    Yon Ochoa presents for an immunotherapy injection. The site of the injection was cleansed with an alcohol swab. Serum was injected into the site after pulling back on the plunger to prevent intravascular injection. After the injection and was instructed to wait in the allergy waiting area for 30 minutes. There was no problems with the injection.    Allergy Shot Questionnaire  Injection nurse/assistant: Moise Flores ST/AT  Have you had increased asthma symptoms in past week?: no  Have you had increased allergy symptoms in the last week?: no  Have you had a cold, respiratory tract infection or flu like symptoms in the past 2 weeks?: no  Did you have any problems within 12 hours of the last injection?: no  Are you on any new medications/ eye drops?: no  Are you on any beta blockers?: no  If female, are you pregnant?: no  I have confirmed the name and birth date on my allergy vial. : yes  Epipen available?: yes  Epipen Lot Number: [id number]  Epipen Expiration Date: 8/2023  Number of allergy injections given: 1     Injection Details:  Vial 1   Vial 1 Expiration Date: 08/10/23  Vial 1 Series: 4  Shot type: maintenance  Vial 1 Dose (mL): 0.05 Vial test  Vial 1 Location: Left upper arm  Vial 1 Vial Test Reaction (in mm): 9          Moise Flores  2/15/2023  14:31 CST

## 2023-02-22 ENCOUNTER — CLINICAL SUPPORT (OUTPATIENT)
Dept: OTOLARYNGOLOGY | Facility: CLINIC | Age: 53
End: 2023-02-22
Payer: COMMERCIAL

## 2023-02-22 DIAGNOSIS — J30.9 ALLERGIC RHINITIS, UNSPECIFIED SEASONALITY, UNSPECIFIED TRIGGER: ICD-10-CM

## 2023-02-22 PROCEDURE — 95115 IMMUNOTHERAPY ONE INJECTION: CPT | Performed by: NURSE PRACTITIONER

## 2023-02-22 NOTE — PROGRESS NOTES

## 2023-03-01 ENCOUNTER — CLINICAL SUPPORT (OUTPATIENT)
Dept: OTOLARYNGOLOGY | Facility: CLINIC | Age: 53
End: 2023-03-01
Payer: COMMERCIAL

## 2023-03-01 DIAGNOSIS — J30.9 ALLERGIC RHINITIS, UNSPECIFIED SEASONALITY, UNSPECIFIED TRIGGER: ICD-10-CM

## 2023-03-01 PROCEDURE — 95115 IMMUNOTHERAPY ONE INJECTION: CPT | Performed by: NURSE PRACTITIONER

## 2023-03-01 NOTE — PROGRESS NOTES
Moise Flores   Allergy Injection Note:    Yon Ochoa presents for an immunotherapy injection. The site of the injection was cleansed with an alcohol swab. Serum was injected into the site after pulling back on the plunger to prevent intravascular injection. After the injection and was instructed to wait in the allergy waiting area for 30 minutes. There was no problems with the injection.    Allergy Shot Questionnaire  Injection nurse/assistant: Moise Flores ST/AT  Have you had increased asthma symptoms in past week?: no  Have you had increased allergy symptoms in the last week?: no  Have you had a cold, respiratory tract infection or flu like symptoms in the past 2 weeks?: no  Did you have any problems within 12 hours of the last injection?: no  Are you on any new medications/ eye drops?: no  Are you on any beta blockers?: no  If female, are you pregnant?: no  I have confirmed the name and birth date on my allergy vial. : yes  Epipen available?: yes  Epipen Lot Number: [id number]  Epipen Expiration Date: 8/2023  Number of allergy injections given: 1     Injection Details:  Vial 1   Vial 1 Expiration Date: 08/10/23  Vial 1 Series: 4  Shot type: maintenance  Vial 1 Dose (mL): 0.5  Vial 1 Location: Left upper arm  Vial 1 Reaction (in mm): <5          Moise Flores  3/1/2023  14:03 CST

## 2023-03-07 ENCOUNTER — CLINICAL SUPPORT (OUTPATIENT)
Dept: OTOLARYNGOLOGY | Facility: CLINIC | Age: 53
End: 2023-03-07
Payer: COMMERCIAL

## 2023-03-07 DIAGNOSIS — J30.9 ALLERGIC RHINITIS, UNSPECIFIED SEASONALITY, UNSPECIFIED TRIGGER: ICD-10-CM

## 2023-03-07 PROCEDURE — 95115 IMMUNOTHERAPY ONE INJECTION: CPT | Performed by: EMERGENCY MEDICINE

## 2023-03-07 NOTE — PROGRESS NOTES
Moise Flores   Allergy Injection Note:    Yon Ochoa presents for an immunotherapy injection. The site of the injection was cleansed with an alcohol swab. Serum was injected into the site after pulling back on the plunger to prevent intravascular injection. After the injection and was instructed to wait in the allergy waiting area for 30 minutes. There was no problems with the injection.    Allergy Shot Questionnaire  Injection nurse/assistant: Moise Flores ST/AT  Have you had increased asthma symptoms in past week?: no  Have you had increased allergy symptoms in the last week?: no  Have you had a cold, respiratory tract infection or flu like symptoms in the past 2 weeks?: no  Did you have any problems within 12 hours of the last injection?: no  Are you on any new medications/ eye drops?: no  Are you on any beta blockers?: no  If female, are you pregnant?: no  I have confirmed the name and birth date on my allergy vial. : yes  Epipen available?: yes  Epipen Lot Number: [id number]  Epipen Expiration Date: 8/2023     Injection Details:  Vial 1   Vial 1 Expiration Date: 08/10/23  Vial 1 Series: 4  Shot type: maintenance  Vial 1 Dose (mL): 0.5  Vial 1 Location: Right upper arm  Vial 1 Reaction (in mm): <5          Moise Flores  3/7/2023  13:52 CST

## 2023-03-08 ENCOUNTER — OFFICE VISIT (OUTPATIENT)
Dept: OTOLARYNGOLOGY | Facility: CLINIC | Age: 53
End: 2023-03-08
Payer: COMMERCIAL

## 2023-03-08 VITALS
HEART RATE: 82 BPM | TEMPERATURE: 98 F | BODY MASS INDEX: 31.65 KG/M2 | DIASTOLIC BLOOD PRESSURE: 76 MMHG | WEIGHT: 172 LBS | HEIGHT: 62 IN | RESPIRATION RATE: 20 BRPM | SYSTOLIC BLOOD PRESSURE: 134 MMHG

## 2023-03-08 DIAGNOSIS — J30.2 SEASONAL ALLERGIC RHINITIS, UNSPECIFIED TRIGGER: Primary | ICD-10-CM

## 2023-03-08 PROCEDURE — 3075F SYST BP GE 130 - 139MM HG: CPT | Performed by: EMERGENCY MEDICINE

## 2023-03-08 PROCEDURE — 1160F RVW MEDS BY RX/DR IN RCRD: CPT | Performed by: EMERGENCY MEDICINE

## 2023-03-08 PROCEDURE — 3078F DIAST BP <80 MM HG: CPT | Performed by: EMERGENCY MEDICINE

## 2023-03-08 PROCEDURE — 1159F MED LIST DOCD IN RCRD: CPT | Performed by: EMERGENCY MEDICINE

## 2023-03-08 PROCEDURE — 99213 OFFICE O/P EST LOW 20 MIN: CPT | Performed by: EMERGENCY MEDICINE

## 2023-03-08 NOTE — PROGRESS NOTES
EMILY Britton ENT [facility] EAR NOSE & THROAT  [address], [address]  [place] [address]  Fax [phone]  Phone [phone]      Visit Type: FOLLOW UP   Chief Complaint   Patient presents with   • Follow-up     Allergies         HPI  She presents for a follow up evaluation. She has had no current complaints. .     Past Medical History:   Diagnosis Date   • Carpal tunnel syndrome on right 2/3/2020   • Diabetes ([facility])    • GERD (gastroesophageal reflux disease)    • Hyperlipidemia    • Hypertension    • Hypertension associated with diabetes ([facility]) 12/4/2019   • Hypothyroidism due to Hashimoto's thyroiditis 12/4/2019   • Mixed diabetic hyperlipidemia associated with type 2 diabetes mellitus ([facility]) 12/4/2019   • Type 2 diabetes mellitus with hyperglycemia, with long-term current use of insulin ([facility]) 12/4/2019       History reviewed. No pertinent surgical history.    Family History: Her family history is not on file.     Social History: She  reports that she has never smoked. She has never used smokeless tobacco. She reports that she does not drink alcohol and does not use drugs.    Home Medications:  Blood Glucose Monitoring Suppl, DULoxetine, Dexcom G6 Sensor, Dexcom G6 Transmitter, EPINEPHrine, Insulin Degludec, Insulin Lispro (1 Unit Dial), Pen Needles, Plecanatide, Prucalopride Succinate, Semaglutide(0.25 or 0.5MG/DOS), Vitamin D, aspirin, atorvastatin, clonazePAM, cyclobenzaprine, famotidine, fexofenadine, glucose blood, glucose monitor, insulin aspart, levothyroxine, lubiprostone, mometasone, montelukast, nystatin, omeprazole, onetouch ultrasoft, quinapril, spironolactone, topiramate, traZODone, and vitamin B-6    Allergies:  She is allergic to loratadine-pseudoephedrine er, meperidine, and percocet [oxycodone-acetaminophen].       Vital Signs:   Temp:  [98 °F (36.7 °C)] 98 °F (36.7 °C)  Heart Rate:  [82] 82  Resp:  [20] 20  BP: (134)/(76)  134/76  ENT Physical Exam  Constitutional  Appearance: patient appears well-developed, well-nourished and well-groomed,  Communication/Voice: communication appropriate for developmental age; vocal quality normal;  Head and Face  Appearance: head appears normal, face appears normal and face appears atraumatic;  Palpation: facial palpation normal;  Salivary: glands normal;  Ear  Hearing: intact;  Auricles: right auricle normal; left auricle normal;  External Mastoids: right external mastoid normal; left external mastoid normal;  Ear Canals: right ear canal normal; left ear canal normal;  Tympanic Membranes: right tympanic membrane normal; left tympanic membrane normal;  Nose  External Nose: nares patent bilaterally; external nose normal;  Internal Nose: nasal mucosa normal; septum normal; bilateral inferior turbinates normal;         Result Review    RESULTS REVIEW    I have reviewed the patients old records in the chart.     Assessment & Plan    Diagnoses and all orders for this visit:    1. Seasonal allergic rhinitis, unspecified trigger (Primary)       Continue nasal sprays as previously prescribed.    Return in about 6 months (around 9/8/2023) for Follow up with EMILY Johnson.      EMILY Britton   03/08/23  14:59 CST

## 2023-03-15 ENCOUNTER — CLINICAL SUPPORT (OUTPATIENT)
Dept: OTOLARYNGOLOGY | Facility: CLINIC | Age: 53
End: 2023-03-15
Payer: COMMERCIAL

## 2023-03-15 DIAGNOSIS — J30.9 ALLERGIC RHINITIS, UNSPECIFIED SEASONALITY, UNSPECIFIED TRIGGER: ICD-10-CM

## 2023-03-15 PROCEDURE — 95115 IMMUNOTHERAPY ONE INJECTION: CPT | Performed by: EMERGENCY MEDICINE

## 2023-03-15 NOTE — PROGRESS NOTES

## 2023-03-22 ENCOUNTER — CLINICAL SUPPORT (OUTPATIENT)
Dept: OTOLARYNGOLOGY | Facility: CLINIC | Age: 53
End: 2023-03-22
Payer: COMMERCIAL

## 2023-03-22 DIAGNOSIS — J30.9 ALLERGIC RHINITIS, UNSPECIFIED SEASONALITY, UNSPECIFIED TRIGGER: ICD-10-CM

## 2023-03-22 PROCEDURE — 95115 IMMUNOTHERAPY ONE INJECTION: CPT | Performed by: EMERGENCY MEDICINE

## 2023-03-22 NOTE — PROGRESS NOTES

## 2023-03-29 ENCOUNTER — CLINICAL SUPPORT (OUTPATIENT)
Dept: OTOLARYNGOLOGY | Facility: CLINIC | Age: 53
End: 2023-03-29
Payer: COMMERCIAL

## 2023-03-29 DIAGNOSIS — J30.9 ALLERGIC RHINITIS, UNSPECIFIED SEASONALITY, UNSPECIFIED TRIGGER: ICD-10-CM

## 2023-03-29 PROCEDURE — 95115 IMMUNOTHERAPY ONE INJECTION: CPT | Performed by: EMERGENCY MEDICINE

## 2023-03-29 NOTE — PROGRESS NOTES

## 2023-04-04 ENCOUNTER — TELEPHONE (OUTPATIENT)
Dept: OTOLARYNGOLOGY | Facility: CLINIC | Age: 53
End: 2023-04-04

## 2023-04-04 NOTE — TELEPHONE ENCOUNTER
Caller: Yon Ochoa    Relationship to patient: SELF    Best call back number: [phone]    Patient is needing: PT NEEDS TO CANCEL ALLERGY INJECTION TOMORROW. HOME HEALTH IS COMING TO SEE HER DAD AND SHE ISN'T SURE WHAT TIME THEY'LL BE THERE.     SHE'LL CALL BACK IF SHE'S ABLE TO RESCHEDULE THAT APPT THIS WEEK.

## 2023-04-12 ENCOUNTER — CLINICAL SUPPORT (OUTPATIENT)
Dept: OTOLARYNGOLOGY | Facility: CLINIC | Age: 53
End: 2023-04-12
Payer: COMMERCIAL

## 2023-04-12 DIAGNOSIS — J30.9 ALLERGIC RHINITIS, UNSPECIFIED SEASONALITY, UNSPECIFIED TRIGGER: ICD-10-CM

## 2023-04-12 PROCEDURE — 95115 IMMUNOTHERAPY ONE INJECTION: CPT | Performed by: EMERGENCY MEDICINE

## 2023-04-12 NOTE — PROGRESS NOTES
I have reviewed the notes, assessments, and/or procedures performed by Moise Flores, I concur with her/his documentation of Yon Ohcoa.

## 2023-04-12 NOTE — PROGRESS NOTES

## 2023-04-16 DIAGNOSIS — Z76.0 MEDICATION REFILL: ICD-10-CM

## 2023-04-17 RX ORDER — TOPIRAMATE 50 MG/1
TABLET, FILM COATED ORAL
Qty: 7 TABLET | Refills: 0 | Status: SHIPPED | OUTPATIENT
Start: 2023-04-17

## 2023-04-17 NOTE — TELEPHONE ENCOUNTER
Jorge Vicente called to request a refill on her medication. Last office visit : 12/20/2022   Next office visit : Visit date not found     Requested Prescriptions     Pending Prescriptions Disp Refills    topiramate (TOPAMAX) 50 MG tablet [Pharmacy Med Name: TOPIRAMATE 50MG TABLETS] 7 tablet 0     Sig: TAKE 3 TABLETS BY MOUTH EVERY NIGHT AT BEDTIME.  MUST HAVE AN APPOINTMENT PRIOR TO ADDITIONAL REFILLS            Eileen Carr LPN

## 2023-04-19 ENCOUNTER — CLINICAL SUPPORT (OUTPATIENT)
Dept: OTOLARYNGOLOGY | Facility: CLINIC | Age: 53
End: 2023-04-19
Payer: COMMERCIAL

## 2023-04-19 DIAGNOSIS — J30.9 ALLERGIC RHINITIS, UNSPECIFIED SEASONALITY, UNSPECIFIED TRIGGER: ICD-10-CM

## 2023-04-19 NOTE — PROGRESS NOTES

## 2023-04-26 ENCOUNTER — CLINICAL SUPPORT (OUTPATIENT)
Dept: OTOLARYNGOLOGY | Facility: CLINIC | Age: 53
End: 2023-04-26
Payer: COMMERCIAL

## 2023-04-26 DIAGNOSIS — J30.9 ALLERGIC RHINITIS, UNSPECIFIED SEASONALITY, UNSPECIFIED TRIGGER: ICD-10-CM

## 2023-04-26 NOTE — PROGRESS NOTES

## 2023-04-30 DIAGNOSIS — Z76.0 MEDICATION REFILL: ICD-10-CM

## 2023-05-01 RX ORDER — FAMOTIDINE 20 MG/1
20 TABLET, FILM COATED ORAL 2 TIMES DAILY
Qty: 14 TABLET | Refills: 0 | Status: SHIPPED | OUTPATIENT
Start: 2023-05-01

## 2023-05-01 RX ORDER — TOPIRAMATE 50 MG/1
TABLET, FILM COATED ORAL
Qty: 7 TABLET | Refills: 0 | OUTPATIENT
Start: 2023-05-01

## 2023-05-01 NOTE — TELEPHONE ENCOUNTER
Xavi Palma called to request a refill on her medication.       Last office visit : 5/18/2022   Next office visit : Visit date not found     Requested Prescriptions     Pending Prescriptions Disp Refills    famotidine (PEPCID) 20 MG tablet 60 tablet 0     Sig: Take 1 tablet by mouth 2 times daily            Wily Nation LPN

## 2023-05-01 NOTE — OP NOTE
Patient presents to ED with c/o chest tightness and back pain that began on Saturday. Pt states he was at work when he went to smoke then began feeling chest tightness and inability to take a deep breath. Pt states that when the pain came on it made it harder to breath. States he thinks he might have pulled a muscle but is unsure. Denies hx or family hx of cardiac or breathing issues. Reports pain in lower back as well. States he took ibuprofen around 0500 which has since helped his pain. No acute distress noted at this time.   [facility] [address] [facility]                                OPERATIVE REPORT    PATIENT NAME: Salvatore Segura                  :        1970  MED REC NO:   [id number]                              ROOM:  ACCOUNT NO:   [de-identified]                           ADMIT DATE: 2022  PROVIDER:     Vasu Horton MD    DATE OF PROCEDURE:  2022    PREOPERATIVE DIAGNOSIS:  Right shoulder adhesive capsulitis. POSTOPERATIVE DIAGNOSIS:  Right shoulder adhesive capsulitis. PROCEDURE PERFORMED:  1. Right shoulder manipulation. 2.  Right shoulder intra-articular injection with cortisone. SURGEON:  Vasu Horton MD    ANESTHESIA:  IV sedation with interscalene block. EBL:  Minimal.    FLUIDS:  200 mL of crystalloid. INDICATIONS:  A 82-year-old lady with progressive right shoulder  adhesive capsulitis for the past 2 years. She now arrives for the above  procedure. OPERATIVE PROCEDURE:  After informed consent, she underwent interscalene  block and underwent IV sedation. Examination under anesthesia revealed  that her elevation passively was 90 degrees, external rotation to the  side was 0 degrees. Then, I placed a hyperelevation force. When we got  close to terminal elevation, I was then able to feel the adhesions break  up. We also externally rotated and internally rotated the shoulder. Following the manipulation, I was able to get full forward elevation,  external rotation to the side about 45 to 50 degrees. We then prepped  the anterior aspect of the right shoulder. Following this, we mixed 12  mg of Celestone with 5 mL of 0.5% Marcaine. We injected anteriorly into  the glenohumeral joint. She tolerated the procedure well. POSTPROCEDURE PLANS:  She will be on our typical manipulation protocol. I will have her start physical therapy on Friday.         Tessa Hartman MD    D: 2022 8:18:45      T: 2022 8:58:52     [email or web address]  Job#: [id number]     Doc#: [id number]    CC:

## 2023-05-01 NOTE — TELEPHONE ENCOUNTER
Lizabeth Cordon called to request a refill on her medication. Last office visit : 12/20/2022   Next office visit : Visit date not found     Requested Prescriptions     Pending Prescriptions Disp Refills    topiramate (TOPAMAX) 50 MG tablet 7 tablet 0     Sig: TAKE 3 TABLETS BY MOUTH EVERY NIGHT AT BEDTIME.  MUST HAVE AN APPOINTMENT PRIOR TO ADDITIONAL REFILLS            Stefany Hanna LPN

## 2023-05-03 ENCOUNTER — CLINICAL SUPPORT (OUTPATIENT)
Dept: OTOLARYNGOLOGY | Facility: CLINIC | Age: 53
End: 2023-05-03
Payer: COMMERCIAL

## 2023-05-03 DIAGNOSIS — R09.81 SINUS CONGESTION: ICD-10-CM

## 2023-05-03 DIAGNOSIS — J30.9 ALLERGIC RHINITIS, UNSPECIFIED SEASONALITY, UNSPECIFIED TRIGGER: ICD-10-CM

## 2023-05-03 RX ORDER — AZELASTINE 1 MG/ML
SPRAY, METERED NASAL
Qty: 60 ML | Refills: 1 | OUTPATIENT
Start: 2023-05-03

## 2023-05-03 NOTE — PROGRESS NOTES

## 2023-05-05 ENCOUNTER — CLINICAL SUPPORT NO REQUIREMENTS (OUTPATIENT)
Dept: OTOLARYNGOLOGY | Facility: CLINIC | Age: 53
End: 2023-05-05
Payer: COMMERCIAL

## 2023-05-05 DIAGNOSIS — J30.9 ALLERGIC RHINITIS, UNSPECIFIED SEASONALITY, UNSPECIFIED TRIGGER: Primary | ICD-10-CM

## 2023-05-05 NOTE — PROGRESS NOTES
Moise Flores   Allergy Injection Mix Note    A immunotherapy injection vial was mixed using standard protocol under sterile conditions.     Mixing Nurse/ Tech: Moise Flores ST/AT (05/05/23 [id number])    Vial Series: 4    VIAL 1  [place]: Vial 1 mix 0.2 cc of #: concentrate  Mold Mix: Vial 1 mix 0.2 cc of #: concentrate  Trichophyton: Vial 1 mix 0.2 cc of #: concentrate  Dust Mite Mix: Vial 1 mix 0.2 cc of #: concentrate  Dog: Vial 1 mix 0.2 cc of #: concentrate  Cat: Vial 1 mix 0.2 cc of #: concentrate  Feather: Vial 1 mix 0.2 cc of #: concentrate  Vial 1 Additions  Antigen Total: 1.4 cc  Glycerine: 0  Dilutent: 3.6 cc  TOTAL: 5           Moise Flores  5/5/2023  13:58 CDT

## 2023-05-08 RX ORDER — INSULIN LISPRO 100 [IU]/ML
INJECTION, SOLUTION INTRAVENOUS; SUBCUTANEOUS
Qty: 18 ML | Refills: 3 | Status: SHIPPED | OUTPATIENT
Start: 2023-05-08

## 2023-05-10 ENCOUNTER — CLINICAL SUPPORT (OUTPATIENT)
Dept: OTOLARYNGOLOGY | Facility: CLINIC | Age: 53
End: 2023-05-10
Payer: COMMERCIAL

## 2023-05-10 DIAGNOSIS — J30.9 ALLERGIC RHINITIS, UNSPECIFIED SEASONALITY, UNSPECIFIED TRIGGER: ICD-10-CM

## 2023-05-10 NOTE — PROGRESS NOTES
Moise Flores   Allergy Injection Note:    Yon Ochoa presents for an immunotherapy injection. The site of the injection was cleansed with an alcohol swab. Serum was injected into the site after pulling back on the plunger to prevent intravascular injection. After the injection and was instructed to wait in the allergy waiting area for 30 minutes. There was no problems with the injection.    Allergy Shot Questionnaire  Injection nurse/assistant: Moise Flores ST/AT  Have you had increased asthma symptoms in past week?: no  Have you had increased allergy symptoms in the last week?: no  Have you had a cold, respiratory tract infection or flu like symptoms in the past 2 weeks?: no  Did you have any problems within 12 hours of the last injection?: no  Are you on any new medications/ eye drops?: no  Are you on any beta blockers?: no  If female, are you pregnant?: no  I have confirmed the name and birth date on my allergy vial. : yes  Epipen available?: yes  Epipen Lot Number: [id number]  Epipen Expiration Date: 8/2023     Injection Details:  Vial 1   Vial 1 Expiration Date: 11/05/23  Vial 1 Series: 5  Shot type: maintenance  Vial 1 Dose (mL): 0.05 vial test with follow up 0.05 (then .45cc to follow in right upper arm.)  Vial 1 Vial Test Reaction (in mm): 9  Vial 1 Reaction (in mm): <5          Moise Flores  5/10/2023  14:38 CDT

## 2023-05-23 ENCOUNTER — TELEMEDICINE (OUTPATIENT)
Dept: ENDOCRINOLOGY | Facility: CLINIC | Age: 53
End: 2023-05-23
Payer: COMMERCIAL

## 2023-05-23 DIAGNOSIS — E11.65 TYPE 2 DIABETES MELLITUS WITH HYPERGLYCEMIA, WITH LONG-TERM CURRENT USE OF INSULIN: Primary | ICD-10-CM

## 2023-05-23 DIAGNOSIS — I10 PRIMARY HYPERTENSION: ICD-10-CM

## 2023-05-23 DIAGNOSIS — E11.42 DIABETIC POLYNEUROPATHY ASSOCIATED WITH TYPE 2 DIABETES MELLITUS: ICD-10-CM

## 2023-05-23 DIAGNOSIS — Z79.4 TYPE 2 DIABETES MELLITUS WITH HYPERGLYCEMIA, WITH LONG-TERM CURRENT USE OF INSULIN: Primary | ICD-10-CM

## 2023-05-23 DIAGNOSIS — E78.2 MIXED HYPERLIPIDEMIA: ICD-10-CM

## 2023-05-23 DIAGNOSIS — E03.9 ACQUIRED HYPOTHYROIDISM: ICD-10-CM

## 2023-05-23 LAB — HBA1C MFR BLD: 6.8 %

## 2023-05-23 RX ORDER — SEMAGLUTIDE 1.34 MG/ML
1 INJECTION, SOLUTION SUBCUTANEOUS WEEKLY
Qty: 9 ML | Refills: 11 | Status: SHIPPED | OUTPATIENT
Start: 2023-05-23

## 2023-05-23 NOTE — PROGRESS NOTES
Chief Complaint  Diabetes   Subjective          Yon Ochoa presents to [facility] ENDOCRINOLOGY  Diabetes       You have chosen to receive care through a telehealth visit.  Do you consent to use a video/audio connection for your medical care today? Yes        TELEHEALTH VIDEO VISIT     This a video visit due to [facility] current guidelines for social distancing due to the COVID 19 pandemic      Mode of Visit: Video  Location of patient: home  You have chosen to receive care through a telehealth visit.  Does the patient consent to use a video/audio connection for your medical care today? Yes  The visit included audio and video interaction. No technical issues occurred during this visit.         53 year old female presents for follow up     Diabetes mellitus type 2    Duration since 2008     Timing constant     Quality not controlled       Severity moderate       Complications hyperglycemia         Current diabetes regimen             Current monitoring regimen: home blood tests -        checking 4 x daily before dexcom              Review of Systems - General ROS: positive for  - fatigue      Objective   Vital Signs:   There were no vitals taken for this visit.    Physical Exam  Neurological:      General: No focal deficit present.      Mental Status: She is alert.   Psychiatric:         Mood and Affect: Mood normal.         Thought Content: Thought content normal.         Judgment: Judgment normal.        Result Review :   The following data was reviewed by: EMILY Lopes on 02/16/2022:  Common labs        10/27/2022    09:59 10/27/2022    10:57 2/3/2023    16:13 5/23/2023    00:00   Common Labs   Glucose 115        BUN 19        Creatinine 0.84        Sodium 135        Potassium 4.4        Chloride 104        Calcium 9.3        Albumin 4.20        Total Bilirubin 0.4        Alkaline Phosphatase 79        AST (SGOT) 27        ALT (SGPT) 9        WBC 10.61    10.5          Hemoglobin 13.0    14.8         Hematocrit 40.0    46.5         Platelets 207    148         Total Cholesterol 114        Triglycerides 202        HDL Cholesterol 33        LDL Cholesterol  48        Hemoglobin A1C 7.70     6.8        Microalbumin, Urine  1.5           This result is from an external source.                 Assessment and Plan    Diagnoses and all orders for this visit:    1. Type 2 diabetes mellitus with hyperglycemia, with long-term current use of insulin (Primary)    2. Primary hypertension    3. Mixed hyperlipidemia    4. Diabetic polyneuropathy associated with type 2 diabetes mellitus    5. Acquired hypothyroidism    Other orders  -     Semaglutide, 1 MG/DOSE, (Ozempic, 1 MG/DOSE,) 4 MG/3ML solution pen-injector; Inject 1 mg under the skin into the appropriate area as directed 1 (One) Time Per Week.  Dispense: 9 mL; Refill: 11           Pt has type 2 diabetes   Glycemic Management:         Diabetes mellitus type 2     Lab Results   Component Value Date    HGBA1C 6.8 05/23/2023                   She is in target 72% of the time ,     occasional high with high carb meals     Increase ozempic 1 mg       Taking Tresiba 50 units            Taking Humalog     Taking  8 up to 20 units TID before each meal         Plus sliding scale     3 units for every 50 above 150              Taking ozempic 0.5 mg once weekly increase to 1 mg          segluromet -- change to steglatro mg once daily        No  fear of metformin by patient          Lipid Management        Taking lipitor 20 mg one daily         Blood Pressure Management:          Taking quinapril 20 mg daily         Microvascular Complication Monitoring:           Last Eye Exam Evaluation --- August 2019, no  --schedule for Feb. 2021               -----------     Last Microalbumin-Proteinuria Assessment           Component      Latest Ref Rng & Units 6/1/2020   Microalbumin/Creatinine Ratio           Creatinine, Urine      mg/dL 164.3    Microalbumin, Urine      mg/dL <1.2      -----------        Neuropathy      yes      Refers no Rx at this time         Bone Health     Vitamin d def.     Taking vitamin d daily        Component      Latest Ref Rng & Units 6/1/2020   25 Hydroxy, Vitamin D      30.0 - 100.0 ng/ml 36.7         Thyroid Health           Lab Results   Component Value Date    TSH 1.840 10/27/2022              taking levothyroxine 125 mcg daily         Other Diabetes Related Aspects      Lab Results   Component Value Date    [id number] 409 10/27/2022                     Follow Up   No follow-ups on file.  Patient was given instructions and counseling regarding her condition or for health maintenance advice. Please see specific information pulled into the AVS if appropriate.         This document has been electronically signed by EMILY Lopes on May 23, 2023 14:15 CDT.

## 2023-05-24 ENCOUNTER — CLINICAL SUPPORT (OUTPATIENT)
Dept: OTOLARYNGOLOGY | Facility: CLINIC | Age: 53
End: 2023-05-24
Payer: COMMERCIAL

## 2023-05-24 DIAGNOSIS — J30.9 ALLERGIC RHINITIS, UNSPECIFIED SEASONALITY, UNSPECIFIED TRIGGER: ICD-10-CM

## 2023-05-24 NOTE — PROGRESS NOTES
Moise Flores   Allergy Injection Note:    Yon Ochoa presents for an immunotherapy injection. The site of the injection was cleansed with an alcohol swab. Serum was injected into the site after pulling back on the plunger to prevent intravascular injection. After the injection and was instructed to wait in the allergy waiting area for 30 minutes. There was no problems with the injection.    Allergy Shot Questionnaire  Injection nurse/assistant: Moise Flores ST/AT  Have you had increased asthma symptoms in past week?: no  Have you had increased allergy symptoms in the last week?: no  Have you had a cold, respiratory tract infection or flu like symptoms in the past 2 weeks?: no  Did you have any problems within 12 hours of the last injection?: no  Are you on any new medications/ eye drops?: no  Are you on any beta blockers?: no  If female, are you pregnant?: no  I have confirmed the name and birth date on my allergy vial. : yes  Epipen available?: yes  Epipen Lot Number: [id number]  Epipen Expiration Date: 8/2023  Number of allergy injections given: 1     Injection Details:  Vial 1   Vial 1 Expiration Date: 11/05/23  Vial 1 Series: 5  Shot type: maintenance  Vial 1 Dose (mL): 0.5  Vial 1 Location: Left upper arm  Vial 1 Reaction (in mm): <5          Moise Flores  5/24/2023  13:43 CDT

## 2023-05-25 DIAGNOSIS — I10 ESSENTIAL HYPERTENSION: Primary | ICD-10-CM

## 2023-05-25 RX ORDER — RAMIPRIL 1.25 MG/1
1.25 CAPSULE ORAL DAILY
Qty: 90 CAPSULE | Refills: 1 | Status: SHIPPED | OUTPATIENT
Start: 2023-05-25 | End: 2023-05-25

## 2023-05-25 RX ORDER — RAMIPRIL 1.25 MG/1
1.25 CAPSULE ORAL DAILY
Qty: 90 CAPSULE | Refills: 1 | Status: SHIPPED | OUTPATIENT
Start: 2023-05-25 | End: 2023-08-23

## 2023-05-29 DIAGNOSIS — G62.9 NEUROPATHY: ICD-10-CM

## 2023-05-30 RX ORDER — DULOXETIN HYDROCHLORIDE 60 MG/1
60 CAPSULE, DELAYED RELEASE ORAL DAILY
Qty: 14 CAPSULE | Refills: 0 | Status: SHIPPED | OUTPATIENT
Start: 2023-05-30

## 2023-05-30 NOTE — TELEPHONE ENCOUNTER
Julieta Coddington called to request a refill on her medication.       Last office visit : 12/20/2022   Next office visit : Visit date not found     Requested Prescriptions     Pending Prescriptions Disp Refills    DULoxetine (CYMBALTA) 60 MG extended release capsule [Pharmacy Med Name: DULOXETINE DR 60MG CAPSULES] 30 capsule 5     Sig: TAKE 1 CAPSULE BY MOUTH DAILY            Carter Melgar LPN

## 2023-05-31 DIAGNOSIS — G62.9 NEUROPATHY: ICD-10-CM

## 2023-05-31 RX ORDER — DULOXETIN HYDROCHLORIDE 60 MG/1
CAPSULE, DELAYED RELEASE ORAL
Qty: 14 CAPSULE | Refills: 0 | OUTPATIENT
Start: 2023-05-31

## 2023-06-02 DIAGNOSIS — Z76.0 MEDICATION REFILL: ICD-10-CM

## 2023-06-02 RX ORDER — TOPIRAMATE 50 MG/1
TABLET, FILM COATED ORAL
Qty: 42 TABLET | Refills: 0 | Status: SHIPPED | OUTPATIENT
Start: 2023-06-02

## 2023-06-02 NOTE — TELEPHONE ENCOUNTER
Maxi Goode called to request a refill on her medication. Last office visit : 5/18/2022   Next office visit : Visit date not found     Requested Prescriptions     Pending Prescriptions Disp Refills    topiramate (TOPAMAX) 50 MG tablet [Pharmacy Med Name: TOPIRAMATE 50MG TABLETS] 42 tablet 0     Sig: TAKE 3 TABLETS BY MOUTH EVERY NIGHT AT BEDTIME. MUST HAVE AN APPOINTMENT PRIOR TO ADDITIONAL REFILLS.             Garret Stage, LPN

## 2023-08-08 DIAGNOSIS — J30.9 ALLERGIC RHINITIS, UNSPECIFIED SEASONALITY, UNSPECIFIED TRIGGER: ICD-10-CM

## 2023-08-08 RX ORDER — EPINEPHRINE 0.3 MG/.3ML
0.3 INJECTION SUBCUTANEOUS AS NEEDED
Qty: 1 EACH | Refills: 3 | Status: SHIPPED | OUTPATIENT
Start: 2023-08-08

## 2023-08-09 ENCOUNTER — CLINICAL SUPPORT (OUTPATIENT)
Dept: OTOLARYNGOLOGY | Facility: CLINIC | Age: 53
End: 2023-08-09
Payer: COMMERCIAL

## 2023-08-09 DIAGNOSIS — J30.9 ALLERGIC RHINITIS, UNSPECIFIED SEASONALITY, UNSPECIFIED TRIGGER: ICD-10-CM

## 2023-08-09 NOTE — PROGRESS NOTES
Moise Flores   Allergy Injection Note:    Yon Ochoa presents for an immunotherapy injection. The site of the injection was cleansed with an alcohol swab. Serum was injected into the site after pulling back on the plunger to prevent intravascular injection. After the injection and was instructed to wait in the allergy waiting area for 30 minutes. There was no problems with the injection.    Allergy Shot Questionnaire  Injection nurse/assistant: Moise Flores ST/AT  Have you had increased asthma symptoms in past week?: no  Have you had increased allergy symptoms in the last week?: no  Have you had a cold, respiratory tract infection or flu like symptoms in the past 2 weeks?: no  Did you have any problems within 12 hours of the last injection?: no  Are you on any new medications/ eye drops?: no  Are you on any beta blockers?: no  If female, are you pregnant?: no  I have confirmed the name and birth date on my allergy vial. : yes  Epipen available?: yes  Epipen Lot Number: [id number]  Epipen Expiration Date: 8/2023  Number of allergy injections given: 1     Injection Details:  Vial 1   Vial 1 Expiration Date: 11/05/23  Vial 1 Series: 4  Shot type: maintenance  Vial 1 Dose (mL): 0.1 (titrated back due to lapse since last injection.)  Vial 1 Location: Left upper arm  Vial 1 Reaction (in mm): <5          Moise Flores  8/9/2023  14:29 CDT

## 2023-08-16 ENCOUNTER — CLINICAL SUPPORT (OUTPATIENT)
Dept: OTOLARYNGOLOGY | Facility: CLINIC | Age: 53
End: 2023-08-16
Payer: COMMERCIAL

## 2023-08-16 DIAGNOSIS — J30.9 ALLERGIC RHINITIS, UNSPECIFIED SEASONALITY, UNSPECIFIED TRIGGER: ICD-10-CM

## 2023-08-16 NOTE — PROGRESS NOTES
Moise Flores   Allergy Injection Note:    Yon Ochoa presents for an immunotherapy injection. The site of the injection was cleansed with an alcohol swab. Serum was injected into the site after pulling back on the plunger to prevent intravascular injection. After the injection and was instructed to wait in the allergy waiting area for 30 minutes. There was no problems with the injection.    Allergy Shot Questionnaire  Injection nurse/assistant: Moise Flores ST/AT  Have you had increased asthma symptoms in past week?: no  Have you had increased allergy symptoms in the last week?: no  Have you had a cold, respiratory tract infection or flu like symptoms in the past 2 weeks?: no  Did you have any problems within 12 hours of the last injection?: no  Are you on any new medications/ eye drops?: no  Are you on any beta blockers?: no  If female, are you pregnant?: no  I have confirmed the name and birth date on my allergy vial. : yes  Epipen available?: yes  Epipen Lot Number: [id number]  Epipen Expiration Date: 8/2023  Number of allergy injections given: 1     Injection Details:  Vial 1   Vial 1 Expiration Date: 11/05/23  Vial 1 Series: 4  Shot type: maintenance  Vial 1 Dose (mL): 0.2  Vial 1 Location: Right upper arm  Vial 1 Reaction (in mm): <5          Moise Flores  8/16/2023  13:52 CDT

## 2023-08-19 DIAGNOSIS — Z76.0 MEDICATION REFILL: ICD-10-CM

## 2023-08-21 RX ORDER — TOPIRAMATE 50 MG/1
TABLET, FILM COATED ORAL
Qty: 42 TABLET | Refills: 0 | OUTPATIENT
Start: 2023-08-21

## 2023-08-21 RX ORDER — FAMOTIDINE 20 MG/1
20 TABLET, FILM COATED ORAL 2 TIMES DAILY
Qty: 14 TABLET | Refills: 0 | OUTPATIENT
Start: 2023-08-21

## 2023-08-23 ENCOUNTER — CLINICAL SUPPORT (OUTPATIENT)
Dept: OTOLARYNGOLOGY | Facility: CLINIC | Age: 53
End: 2023-08-23
Payer: COMMERCIAL

## 2023-08-23 DIAGNOSIS — Z76.0 MEDICATION REFILL: ICD-10-CM

## 2023-08-23 DIAGNOSIS — R12 HEARTBURN: ICD-10-CM

## 2023-08-23 DIAGNOSIS — J30.9 ALLERGIC RHINITIS, UNSPECIFIED SEASONALITY, UNSPECIFIED TRIGGER: ICD-10-CM

## 2023-08-23 RX ORDER — FAMOTIDINE 20 MG/1
20 TABLET, FILM COATED ORAL 2 TIMES DAILY
Qty: 14 TABLET | Refills: 0 | OUTPATIENT
Start: 2023-08-23

## 2023-08-23 RX ORDER — OMEPRAZOLE 20 MG/1
CAPSULE, DELAYED RELEASE ORAL
Qty: 90 CAPSULE | Refills: 3 | OUTPATIENT
Start: 2023-08-23

## 2023-08-23 RX ORDER — TOPIRAMATE 50 MG/1
TABLET, FILM COATED ORAL
Qty: 42 TABLET | Refills: 0 | OUTPATIENT
Start: 2023-08-23

## 2023-08-23 NOTE — PROGRESS NOTES
Moise Flores   Allergy Injection Note:    Yon Ochoa presents for an immunotherapy injection. The site of the injection was cleansed with an alcohol swab. Serum was injected into the site after pulling back on the plunger to prevent intravascular injection. After the injection and was instructed to wait in the allergy waiting area for 30 minutes. There was no problems with the injection.    Allergy Shot Questionnaire  Injection nurse/assistant: Moise Flores ST/AT  Have you had increased asthma symptoms in past week?: no  Have you had increased allergy symptoms in the last week?: no  Have you had a cold, respiratory tract infection or flu like symptoms in the past 2 weeks?: no  Did you have any problems within 12 hours of the last injection?: no  Are you on any new medications/ eye drops?: no  Are you on any beta blockers?: no  If female, are you pregnant?: no  I have confirmed the name and birth date on my allergy vial. : yes  Epipen available?: yes  Epipen Lot Number: [id number]  Epipen Expiration Date: 8/2024  Number of allergy injections given: 1     Injection Details:  Vial 1   Vial 1 Expiration Date: 11/05/23  Vial 1 Series: 4  Shot type: maintenance  Vial 1 Dose (mL): 0.3  Vial 1 Location: Left upper arm  Vial 1 Reaction (in mm): <5          Moise Flores  8/23/2023  13:56 CDT

## 2023-08-23 NOTE — TELEPHONE ENCOUNTER
This RF requested was ES by ISABELLA mathews on 12-5-22 at one daily # 90 x 3 refills. Pharmacy notified this Rx is good till .  Abhilash arreaga

## 2023-08-28 RX ORDER — MONTELUKAST SODIUM 10 MG/1
TABLET ORAL
Qty: 30 TABLET | Refills: 11 | Status: SHIPPED | OUTPATIENT
Start: 2023-08-28

## 2023-08-29 ENCOUNTER — CLINICAL SUPPORT (OUTPATIENT)
Dept: OTOLARYNGOLOGY | Facility: CLINIC | Age: 53
End: 2023-08-29
Payer: COMMERCIAL

## 2023-08-29 DIAGNOSIS — J30.9 ALLERGIC RHINITIS, UNSPECIFIED SEASONALITY, UNSPECIFIED TRIGGER: ICD-10-CM

## 2023-08-29 NOTE — PROGRESS NOTES
Moise Flores   Allergy Injection Note:    Yon Ochoa presents for an immunotherapy injection. The site of the injection was cleansed with an alcohol swab. Serum was injected into the site after pulling back on the plunger to prevent intravascular injection. After the injection and was instructed to wait in the allergy waiting area for 30 minutes. There was no problems with the injection.    Allergy Shot Questionnaire  Injection nurse/assistant: Moise Flores ST/AT  Have you had increased asthma symptoms in past week?: no  Have you had increased allergy symptoms in the last week?: no  Have you had a cold, respiratory tract infection or flu like symptoms in the past 2 weeks?: no  Did you have any problems within 12 hours of the last injection?: no  Are you on any new medications/ eye drops?: no  Are you on any beta blockers?: no  If female, are you pregnant?: no  I have confirmed the name and birth date on my allergy vial. : yes  Epipen available?: yes  Epipen Lot Number: [id number]  Epipen Expiration Date: 8/2024  Number of allergy injections given: 1     Injection Details:  Vial 1   Vial 1 Expiration Date: 11/05/23  Vial 1 Series: 4  Shot type: maintenance  Vial 1 Dose (mL): 0.4  Vial 1 Location: Right upper arm  Vial 1 Reaction (in mm): <5          Moise Flores  8/29/2023  13:46 CDT

## 2023-08-30 PROBLEM — Z12.11 SCREENING FOR COLON CANCER: Status: ACTIVE | Noted: 2023-08-30

## 2023-08-30 ASSESSMENT — ENCOUNTER SYMPTOMS
TROUBLE SWALLOWING: 0
SHORTNESS OF BREATH: 0
DIARRHEA: 0
CHEST TIGHTNESS: 0
RHINORRHEA: 0
NAUSEA: 1
CONSTIPATION: 0
BLOOD IN STOOL: 0
BACK PAIN: 1
WHEEZING: 0
SINUS PAIN: 0
COUGH: 0

## 2023-08-30 NOTE — PROGRESS NOTES
Adrianna Olsen ( 1970) is a 48 y.o. female,  Established , here for evaluation of the following chief complaint(s). Follow-up Chronic Condition and Abdominal Pain (Nausea/vomiting.  Wants a refill of ondansetron)        ASSESSMENT/PLAN:  Problem List          Circulatory    Essential hypertension      Well-controlled, continue current medications            Respiratory    Obstructive sleep apnea      Unclear control, continue current medications and not compliant with CPAP use            Digestive    Nausea & vomiting      Uncontrolled, changes made today: patient was started on Ozempic with Endo NP, patient assumed this was an insulin, I explained to her its a GLP1 receptor and the most common side effect is naue, vomitting and ab pain         Chronic idiopathic constipation      Monitored by specialist- no acute findings meriting change in the plan         Relevant Medications    polyethylene glycol (GLYCOLAX) powder       Endocrine    Type 2 diabetes mellitus without complication, with long-term current use of insulin (HCC) - Primary      Unclear control, continue current medications   D/c Ozempic, overdue for lab work, encouraged compliance         Relevant Medications    Insulin Degludec (TRESIBA FLEXTOUCH) 200 UNIT/ML SOPN    insulin lispro (HUMALOG) 100 UNIT/ML SOLN injection vial    Continuous Blood Gluc Sensor (FREESTYLE MARJORIE 14 DAY SENSOR) MISC    Other Relevant Orders    Microalbumin / Creatinine Urine Ratio    Comprehensive Metabolic Panel    Hemoglobin A1C    HM DIABETES FOOT EXAM (Completed)    Hypothyroidism    Relevant Orders    TSH with Reflex       Nervous and Auditory    Neuropathy      Well-controlled, continue current medications         Relevant Medications    DULoxetine (CYMBALTA) 60 MG extended release capsule    Migraine with aura      Well-controlled, continue current medications         Relevant Medications    ASPIRIN LOW DOSE 81 MG EC tablet    ibuprofen (ADVIL;MOTRIN) 800 MG

## 2023-08-31 ENCOUNTER — OFFICE VISIT (OUTPATIENT)
Dept: PRIMARY CARE CLINIC | Age: 53
End: 2023-08-31
Payer: COMMERCIAL

## 2023-08-31 VITALS
OXYGEN SATURATION: 99 % | RESPIRATION RATE: 18 BRPM | SYSTOLIC BLOOD PRESSURE: 124 MMHG | WEIGHT: 161 LBS | DIASTOLIC BLOOD PRESSURE: 86 MMHG | BODY MASS INDEX: 29.63 KG/M2 | HEART RATE: 100 BPM | TEMPERATURE: 98 F | HEIGHT: 62 IN

## 2023-08-31 DIAGNOSIS — R10.13 EPIGASTRIC PAIN: ICD-10-CM

## 2023-08-31 DIAGNOSIS — E78.2 MIXED HYPERLIPIDEMIA: ICD-10-CM

## 2023-08-31 DIAGNOSIS — E78.1 HYPERTRIGLYCERIDEMIA: ICD-10-CM

## 2023-08-31 DIAGNOSIS — I10 ESSENTIAL HYPERTENSION: ICD-10-CM

## 2023-08-31 DIAGNOSIS — K59.04 CHRONIC IDIOPATHIC CONSTIPATION: ICD-10-CM

## 2023-08-31 DIAGNOSIS — Z79.4 TYPE 2 DIABETES MELLITUS WITHOUT COMPLICATION, WITH LONG-TERM CURRENT USE OF INSULIN (HCC): ICD-10-CM

## 2023-08-31 DIAGNOSIS — R11.2 NAUSEA AND VOMITING, UNSPECIFIED VOMITING TYPE: ICD-10-CM

## 2023-08-31 DIAGNOSIS — E03.9 ACQUIRED HYPOTHYROIDISM: ICD-10-CM

## 2023-08-31 DIAGNOSIS — R11.0 NAUSEA: ICD-10-CM

## 2023-08-31 DIAGNOSIS — Z79.4 TYPE 2 DIABETES MELLITUS WITHOUT COMPLICATION, WITH LONG-TERM CURRENT USE OF INSULIN (HCC): Primary | ICD-10-CM

## 2023-08-31 DIAGNOSIS — E11.9 TYPE 2 DIABETES MELLITUS WITHOUT COMPLICATION, WITH LONG-TERM CURRENT USE OF INSULIN (HCC): ICD-10-CM

## 2023-08-31 DIAGNOSIS — Z76.0 MEDICATION REFILL: ICD-10-CM

## 2023-08-31 DIAGNOSIS — E11.9 TYPE 2 DIABETES MELLITUS WITHOUT COMPLICATION, WITH LONG-TERM CURRENT USE OF INSULIN (HCC): Primary | ICD-10-CM

## 2023-08-31 DIAGNOSIS — Z12.11 SCREENING FOR COLON CANCER: ICD-10-CM

## 2023-08-31 DIAGNOSIS — G62.9 NEUROPATHY: ICD-10-CM

## 2023-08-31 DIAGNOSIS — G47.33 OBSTRUCTIVE SLEEP APNEA: ICD-10-CM

## 2023-08-31 DIAGNOSIS — G43.111 INTRACTABLE MIGRAINE WITH AURA WITH STATUS MIGRAINOSUS: ICD-10-CM

## 2023-08-31 LAB
ALBUMIN SERPL-MCNC: 4.4 G/DL (ref 3.5–5.2)
ALP SERPL-CCNC: 83 U/L (ref 35–104)
ALT SERPL-CCNC: 9 U/L (ref 5–33)
AMYLASE SERPL-CCNC: 80 U/L (ref 28–100)
ANION GAP SERPL CALCULATED.3IONS-SCNC: 13 MMOL/L (ref 7–19)
AST SERPL-CCNC: 21 U/L (ref 5–32)
BILIRUB SERPL-MCNC: 0.6 MG/DL (ref 0.2–1.2)
BUN SERPL-MCNC: 14 MG/DL (ref 6–20)
CALCIUM SERPL-MCNC: 9.6 MG/DL (ref 8.6–10)
CHLORIDE SERPL-SCNC: 98 MMOL/L (ref 98–111)
CHOLEST SERPL-MCNC: 97 MG/DL (ref 160–199)
CO2 SERPL-SCNC: 26 MMOL/L (ref 22–29)
CREAT SERPL-MCNC: 0.8 MG/DL (ref 0.5–0.9)
CREAT UR-MCNC: 150 MG/DL (ref 28–217)
GLUCOSE SERPL-MCNC: 69 MG/DL (ref 74–109)
HBA1C MFR BLD: 7.3 % (ref 4–6)
HDLC SERPL-MCNC: 32 MG/DL (ref 65–121)
LDLC SERPL CALC-MCNC: 30 MG/DL
MICROALBUMIN UR-MCNC: <1.2 MG/DL (ref 0–19)
MICROALBUMIN/CREAT UR-RTO: NORMAL MG/G
POTASSIUM SERPL-SCNC: 4.6 MMOL/L (ref 3.5–5)
PROT SERPL-MCNC: 7.8 G/DL (ref 6.6–8.7)
SODIUM SERPL-SCNC: 137 MMOL/L (ref 136–145)
TRIGL SERPL-MCNC: 174 MG/DL (ref 0–149)
TSH SERPL DL<=0.005 MIU/L-ACNC: 2.64 UIU/ML (ref 0.27–4.2)

## 2023-08-31 PROCEDURE — 3078F DIAST BP <80 MM HG: CPT | Performed by: INTERNAL MEDICINE

## 2023-08-31 PROCEDURE — 3051F HG A1C>EQUAL 7.0%<8.0%: CPT | Performed by: INTERNAL MEDICINE

## 2023-08-31 PROCEDURE — 3074F SYST BP LT 130 MM HG: CPT | Performed by: INTERNAL MEDICINE

## 2023-08-31 PROCEDURE — 99214 OFFICE O/P EST MOD 30 MIN: CPT | Performed by: INTERNAL MEDICINE

## 2023-08-31 RX ORDER — ONDANSETRON 4 MG/1
4 TABLET, ORALLY DISINTEGRATING ORAL 3 TIMES DAILY PRN
Qty: 21 TABLET | Refills: 0 | Status: SHIPPED | OUTPATIENT
Start: 2023-08-31

## 2023-08-31 RX ORDER — TOPIRAMATE 50 MG/1
TABLET, FILM COATED ORAL
Qty: 42 TABLET | Refills: 0 | Status: SHIPPED | OUTPATIENT
Start: 2023-08-31

## 2023-08-31 SDOH — ECONOMIC STABILITY: FOOD INSECURITY: WITHIN THE PAST 12 MONTHS, YOU WORRIED THAT YOUR FOOD WOULD RUN OUT BEFORE YOU GOT MONEY TO BUY MORE.: NEVER TRUE

## 2023-08-31 SDOH — ECONOMIC STABILITY: HOUSING INSECURITY
IN THE LAST 12 MONTHS, WAS THERE A TIME WHEN YOU DID NOT HAVE A STEADY PLACE TO SLEEP OR SLEPT IN A SHELTER (INCLUDING NOW)?: NO

## 2023-08-31 SDOH — ECONOMIC STABILITY: INCOME INSECURITY: HOW HARD IS IT FOR YOU TO PAY FOR THE VERY BASICS LIKE FOOD, HOUSING, MEDICAL CARE, AND HEATING?: NOT HARD AT ALL

## 2023-08-31 SDOH — ECONOMIC STABILITY: FOOD INSECURITY: WITHIN THE PAST 12 MONTHS, THE FOOD YOU BOUGHT JUST DIDN'T LAST AND YOU DIDN'T HAVE MONEY TO GET MORE.: NEVER TRUE

## 2023-08-31 ASSESSMENT — PATIENT HEALTH QUESTIONNAIRE - PHQ9
SUM OF ALL RESPONSES TO PHQ QUESTIONS 1-9: 0
SUM OF ALL RESPONSES TO PHQ QUESTIONS 1-9: 0
SUM OF ALL RESPONSES TO PHQ9 QUESTIONS 1 & 2: 0
1. LITTLE INTEREST OR PLEASURE IN DOING THINGS: 0
SUM OF ALL RESPONSES TO PHQ QUESTIONS 1-9: 0
SUM OF ALL RESPONSES TO PHQ QUESTIONS 1-9: 0
2. FEELING DOWN, DEPRESSED OR HOPELESS: 0

## 2023-08-31 NOTE — TELEPHONE ENCOUNTER
Magnus Ramirez called to request a refill on her medication. Last office visit : 12/20/2022   Next office visit : 8/31/2023     Requested Prescriptions     Pending Prescriptions Disp Refills    topiramate (TOPAMAX) 50 MG tablet 42 tablet 0     Sig: TAKE 3 TABLETS BY MOUTH EVERY NIGHT AT BEDTIME. MUST HAVE AN APPOINTMENT PRIOR TO ADDITIONAL REFILLS.             Manjula Cassidy LPN

## 2023-09-01 ENCOUNTER — TELEPHONE (OUTPATIENT)
Dept: ENDOCRINOLOGY | Facility: CLINIC | Age: 53
End: 2023-09-01

## 2023-09-01 ENCOUNTER — TELEMEDICINE (OUTPATIENT)
Dept: ENDOCRINOLOGY | Facility: CLINIC | Age: 53
End: 2023-09-01
Payer: COMMERCIAL

## 2023-09-01 DIAGNOSIS — E11.65 TYPE 2 DIABETES MELLITUS WITH HYPERGLYCEMIA, WITH LONG-TERM CURRENT USE OF INSULIN: Primary | ICD-10-CM

## 2023-09-01 DIAGNOSIS — E03.9 ACQUIRED HYPOTHYROIDISM: ICD-10-CM

## 2023-09-01 DIAGNOSIS — E78.2 MIXED HYPERLIPIDEMIA: ICD-10-CM

## 2023-09-01 DIAGNOSIS — I10 ESSENTIAL HYPERTENSION: ICD-10-CM

## 2023-09-01 DIAGNOSIS — Z79.4 TYPE 2 DIABETES MELLITUS WITH HYPERGLYCEMIA, WITH LONG-TERM CURRENT USE OF INSULIN: Primary | ICD-10-CM

## 2023-09-01 DIAGNOSIS — E11.43 DIABETIC AUTONOMIC NEUROPATHY ASSOCIATED WITH TYPE 2 DIABETES MELLITUS: ICD-10-CM

## 2023-09-01 PROBLEM — R11.2 NAUSEA & VOMITING: Status: ACTIVE | Noted: 2023-09-01

## 2023-09-01 RX ORDER — SEMAGLUTIDE 1.34 MG/ML
1 INJECTION, SOLUTION SUBCUTANEOUS WEEKLY
Qty: 3 ML | Refills: 1 | Status: SHIPPED
Start: 2023-09-01 | End: 2023-09-01 | Stop reason: SINTOL

## 2023-09-01 ASSESSMENT — ENCOUNTER SYMPTOMS
ABDOMINAL PAIN: 1
VOMITING: 1

## 2023-09-01 NOTE — ASSESSMENT & PLAN NOTE
Unclear control, continue current medications   D/c Ozempic, overdue for lab work, encouraged compliance

## 2023-09-01 NOTE — ASSESSMENT & PLAN NOTE
Uncontrolled, changes made today: patient was started on Ozempic with Endo NP, patient assumed this was an insulin, I explained to her its a GLP1 receptor and the most common side effect is naue, vomitting and ab pain

## 2023-09-01 NOTE — PROGRESS NOTES
Chief Complaint  Diabetes   Subjective          Yon Ochoa presents to [facility] ENDOCRINOLOGY  Diabetes     You have chosen to receive care through a telehealth visit.  Do you consent to use a video/audio connection for your medical care today? Yes        TELEHEALTH VIDEO VISIT     This a video visit due to [facility] current guidelines for social distancing due to the COVID 19 pandemic      Mode of Visit: Video  Location of patient: home  You have chosen to receive care through a telehealth visit.  Does the patient consent to use a video/audio connection for your medical care today? Yes  The visit included audio and video interaction. No technical issues occurred during this visit.         53 year old female presents for follow up     Reason diabetes mellitus type 2     Diagnosed in 2008     Timing constant       Quality not controlled       Severity moderate       Complications hyperglycemia         Current diabetes regimen             Current monitoring regimen: home blood tests -        checking 4 x daily before dexcom               She is vomiting each morning, nauseated all day long     Review of Systems - General ROS: negative                Objective   Vital Signs:   There were no vitals taken for this visit.    Physical Exam  Neurological:      General: No focal deficit present.      Mental Status: She is alert.   Psychiatric:         Mood and Affect: Mood normal.         Thought Content: Thought content normal.         Judgment: Judgment normal.      Result Review :   The following data was reviewed by: EMILY Lopes on 02/16/2022:  Common labs          2/3/2023    16:13 5/23/2023    00:00 8/31/2023    14:50   Common Labs   WBC 10.5         Hemoglobin 14.8         Hematocrit 46.5         Platelets 148         Total Cholesterol   97       Triglycerides   174       HDL Cholesterol   32       LDL Cholesterol    30       Hemoglobin A1C  6.8     7.3          Details           This result is from an external source.                       Assessment and Plan    Diagnoses and all orders for this visit:    1. Type 2 diabetes mellitus with hyperglycemia, with long-term current use of insulin (Primary)  -     CBC & Differential; Future  -     Comprehensive Metabolic Panel; Future  -     Hemoglobin A1c; Future  -     Lipid Panel; Future  -     Microalbumin / Creatinine Urine Ratio - Urine, Clean Catch; Future  -     TSH; Future    2. Acquired hypothyroidism  -     CBC & Differential; Future  -     Comprehensive Metabolic Panel; Future  -     Hemoglobin A1c; Future  -     Lipid Panel; Future  -     Microalbumin / Creatinine Urine Ratio - Urine, Clean Catch; Future  -     TSH; Future    3. Mixed hyperlipidemia  -     CBC & Differential; Future  -     Comprehensive Metabolic Panel; Future  -     Hemoglobin A1c; Future  -     Lipid Panel; Future  -     Microalbumin / Creatinine Urine Ratio - Urine, Clean Catch; Future  -     TSH; Future    4. Diabetic autonomic neuropathy associated with type 2 diabetes mellitus  -     CBC & Differential; Future  -     Comprehensive Metabolic Panel; Future  -     Hemoglobin A1c; Future  -     Lipid Panel; Future  -     Microalbumin / Creatinine Urine Ratio - Urine, Clean Catch; Future  -     TSH; Future    5. Essential hypertension  -     CBC & Differential; Future  -     Comprehensive Metabolic Panel; Future  -     Hemoglobin A1c; Future  -     Lipid Panel; Future  -     Microalbumin / Creatinine Urine Ratio - Urine, Clean Catch; Future  -     TSH; Future    Other orders  -     Tirzepatide 7.5 MG/0.5ML solution pen-injector; Inject 0.5 mL under the skin into the appropriate area as directed 1 (One) Time Per Week.  Dispense: 2 mL; Refill: 11             Pt has type 2 diabetes   Glycemic Management:         Diabetes mellitus type 2     Lab Results   Component Value Date    HGBA1C 7.3 (H) 08/31/2023                         She is vomiting with Ozempic will  change to mounjaro      No change to insulin today    She is hyperglycemic with meals but vomiting ,                 Taking Tresiba 50 units            Taking Humalog     Taking  8 up to 20 units TID before each meal         Plus sliding scale     3 units for every 50 above 150              Taking ozempic  1 mg --stop due to side effects     Trulicity not effective    Change to Mounjaro 7.5 mg weekly          segluromet -- change to steglatro mg once daily        No  fear of metformin by patient          Lipid Management        Taking lipitor 20 mg one daily         Blood Pressure Management:          Taking quinapril 20 mg daily         Microvascular Complication Monitoring:           Last Eye Exam Evaluation --- August 2019, no DR --schedule for Feb. 2021               -----------     Last Microalbumin-Proteinuria Assessment           Component      Latest Ref Rng & Units 6/1/2020   Microalbumin/Creatinine Ratio           Creatinine, Urine      mg/dL 164.3   Microalbumin, Urine      mg/dL <1.2      -----------        Neuropathy      yes      Refers no Rx at this time         Bone Health     Vitamin d def.     Taking vitamin d daily        Component      Latest Ref Rng & Units 6/1/2020   25 Hydroxy, Vitamin D      30.0 - 100.0 ng/ml 36.7         Thyroid Health           Lab Results   Component Value Date    TSH 2.640 08/31/2023              taking levothyroxine 125 mcg daily         Other Diabetes Related Aspects      Lab Results   Component Value Date    [id number] 409 10/27/2022                     Follow Up   No follow-ups on file.  Patient was given instructions and counseling regarding her condition or for health maintenance advice. Please see specific information pulled into the AVS if appropriate.         This document has been electronically signed by EMILY Lopes on September 1, 2023 15:03 CDT.

## 2023-09-05 ENCOUNTER — DOCUMENTATION (OUTPATIENT)
Dept: ENDOCRINOLOGY | Facility: CLINIC | Age: 53
End: 2023-09-05
Payer: COMMERCIAL

## 2023-09-06 ENCOUNTER — DOCUMENTATION (OUTPATIENT)
Dept: ENDOCRINOLOGY | Facility: CLINIC | Age: 53
End: 2023-09-06
Payer: COMMERCIAL

## 2023-09-06 ENCOUNTER — CLINICAL SUPPORT (OUTPATIENT)
Dept: OTOLARYNGOLOGY | Facility: CLINIC | Age: 53
End: 2023-09-06
Payer: COMMERCIAL

## 2023-09-06 DIAGNOSIS — J30.9 ALLERGIC RHINITIS, UNSPECIFIED SEASONALITY, UNSPECIFIED TRIGGER: ICD-10-CM

## 2023-09-06 DIAGNOSIS — J30.89 PERENNIAL ALLERGIC RHINITIS: ICD-10-CM

## 2023-09-06 NOTE — PROGRESS NOTES
Moise Flores   Allergy Injection Note:    Yon Ochoa presents for an immunotherapy injection. The site of the injection was cleansed with an alcohol swab. Serum was injected into the site after pulling back on the plunger to prevent intravascular injection. After the injection and was instructed to wait in the allergy waiting area for 30 minutes. There was no problems with the injection.    Allergy Shot Questionnaire  Injection nurse/assistant: Moise Flores ST/AT  Have you had increased asthma symptoms in past week?: no  Have you had increased allergy symptoms in the last week?: no  Have you had a cold, respiratory tract infection or flu like symptoms in the past 2 weeks?: no  Did you have any problems within 12 hours of the last injection?: no  Are you on any new medications/ eye drops?: no  Are you on any beta blockers?: no  If female, are you pregnant?: no  I have confirmed the name and birth date on my allergy vial. : yes  Epipen available?: yes  Epipen Lot Number: [id number]  Epipen Expiration Date: 8/2024  Number of allergy injections given: 1     Injection Details:  Vial 1   Vial 1 Expiration Date: 11/05/23  Vial 1 Series: 4  Shot type: maintenance  Vial 1 Dose (mL): 0.5  Vial 1 Location: Right upper arm  Vial 1 Reaction (in mm): <5          Moise Flores  9/6/2023  13:47 CDT

## 2023-09-07 RX ORDER — FEXOFENADINE HYDROCHLORIDE 180 MG/1
TABLET, FILM COATED ORAL
Qty: 90 TABLET | Refills: 3 | Status: SHIPPED | OUTPATIENT
Start: 2023-09-07

## 2023-09-20 ENCOUNTER — CLINICAL SUPPORT (OUTPATIENT)
Dept: OTOLARYNGOLOGY | Facility: CLINIC | Age: 53
End: 2023-09-20
Payer: COMMERCIAL

## 2023-09-20 DIAGNOSIS — J30.9 ALLERGIC RHINITIS, UNSPECIFIED SEASONALITY, UNSPECIFIED TRIGGER: ICD-10-CM

## 2023-09-20 NOTE — PROGRESS NOTES
Moise Flores   Allergy Injection Note:    Yon Ochoa presents for an immunotherapy injection. The site of the injection was cleansed with an alcohol swab. Serum was injected into the site after pulling back on the plunger to prevent intravascular injection. After the injection and was instructed to wait in the allergy waiting area for 30 minutes. There was no problems with the injection.    Allergy Shot Questionnaire  Injection nurse/assistant: Moise Flores ST/AT  Have you had increased asthma symptoms in past week?: no  Have you had increased allergy symptoms in the last week?: no  Have you had a cold, respiratory tract infection or flu like symptoms in the past 2 weeks?: no  Did you have any problems within 12 hours of the last injection?: no  Are you on any new medications/ eye drops?: no  Are you on any beta blockers?: no  If female, are you pregnant?: no  I have confirmed the name and birth date on my allergy vial. : yes  Epipen available?: yes  Epipen Lot Number: [id number]  Epipen Expiration Date: 8/2024  Number of allergy injections given: 1     Injection Details:  Vial 1   Vial 1 Expiration Date: 11/05/23  Vial 1 Series: 4  Shot type: maintenance  Vial 1 Dose (mL): 0.5  Vial 1 Location: Left upper arm  Vial 1 Reaction (in mm): <5          Moise Flores  9/20/2023  14:09 CDT

## 2023-09-25 DIAGNOSIS — Z76.0 MEDICATION REFILL: ICD-10-CM

## 2023-09-25 DIAGNOSIS — G62.9 NEUROPATHY: ICD-10-CM

## 2023-09-25 DIAGNOSIS — F51.01 PRIMARY INSOMNIA: ICD-10-CM

## 2023-09-25 DIAGNOSIS — J30.89 PERENNIAL ALLERGIC RHINITIS: ICD-10-CM

## 2023-09-25 DIAGNOSIS — Z79.4 TYPE 2 DIABETES MELLITUS WITHOUT COMPLICATION, WITH LONG-TERM CURRENT USE OF INSULIN (HCC): Chronic | ICD-10-CM

## 2023-09-25 DIAGNOSIS — E11.9 TYPE 2 DIABETES MELLITUS WITHOUT COMPLICATION, WITH LONG-TERM CURRENT USE OF INSULIN (HCC): Chronic | ICD-10-CM

## 2023-09-26 DIAGNOSIS — Z76.0 MEDICATION REFILL: ICD-10-CM

## 2023-09-26 RX ORDER — FEXOFENADINE HCL 180 MG/1
180 TABLET ORAL EVERY MORNING
Qty: 90 TABLET | Refills: 3 | OUTPATIENT
Start: 2023-09-26

## 2023-09-26 RX ORDER — DULOXETIN HYDROCHLORIDE 60 MG/1
60 CAPSULE, DELAYED RELEASE ORAL DAILY
Qty: 30 CAPSULE | Refills: 3 | Status: SHIPPED | OUTPATIENT
Start: 2023-09-26

## 2023-09-26 RX ORDER — TOPIRAMATE 50 MG/1
TABLET, FILM COATED ORAL
Qty: 270 TABLET | OUTPATIENT
Start: 2023-09-26

## 2023-09-26 RX ORDER — FAMOTIDINE 20 MG/1
20 TABLET, FILM COATED ORAL 2 TIMES DAILY
Qty: 60 TABLET | Refills: 3 | Status: SHIPPED | OUTPATIENT
Start: 2023-09-26

## 2023-09-26 RX ORDER — TRAZODONE HYDROCHLORIDE 100 MG/1
100 TABLET ORAL NIGHTLY
Qty: 30 TABLET | Refills: 3 | Status: SHIPPED | OUTPATIENT
Start: 2023-09-26

## 2023-09-26 RX ORDER — TOPIRAMATE 50 MG/1
TABLET, FILM COATED ORAL
Qty: 90 TABLET | Refills: 0 | Status: SHIPPED | OUTPATIENT
Start: 2023-09-26

## 2023-09-26 RX ORDER — INSULIN DEGLUDEC 200 U/ML
INJECTION, SOLUTION SUBCUTANEOUS
Qty: 4 ADJUSTABLE DOSE PRE-FILLED PEN SYRINGE | Refills: 1 | Status: SHIPPED | OUTPATIENT
Start: 2023-09-26

## 2023-09-26 NOTE — TELEPHONE ENCOUNTER
Isamar Valladares called to request a refill on her medication. Last office visit : 8/31/2023   Next office visit : 1/2/2024     Requested Prescriptions     Pending Prescriptions Disp Refills    topiramate (TOPAMAX) 50 MG tablet 42 tablet 0     Sig: TAKE 3 TABLETS BY MOUTH EVERY NIGHT AT BEDTIME. MUST HAVE AN APPOINTMENT PRIOR TO ADDITIONAL REFILLS.     fexofenadine (ALLERGY RELIEF) 180 MG tablet 90 tablet 3     Sig: Take 1 tablet by mouth every morning            Aki Orr LPN

## 2023-09-26 NOTE — TELEPHONE ENCOUNTER
Radha Matias called to request a refill on her medication. Last office visit : 2023   Next office visit : 2023     Requested Prescriptions     Pending Prescriptions Disp Refills    Insulin Degludec (TRESIBA FLEXTOUCH) 200 UNIT/ML SOPN 4 Adjustable Dose Pre-filled Pen Syringe 1     Si units each morning not taking at all during the night. traZODone (DESYREL) 100 MG tablet 30 tablet 5     Sig: Take 1 tablet by mouth nightly    famotidine (PEPCID) 20 MG tablet 14 tablet 0     Sig: Take 1 tablet by mouth 2 times daily Must have an appointment prior to additional refills. DULoxetine (CYMBALTA) 60 MG extended release capsule 14 capsule 0     Sig: Take 1 capsule by mouth daily MUST HAVE AN APPOINTMENT PRIOR TO ADDITIONAL REFILLS.             Tr Seay LPN

## 2023-09-27 ENCOUNTER — CLINICAL SUPPORT (OUTPATIENT)
Dept: OTOLARYNGOLOGY | Facility: CLINIC | Age: 53
End: 2023-09-27
Payer: COMMERCIAL

## 2023-09-27 DIAGNOSIS — F51.01 PRIMARY INSOMNIA: ICD-10-CM

## 2023-09-27 DIAGNOSIS — J30.9 ALLERGIC RHINITIS, UNSPECIFIED SEASONALITY, UNSPECIFIED TRIGGER: ICD-10-CM

## 2023-09-27 DIAGNOSIS — G62.9 NEUROPATHY: ICD-10-CM

## 2023-09-27 RX ORDER — TRAZODONE HYDROCHLORIDE 100 MG/1
100 TABLET ORAL
Qty: 30 TABLET | Refills: 3 | OUTPATIENT
Start: 2023-09-27

## 2023-09-27 RX ORDER — DULOXETIN HYDROCHLORIDE 30 MG/1
30 CAPSULE, DELAYED RELEASE ORAL EVERY MORNING
Qty: 30 CAPSULE | Refills: 5 | OUTPATIENT
Start: 2023-09-27 | End: 2023-10-27

## 2023-09-27 NOTE — TELEPHONE ENCOUNTER
Yeni Crabtree called to request a refill on her medication.       Last office visit : 5/18/2022   Next office visit : Visit date not found     Requested Prescriptions     Pending Prescriptions Disp Refills    DULoxetine (CYMBALTA) 30 MG extended release capsule [Pharmacy Med Name: DULOXETINE DR 30MG CAPSULES] 30 capsule 5     Sig: TAKE 1 CAPSULE BY MOUTH EVERY MORNING    traZODone (DESYREL) 100 MG tablet [Pharmacy Med Name: TRAZODONE 100MG TABLETS] 30 tablet 3     Sig: TAKE 1 TABLET BY MOUTH EVERY NIGHT            Lyssa Lopez LPN

## 2023-09-27 NOTE — PROGRESS NOTES
Moise Flores   Allergy Injection Note:    Yon Ochoa presents for an immunotherapy injection. The site of the injection was cleansed with an alcohol swab. Serum was injected into the site after pulling back on the plunger to prevent intravascular injection. After the injection and was instructed to wait in the allergy waiting area for 30 minutes. There was no problems with the injection.    Allergy Shot Questionnaire  Injection nurse/assistant: Moise Flores ST/AT  Have you had increased asthma symptoms in past week?: no  Have you had increased allergy symptoms in the last week?: no  Have you had a cold, respiratory tract infection or flu like symptoms in the past 2 weeks?: no  Did you have any problems within 12 hours of the last injection?: no  Are you on any new medications/ eye drops?: no  Are you on any beta blockers?: no  If female, are you pregnant?: no  I have confirmed the name and birth date on my allergy vial. : yes  Epipen available?: yes  Epipen Lot Number: [id number]  Epipen Expiration Date: 8/2024  Number of allergy injections given: 1     Injection Details:  Vial 1   Vial 1 Expiration Date: 11/05/23  Vial 1 Series: 4  Shot type: maintenance  Vial 1 Dose (mL): 0.5  Vial 1 Location: Right upper arm  Vial 1 Reaction (in mm): <5          Moise Flores  9/27/2023  14:32 CDT

## 2023-09-29 PROBLEM — Z12.11 SCREENING FOR COLON CANCER: Status: RESOLVED | Noted: 2023-08-30 | Resolved: 2023-09-29

## 2023-10-10 ENCOUNTER — OFFICE VISIT (OUTPATIENT)
Dept: OTOLARYNGOLOGY | Facility: CLINIC | Age: 53
End: 2023-10-10
Payer: COMMERCIAL

## 2023-10-10 VITALS — WEIGHT: 168 LBS | HEIGHT: 62 IN | BODY MASS INDEX: 30.91 KG/M2 | TEMPERATURE: 98 F

## 2023-10-10 DIAGNOSIS — J31.0 CHRONIC RHINITIS: ICD-10-CM

## 2023-10-10 DIAGNOSIS — J30.9 ALLERGIC RHINITIS, UNSPECIFIED SEASONALITY, UNSPECIFIED TRIGGER: Primary | ICD-10-CM

## 2023-10-10 DIAGNOSIS — J32.9 CHRONIC SINUSITIS, UNSPECIFIED LOCATION: ICD-10-CM

## 2023-10-10 RX ORDER — AMOXICILLIN AND CLAVULANATE POTASSIUM 875; 125 MG/1; MG/1
1 TABLET, FILM COATED ORAL EVERY 12 HOURS SCHEDULED
Qty: 20 TABLET | Refills: 0 | Status: SHIPPED | OUTPATIENT
Start: 2023-10-10 | End: 2023-10-20

## 2023-10-10 RX ORDER — METHYLPREDNISOLONE 4 MG/1
TABLET ORAL
Qty: 1 EACH | Refills: 0 | Status: SHIPPED | OUTPATIENT
Start: 2023-10-10 | End: 2023-10-16

## 2023-10-10 RX ORDER — FLUCONAZOLE 150 MG/1
150 TABLET ORAL ONCE
Qty: 2 TABLET | Refills: 0 | Status: SHIPPED | OUTPATIENT
Start: 2023-10-10 | End: 2023-10-10

## 2023-10-10 NOTE — PROGRESS NOTES
EMILY Britton ENT [facility] EAR NOSE & THROAT  [address], [address]  [place] [address]  Fax [phone]  Phone [phone]      Visit Type: FOLLOW UP   Chief Complaint   Patient presents with    Allergic Rhinitis    Sneezing    Neck Pain        HPI  She presents for a follow up evaluation. She feels like she has a sinus infection, she has had facial pressure, ear pain, and feeling fluid on the ears for the last 7-10 days.       Past Medical History:   Diagnosis Date    Carpal tunnel syndrome on right 2/3/2020    Diabetes     GERD (gastroesophageal reflux disease)     Hyperlipidemia     Hypertension     Hypertension associated with diabetes 12/4/2019    Hypothyroidism due to Hashimoto's thyroiditis 12/4/2019    Mixed diabetic hyperlipidemia associated with type 2 diabetes mellitus 12/4/2019    Type 2 diabetes mellitus with hyperglycemia, with long-term current use of insulin 12/4/2019       History reviewed. No pertinent surgical history.    Family History: Her family history is not on file.     Social History: She  reports that she has never smoked. She has never used smokeless tobacco. She reports that she does not drink alcohol and does not use drugs.    Home Medications:  Blood Glucose Monitoring Suppl, DULoxetine, Dexcom G6 Sensor, Dexcom G6 Transmitter, EPINEPHrine, Insulin Degludec, Insulin Lispro (1 Unit Dial), Pen Needles, Plecanatide, Prucalopride Succinate, Tirzepatide, Vitamin D, amoxicillin-clavulanate, aspirin, atorvastatin, clonazePAM, cyclobenzaprine, famotidine, fexofenadine, fluconazole, glucose blood, glucose monitor, insulin aspart, levothyroxine, lubiprostone, methylPREDNISolone, mometasone, montelukast, nystatin, omeprazole, onetouch ultrasoft, quinapril, spironolactone, topiramate, traZODone, and vitamin B-6    Allergies:  She is allergic to loratadine-pseudoephedrine er, meperidine, and percocet  [oxycodone-acetaminophen].       Vital Signs:   Temp:  [98 øF (36.7 øC)] 98 øF (36.7 øC)  ENT Physical Exam  Constitutional  Appearance: patient appears well-developed, well-nourished and well-groomed,  Communication/Voice: communication appropriate for developmental age; vocal quality normal;  Head and Face  Appearance: head appears normal, face appears normal and face appears atraumatic;  Palpation: sinus tenderness present;  Salivary: glands normal;  Ear  Hearing: intact;  Auricles: bilateral auricles normal;  Ear Canals: bilateral ear canals normal;  Tympanic Membranes: bilateral tympanic membranes with effusion;           Result Review    RESULTS REVIEW    I have reviewed the patients old records in the chart.     Assessment & Plan    Diagnoses and all orders for this visit:    1. Allergic rhinitis, unspecified seasonality, unspecified trigger (Primary)    2. Chronic rhinitis    3. Chronic sinusitis, unspecified location    Other orders  -     methylPREDNISolone (Medrol) 4 MG dose pack; Take as directed on package instructions.  Dispense: 1 each; Refill: 0  -     amoxicillin-clavulanate (AUGMENTIN) 875-125 MG per tablet; Take 1 tablet by mouth Every 12 (Twelve) Hours for 10 days.  Dispense: 20 tablet; Refill: 0  -     fluconazole (Diflucan) 150 MG tablet; Take 1 tablet by mouth 1 (One) Time for 1 dose.  Dispense: 2 tablet; Refill: 0       For the best response, use your nasal sprays every day without skipping doses. It may take several weeks before the full effect is acheived.     Return in about 6 months (around 4/10/2024) for Follow up with EMILY Johnson.      Electronically signed by EMILY Britton 10/10/23 2:00 PM CDT.

## 2023-10-11 ENCOUNTER — CLINICAL SUPPORT (OUTPATIENT)
Dept: OTOLARYNGOLOGY | Facility: CLINIC | Age: 53
End: 2023-10-11
Payer: COMMERCIAL

## 2023-10-11 DIAGNOSIS — J30.9 ALLERGIC RHINITIS, UNSPECIFIED SEASONALITY, UNSPECIFIED TRIGGER: ICD-10-CM

## 2023-10-11 NOTE — PROGRESS NOTES
Moise Flores   Allergy Injection Note:    Yon Ochoa presents for an immunotherapy injection. The site of the injection was cleansed with an alcohol swab. Serum was injected into the site after pulling back on the plunger to prevent intravascular injection. After the injection and was instructed to wait in the allergy waiting area for 30 minutes. There was no problems with the injection.    Allergy Shot Questionnaire  Injection nurse/assistant: Moise Flores ST/AT  Have you had increased asthma symptoms in past week?: no  Have you had increased allergy symptoms in the last week?: no  Have you had a cold, respiratory tract infection or flu like symptoms in the past 2 weeks?: no  Did you have any problems within 12 hours of the last injection?: no  Are you on any new medications/ eye drops?: no  Are you on any beta blockers?: no  If female, are you pregnant?: no  I have confirmed the name and birth date on my allergy vial. : yes  Epipen available?: yes  Epipen Lot Number: [id number]  Epipen Expiration Date: 8/2024  Number of allergy injections given: 1     Injection Details:  Vial 1   Vial 1 Expiration Date: 11/05/23  Vial 1 Series: 4  Shot type: maintenance  Vial 1 Dose (mL): 0.5  Vial 1 Location: Left upper arm  Vial 1 Reaction (in mm): <5          Moise Flores  10/11/2023  13:55 CDT

## 2023-10-19 ENCOUNTER — CLINICAL SUPPORT NO REQUIREMENTS (OUTPATIENT)
Dept: OTOLARYNGOLOGY | Facility: CLINIC | Age: 53
End: 2023-10-19
Payer: COMMERCIAL

## 2023-10-19 ENCOUNTER — CLINICAL SUPPORT (OUTPATIENT)
Dept: OTOLARYNGOLOGY | Facility: CLINIC | Age: 53
End: 2023-10-19
Payer: COMMERCIAL

## 2023-10-19 DIAGNOSIS — J30.9 ALLERGIC RHINITIS, UNSPECIFIED SEASONALITY, UNSPECIFIED TRIGGER: Primary | ICD-10-CM

## 2023-10-19 DIAGNOSIS — J30.9 ALLERGIC RHINITIS, UNSPECIFIED SEASONALITY, UNSPECIFIED TRIGGER: ICD-10-CM

## 2023-10-19 NOTE — PROGRESS NOTES
Moise Flores   Allergy Injection Mix Note    A immunotherapy injection vial was mixed using standard protocol under sterile conditions.     Mixing Nurse/ Tech: ARMINDA (10/19/23 [id number])    Vial Series: 4    VIAL 1  [place]: Vial 1 mix 0.2 cc of #: concentrate  Mold Mix: Vial 1 mix 0.2 cc of #: concentrate  Trichophyton: Vial 1 mix 0.2 cc of #: concentrate  Dust Mite Mix: Vial 1 mix 0.2 cc of #: concentrate  Dog: Vial 1 mix 0.2 cc of #: concentrate  Cat: Vial 1 mix 0.2 cc of #: concentrate  Feather: Vial 1 mix 0.2 cc of #: concentrate  Vial 1 Additions  Antigen Total: 1.4 cc  Glycerine: 0  Dilutent: 3.6 cc  TOTAL: 5           Moise Flores  10/19/2023  15:41 CDT

## 2023-10-19 NOTE — PROGRESS NOTES
Moise Flores   Allergy Injection Note:    Yon Ochoa presents for an immunotherapy injection. The site of the injection was cleansed with an alcohol swab. Serum was injected into the site after pulling back on the plunger to prevent intravascular injection. After the injection and was instructed to wait in the allergy waiting area for 30 minutes. There was no problems with the injection.    Allergy Shot Questionnaire  Injection nurse/assistant: Moise Flores ST/AT  Have you had increased asthma symptoms in past week?: no  Have you had increased allergy symptoms in the last week?: no  Have you had a cold, respiratory tract infection or flu like symptoms in the past 2 weeks?: no  Did you have any problems within 12 hours of the last injection?: no  Are you on any new medications/ eye drops?: no  Are you on any beta blockers?: no  If female, are you pregnant?: no  I have confirmed the name and birth date on my allergy vial. : yes  Epipen available?: yes  Epipen Lot Number: [id number]  Epipen Expiration Date: 8/2024  Number of allergy injections given: 1     Injection Details:  Vial 1   Vial 1 Expiration Date: 11/05/23  Vial 1 Series: 4  Shot type: maintenance  Vial 1 Dose (mL): 0.5  Vial 1 Location: Right upper arm  Vial 1 Reaction (in mm): <5          Moise Flores  10/19/2023  13:50 CDT

## 2023-10-23 NOTE — PROGRESS NOTES
I have reviewed the notes, assessments, and/or procedures of this encounter and I concur with the documentation on Yon Ochoa.      Oleg Swan MD  10/23/23  5:18 PM CDT

## 2023-10-25 ENCOUNTER — CLINICAL SUPPORT (OUTPATIENT)
Dept: OTOLARYNGOLOGY | Facility: CLINIC | Age: 53
End: 2023-10-25
Payer: COMMERCIAL

## 2023-10-25 DIAGNOSIS — J30.9 ALLERGIC RHINITIS, UNSPECIFIED SEASONALITY, UNSPECIFIED TRIGGER: ICD-10-CM

## 2023-10-25 NOTE — PROGRESS NOTES
Moise Flores   Allergy Injection Note:    Yon Ochoa presents for an immunotherapy injection. The site of the injection was cleansed with an alcohol swab. Serum was injected into the site after pulling back on the plunger to prevent intravascular injection. After the injection and was instructed to wait in the allergy waiting area for 30 minutes. There was no problems with the injection.    Allergy Shot Questionnaire  Injection nurse/assistant: Moise Flores ST/AT  Have you had increased asthma symptoms in past week?: no  Have you had increased allergy symptoms in the last week?: no  Have you had a cold, respiratory tract infection or flu like symptoms in the past 2 weeks?: no  Did you have any problems within 12 hours of the last injection?: no  Are you on any new medications/ eye drops?: no  Are you on any beta blockers?: no  If female, are you pregnant?: no  I have confirmed the name and birth date on my allergy vial. : yes  Epipen available?: yes  Epipen Lot Number: [id number]  Epipen Expiration Date: 8/2024  Number of allergy injections given: 1     Injection Details:  Vial 1   Vial 1 Expiration Date: 04/19/24  Vial 1 Series: 4  Shot type: maintenance  Vial 1 Dose (mL): 0.05 vial test with follow up 0.05 (then .45cc to follow in right upper arm.)  Vial 1 Location: Left upper arm  Vial 1 Vial Test Reaction (in mm): 9  Vial 1 Reaction (in mm): <5          Moise Flores  10/25/2023  14:13 CDT

## 2023-10-25 NOTE — PROGRESS NOTES
I have reviewed the notes, assessments, and/or procedures performed by Moise Alan, I concur with her/his documentation of Yon Ochoa.

## 2023-10-27 DIAGNOSIS — E78.5 HYPERLIPIDEMIA, UNSPECIFIED HYPERLIPIDEMIA TYPE: ICD-10-CM

## 2023-10-27 DIAGNOSIS — I10 ESSENTIAL HYPERTENSION: ICD-10-CM

## 2023-10-27 NOTE — PROGRESS NOTES
Subjective:      Patient ID: Dickson Umanzor is a 41 y.o. female. HPI  Marni for follow-up of diabetes. Her glycemic control is warm. Her average blood sugars are around 300. Her insurance will not pay for her Invokana nor with a pay for her Humalog any longer. She is currently on Januvia, metformin, Lantus, Humalog. I have reviewed her blood glucose log. She is complaining of persistent neuropathic pain in her right leg. Review of Systems   Constitutional: Negative for activity change and fatigue. Respiratory: Negative for cough and shortness of breath. Cardiovascular: Negative for chest pain and leg swelling. Gastrointestinal: Negative for constipation, diarrhea, nausea and vomiting. Genitourinary: Negative for difficulty urinating and dysuria. Musculoskeletal: Negative for arthralgias and back pain. Neurological: Negative for dizziness and headaches. Objective:   Physical Exam   Constitutional: She is oriented to person, place, and time. She appears well-developed and well-nourished. HENT:   Head: Normocephalic and atraumatic. Mouth/Throat: Oropharynx is clear and moist.   Eyes: Conjunctivae are normal. Pupils are equal, round, and reactive to light. Cardiovascular: Normal rate, regular rhythm and normal heart sounds. Pulmonary/Chest: Effort normal and breath sounds normal.   Abdominal: Soft. Musculoskeletal: Normal range of motion. Neurological: She is alert and oriented to person, place, and time. Skin: Skin is warm and dry. Vitals reviewed. Assessment:      1. Neuropathy [facility])  Ambulatory referral to Neurology   2. Type 2 diabetes mellitus without complication, with long-term current use of insulin ([facility])             Plan:      Patient Instructions   Stop Invokana. Start Jardiance 25 mg once a day. Finish Lantus. Ask pharmacy to substitute with Sh[address]. Finish Humalog. Start Humulin R to replace Humalog; same regimen. Never

## 2023-10-29 RX ORDER — SPIRONOLACTONE 50 MG/1
50 TABLET, FILM COATED ORAL DAILY
Qty: 90 TABLET | Refills: 0 | Status: SHIPPED | OUTPATIENT
Start: 2023-10-29 | End: 2024-01-27

## 2023-10-29 RX ORDER — ATORVASTATIN CALCIUM 20 MG/1
20 TABLET, FILM COATED ORAL DAILY
Qty: 90 TABLET | Refills: 0 | Status: SHIPPED | OUTPATIENT
Start: 2023-10-29 | End: 2023-11-28

## 2023-10-31 ENCOUNTER — APPOINTMENT (OUTPATIENT)
Dept: OPERATING ROOM | Age: 53
End: 2023-10-31
Attending: INTERNAL MEDICINE

## 2023-10-31 ENCOUNTER — ANESTHESIA EVENT (OUTPATIENT)
Dept: OPERATING ROOM | Age: 53
End: 2023-10-31

## 2023-10-31 ENCOUNTER — HOSPITAL ENCOUNTER (OUTPATIENT)
Age: 53
Setting detail: OUTPATIENT SURGERY
Discharge: HOME | End: 2023-10-31
Attending: INTERNAL MEDICINE | Admitting: INTERNAL MEDICINE
Payer: COMMERCIAL

## 2023-10-31 ENCOUNTER — ANESTHESIA (OUTPATIENT)
Dept: OPERATING ROOM | Age: 53
End: 2023-10-31

## 2023-10-31 VITALS
WEIGHT: 158 LBS | OXYGEN SATURATION: 96 % | HEIGHT: 63 IN | BODY MASS INDEX: 28 KG/M2 | TEMPERATURE: 97.1 F | SYSTOLIC BLOOD PRESSURE: 103 MMHG | RESPIRATION RATE: 16 BRPM | DIASTOLIC BLOOD PRESSURE: 68 MMHG | HEART RATE: 70 BPM

## 2023-10-31 DIAGNOSIS — Z80.8 FAMILY HISTORY OF BRAIN CANCER: ICD-10-CM

## 2023-10-31 DIAGNOSIS — Z80.0 FAMILY HISTORY OF COLON CANCER: ICD-10-CM

## 2023-10-31 DIAGNOSIS — Z80.42 FAMILY HISTORY OF PROSTATE CANCER: ICD-10-CM

## 2023-10-31 PROCEDURE — G0105 COLORECTAL SCRN; HI RISK IND: HCPCS

## 2023-10-31 PROCEDURE — 45378 DIAGNOSTIC COLONOSCOPY: CPT | Performed by: INTERNAL MEDICINE

## 2023-10-31 RX ORDER — SODIUM CHLORIDE, SODIUM LACTATE, POTASSIUM CHLORIDE, CALCIUM CHLORIDE 600; 310; 30; 20 MG/100ML; MG/100ML; MG/100ML; MG/100ML
INJECTION, SOLUTION INTRAVENOUS CONTINUOUS
Status: DISCONTINUED | OUTPATIENT
Start: 2023-10-31 | End: 2023-10-31 | Stop reason: HOSPADM

## 2023-10-31 RX ORDER — DEXTROSE MONOHYDRATE 50 MG/ML
INJECTION, SOLUTION INTRAVENOUS ONCE
Status: COMPLETED | OUTPATIENT
Start: 2023-10-31 | End: 2023-10-31

## 2023-10-31 RX ORDER — PROPOFOL 10 MG/ML
INJECTION, EMULSION INTRAVENOUS PRN
Status: DISCONTINUED | OUTPATIENT
Start: 2023-10-31 | End: 2023-10-31 | Stop reason: SDUPTHER

## 2023-10-31 RX ORDER — SODIUM CHLORIDE, SODIUM LACTATE, POTASSIUM CHLORIDE, CALCIUM CHLORIDE 600; 310; 30; 20 MG/100ML; MG/100ML; MG/100ML; MG/100ML
INJECTION, SOLUTION INTRAVENOUS CONTINUOUS PRN
Status: DISCONTINUED | OUTPATIENT
Start: 2023-10-31 | End: 2023-10-31 | Stop reason: SDUPTHER

## 2023-10-31 RX ORDER — LIDOCAINE HYDROCHLORIDE 10 MG/ML
INJECTION, SOLUTION EPIDURAL; INFILTRATION; INTRACAUDAL; PERINEURAL PRN
Status: DISCONTINUED | OUTPATIENT
Start: 2023-10-31 | End: 2023-10-31 | Stop reason: SDUPTHER

## 2023-10-31 RX ADMIN — LIDOCAINE HYDROCHLORIDE 30 MG: 10 INJECTION, SOLUTION EPIDURAL; INFILTRATION; INTRACAUDAL; PERINEURAL at 11:50

## 2023-10-31 RX ADMIN — PROPOFOL 80 MG: 10 INJECTION, EMULSION INTRAVENOUS at 11:51

## 2023-10-31 RX ADMIN — PROPOFOL 50 MG: 10 INJECTION, EMULSION INTRAVENOUS at 12:03

## 2023-10-31 RX ADMIN — PROPOFOL 50 MG: 10 INJECTION, EMULSION INTRAVENOUS at 11:55

## 2023-10-31 RX ADMIN — SODIUM CHLORIDE, SODIUM LACTATE, POTASSIUM CHLORIDE, CALCIUM CHLORIDE: 600; 310; 30; 20 INJECTION, SOLUTION INTRAVENOUS at 09:50

## 2023-10-31 RX ADMIN — PROPOFOL 50 MG: 10 INJECTION, EMULSION INTRAVENOUS at 11:58

## 2023-10-31 RX ADMIN — PROPOFOL 50 MG: 10 INJECTION, EMULSION INTRAVENOUS at 12:07

## 2023-10-31 RX ADMIN — DEXTROSE MONOHYDRATE: 50 INJECTION, SOLUTION INTRAVENOUS at 11:26

## 2023-10-31 RX ADMIN — SODIUM CHLORIDE, SODIUM LACTATE, POTASSIUM CHLORIDE, CALCIUM CHLORIDE: 600; 310; 30; 20 INJECTION, SOLUTION INTRAVENOUS at 11:49

## 2023-10-31 ASSESSMENT — PAIN - FUNCTIONAL ASSESSMENT: PAIN_FUNCTIONAL_ASSESSMENT: NONE - DENIES PAIN

## 2023-10-31 NOTE — H&P
Patient Name: Any Christie  : 1970  MRN: [id number]  DATE: 10/31/23    Allergies: Allergies   Allergen Reactions    Claritin-D 12 Hour [Loratadine-Pseudoephedrine Er] Other (See Comments)     \"it intensifies my migraines\"    Demerol Hcl [Meperidine] Other (See Comments)     Aggression    Percocet [Oxycodone-Acetaminophen] Rash        ENDOSCOPY  History and Physical    Procedure:    [] Diagnostic Colonoscopy       [x] Screening Colonoscopy  [] EGD      [] ERCP      [] EUS       [] Other    [x] Previous office notes/History and Physical reviewed from the patients chart. Please see EMR for further details of HPI. I have examined the patient's status immediately prior to the procedure and:      Indications/HPI: Family history of colon cancer-dad and colon polyps-aunt; needs colon cancer screening  []Abdominal Pain   []Cancer- GI/Lung     [x]Fhx of colon CA/polyps  []History of Polyps  []Barretts            []Melena  []Abnormal Imaging              []Dysphagia              []Persistent Pneumonia   []Anemia                            []Food Impaction        []History of Polyps  [] GI Bleed             []Pulmonary nodule/Mass   []Change in bowel habits []Heartburn/Reflux  []Rectal Bleed (BRBPR)  []Chest Pain - Non Cardiac []Heme (+) Stool []Ulcers  []Constipation  []Hemoptysis  []Varices  []Diarrhea  []Hypoxemia    []Nausea/Vomiting   [x]Screening   []Crohns/Colitis  []Other:     Anesthesia:   [x] MAC [] Moderate Sedation   [] General   [] None     ROS: 12 pt Review of Symptoms was negative unless mentioned above    Medications:   Prior to Admission medications    Medication Sig Start Date End Date Taking?  Authorizing Provider   spironolactone (ALDACTONE) 50 MG tablet TAKE 1 TABLET BY MOUTH DAILY 10/29/23 1/27/24  Radha Albarran MD   atorvastatin (LIPITOR) 20 MG tablet TAKE 1 TABLET BY MOUTH DAILY 10/29/23 11/28/23  Radha Albarran MD   topiramate (TOPAMAX) 50 MG tablet TAKE 3 TABLETS BY

## 2023-10-31 NOTE — OP NOTE
Patient: Raven Masterson : 1970  Med Rec#: [id number] Acc#: [id number]   Primary Care Provider Chaya Cee MD    Date of Procedure:  10/31/2023    Endoscopist: Darron Alberts MD, MD    Referring Provider: Chaya Cee MD,     Operation Performed: Colonoscopy up to the cecum    Indications:  Family history of colon cancer-dad and colon polyps-aunt; needs colon cancer screening    Anesthesia:  Sedation was administered by anesthesia who monitored the patient during the procedure. I met with Raven Masterson prior to procedure. We discussed the procedure itself, and I have discussed the risks of endoscopy (including-- but not limited to-- pain, discomfort, bleeding potentially requiring second endoscopic procedure and/or blood transfusion, organ perforation requiring operative repair, damage to organs near the colon, infection, aspiration, cardiopulmonary/allergic reaction), benefits, indications to endoscopy. Additionally, we discussed options other than colonoscopy. The patient expressed understanding. All questions answered. The patient decided to proceed with the procedure. Signed informed consent was placed on the chart. Blood Loss: minimal    Withdrawal time: More than 6 minutes  Bowel Prep: Fair  with small amounts of thick solid and semisolid stool and a moderate amount of thick, opaque liquid scattered in patchy segments throughout the colon obscuring the underlying mucosa. Lesions including polyps may have been missed. Complications: no immediate complications    DESCRIPTION OF PROCEDURE:     A time out was performed. After written informed consent was obtained, the patient was placed in the left lateral position. The perianal area was inspected, and a digital rectal exam was performed. A rectal exam was performed: normal tone, no palpable lesions.  At this point, a forward viewing Olympus colonoscope was inserted into the anus and carefully advanced to the cecum. The cecum was identified by the ileocecal valve and the appendiceal orifice. The colonoscope was then slowly withdrawn with careful inspection of the mucosa in a linear and circumferential fashion. The scope was retroflexed in the rectum. Suction was utilized during the procedure to remove as much air as possible from the bowel. The colonoscope was removed from the patient, and the procedure was terminated. Findings are listed below. Findings:     NO large polyps or masses or strictures or colitis. Suboptimal exam due to prep quality as described above. Internal hemorrhoids-Grade 1 and a perianal tag  Where it was clearly visible, the mucosa appeared normal throughout the entire examined colon  Retroflexion in the rectum was otherwise normal and revealed no further abnormalities      Recommendations:  1. Repeat colonoscopy:  -based on her family history, colonoscopy findings today and prep quality, in 3 years; sooner if her personal or family history as pertaining to colorectal cancer risk changes requiring an earlier exam or if the patient were to develop lower GI symptoms such as bleeding, abdominal pain, change in bowel habits or stool caliber or if the patient has anemia or unexplained weight loss in the future. 2. Await biopsy results-you will receive a letter with your results within 7-10 days    - Resume previous meds and diet  - GI clinic f/u PRN   - Keep scheduled f/u appts with other MDs     Findings and recommendations were discussed w/ the patient. A copy of the images was provided.     (Please note that portions of this note were completed with a voice recognition program. Efforts were made to edit the dictations but occasionally words may be mis-transcribed.)     Obey Hodge MD, MD  10/31/2023  11:55 AM

## 2023-10-31 NOTE — ANESTHESIA POSTPROCEDURE EVALUATION
Department of Anesthesiology  Postprocedure Note    Patient: Renan Bull  MRN: [id number]  YOB: 1970  Date of evaluation: 10/31/2023      Procedure Summary     Date: 10/31/23 Room / Location: [facility] ENDO 02 / [address]    Anesthesia Start: 0470 Anesthesia Stop: 1209    Procedure: COLORECTAL CANCER SCREENING, NOT HIGH RISK (Abdomen) Diagnosis:       FH: colon polyps      (FH: colon polyps [Z83.719])    Surgeons: Sadie Cancino MD Responsible Provider: NICANOR Lopez CRNA    Anesthesia Type: TIVA, general ASA Status: 2          Anesthesia Type: No value filed.     Kristopher Phase I:      Kristopher Phase II:        Anesthesia Post Evaluation    Patient location during evaluation: bedside  Patient participation: complete - patient participated  Level of consciousness: sleepy but conscious  Pain score: 0  Airway patency: patent  Nausea & Vomiting: no vomiting and no nausea  Complications: no  Cardiovascular status: blood pressure returned to baseline and hemodynamically stable  Respiratory status: acceptable  Hydration status: stable  Pain management: satisfactory to patient

## 2023-10-31 NOTE — DISCHARGE INSTRUCTIONS
1. Repeat colonoscopy:  -based on her family history, colonoscopy findings today and prep quality, in 3 years; sooner if her personal or family history as pertaining to colorectal cancer risk changes requiring an earlier exam or if the patient were to develop lower GI symptoms such as bleeding, abdominal pain, change in bowel habits or stool caliber or if the patient has anemia or unexplained weight loss in the future. 2. Await biopsy results-you will receive a letter with your results within 7-10 days    - Resume previous meds and diet  - GI clinic f/u PRN   - Keep scheduled f/u appts with other MDs POST-OP ORDERS: ENDOSCOPY & COLONOSCOPY:    1. Rest today. 2. DO NOT eat or drink until wide awake; eat your usual diet today in moderate amount only. 3. DO NOT drive today. 4. Call physician if you have severe pain, vomiting, fever, rectal bleeding or black bowel movements. 5.  If a biopsy was taken or a polyp removed, you should expect to hear results in about 21 days. If you have heard nothing from your physician by then, call the office for results. 6.  Discharge home when patient awake, vitals signs stable and tolerating liquids. 7. Call with questions or concerns [phone].

## 2023-11-01 ENCOUNTER — CLINICAL SUPPORT (OUTPATIENT)
Dept: OTOLARYNGOLOGY | Facility: CLINIC | Age: 53
End: 2023-11-01
Payer: COMMERCIAL

## 2023-11-01 DIAGNOSIS — J30.9 ALLERGIC RHINITIS, UNSPECIFIED SEASONALITY, UNSPECIFIED TRIGGER: ICD-10-CM

## 2023-11-01 NOTE — PROGRESS NOTES
Moise Flores   Allergy Injection Note:    Yon Ochoa presents for an immunotherapy injection. The site of the injection was cleansed with an alcohol swab. Serum was injected into the site after pulling back on the plunger to prevent intravascular injection. After the injection and was instructed to wait in the allergy waiting area for 30 minutes. There was no problems with the injection.    Allergy Shot Questionnaire  Injection nurse/assistant: Moise Flores ST/AT  Have you had increased asthma symptoms in past week?: no  Have you had increased allergy symptoms in the last week?: no  Have you had a cold, respiratory tract infection or flu like symptoms in the past 2 weeks?: no  Did you have any problems within 12 hours of the last injection?: no  Are you on any new medications/ eye drops?: no  Are you on any beta blockers?: no  If female, are you pregnant?: no  I have confirmed the name and birth date on my allergy vial. : yes  Epipen available?: yes  Epipen Lot Number: [id number]  Epipen Expiration Date: 8/2024  Number of allergy injections given: 1     Injection Details:  Vial 1   Vial 1 Expiration Date: 04/19/24  Vial 1 Series: 4  Shot type: maintenance  Vial 1 Dose (mL): 0.5  Vial 1 Location: Left upper arm  Vial 1 Reaction (in mm): <5          Moise Flores  11/1/2023  14:19 CDT

## 2023-11-04 DIAGNOSIS — Z76.0 MEDICATION REFILL: ICD-10-CM

## 2023-11-06 DIAGNOSIS — Z76.0 MEDICATION REFILL: ICD-10-CM

## 2023-11-06 RX ORDER — TOPIRAMATE 50 MG/1
TABLET, FILM COATED ORAL
Qty: 90 TABLET | Refills: 0 | Status: SHIPPED | OUTPATIENT
Start: 2023-11-06

## 2023-11-06 RX ORDER — TOPIRAMATE 50 MG/1
TABLET, FILM COATED ORAL
Qty: 270 TABLET | OUTPATIENT
Start: 2023-11-06

## 2023-11-06 NOTE — TELEPHONE ENCOUNTER
Raven Masterson called to request a refill on her medication.       Last office visit : 8/31/2023   Next office visit : 1/2/2024     Requested Prescriptions     Pending Prescriptions Disp Refills    topiramate (TOPAMAX) 50 MG tablet [Pharmacy Med Name: TOPIRAMATE 50MG TABLETS] 90 tablet 0     Sig: TAKE 3 TABLETS BY MOUTH EVERY NIGHT AT BEDTIME            Jaswinder Montgomery LPN

## 2023-11-08 ENCOUNTER — CLINICAL SUPPORT (OUTPATIENT)
Dept: OTOLARYNGOLOGY | Facility: CLINIC | Age: 53
End: 2023-11-08
Payer: COMMERCIAL

## 2023-11-08 DIAGNOSIS — J30.9 ALLERGIC RHINITIS, UNSPECIFIED SEASONALITY, UNSPECIFIED TRIGGER: ICD-10-CM

## 2023-11-17 DIAGNOSIS — I10 ESSENTIAL HYPERTENSION: ICD-10-CM

## 2023-11-17 RX ORDER — RAMIPRIL 1.25 MG/1
CAPSULE ORAL DAILY
Qty: 90 CAPSULE | Refills: 0 | Status: SHIPPED | OUTPATIENT
Start: 2023-11-17

## 2023-11-17 NOTE — TELEPHONE ENCOUNTER
Yeni Crabtree called to request a refill on her medication.       Last office visit : 8/31/2023   Next office visit : 1/2/2024     Requested Prescriptions     Pending Prescriptions Disp Refills    ramipril (ALTACE) 1.25 MG capsule [Pharmacy Med Name: RAMIPRIL 1.25 MG CAPSULE] 90 capsule 1     Sig: TAKE 1 CAPSULE BY MOUTH EVERY DAY            Alvino Perez LPN

## 2023-12-13 ENCOUNTER — CLINICAL SUPPORT (OUTPATIENT)
Dept: OTOLARYNGOLOGY | Facility: CLINIC | Age: 53
End: 2023-12-13
Payer: COMMERCIAL

## 2023-12-13 DIAGNOSIS — Z79.4 TYPE 2 DIABETES MELLITUS WITHOUT COMPLICATION, WITH LONG-TERM CURRENT USE OF INSULIN (HCC): Chronic | ICD-10-CM

## 2023-12-13 DIAGNOSIS — E11.9 TYPE 2 DIABETES MELLITUS WITHOUT COMPLICATION, WITH LONG-TERM CURRENT USE OF INSULIN (HCC): Chronic | ICD-10-CM

## 2023-12-13 DIAGNOSIS — J30.9 ALLERGIC RHINITIS, UNSPECIFIED SEASONALITY, UNSPECIFIED TRIGGER: ICD-10-CM

## 2023-12-13 RX ORDER — INSULIN DEGLUDEC 200 U/ML
INJECTION, SOLUTION SUBCUTANEOUS
Qty: 12 ML | Refills: 0 | Status: SHIPPED | OUTPATIENT
Start: 2023-12-13

## 2023-12-13 NOTE — TELEPHONE ENCOUNTER
Adrianna Olsen called to request a refill on her medication.       Last office visit : 8/31/2023   Next office visit : 1/2/2024     Requested Prescriptions     Pending Prescriptions Disp Refills    Insulin Degludec (TRESIBA FLEXTOUCH) 200 UNIT/ML SOPN [Pharmacy Med Name: Marcelo Eliaser PEN(U-200)INJ 3ML] 12 mL      Sig: INJECT [address] SKIN EVERY MORNING            Terrence Razo LPN

## 2023-12-15 NOTE — TELEPHONE ENCOUNTER
Called and left message for patient regarding her insulin. Advised patient that her endocrinologist is to take care of her insulin.

## 2024-01-01 ASSESSMENT — ENCOUNTER SYMPTOMS
WHEEZING: 0
NAUSEA: 1
COUGH: 0
ABDOMINAL PAIN: 1
VOMITING: 1
CHEST TIGHTNESS: 0
BLOOD IN STOOL: 0
SHORTNESS OF BREATH: 0
BACK PAIN: 1
RHINORRHEA: 0
DIARRHEA: 0
SINUS PAIN: 0
TROUBLE SWALLOWING: 0
CONSTIPATION: 0

## 2024-01-02 ENCOUNTER — OFFICE VISIT (OUTPATIENT)
Dept: PRIMARY CARE CLINIC | Age: 54
End: 2024-01-02
Payer: COMMERCIAL

## 2024-01-02 VITALS
TEMPERATURE: 97.1 F | DIASTOLIC BLOOD PRESSURE: 88 MMHG | OXYGEN SATURATION: 99 % | HEART RATE: 69 BPM | HEIGHT: 63 IN | SYSTOLIC BLOOD PRESSURE: 132 MMHG | WEIGHT: 160 LBS | BODY MASS INDEX: 28.35 KG/M2 | RESPIRATION RATE: 18 BRPM

## 2024-01-02 DIAGNOSIS — Z51.89 ENCOUNTER FOR MONITORING OF PATIENT COMPLIANCE IN DRUG TREATMENT PROGRAM: ICD-10-CM

## 2024-01-02 DIAGNOSIS — E03.9 ACQUIRED HYPOTHYROIDISM: ICD-10-CM

## 2024-01-02 DIAGNOSIS — J32.1 CHRONIC FRONTAL SINUSITIS: ICD-10-CM

## 2024-01-02 DIAGNOSIS — Z79.4 TYPE 2 DIABETES MELLITUS WITHOUT COMPLICATION, WITH LONG-TERM CURRENT USE OF INSULIN (HCC): ICD-10-CM

## 2024-01-02 DIAGNOSIS — E78.2 MIXED HYPERLIPIDEMIA: ICD-10-CM

## 2024-01-02 DIAGNOSIS — E11.9 TYPE 2 DIABETES MELLITUS WITHOUT COMPLICATION, WITH LONG-TERM CURRENT USE OF INSULIN (HCC): ICD-10-CM

## 2024-01-02 DIAGNOSIS — E78.1 HYPERTRIGLYCERIDEMIA: ICD-10-CM

## 2024-01-02 DIAGNOSIS — F41.8 DEPRESSION WITH ANXIETY: ICD-10-CM

## 2024-01-02 DIAGNOSIS — Z23 NEED FOR HEPATITIS B VACCINATION: ICD-10-CM

## 2024-01-02 DIAGNOSIS — K59.09 CHRONIC CONSTIPATION: ICD-10-CM

## 2024-01-02 DIAGNOSIS — F32.89 OTHER DEPRESSION: ICD-10-CM

## 2024-01-02 DIAGNOSIS — Z23 NEED FOR SHINGLES VACCINE: Primary | ICD-10-CM

## 2024-01-02 DIAGNOSIS — J31.0 CHRONIC RHINITIS: ICD-10-CM

## 2024-01-02 DIAGNOSIS — I10 ESSENTIAL HYPERTENSION: ICD-10-CM

## 2024-01-02 DIAGNOSIS — Z23 NEED FOR VACCINATION WITH 20-POLYVALENT PNEUMOCOCCAL CONJUGATE VACCINE: ICD-10-CM

## 2024-01-02 LAB
ALBUMIN SERPL-MCNC: 4.4 G/DL (ref 3.5–5.2)
ALCOHOL URINE: NORMAL
ALP SERPL-CCNC: 114 U/L (ref 35–104)
ALT SERPL-CCNC: 14 U/L (ref 5–33)
AMPHETAMINE SCREEN, URINE: NEGATIVE
ANION GAP SERPL CALCULATED.3IONS-SCNC: 11 MMOL/L (ref 7–19)
AST SERPL-CCNC: 23 U/L (ref 5–32)
BARBITURATE SCREEN, URINE: NEGATIVE
BENZODIAZEPINE SCREEN, URINE: NEGATIVE
BILIRUB SERPL-MCNC: 0.6 MG/DL (ref 0.2–1.2)
BUN SERPL-MCNC: 12 MG/DL (ref 6–20)
BUPRENORPHINE URINE: NEGATIVE
CALCIUM SERPL-MCNC: 9.5 MG/DL (ref 8.6–10)
CHLORIDE SERPL-SCNC: 103 MMOL/L (ref 98–111)
CHOLEST SERPL-MCNC: 98 MG/DL (ref 160–199)
CO2 SERPL-SCNC: 22 MMOL/L (ref 22–29)
COCAINE METABOLITE SCREEN URINE: NEGATIVE
CREAT SERPL-MCNC: 0.8 MG/DL (ref 0.5–0.9)
FENTANYL SCREEN, URINE: NORMAL
GABAPENTIN SCREEN, URINE: NORMAL
GLUCOSE SERPL-MCNC: 181 MG/DL (ref 74–109)
HBA1C MFR BLD: 9.7 % (ref 4–6)
HDLC SERPL-MCNC: 33 MG/DL (ref 65–121)
LDLC SERPL CALC-MCNC: 27 MG/DL
MDMA URINE: NEGATIVE
METHADONE SCREEN, URINE: NEGATIVE
METHAMPHETAMINE, URINE: NEGATIVE
OPIATE SCREEN URINE: NEGATIVE
OXYCODONE SCREEN URINE: NEGATIVE
PHENCYCLIDINE SCREEN URINE: NEGATIVE
POTASSIUM SERPL-SCNC: 4.1 MMOL/L (ref 3.5–5)
PROPOXYPHENE SCREEN, URINE: NEGATIVE
PROT SERPL-MCNC: 8.2 G/DL (ref 6.6–8.7)
SODIUM SERPL-SCNC: 136 MMOL/L (ref 136–145)
SYNTHETIC CANNABINOIDS(K2) SCREEN, URINE: NORMAL
THC SCREEN, URINE: NEGATIVE
TRAMADOL SCREEN URINE: NORMAL
TRICYCLIC ANTIDEPRESSANTS, UR: NEGATIVE
TRIGL SERPL-MCNC: 192 MG/DL (ref 0–149)

## 2024-01-02 PROCEDURE — 99214 OFFICE O/P EST MOD 30 MIN: CPT | Performed by: INTERNAL MEDICINE

## 2024-01-02 PROCEDURE — 3075F SYST BP GE 130 - 139MM HG: CPT | Performed by: INTERNAL MEDICINE

## 2024-01-02 PROCEDURE — 3079F DIAST BP 80-89 MM HG: CPT | Performed by: INTERNAL MEDICINE

## 2024-01-02 PROCEDURE — 3046F HEMOGLOBIN A1C LEVEL >9.0%: CPT | Performed by: INTERNAL MEDICINE

## 2024-01-02 RX ORDER — HEPATITIS B VACCINE (RECOMBINANT) 20 UG/ML
20 INJECTION, SUSPENSION INTRAMUSCULAR ONCE
Qty: 1 ML | Refills: 0 | Status: SHIPPED | OUTPATIENT
Start: 2024-01-02 | End: 2024-01-02

## 2024-01-02 RX ORDER — ZOSTER VACCINE RECOMBINANT, ADJUVANTED 50 MCG/0.5
0.5 KIT INTRAMUSCULAR SEE ADMIN INSTRUCTIONS
Qty: 0.5 ML | Refills: 0 | Status: SHIPPED | OUTPATIENT
Start: 2024-01-02 | End: 2024-06-30

## 2024-01-02 SDOH — ECONOMIC STABILITY: FOOD INSECURITY: WITHIN THE PAST 12 MONTHS, YOU WORRIED THAT YOUR FOOD WOULD RUN OUT BEFORE YOU GOT MONEY TO BUY MORE.: NEVER TRUE

## 2024-01-02 SDOH — ECONOMIC STABILITY: INCOME INSECURITY: HOW HARD IS IT FOR YOU TO PAY FOR THE VERY BASICS LIKE FOOD, HOUSING, MEDICAL CARE, AND HEATING?: NOT HARD AT ALL

## 2024-01-02 SDOH — ECONOMIC STABILITY: FOOD INSECURITY: WITHIN THE PAST 12 MONTHS, THE FOOD YOU BOUGHT JUST DIDN'T LAST AND YOU DIDN'T HAVE MONEY TO GET MORE.: NEVER TRUE

## 2024-01-02 ASSESSMENT — PATIENT HEALTH QUESTIONNAIRE - PHQ9
SUM OF ALL RESPONSES TO PHQ QUESTIONS 1-9: 20
SUM OF ALL RESPONSES TO PHQ9 QUESTIONS 1 & 2: 6
9. THOUGHTS THAT YOU WOULD BE BETTER OFF DEAD, OR OF HURTING YOURSELF: 0
6. FEELING BAD ABOUT YOURSELF - OR THAT YOU ARE A FAILURE OR HAVE LET YOURSELF OR YOUR FAMILY DOWN: 3
4. FEELING TIRED OR HAVING LITTLE ENERGY: 2
3. TROUBLE FALLING OR STAYING ASLEEP: 3
SUM OF ALL RESPONSES TO PHQ QUESTIONS 1-9: 20
5. POOR APPETITE OR OVEREATING: 3
2. FEELING DOWN, DEPRESSED OR HOPELESS: 3
7. TROUBLE CONCENTRATING ON THINGS, SUCH AS READING THE NEWSPAPER OR WATCHING TELEVISION: 0
10. IF YOU CHECKED OFF ANY PROBLEMS, HOW DIFFICULT HAVE THESE PROBLEMS MADE IT FOR YOU TO DO YOUR WORK, TAKE CARE OF THINGS AT HOME, OR GET ALONG WITH OTHER PEOPLE: 3
1. LITTLE INTEREST OR PLEASURE IN DOING THINGS: 3
8. MOVING OR SPEAKING SO SLOWLY THAT OTHER PEOPLE COULD HAVE NOTICED. OR THE OPPOSITE, BEING SO FIGETY OR RESTLESS THAT YOU HAVE BEEN MOVING AROUND A LOT MORE THAN USUAL: 3

## 2024-01-02 ASSESSMENT — ANXIETY QUESTIONNAIRES
GAD7 TOTAL SCORE: 14
4. TROUBLE RELAXING: 2
7. FEELING AFRAID AS IF SOMETHING AWFUL MIGHT HAPPEN: 2
6. BECOMING EASILY ANNOYED OR IRRITABLE: 1
2. NOT BEING ABLE TO STOP OR CONTROL WORRYING: 3
5. BEING SO RESTLESS THAT IT IS HARD TO SIT STILL: 1
1. FEELING NERVOUS, ANXIOUS, OR ON EDGE: 2
IF YOU CHECKED OFF ANY PROBLEMS ON THIS QUESTIONNAIRE, HOW DIFFICULT HAVE THESE PROBLEMS MADE IT FOR YOU TO DO YOUR WORK, TAKE CARE OF THINGS AT HOME, OR GET ALONG WITH OTHER PEOPLE: VERY DIFFICULT
3. WORRYING TOO MUCH ABOUT DIFFERENT THINGS: 3

## 2024-01-02 ASSESSMENT — COLUMBIA-SUICIDE SEVERITY RATING SCALE - C-SSRS
2. HAVE YOU ACTUALLY HAD ANY THOUGHTS OF KILLING YOURSELF?: NO
1. WITHIN THE PAST MONTH, HAVE YOU WISHED YOU WERE DEAD OR WISHED YOU COULD GO TO SLEEP AND NOT WAKE UP?: NO
6. HAVE YOU EVER DONE ANYTHING, STARTED TO DO ANYTHING, OR PREPARED TO DO ANYTHING TO END YOUR LIFE?: NO

## 2024-01-02 NOTE — PROGRESS NOTES
Jose M Osorio (:  1970) is a 53 y.o. female,{New vs Established:[id number]::\"Established patient\"}, here for evaluation of the following chief complaint(s):  No chief complaint on file.         ASSESSMENT/PLAN:  {There are no diagnoses linked to this encounter. (Refresh or delete this SmartLink)}    No follow-ups on file.         Subjective   SUBJECTIVE/OBJECTIVE:  HPI    Review of Systems       Objective   Physical Exam       {Time Documentation Optional:[id number]}      An electronic signature was used to authenticate this note.    --Monika Strong LPN   
      On the basis of positive PHQ-9 screening (PHQ-9 Total Score: 20), the following plan was implemented: additional evaluation and assessment performed, follow-up plan includes but not limited to: Medication management and Referral to BH/Specialist for evaluation and management and referral to Behavioral health provided.  Patient will follow-up in 4 month(s) with PCP.     (Time Documentation Optional [id number])    An electronic signaturewaas used to authenticate this note  -Claudia Valdez MD on 1/3/2024 at 2:54 PM

## 2024-01-03 ENCOUNTER — CLINICAL SUPPORT (OUTPATIENT)
Dept: OTOLARYNGOLOGY | Facility: CLINIC | Age: 54
End: 2024-01-03
Payer: COMMERCIAL

## 2024-01-03 DIAGNOSIS — J30.9 ALLERGIC RHINITIS, UNSPECIFIED SEASONALITY, UNSPECIFIED TRIGGER: ICD-10-CM

## 2024-01-03 PROBLEM — F41.8 DEPRESSION WITH ANXIETY: Status: ACTIVE | Noted: 2024-01-03

## 2024-01-03 NOTE — ASSESSMENT & PLAN NOTE
Uncontrolled, continue current medications   Patient is currently on Cymbalta for neuropathy however she is having a very big situation with her father and between her siblings she was advised to see psychiatry for management medications as she is on Cymbalta and it is difficult to add another SSRI

## 2024-01-03 NOTE — PROGRESS NOTES
I have reviewed the notes, assessments, and/or procedures performed by Moise Flores, I concur with her/his documentation of Yon Ochoa.    
36.7

## 2024-01-09 ENCOUNTER — TELEPHONE (OUTPATIENT)
Dept: PSYCHIATRY | Age: 54
End: 2024-01-09

## 2024-01-09 NOTE — TELEPHONE ENCOUNTER
Called pt to schedule an appt from a referral.    No answer and LVM.    Electronically signed by Annabel Davila MA on 1/9/2024 at 4:30 PM

## 2024-01-10 ENCOUNTER — CLINICAL SUPPORT (OUTPATIENT)
Dept: OTOLARYNGOLOGY | Facility: CLINIC | Age: 54
End: 2024-01-10
Payer: COMMERCIAL

## 2024-01-10 DIAGNOSIS — J30.9 ALLERGIC RHINITIS, UNSPECIFIED SEASONALITY, UNSPECIFIED TRIGGER: ICD-10-CM

## 2024-01-11 ENCOUNTER — TELEPHONE (OUTPATIENT)
Dept: PSYCHIATRY | Age: 54
End: 2024-01-11

## 2024-01-11 NOTE — TELEPHONE ENCOUNTER
Called pt to schedule an appt from a referral.    Pt stated that she does not want our services.    Electronically signed by Annabel Davila MA on 1/11/2024 at 4:45 PM

## 2024-01-13 DIAGNOSIS — Z76.0 MEDICATION REFILL: ICD-10-CM

## 2024-01-15 DIAGNOSIS — Z76.0 MEDICATION REFILL: ICD-10-CM

## 2024-01-15 RX ORDER — TOPIRAMATE 50 MG/1
TABLET, FILM COATED ORAL
Qty: 270 TABLET | OUTPATIENT
Start: 2024-01-15

## 2024-01-15 RX ORDER — TOPIRAMATE 50 MG/1
TABLET, FILM COATED ORAL
Qty: 90 TABLET | Refills: 0 | Status: SHIPPED | OUTPATIENT
Start: 2024-01-15

## 2024-01-15 NOTE — TELEPHONE ENCOUNTER
Jose M MITZI Osorio called to request a refill on her medication.      Last office visit : 1/2/2024   Next office visit : 4/2/2024     Requested Prescriptions     Pending Prescriptions Disp Refills    topiramate (TOPAMAX) 50 MG tablet [Pharmacy Med Name: TOPIRAMATE 50MG TABLETS] 90 tablet 0     Sig: TAKE 3 TABLETS BY MOUTH EVERY NIGHT AT BEDTIME            Monika Strong LPN

## 2024-01-17 ENCOUNTER — TELEPHONE (OUTPATIENT)
Dept: PRIMARY CARE CLINIC | Age: 54
End: 2024-01-17

## 2024-01-17 DIAGNOSIS — F41.8 DEPRESSION WITH ANXIETY: Primary | ICD-10-CM

## 2024-01-17 NOTE — TELEPHONE ENCOUNTER
Patient called and left a voicemail requesting the referral for psychiatry be changed to someone outside of [facility]. Please advise.

## 2024-01-19 DIAGNOSIS — Z79.4 TYPE 2 DIABETES MELLITUS WITHOUT COMPLICATION, WITH LONG-TERM CURRENT USE OF INSULIN (HCC): Chronic | ICD-10-CM

## 2024-01-19 DIAGNOSIS — E11.9 TYPE 2 DIABETES MELLITUS WITHOUT COMPLICATION, WITH LONG-TERM CURRENT USE OF INSULIN (HCC): Chronic | ICD-10-CM

## 2024-01-22 RX ORDER — INSULIN DEGLUDEC 200 U/ML
INJECTION, SOLUTION SUBCUTANEOUS
Qty: 12 ML | Refills: 0 | Status: SHIPPED | OUTPATIENT
Start: 2024-01-22

## 2024-01-22 NOTE — TELEPHONE ENCOUNTER
Jose M CARTAGENA Jo called to request a refill on her medication.      Last office visit : 1/2/2024   Next office visit : 4/2/2024     Requested Prescriptions     Pending Prescriptions Disp Refills    Insulin Degludec (TRESIBA FLEXTOUCH) 200 UNIT/ML SOPN [Pharmacy Med Name: TRESIBA FLEXTOUCH PEN(U-200)INJ 3ML] 12 mL 0     Sig: ADMINISTER 60 UNITS UNDER THE SKIN EVERY MORNING            Monika Strong LPN

## 2024-01-24 DIAGNOSIS — F41.8 DEPRESSION WITH ANXIETY: Primary | ICD-10-CM

## 2024-01-25 DIAGNOSIS — F51.01 PRIMARY INSOMNIA: ICD-10-CM

## 2024-01-25 DIAGNOSIS — G62.9 NEUROPATHY: ICD-10-CM

## 2024-01-25 DIAGNOSIS — E78.5 HYPERLIPIDEMIA, UNSPECIFIED HYPERLIPIDEMIA TYPE: ICD-10-CM

## 2024-01-25 DIAGNOSIS — I10 ESSENTIAL HYPERTENSION: ICD-10-CM

## 2024-01-25 RX ORDER — ATORVASTATIN CALCIUM 20 MG/1
20 TABLET, FILM COATED ORAL DAILY
Qty: 90 TABLET | Refills: 0 | Status: SHIPPED | OUTPATIENT
Start: 2024-01-25

## 2024-01-25 RX ORDER — DULOXETIN HYDROCHLORIDE 60 MG/1
60 CAPSULE, DELAYED RELEASE ORAL DAILY
Qty: 30 CAPSULE | Refills: 2 | Status: SHIPPED | OUTPATIENT
Start: 2024-01-25

## 2024-01-25 RX ORDER — SPIRONOLACTONE 50 MG/1
50 TABLET, FILM COATED ORAL DAILY
Qty: 90 TABLET | Refills: 0 | Status: SHIPPED | OUTPATIENT
Start: 2024-01-25 | End: 2024-04-24

## 2024-01-25 RX ORDER — TRAZODONE HYDROCHLORIDE 100 MG/1
100 TABLET ORAL NIGHTLY
Qty: 30 TABLET | Refills: 2 | Status: SHIPPED | OUTPATIENT
Start: 2024-01-25

## 2024-01-25 NOTE — TELEPHONE ENCOUNTER
Jose M CARTAGENA Jo called to request a refill on her medication.      Last office visit : 1/2/2024   Next office visit : 4/2/2024     Requested Prescriptions     Pending Prescriptions Disp Refills    DULoxetine (CYMBALTA) 60 MG extended release capsule [Pharmacy Med Name: DULOXETINE DR 60MG CAPSULES] 30 capsule 3     Sig: TAKE 1 CAPSULE BY MOUTH DAILY    spironolactone (ALDACTONE) 50 MG tablet [Pharmacy Med Name: SPIRONOLACTONE 50MG TABLETS] 90 tablet 0     Sig: TAKE 1 TABLET BY MOUTH DAILY    atorvastatin (LIPITOR) 20 MG tablet [Pharmacy Med Name: ATORVASTATIN 20MG TABLETS] 90 tablet 0     Sig: TAKE 1 TABLET BY MOUTH DAILY    traZODone (DESYREL) 100 MG tablet [Pharmacy Med Name: TRAZODONE 100MG TABLETS] 30 tablet 3     Sig: TAKE 1 TABLET BY MOUTH EVERY NIGHT            Monika Strong LPN

## 2024-01-31 ENCOUNTER — CLINICAL SUPPORT (OUTPATIENT)
Dept: OTOLARYNGOLOGY | Facility: CLINIC | Age: 54
End: 2024-01-31
Payer: COMMERCIAL

## 2024-01-31 DIAGNOSIS — J30.9 ALLERGIC RHINITIS, UNSPECIFIED SEASONALITY, UNSPECIFIED TRIGGER: ICD-10-CM

## 2024-02-07 ENCOUNTER — OFFICE VISIT (OUTPATIENT)
Dept: PRIMARY CARE CLINIC | Age: 54
End: 2024-02-07
Payer: COMMERCIAL

## 2024-02-07 VITALS
HEIGHT: 63 IN | DIASTOLIC BLOOD PRESSURE: 84 MMHG | TEMPERATURE: 98.3 F | SYSTOLIC BLOOD PRESSURE: 136 MMHG | OXYGEN SATURATION: 98 % | BODY MASS INDEX: 27.82 KG/M2 | HEART RATE: 131 BPM | WEIGHT: 157 LBS

## 2024-02-07 DIAGNOSIS — R19.7 DIARRHEA OF PRESUMED INFECTIOUS ORIGIN: Primary | ICD-10-CM

## 2024-02-07 DIAGNOSIS — R68.89 FLU-LIKE SYMPTOMS: ICD-10-CM

## 2024-02-07 DIAGNOSIS — R11.2 NAUSEA AND VOMITING, UNSPECIFIED VOMITING TYPE: ICD-10-CM

## 2024-02-07 PROCEDURE — 99213 OFFICE O/P EST LOW 20 MIN: CPT | Performed by: NURSE PRACTITIONER

## 2024-02-07 PROCEDURE — 3075F SYST BP GE 130 - 139MM HG: CPT | Performed by: NURSE PRACTITIONER

## 2024-02-07 PROCEDURE — 3079F DIAST BP 80-89 MM HG: CPT | Performed by: NURSE PRACTITIONER

## 2024-02-07 RX ORDER — ONDANSETRON 4 MG/1
4 TABLET, ORALLY DISINTEGRATING ORAL 3 TIMES DAILY PRN
Qty: 21 TABLET | Refills: 0 | Status: SHIPPED | OUTPATIENT
Start: 2024-02-07

## 2024-02-07 NOTE — PATIENT INSTRUCTIONS
[facility] GI panel pending- will call with results.   Increase fluids.   Monitor blood sugar; adjust insulin if needed.   Zofran as needed for nausea/vomiting.   Avoid imodium.   Go to ER for worsening symptoms.

## 2024-02-07 NOTE — PROGRESS NOTES
or Vomiting     Dispense:  21 tablet     Refill:  0         ORDERS:  Orders Placed This Encounter   Procedures    POCT COVID-19 & Influenza A/B       Follow-up:  No follow-ups on file.    PATIENT INSTRUCTIONS:  Patient Instructions   [facility] GI panel pending- will call with results.   Increase fluids.   Monitor blood sugar; adjust insulin if needed.   Zofran as needed for nausea/vomiting.   Avoid imodium.   Go to ER for worsening symptoms.   Electronically signed by NICANOR Montesinos on 2/7/2024 at 4:09 PM    EMR Dragon/transcription disclaimer:  Much of thisencounter note is electronic transcription/translation of spoken language to printed texts.  The electronic translation of spoken language may be erroneous, or at times, nonsensical words or phrases may be inadvertentlytranscribed.  Although I have reviewed the note for such errors, some may still exist.

## 2024-02-12 DIAGNOSIS — I10 ESSENTIAL HYPERTENSION: ICD-10-CM

## 2024-02-12 RX ORDER — RAMIPRIL 1.25 MG/1
CAPSULE ORAL DAILY
Qty: 90 CAPSULE | Refills: 0 | Status: SHIPPED | OUTPATIENT
Start: 2024-02-12

## 2024-02-12 NOTE — TELEPHONE ENCOUNTER
Jose M MITZI Osorio called to request a refill on her medication.      Last office visit : 2/7/2024   Next office visit : 4/2/2024     Requested Prescriptions     Pending Prescriptions Disp Refills    ramipril (ALTACE) 1.25 MG capsule [Pharmacy Med Name: RAMIPRIL 1.25 MG CAPSULE] 90 capsule 0     Sig: TAKE 1 CAPSULE BY MOUTH EVERY DAY            Monika Strong LPN

## 2024-02-14 ENCOUNTER — CLINICAL SUPPORT (OUTPATIENT)
Dept: OTOLARYNGOLOGY | Facility: CLINIC | Age: 54
End: 2024-02-14
Payer: COMMERCIAL

## 2024-02-14 DIAGNOSIS — J30.9 ALLERGIC RHINITIS, UNSPECIFIED SEASONALITY, UNSPECIFIED TRIGGER: ICD-10-CM

## 2024-02-20 ENCOUNTER — APPOINTMENT (OUTPATIENT)
Dept: CT IMAGING | Age: 54
End: 2024-02-20
Payer: COMMERCIAL

## 2024-02-20 ENCOUNTER — HOSPITAL ENCOUNTER (EMERGENCY)
Age: 54
Discharge: HOME | End: 2024-02-20
Attending: EMERGENCY MEDICINE
Payer: COMMERCIAL

## 2024-02-20 VITALS
RESPIRATION RATE: 18 BRPM | DIASTOLIC BLOOD PRESSURE: 76 MMHG | OXYGEN SATURATION: 98 % | HEART RATE: 106 BPM | BODY MASS INDEX: 28.06 KG/M2 | WEIGHT: 156 LBS | SYSTOLIC BLOOD PRESSURE: 121 MMHG | TEMPERATURE: 99.1 F

## 2024-02-20 DIAGNOSIS — G43.809 OTHER MIGRAINE WITHOUT STATUS MIGRAINOSUS, NOT INTRACTABLE: ICD-10-CM

## 2024-02-20 DIAGNOSIS — R10.84 GENERALIZED ABDOMINAL PAIN: Primary | ICD-10-CM

## 2024-02-20 DIAGNOSIS — R11.2 NAUSEA AND VOMITING, UNSPECIFIED VOMITING TYPE: ICD-10-CM

## 2024-02-20 LAB
ALBUMIN SERPL-MCNC: 4.1 G/DL (ref 3.5–5.2)
ALP SERPL-CCNC: 97 U/L (ref 35–104)
ALT SERPL-CCNC: 15 U/L (ref 5–33)
ANION GAP SERPL CALCULATED.3IONS-SCNC: 14 MMOL/L (ref 7–19)
AST SERPL-CCNC: 28 U/L (ref 5–32)
B PARAP IS1001 DNA NPH QL NAA+NON-PROBE: NOT DETECTED
B PERT.PT PRMT NPH QL NAA+NON-PROBE: NOT DETECTED
BASOPHILS # BLD: 0.1 K/UL (ref 0–0.2)
BASOPHILS NFR BLD: 0.4 % (ref 0–1)
BILIRUB SERPL-MCNC: 0.8 MG/DL (ref 0.2–1.2)
BILIRUB UR QL STRIP: NEGATIVE
BUN SERPL-MCNC: 13 MG/DL (ref 6–20)
C PNEUM DNA NPH QL NAA+NON-PROBE: NOT DETECTED
CALCIUM SERPL-MCNC: 9.3 MG/DL (ref 8.6–10)
CHLORIDE SERPL-SCNC: 102 MMOL/L (ref 98–111)
CLARITY UR: CLEAR
CO2 SERPL-SCNC: 19 MMOL/L (ref 22–29)
COLOR UR: YELLOW
CREAT SERPL-MCNC: 0.9 MG/DL (ref 0.5–0.9)
EOSINOPHIL # BLD: 0 K/UL (ref 0–0.6)
EOSINOPHIL NFR BLD: 0.1 % (ref 0–5)
ERYTHROCYTE [DISTWIDTH] IN BLOOD BY AUTOMATED COUNT: 13.9 % (ref 11.5–14.5)
FLUAV RNA NPH QL NAA+NON-PROBE: NOT DETECTED
FLUBV RNA NPH QL NAA+NON-PROBE: NOT DETECTED
GLUCOSE SERPL-MCNC: 168 MG/DL (ref 74–109)
GLUCOSE UR STRIP.AUTO-MCNC: NEGATIVE MG/DL
HADV DNA NPH QL NAA+NON-PROBE: NOT DETECTED
HCOV 229E RNA NPH QL NAA+NON-PROBE: NOT DETECTED
HCOV HKU1 RNA NPH QL NAA+NON-PROBE: NOT DETECTED
HCOV NL63 RNA NPH QL NAA+NON-PROBE: NOT DETECTED
HCOV OC43 RNA NPH QL NAA+NON-PROBE: NOT DETECTED
HCT VFR BLD AUTO: 45.1 % (ref 37–47)
HGB BLD-MCNC: 14.9 G/DL (ref 12–16)
HGB UR STRIP.AUTO-MCNC: NEGATIVE MG/L
HMPV RNA NPH QL NAA+NON-PROBE: NOT DETECTED
HPIV1 RNA NPH QL NAA+NON-PROBE: NOT DETECTED
HPIV2 RNA NPH QL NAA+NON-PROBE: NOT DETECTED
HPIV3 RNA NPH QL NAA+NON-PROBE: NOT DETECTED
HPIV4 RNA NPH QL NAA+NON-PROBE: NOT DETECTED
IMM GRANULOCYTES # BLD: 0.1 K/UL
KETONES UR STRIP.AUTO-MCNC: NEGATIVE MG/DL
LEUKOCYTE ESTERASE UR QL STRIP.AUTO: NEGATIVE
LIPASE SERPL-CCNC: 21 U/L (ref 13–60)
LYMPHOCYTES # BLD: 1.8 K/UL (ref 1.1–4.5)
LYMPHOCYTES NFR BLD: 10.2 % (ref 20–40)
M PNEUMO DNA NPH QL NAA+NON-PROBE: NOT DETECTED
MCH RBC QN AUTO: 28.8 PG (ref 27–31)
MCHC RBC AUTO-ENTMCNC: 33 G/DL (ref 33–37)
MCV RBC AUTO: 87.2 FL (ref 81–99)
MONOCYTES # BLD: 0.7 K/UL (ref 0–0.9)
MONOCYTES NFR BLD: 4 % (ref 0–10)
NEUTROPHILS # BLD: 15.1 K/UL (ref 1.5–7.5)
NEUTS SEG NFR BLD: 84.9 % (ref 50–65)
NITRITE UR QL STRIP.AUTO: NEGATIVE
PH UR STRIP.AUTO: 8 [PH] (ref 5–8)
PLATELET # BLD AUTO: 211 K/UL (ref 130–400)
PMV BLD AUTO: 9 FL (ref 9.4–12.3)
POTASSIUM SERPL-SCNC: 4.8 MMOL/L (ref 3.5–5)
PROT SERPL-MCNC: 8.1 G/DL (ref 6.6–8.7)
PROT UR STRIP.AUTO-MCNC: NEGATIVE MG/DL
RBC # BLD AUTO: 5.17 M/UL (ref 4.2–5.4)
RSV RNA NPH QL NAA+NON-PROBE: NOT DETECTED
RV+EV RNA NPH QL NAA+NON-PROBE: NOT DETECTED
SARS-COV-2 RNA NPH QL NAA+NON-PROBE: NOT DETECTED
SODIUM SERPL-SCNC: 135 MMOL/L (ref 136–145)
SP GR UR STRIP.AUTO: >=1.045 (ref 1–1.03)
UROBILINOGEN UR STRIP.AUTO-MCNC: 1 E.U./DL
WBC # BLD AUTO: 17.8 K/UL (ref 4.8–10.8)

## 2024-02-20 PROCEDURE — 85025 COMPLETE CBC W/AUTO DIFF WBC: CPT

## 2024-02-20 PROCEDURE — 2580000003 HC RX 258: Performed by: EMERGENCY MEDICINE

## 2024-02-20 PROCEDURE — 36415 COLL VENOUS BLD VENIPUNCTURE: CPT

## 2024-02-20 PROCEDURE — 6360000004 HC RX CONTRAST MEDICATION: Performed by: EMERGENCY MEDICINE

## 2024-02-20 PROCEDURE — 96374 THER/PROPH/DIAG INJ IV PUSH: CPT | Performed by: EMERGENCY MEDICINE

## 2024-02-20 PROCEDURE — 99285 EMERGENCY DEPT VISIT HI MDM: CPT | Performed by: EMERGENCY MEDICINE

## 2024-02-20 PROCEDURE — 74177 CT ABD & PELVIS W/CONTRAST: CPT

## 2024-02-20 PROCEDURE — 81003 URINALYSIS AUTO W/O SCOPE: CPT

## 2024-02-20 PROCEDURE — 0202U NFCT DS 22 TRGT SARS-COV-2: CPT

## 2024-02-20 PROCEDURE — 96361 HYDRATE IV INFUSION ADD-ON: CPT | Performed by: EMERGENCY MEDICINE

## 2024-02-20 PROCEDURE — 96375 TX/PRO/DX INJ NEW DRUG ADDON: CPT | Performed by: EMERGENCY MEDICINE

## 2024-02-20 PROCEDURE — 6360000002 HC RX W HCPCS: Performed by: EMERGENCY MEDICINE

## 2024-02-20 PROCEDURE — 96372 THER/PROPH/DIAG INJ SC/IM: CPT | Performed by: EMERGENCY MEDICINE

## 2024-02-20 PROCEDURE — 83690 ASSAY OF LIPASE: CPT

## 2024-02-20 PROCEDURE — 80053 COMPREHEN METABOLIC PANEL: CPT

## 2024-02-20 RX ORDER — PROMETHAZINE HYDROCHLORIDE 25 MG/ML
25 INJECTION, SOLUTION INTRAMUSCULAR; INTRAVENOUS ONCE
Status: COMPLETED | OUTPATIENT
Start: 2024-02-20 | End: 2024-02-20

## 2024-02-20 RX ORDER — 0.9 % SODIUM CHLORIDE 0.9 %
1000 INTRAVENOUS SOLUTION INTRAVENOUS ONCE
Status: COMPLETED | OUTPATIENT
Start: 2024-02-20 | End: 2024-02-20

## 2024-02-20 RX ORDER — PROMETHAZINE HYDROCHLORIDE 25 MG/1
25 TABLET ORAL EVERY 6 HOURS PRN
Qty: 20 TABLET | Refills: 0 | Status: SHIPPED | OUTPATIENT
Start: 2024-02-20 | End: 2024-02-27

## 2024-02-20 RX ORDER — PROCHLORPERAZINE EDISYLATE 5 MG/ML
10 INJECTION INTRAMUSCULAR; INTRAVENOUS ONCE
Status: COMPLETED | OUTPATIENT
Start: 2024-02-20 | End: 2024-02-20

## 2024-02-20 RX ORDER — KETOROLAC TROMETHAMINE 30 MG/ML
30 INJECTION, SOLUTION INTRAMUSCULAR; INTRAVENOUS ONCE
Status: COMPLETED | OUTPATIENT
Start: 2024-02-20 | End: 2024-02-20

## 2024-02-20 RX ADMIN — IOPAMIDOL 90 ML: 755 INJECTION, SOLUTION INTRAVENOUS at 11:23

## 2024-02-20 RX ADMIN — PROMETHAZINE HYDROCHLORIDE 25 MG: 25 INJECTION INTRAMUSCULAR; INTRAVENOUS at 11:19

## 2024-02-20 RX ADMIN — KETOROLAC TROMETHAMINE 30 MG: 30 INJECTION, SOLUTION INTRAMUSCULAR at 10:39

## 2024-02-20 RX ADMIN — PROCHLORPERAZINE EDISYLATE 10 MG: 5 INJECTION INTRAMUSCULAR; INTRAVENOUS at 12:31

## 2024-02-20 RX ADMIN — SODIUM CHLORIDE 1000 ML: 9 INJECTION, SOLUTION INTRAVENOUS at 10:38

## 2024-02-20 ASSESSMENT — PAIN DESCRIPTION - LOCATION: LOCATION: ABDOMEN

## 2024-02-20 ASSESSMENT — PAIN SCALES - GENERAL: PAINLEVEL_OUTOF10: 9

## 2024-02-20 NOTE — ED PROVIDER NOTES
[facility] EMERGENCY DEPT  eMERGENCY dEPARTMENT eNCOUnter      Pt Name: Jose M Osorio  MRN: [id number]  Birthdate 1970  Date of evaluation: 2/20/2024  Provider: Camilla Tran DO    CHIEF COMPLAINT       Chief Complaint   Patient presents with    Abdominal Pain    Migraine         HISTORY OF PRESENT ILLNESS   (Location/Symptom, Timing/Onset,Context/Setting, Quality, Duration, Modifying Factors, Severity)  Note limiting factors.   Jose M Osorio is a 53 y.o. female who presents to the emergency department      The patient is a 54 yo F who presents to the ED c/o abdominal pain. She says she started feeling bad last night with abdominal pain followed by nausea and vomiting. She has had body aches. She then gradually developed a migraine headache - history of the same treated well with Botox in the past. She follows with Dr. Niki Falcon. She has photophobia. She reports vomiting worsens her migraine. She has had a little loose stool. She has a chronic cough since Nov - no change in sputum or cough. She's felt feverish. She says her granddaughter is also ill with the same symptoms. She ha taken zofran without relief of vomiting. She reports abdominal pain is generalized and radiates to flank bilaterally. She reports some decreased urine output and feels dehydrated. There are no other complaints/concerns at this time.           NursingNotes were reviewed.    REVIEW OF SYSTEMS    (2-9 systems for level 4, 10 or more for level 5)     As per HPI         PAST MEDICALHISTORY     Past Medical History:   Diagnosis Date    Anxiety     Bacteremia 10/17/2019    Bandemia 10/16/2019    Class 2 obesity due to excess calories without serious comorbidity with body mass index (BMI) of 36.0 to 36.9 in adult 12/19/2019    Depression     Diabetes mellitus (HCC)     GERD (gastroesophageal reflux disease)     Heartburn 10/28/2020    Hyperglycemia 8/16/2017    Hyperlipidemia     Hypertension     Hypothyroidism     Lower abdominal pain

## 2024-02-21 ENCOUNTER — CLINICAL SUPPORT (OUTPATIENT)
Dept: OTOLARYNGOLOGY | Facility: CLINIC | Age: 54
End: 2024-02-21
Payer: COMMERCIAL

## 2024-02-21 DIAGNOSIS — J30.9 ALLERGIC RHINITIS, UNSPECIFIED SEASONALITY, UNSPECIFIED TRIGGER: ICD-10-CM

## 2024-02-26 ENCOUNTER — TELEPHONE (OUTPATIENT)
Dept: GASTROENTEROLOGY | Age: 54
End: 2024-02-26

## 2024-02-26 NOTE — TELEPHONE ENCOUNTER
02- [facility] sent over a fax requesting a refill on Omeprazole 20mg.       Called patient notified that she would need an apt as that her Last apt was 01-.       Scheduled an apt for 03- with CT APRN     Routed to CT APRN   Pt is out of medication

## 2024-02-27 DIAGNOSIS — R12 HEARTBURN: ICD-10-CM

## 2024-02-27 RX ORDER — OMEPRAZOLE 20 MG/1
CAPSULE, DELAYED RELEASE ORAL
Qty: 90 CAPSULE | Refills: 3 | Status: SHIPPED | OUTPATIENT
Start: 2024-02-27

## 2024-02-28 ENCOUNTER — CLINICAL SUPPORT (OUTPATIENT)
Dept: OTOLARYNGOLOGY | Facility: CLINIC | Age: 54
End: 2024-02-28
Payer: COMMERCIAL

## 2024-02-28 DIAGNOSIS — J30.9 ALLERGIC RHINITIS, UNSPECIFIED SEASONALITY, UNSPECIFIED TRIGGER: ICD-10-CM

## 2024-02-29 NOTE — PROGRESS NOTES
Lipase 21 13 - 60 U/L   Urinalysis with Reflex to Culture    Specimen: Urine   Result Value Ref Range    Color, UA YELLOW Straw/Yellow    Clarity, UA Clear Clear    Glucose, Ur Negative Negative mg/dL    Bilirubin Urine Negative Negative    Ketones, Urine Negative Negative mg/dL    Specific Gravity, UA >=1.045 1.005 - 1.030    Blood, Urine Negative Negative    pH, UA 8.0 5.0 - 8.0    Protein, UA Negative Negative mg/dL    Urobilinogen, Urine 1.0 <2.0 E.U./dL    Nitrite, Urine Negative Negative    Leukocyte Esterase, Urine Negative Negative       No follow-ups on file.    HPI  54 y.o. female   Patient Active Problem List   Diagnosis    Hypothyroidism    Vitamin D deficiency    Essential hypertension    Mixed hyperlipidemia    Carpal tunnel syndrome on right    Hypoesthesia of skin    Type 2 diabetes mellitus without complication, with long-term current use of insulin (HCC)    Skin infection    Chronic constipation    GERD without esophagitis    Family history of polyps in the colon    Neuropathy    Polypharmacy    Superficial mixed comedonal and inflammatory acne vulgaris    MRSA (methicillin resistant staph aureus) culture positive    Anxiety    Class 2 obesity due to excess calories without serious comorbidity with body mass index (BMI) of 36.0 to 36.9 in adult    Migraine with aura    Obstructive sleep apnea    Neck and shoulder pain    Dysphagia    Hyponatremia    Primary insomnia    Chronic frontal sinusitis    Chronic idiopathic constipation    Irritable bowel syndrome with constipation    Sinus congestion    Acute upper urinary tract infection    Noncompliance with CPAP treatment    Hypertriglyceridemia    Nausea & vomiting    Depression with anxiety    ACE-inhibitor cough    Chronic rhinitis    Diabetic polyneuropathy associated with type 2 diabetes mellitus (HCC)    Hypothyroidism due to Hashimoto's thyroiditis    Mixed diabetic hyperlipidemia associated with type 2 diabetes mellitus (HCC)    Seasonal

## 2024-03-01 ENCOUNTER — CLINICAL SUPPORT NO REQUIREMENTS (OUTPATIENT)
Dept: OTOLARYNGOLOGY | Facility: CLINIC | Age: 54
End: 2024-03-01
Payer: COMMERCIAL

## 2024-03-01 ENCOUNTER — OFFICE VISIT (OUTPATIENT)
Dept: PRIMARY CARE CLINIC | Age: 54
End: 2024-03-01
Payer: COMMERCIAL

## 2024-03-01 VITALS
OXYGEN SATURATION: 99 % | BODY MASS INDEX: 28.17 KG/M2 | TEMPERATURE: 97.1 F | RESPIRATION RATE: 18 BRPM | DIASTOLIC BLOOD PRESSURE: 76 MMHG | HEART RATE: 103 BPM | HEIGHT: 63 IN | SYSTOLIC BLOOD PRESSURE: 128 MMHG | WEIGHT: 159 LBS

## 2024-03-01 DIAGNOSIS — J30.9 ALLERGIC RHINITIS, UNSPECIFIED SEASONALITY, UNSPECIFIED TRIGGER: Primary | ICD-10-CM

## 2024-03-01 DIAGNOSIS — R10.84 GENERALIZED ABDOMINAL PAIN: ICD-10-CM

## 2024-03-01 DIAGNOSIS — J31.0 CHRONIC RHINITIS: ICD-10-CM

## 2024-03-01 DIAGNOSIS — J31.0 RHINITIS, UNSPECIFIED TYPE: Primary | ICD-10-CM

## 2024-03-01 DIAGNOSIS — D72.829 LEUKOCYTOSIS, UNSPECIFIED TYPE: ICD-10-CM

## 2024-03-01 DIAGNOSIS — R05.8 ACE-INHIBITOR COUGH: ICD-10-CM

## 2024-03-01 DIAGNOSIS — R10.10 PAIN OF UPPER ABDOMEN: ICD-10-CM

## 2024-03-01 DIAGNOSIS — T46.4X5A ACE-INHIBITOR COUGH: ICD-10-CM

## 2024-03-01 PROBLEM — J30.2 SEASONAL ALLERGIC RHINITIS: Status: ACTIVE | Noted: 2022-08-23

## 2024-03-01 PROBLEM — E11.69 MIXED DIABETIC HYPERLIPIDEMIA ASSOCIATED WITH TYPE 2 DIABETES MELLITUS (HCC): Status: ACTIVE | Noted: 2019-12-04

## 2024-03-01 PROBLEM — E11.42 DIABETIC POLYNEUROPATHY ASSOCIATED WITH TYPE 2 DIABETES MELLITUS (HCC): Status: ACTIVE | Noted: 2022-02-16

## 2024-03-01 PROBLEM — E06.3 HYPOTHYROIDISM DUE TO HASHIMOTO'S THYROIDITIS: Status: ACTIVE | Noted: 2019-12-04

## 2024-03-01 PROBLEM — E78.2 MIXED DIABETIC HYPERLIPIDEMIA ASSOCIATED WITH TYPE 2 DIABETES MELLITUS (HCC): Status: ACTIVE | Noted: 2019-12-04

## 2024-03-01 PROBLEM — E03.8 HYPOTHYROIDISM DUE TO HASHIMOTO'S THYROIDITIS: Status: ACTIVE | Noted: 2019-12-04

## 2024-03-01 LAB
BASOPHILS # BLD: 0.1 K/UL (ref 0–0.2)
BASOPHILS NFR BLD: 0.8 % (ref 0–1)
BILIRUB UR QL STRIP: NEGATIVE
CLARITY UR: CLEAR
COLOR UR: YELLOW
EOSINOPHIL # BLD: 0.3 K/UL (ref 0–0.6)
EOSINOPHIL NFR BLD: 2 % (ref 0–5)
ERYTHROCYTE [DISTWIDTH] IN BLOOD BY AUTOMATED COUNT: 13.6 % (ref 11.5–14.5)
GLUCOSE UR STRIP.AUTO-MCNC: NEGATIVE MG/DL
HCT VFR BLD AUTO: 36.4 % (ref 37–47)
HGB BLD-MCNC: 12 G/DL (ref 12–16)
HGB UR STRIP.AUTO-MCNC: NEGATIVE MG/L
IMM GRANULOCYTES # BLD: 0 K/UL
KETONES UR STRIP.AUTO-MCNC: NEGATIVE MG/DL
LEUKOCYTE ESTERASE UR QL STRIP.AUTO: NEGATIVE
LYMPHOCYTES # BLD: 4.2 K/UL (ref 1.1–4.5)
LYMPHOCYTES NFR BLD: 34.2 % (ref 20–40)
MCH RBC QN AUTO: 29.5 PG (ref 27–31)
MCHC RBC AUTO-ENTMCNC: 33 G/DL (ref 33–37)
MCV RBC AUTO: 89.4 FL (ref 81–99)
MONOCYTES # BLD: 0.6 K/UL (ref 0–0.9)
MONOCYTES NFR BLD: 5 % (ref 0–10)
NEUTROPHILS # BLD: 7.1 K/UL (ref 1.5–7.5)
NEUTS SEG NFR BLD: 57.7 % (ref 50–65)
NITRITE UR QL STRIP.AUTO: NEGATIVE
PH UR STRIP.AUTO: 7 [PH] (ref 5–8)
PLATELET # BLD AUTO: 235 K/UL (ref 130–400)
PMV BLD AUTO: 9.9 FL (ref 9.4–12.3)
PROT UR STRIP.AUTO-MCNC: NEGATIVE MG/DL
RBC # BLD AUTO: 4.07 M/UL (ref 4.2–5.4)
SP GR UR STRIP.AUTO: 1.02 (ref 1–1.03)
UROBILINOGEN UR STRIP.AUTO-MCNC: 1 E.U./DL
WBC # BLD AUTO: 12.4 K/UL (ref 4.8–10.8)

## 2024-03-01 PROCEDURE — 3074F SYST BP LT 130 MM HG: CPT | Performed by: INTERNAL MEDICINE

## 2024-03-01 PROCEDURE — 99213 OFFICE O/P EST LOW 20 MIN: CPT | Performed by: INTERNAL MEDICINE

## 2024-03-01 PROCEDURE — 3078F DIAST BP <80 MM HG: CPT | Performed by: INTERNAL MEDICINE

## 2024-03-01 ASSESSMENT — ENCOUNTER SYMPTOMS
SINUS PAIN: 1
COUGH: 1
ABDOMINAL PAIN: 1
RHINORRHEA: 1
SHORTNESS OF BREATH: 0

## 2024-03-01 NOTE — PROGRESS NOTES
Moise Flores   Allergy Injection Mix Note    A immunotherapy injection vial was mixed using standard protocol under sterile conditions.     Mixing Nurse/ Tech: ARMINDA (03/01/24 1032)    Vial Series: 4    VIAL 1  [place]: Vial 1 mix 0.2 cc of #: concentrate  Mold Mix: Vial 1 mix 0.2 cc of #: concentrate  Trichophyton: Vial 1 mix 0.2 cc of #: concentrate  Dust Mite Mix: Vial 1 mix 0.2 cc of #: concentrate  Dog: Vial 1 mix 0.2 cc of #: concentrate  Cat: Vial 1 mix 0.2 cc of #: concentrate  Feather: Vial 1 mix 0.2 cc of #: concentrate  Vial 1 Additions  Antigen Total: 1.4 cc  Glycerine: 0  Dilutent: 3.6 cc  TOTAL: 5           Moise Flores  3/1/2024  10:32 CST

## 2024-03-01 NOTE — ASSESSMENT & PLAN NOTE
Borderline controlled, Patients abdomen was soft, had elevated white count, will check urine for culture, upper abd pain probably from superficial muscle pain from excessive coughing

## 2024-03-01 NOTE — ASSESSMENT & PLAN NOTE
Monitored by specialist- no acute findings meriting change in the plan  She will start Montelleukast and ff up with ENT

## 2024-03-03 LAB
BACTERIA UR CULT: ABNORMAL
BACTERIA UR CULT: ABNORMAL
ORGANISM: ABNORMAL

## 2024-03-06 ENCOUNTER — CLINICAL SUPPORT (OUTPATIENT)
Dept: OTOLARYNGOLOGY | Facility: CLINIC | Age: 54
End: 2024-03-06
Payer: COMMERCIAL

## 2024-03-06 DIAGNOSIS — J30.9 ALLERGIC RHINITIS, UNSPECIFIED SEASONALITY, UNSPECIFIED TRIGGER: ICD-10-CM

## 2024-03-11 ENCOUNTER — TELEPHONE (OUTPATIENT)
Dept: OTOLARYNGOLOGY | Facility: CLINIC | Age: 54
End: 2024-03-11
Payer: COMMERCIAL

## 2024-03-11 DIAGNOSIS — J30.89 PERENNIAL ALLERGIC RHINITIS: ICD-10-CM

## 2024-03-11 DIAGNOSIS — Z76.0 MEDICATION REFILL: ICD-10-CM

## 2024-03-11 DIAGNOSIS — G62.9 NEUROPATHY: ICD-10-CM

## 2024-03-11 RX ORDER — DULOXETIN HYDROCHLORIDE 30 MG/1
30 CAPSULE, DELAYED RELEASE ORAL EVERY MORNING
Qty: 30 CAPSULE | Refills: 5 | Status: SHIPPED | OUTPATIENT
Start: 2024-03-11 | End: 2024-04-10

## 2024-03-11 RX ORDER — TOPIRAMATE 50 MG/1
TABLET, FILM COATED ORAL
Qty: 90 TABLET | Refills: 0 | Status: SHIPPED | OUTPATIENT
Start: 2024-03-11

## 2024-03-11 RX ORDER — FEXOFENADINE HCL 180 MG/1
180 TABLET ORAL EVERY MORNING
Qty: 90 TABLET | Refills: 3 | Status: SHIPPED | OUTPATIENT
Start: 2024-03-11

## 2024-03-11 NOTE — TELEPHONE ENCOUNTER
"Called pt back and informed her we do not have any sooner available appts at this time and to be seen at PCP to treat symptoms. Pt states PCP said she wouldn't give her anything because there is \"nothing is wrong\" with her. Pt has tried Mucinex, robitussin w/honey, delsym, etc.  Pt is having severe sinus pressure/pain. Informed pt I would speak to provider to see if there is anything we can send out for her, and I will call her back tomorrow. Pt verbalized understanding.   "

## 2024-03-11 NOTE — TELEPHONE ENCOUNTER
Jose M MITZI Osorio called to request a refill on her medication.      Last office visit : 3/1/2024   Next office visit : 4/2/2024     Requested Prescriptions     Pending Prescriptions Disp Refills    DULoxetine (CYMBALTA) 30 MG extended release capsule 30 capsule 5     Sig: Take 1 capsule by mouth every morning    fexofenadine (ALLERGY RELIEF) 180 MG tablet 90 tablet 3     Sig: Take 1 tablet by mouth every morning    topiramate (TOPAMAX) 50 MG tablet 90 tablet 0            Monika Strong LPN

## 2024-03-11 NOTE — TELEPHONE ENCOUNTER
Caller: Yon Ochoa     Relationship to patient: SELF    Best call back number: [phone]     Patient is needing: PT CALLED AND STATED THAT SHE IS HAVING SOME INCREASED SYMPTOMS. SHE STATES THAT SHE FEELS LIKE SHE IS SICK ALL OF THE TIME AND GETTING NO RELIEF. SOME OF THE SYMPTOMS SHE IS HAVING ARE: EARS STOPPED UP, SINUS HEADACHES, FEELING LIKE SHE HAS A SINUS INFECTION. PLEASE REACH OUT TO THE PT AS SHE IS WANTING TO SEE IF SHE CAN BE SEEN SOONER THAN THE 04/11/24 APPT SHE HAS SCHEDULED

## 2024-03-13 ENCOUNTER — CLINICAL SUPPORT (OUTPATIENT)
Dept: OTOLARYNGOLOGY | Facility: CLINIC | Age: 54
End: 2024-03-13
Payer: COMMERCIAL

## 2024-03-13 DIAGNOSIS — J30.9 ALLERGIC RHINITIS, UNSPECIFIED SEASONALITY, UNSPECIFIED TRIGGER: Primary | ICD-10-CM

## 2024-03-14 ENCOUNTER — TELEPHONE (OUTPATIENT)
Dept: PRIMARY CARE CLINIC | Age: 54
End: 2024-03-14

## 2024-03-14 DIAGNOSIS — Z76.0 MEDICATION REFILL: ICD-10-CM

## 2024-03-14 NOTE — TELEPHONE ENCOUNTER
[facility] sent a fax requesting an alternative medication for topiramate 50 mg 3 tablets at bedtime citing insurance will not cover 3 pills daily. Maximum allowed is 2 pills daily. Please advise.

## 2024-03-14 NOTE — TELEPHONE ENCOUNTER
Fax received from [facility] sent back to them with a note for patient to contact her neurologist for any changes to migraine medications.

## 2024-03-15 ENCOUNTER — PATIENT MESSAGE (OUTPATIENT)
Dept: PRIMARY CARE CLINIC | Age: 54
End: 2024-03-15

## 2024-03-15 DIAGNOSIS — G43.111 INTRACTABLE MIGRAINE WITH AURA WITH STATUS MIGRAINOSUS: Primary | ICD-10-CM

## 2024-03-15 RX ORDER — TOPIRAMATE 50 MG/1
TABLET, FILM COATED ORAL
Qty: 90 TABLET | Refills: 0 | OUTPATIENT
Start: 2024-03-15

## 2024-03-18 RX ORDER — VALPROIC ACID 250 MG/1
250 CAPSULE, LIQUID FILLED ORAL 4 TIMES DAILY
Qty: 120 CAPSULE | Refills: 0 | Status: SHIPPED | OUTPATIENT
Start: 2024-03-18 | End: 2024-04-17

## 2024-03-18 NOTE — TELEPHONE ENCOUNTER
She can try valproic acid as an alternative to her Topamax, if it does not help, I gave her a referral to Neurology

## 2024-03-18 NOTE — TELEPHONE ENCOUNTER
From: ROBI GARZA  To: Jose M Osorio  Sent: 3/15/2024 7:29 AM CDT  Subject: topiramate    Dr Lamb has instructed that you should contact your neurologist to have your medications adjusted. She has been giving the refills as a courtesy, but is not comfortable adjusting the dose. Please consult your neurologist regarding this medication.    Yes

## 2024-03-20 ENCOUNTER — CLINICAL SUPPORT (OUTPATIENT)
Dept: OTOLARYNGOLOGY | Facility: CLINIC | Age: 54
End: 2024-03-20
Payer: COMMERCIAL

## 2024-03-20 DIAGNOSIS — J30.9 ALLERGIC RHINITIS, UNSPECIFIED SEASONALITY, UNSPECIFIED TRIGGER: Primary | ICD-10-CM

## 2024-03-20 NOTE — PROGRESS NOTES
I have reviewed the notes, assessments, and/or procedures performed by Moise Tim, I concur with her/his documentation of Yon Ochoa.

## 2024-03-25 ENCOUNTER — TELEPHONE (OUTPATIENT)
Dept: NEUROLOGY | Age: 54
End: 2024-03-25

## 2024-03-25 NOTE — TELEPHONE ENCOUNTER
Called the patient to schedule their appointment for the referral we received to our office for migraines. The patient didn't answer the call and the voicemail box was full.

## 2024-03-27 ENCOUNTER — CLINICAL SUPPORT (OUTPATIENT)
Dept: OTOLARYNGOLOGY | Facility: CLINIC | Age: 54
End: 2024-03-27
Payer: COMMERCIAL

## 2024-03-27 DIAGNOSIS — J30.9 ALLERGIC RHINITIS, UNSPECIFIED SEASONALITY, UNSPECIFIED TRIGGER: Primary | ICD-10-CM

## 2024-04-04 ENCOUNTER — TELEPHONE (OUTPATIENT)
Dept: NEUROLOGY | Age: 54
End: 2024-04-04

## 2024-04-04 NOTE — TELEPHONE ENCOUNTER
Received a referral from Dr. Lamb office for this patient. Called patient to see about getting patient scheduled an appointment with Dr. Miner. NO answer. VM is full.

## 2024-04-09 DIAGNOSIS — E11.9 TYPE 2 DIABETES MELLITUS WITHOUT COMPLICATION, WITH LONG-TERM CURRENT USE OF INSULIN (HCC): Chronic | ICD-10-CM

## 2024-04-09 DIAGNOSIS — Z79.4 TYPE 2 DIABETES MELLITUS WITHOUT COMPLICATION, WITH LONG-TERM CURRENT USE OF INSULIN (HCC): Chronic | ICD-10-CM

## 2024-04-09 RX ORDER — INSULIN DEGLUDEC 200 U/ML
INJECTION, SOLUTION SUBCUTANEOUS
Qty: 12 ML | Refills: 0 | Status: SHIPPED | OUTPATIENT
Start: 2024-04-09

## 2024-04-11 ENCOUNTER — OFFICE VISIT (OUTPATIENT)
Dept: OTOLARYNGOLOGY | Facility: CLINIC | Age: 54
End: 2024-04-11
Payer: COMMERCIAL

## 2024-04-11 VITALS — WEIGHT: 159 LBS | TEMPERATURE: 98 F | BODY MASS INDEX: 29.26 KG/M2 | HEIGHT: 62 IN

## 2024-04-11 DIAGNOSIS — J30.9 ALLERGIC RHINITIS, UNSPECIFIED SEASONALITY, UNSPECIFIED TRIGGER: Primary | ICD-10-CM

## 2024-04-11 RX ORDER — AMOXICILLIN AND CLAVULANATE POTASSIUM 875; 125 MG/1; MG/1
1 TABLET, FILM COATED ORAL EVERY 12 HOURS SCHEDULED
Qty: 20 TABLET | Refills: 0 | Status: SHIPPED | OUTPATIENT
Start: 2024-04-11 | End: 2024-04-21

## 2024-04-11 NOTE — PROGRESS NOTES
EMILY Brown ENT [facility] EAR NOSE & THROAT  [address], [address]  [place] [address]  Fax [phone]  Phone [phone]      Visit Type: FOLLOW UP   Chief Complaint   Patient presents with    Allergic Rhinitis           HPI  She presents for a follow up evaluation. She has had no current complaints. .     Past Medical History:   Diagnosis Date    Carpal tunnel syndrome on right 2/3/2020    Diabetes     GERD (gastroesophageal reflux disease)     Hyperlipidemia     Hypertension     Hypertension associated with diabetes 12/4/2019    Hypothyroidism due to Hashimoto's thyroiditis 12/4/2019    Mixed diabetic hyperlipidemia associated with type 2 diabetes mellitus 12/4/2019    Type 2 diabetes mellitus with hyperglycemia, with long-term current use of insulin 12/4/2019       History reviewed. No pertinent surgical history.    Family History: Her family history is not on file.     Social History: She  reports that she has never smoked. She has never used smokeless tobacco. She reports that she does not drink alcohol and does not use drugs.    Home Medications:  Blood Glucose Monitoring Suppl, DULoxetine, Dexcom G6 Sensor, Dexcom G6 Transmitter, EPINEPHrine, Insulin Degludec, Insulin Lispro (1 Unit Dial), Pen Needles, Plecanatide, Prucalopride Succinate, Tirzepatide, Vitamin D, amoxicillin-clavulanate, aspirin, atorvastatin, clonazePAM, cyclobenzaprine, famotidine, fexofenadine, glucose blood, glucose monitor, insulin aspart, levothyroxine, lubiprostone, mometasone, montelukast, omeprazole, onetouch ultrasoft, quinapril, spironolactone, topiramate, traZODone, and vitamin B-6    Allergies:  She is allergic to loratadine-pseudoephedrine er, meperidine, and percocet [oxycodone-acetaminophen].       Vital Signs:   Temp:  [98 °F (36.7 °C)] 98 °F (36.7 °C)  ENT Physical Exam  Nose  External Nose: nares patent bilaterally; external nose  normal;  Internal Nose: nasal mucosa normal; septum normal; bilateral inferior turbinates normal;           Result Review       RESULTS REVIEW    I have reviewed the patients old records in the chart.        Assessment & Plan  Allergic rhinitis, unspecified seasonality, unspecified trigger           New Medications Ordered This Visit   Medications    amoxicillin-clavulanate (AUGMENTIN) 875-125 MG per tablet     Sig: Take 1 tablet by mouth Every 12 (Twelve) Hours for 10 days.     Dispense:  20 tablet     Refill:  0     Continue current management plan.  Return in about 6 months (around 10/11/2024) for Follow up with EMILY Brown.        Electronically signed by EMILY Brown 04/11/24 1:55 PM CDT.

## 2024-04-17 ENCOUNTER — CLINICAL SUPPORT (OUTPATIENT)
Dept: OTOLARYNGOLOGY | Facility: CLINIC | Age: 54
End: 2024-04-17
Payer: COMMERCIAL

## 2024-04-17 DIAGNOSIS — J30.9 ALLERGIC RHINITIS, UNSPECIFIED SEASONALITY, UNSPECIFIED TRIGGER: Primary | ICD-10-CM

## 2024-04-24 ENCOUNTER — CLINICAL SUPPORT (OUTPATIENT)
Dept: OTOLARYNGOLOGY | Facility: CLINIC | Age: 54
End: 2024-04-24
Payer: COMMERCIAL

## 2024-04-24 ENCOUNTER — TELEPHONE (OUTPATIENT)
Dept: NEUROLOGY | Age: 54
End: 2024-04-24

## 2024-04-24 DIAGNOSIS — I10 ESSENTIAL HYPERTENSION: ICD-10-CM

## 2024-04-24 DIAGNOSIS — J30.9 ALLERGIC RHINITIS, UNSPECIFIED SEASONALITY, UNSPECIFIED TRIGGER: Primary | ICD-10-CM

## 2024-04-24 DIAGNOSIS — E78.5 HYPERLIPIDEMIA, UNSPECIFIED HYPERLIPIDEMIA TYPE: ICD-10-CM

## 2024-04-24 RX ORDER — ATORVASTATIN CALCIUM 20 MG/1
20 TABLET, FILM COATED ORAL DAILY
Qty: 90 TABLET | Refills: 0 | Status: SHIPPED | OUTPATIENT
Start: 2024-04-24

## 2024-04-24 RX ORDER — SPIRONOLACTONE 50 MG/1
50 TABLET, FILM COATED ORAL DAILY
Qty: 90 TABLET | Refills: 0 | Status: SHIPPED | OUTPATIENT
Start: 2024-04-24 | End: 2024-07-23

## 2024-04-24 NOTE — TELEPHONE ENCOUNTER
Called the patient to schedule their appointment for the referral we received to the office. The patient appointment is scheduled on 06/24/24 at 10:30 with Adama Miner. The patient clarified with understanding about their appointment date and time.

## 2024-04-24 NOTE — TELEPHONE ENCOUNTER
Jose M Osorio called to request a refill on her medication.      Last office visit : 3/1/2024   Next office visit : 5/16/2024     Requested Prescriptions     Pending Prescriptions Disp Refills    spironolactone (ALDACTONE) 50 MG tablet [Pharmacy Med Name: SPIRONOLACTONE 50MG TABLETS] 90 tablet 0     Sig: TAKE 1 TABLET BY MOUTH DAILY    atorvastatin (LIPITOR) 20 MG tablet [Pharmacy Med Name: ATORVASTATIN 20MG TABLETS] 90 tablet 0     Sig: TAKE 1 TABLET BY MOUTH DAILY            Karolina Caba MA

## 2024-05-01 ENCOUNTER — CLINICAL SUPPORT (OUTPATIENT)
Dept: OTOLARYNGOLOGY | Facility: CLINIC | Age: 54
End: 2024-05-01
Payer: COMMERCIAL

## 2024-05-01 DIAGNOSIS — J30.9 ALLERGIC RHINITIS, UNSPECIFIED SEASONALITY, UNSPECIFIED TRIGGER: Primary | ICD-10-CM

## 2024-05-01 PROCEDURE — 95115 IMMUNOTHERAPY ONE INJECTION: CPT | Performed by: EMERGENCY MEDICINE

## 2024-05-15 ENCOUNTER — CLINICAL SUPPORT (OUTPATIENT)
Dept: OTOLARYNGOLOGY | Facility: CLINIC | Age: 54
End: 2024-05-15
Payer: COMMERCIAL

## 2024-05-15 DIAGNOSIS — J30.9 ALLERGIC RHINITIS, UNSPECIFIED SEASONALITY, UNSPECIFIED TRIGGER: Primary | ICD-10-CM

## 2024-05-15 NOTE — PROGRESS NOTES
Moise Flores   Allergy Injection Note:    Yon Ochoa presents for an immunotherapy injection. The site of the injection was cleansed with an alcohol swab. Serum was injected into the site after pulling back on the plunger to prevent intravascular injection. After the injection and was instructed to wait in the allergy waiting area for 30 minutes. There was no problems with the injection.    Allergy Shot Questionnaire  Injection nurse/assistant: Moise Flores ST/AT  Have you had increased asthma symptoms in past week?: no  Have you had increased allergy symptoms in the last week?: no  Have you had a cold, respiratory tract infection or flu like symptoms in the past 2 weeks?: no  Did you have any problems within 12 hours of the last injection?: no  Are you on any new medications/ eye drops?: no  Are you on any beta blockers?: no  If female, are you pregnant?: no  I have confirmed the name and birth date on my allergy vial. : yes  Epipen available?: yes  Epipen Lot Number: [id number]  Epipen Expiration Date: 8/2024  Number of allergy injections given: 1     Injection Details:  Vial 1   Vial 1 Expiration Date: 09/01/24  Vial 1 Series: 4  Shot type: maintenance  Vial 1 Dose (mL): 0.5  Vial 1 Location: Left upper arm  Vial 1 Reaction (in mm): <5          Moise Flores  5/15/2024  13:57 CDT

## 2024-05-16 ENCOUNTER — OFFICE VISIT (OUTPATIENT)
Dept: PRIMARY CARE CLINIC | Age: 54
End: 2024-05-16
Payer: COMMERCIAL

## 2024-05-16 VITALS
SYSTOLIC BLOOD PRESSURE: 110 MMHG | HEART RATE: 100 BPM | HEIGHT: 63 IN | WEIGHT: 161 LBS | DIASTOLIC BLOOD PRESSURE: 80 MMHG | BODY MASS INDEX: 28.53 KG/M2 | OXYGEN SATURATION: 98 % | RESPIRATION RATE: 18 BRPM | TEMPERATURE: 97.1 F

## 2024-05-16 DIAGNOSIS — F41.9 ANXIETY: ICD-10-CM

## 2024-05-16 DIAGNOSIS — Z79.4 TYPE 2 DIABETES MELLITUS WITHOUT COMPLICATION, WITH LONG-TERM CURRENT USE OF INSULIN (HCC): Primary | ICD-10-CM

## 2024-05-16 DIAGNOSIS — E03.8 HYPOTHYROIDISM DUE TO HASHIMOTO'S THYROIDITIS: ICD-10-CM

## 2024-05-16 DIAGNOSIS — E11.69 MIXED DIABETIC HYPERLIPIDEMIA ASSOCIATED WITH TYPE 2 DIABETES MELLITUS (HCC): ICD-10-CM

## 2024-05-16 DIAGNOSIS — F51.01 PRIMARY INSOMNIA: ICD-10-CM

## 2024-05-16 DIAGNOSIS — K59.04 CHRONIC IDIOPATHIC CONSTIPATION: ICD-10-CM

## 2024-05-16 DIAGNOSIS — G47.33 OBSTRUCTIVE SLEEP APNEA: ICD-10-CM

## 2024-05-16 DIAGNOSIS — I10 ESSENTIAL HYPERTENSION: ICD-10-CM

## 2024-05-16 DIAGNOSIS — F41.8 DEPRESSION WITH ANXIETY: ICD-10-CM

## 2024-05-16 DIAGNOSIS — K21.9 GERD WITHOUT ESOPHAGITIS: ICD-10-CM

## 2024-05-16 DIAGNOSIS — Z13.0 SCREENING FOR DEFICIENCY ANEMIA: ICD-10-CM

## 2024-05-16 DIAGNOSIS — E03.9 ACQUIRED HYPOTHYROIDISM: ICD-10-CM

## 2024-05-16 DIAGNOSIS — E78.5 HYPERLIPIDEMIA, UNSPECIFIED HYPERLIPIDEMIA TYPE: ICD-10-CM

## 2024-05-16 DIAGNOSIS — E03.9 HYPOTHYROIDISM, UNSPECIFIED TYPE: ICD-10-CM

## 2024-05-16 DIAGNOSIS — E11.42 DIABETIC POLYNEUROPATHY ASSOCIATED WITH TYPE 2 DIABETES MELLITUS (HCC): ICD-10-CM

## 2024-05-16 DIAGNOSIS — E78.2 MIXED DIABETIC HYPERLIPIDEMIA ASSOCIATED WITH TYPE 2 DIABETES MELLITUS (HCC): ICD-10-CM

## 2024-05-16 DIAGNOSIS — Z76.0 MEDICATION REFILL: ICD-10-CM

## 2024-05-16 DIAGNOSIS — E78.1 HYPERTRIGLYCERIDEMIA: ICD-10-CM

## 2024-05-16 DIAGNOSIS — E06.3 HYPOTHYROIDISM DUE TO HASHIMOTO'S THYROIDITIS: ICD-10-CM

## 2024-05-16 DIAGNOSIS — J31.0 CHRONIC RHINITIS: ICD-10-CM

## 2024-05-16 DIAGNOSIS — G62.9 NEUROPATHY: ICD-10-CM

## 2024-05-16 DIAGNOSIS — E66.09 CLASS 2 OBESITY DUE TO EXCESS CALORIES WITHOUT SERIOUS COMORBIDITY WITH BODY MASS INDEX (BMI) OF 36.0 TO 36.9 IN ADULT: ICD-10-CM

## 2024-05-16 DIAGNOSIS — E11.9 TYPE 2 DIABETES MELLITUS WITHOUT COMPLICATION, WITH LONG-TERM CURRENT USE OF INSULIN (HCC): Primary | ICD-10-CM

## 2024-05-16 LAB — HBA1C MFR BLD: 6.8 %

## 2024-05-16 PROCEDURE — 83036 HEMOGLOBIN GLYCOSYLATED A1C: CPT | Performed by: INTERNAL MEDICINE

## 2024-05-16 PROCEDURE — 3044F HG A1C LEVEL LT 7.0%: CPT | Performed by: INTERNAL MEDICINE

## 2024-05-16 PROCEDURE — 99214 OFFICE O/P EST MOD 30 MIN: CPT | Performed by: INTERNAL MEDICINE

## 2024-05-16 PROCEDURE — 3074F SYST BP LT 130 MM HG: CPT | Performed by: INTERNAL MEDICINE

## 2024-05-16 PROCEDURE — 3079F DIAST BP 80-89 MM HG: CPT | Performed by: INTERNAL MEDICINE

## 2024-05-16 RX ORDER — LEVOTHYROXINE SODIUM 137 UG/1
137 TABLET ORAL DAILY
Qty: 90 TABLET | Refills: 1 | Status: SHIPPED | OUTPATIENT
Start: 2024-05-16 | End: 2024-11-12

## 2024-05-16 RX ORDER — DULOXETIN HYDROCHLORIDE 60 MG/1
60 CAPSULE, DELAYED RELEASE ORAL DAILY
Qty: 90 CAPSULE | Refills: 1 | Status: SHIPPED | OUTPATIENT
Start: 2024-05-16 | End: 2024-11-12

## 2024-05-16 RX ORDER — CLONAZEPAM 0.5 MG/1
TABLET ORAL
Qty: 30 TABLET | Refills: 0 | Status: SHIPPED | OUTPATIENT
Start: 2024-05-16 | End: 2027-05-16

## 2024-05-16 RX ORDER — FAMOTIDINE 20 MG/1
20 TABLET, FILM COATED ORAL 2 TIMES DAILY
Qty: 180 TABLET | Refills: 1 | Status: SHIPPED | OUTPATIENT
Start: 2024-05-16 | End: 2024-11-12

## 2024-05-16 RX ORDER — RAMIPRIL 1.25 MG/1
CAPSULE ORAL DAILY
Qty: 90 CAPSULE | Refills: 0 | OUTPATIENT
Start: 2024-05-16

## 2024-05-16 RX ORDER — SPIRONOLACTONE 50 MG/1
50 TABLET, FILM COATED ORAL DAILY
Qty: 90 TABLET | Refills: 1 | Status: SHIPPED | OUTPATIENT
Start: 2024-05-16 | End: 2024-11-12

## 2024-05-16 RX ORDER — ATORVASTATIN CALCIUM 20 MG/1
20 TABLET, FILM COATED ORAL DAILY
Qty: 90 TABLET | Refills: 1 | Status: SHIPPED | OUTPATIENT
Start: 2024-05-16 | End: 2024-11-12

## 2024-05-16 RX ORDER — TRAZODONE HYDROCHLORIDE 100 MG/1
100 TABLET ORAL NIGHTLY
Qty: 90 TABLET | Refills: 1 | Status: SHIPPED | OUTPATIENT
Start: 2024-05-16 | End: 2024-11-12

## 2024-05-16 NOTE — PROGRESS NOTES
diabetes mellitus (HCC)      Well-controlled, continue current medications         Relevant Medications    insulin lispro (HUMALOG) 100 UNIT/ML SOLN injection vial    TRESIBA FLEXTOUCH 200 UNIT/ML SOPN    spironolactone (ALDACTONE) 50 MG tablet    atorvastatin (LIPITOR) 20 MG tablet    Hypothyroidism due to Hashimoto's thyroiditis      Well-controlled, continue current medications         Relevant Medications    levothyroxine (SYNTHROID) 137 MCG tablet    RESOLVED: Hypothyroidism    Relevant Medications    levothyroxine (SYNTHROID) 137 MCG tablet    Other Relevant Orders    TSH with Reflex    Diabetic polyneuropathy associated with type 2 diabetes mellitus (HCC)      Well-controlled, continue current medications         Relevant Medications    Onabotulinumtoxin A (BOTOX) 200 units injection    insulin lispro (HUMALOG) 100 UNIT/ML SOLN injection vial    cyclobenzaprine (FLEXERIL) 10 MG tablet    TRESIBA FLEXTOUCH 200 UNIT/ML SOPN    DULoxetine (CYMBALTA) 60 MG extended release capsule    clonazePAM (KLONOPIN) 0.5 MG tablet    traZODone (DESYREL) 100 MG tablet       Nervous and Auditory    Neuropathy    Relevant Medications    DULoxetine (CYMBALTA) 60 MG extended release capsule       Other    Primary insomnia    Relevant Medications    traZODone (DESYREL) 100 MG tablet    Hypertriglyceridemia    Relevant Medications    spironolactone (ALDACTONE) 50 MG tablet    atorvastatin (LIPITOR) 20 MG tablet    Other Relevant Orders    Lipid Panel    Depression with anxiety      Well-controlled, continue current medications         Relevant Medications    DULoxetine (CYMBALTA) 60 MG extended release capsule    clonazePAM (KLONOPIN) 0.5 MG tablet    traZODone (DESYREL) 100 MG tablet    Class 2 obesity due to excess calories without serious comorbidity with body mass index (BMI) of 36.0 to 36.9 in adult      Well-controlled, continue current medications         Relevant Medications    insulin lispro (HUMALOG) 100 UNIT/ML SOLN

## 2024-05-17 ASSESSMENT — ENCOUNTER SYMPTOMS
WHEEZING: 0
NAUSEA: 0
COUGH: 0
ABDOMINAL PAIN: 0
DIARRHEA: 0
TROUBLE SWALLOWING: 0
SINUS PAIN: 0
CONSTIPATION: 0
CHEST TIGHTNESS: 0
BLOOD IN STOOL: 0
BACK PAIN: 0
VOMITING: 0
SHORTNESS OF BREATH: 0
RHINORRHEA: 0

## 2024-05-22 ENCOUNTER — CLINICAL SUPPORT (OUTPATIENT)
Dept: OTOLARYNGOLOGY | Facility: CLINIC | Age: 54
End: 2024-05-22
Payer: COMMERCIAL

## 2024-05-22 DIAGNOSIS — J30.9 ALLERGIC RHINITIS, UNSPECIFIED SEASONALITY, UNSPECIFIED TRIGGER: Primary | ICD-10-CM

## 2024-05-29 ENCOUNTER — CLINICAL SUPPORT (OUTPATIENT)
Dept: OTOLARYNGOLOGY | Facility: CLINIC | Age: 54
End: 2024-05-29
Payer: COMMERCIAL

## 2024-05-29 DIAGNOSIS — J30.9 ALLERGIC RHINITIS, UNSPECIFIED SEASONALITY, UNSPECIFIED TRIGGER: Primary | ICD-10-CM

## 2024-06-05 ENCOUNTER — CLINICAL SUPPORT (OUTPATIENT)
Dept: OTOLARYNGOLOGY | Facility: CLINIC | Age: 54
End: 2024-06-05
Payer: COMMERCIAL

## 2024-06-05 ENCOUNTER — CLINICAL SUPPORT NO REQUIREMENTS (OUTPATIENT)
Dept: OTOLARYNGOLOGY | Facility: CLINIC | Age: 54
End: 2024-06-05
Payer: COMMERCIAL

## 2024-06-05 DIAGNOSIS — J30.9 ALLERGIC RHINITIS, UNSPECIFIED SEASONALITY, UNSPECIFIED TRIGGER: Primary | ICD-10-CM

## 2024-06-05 DIAGNOSIS — J30.9 ALLERGIC RHINITIS, UNSPECIFIED SEASONALITY, UNSPECIFIED TRIGGER: ICD-10-CM

## 2024-06-05 NOTE — PROGRESS NOTES
Moise Flores   Allergy Injection Mix Note    A immunotherapy injection vial was mixed using standard protocol under sterile conditions.     Mixing Nurse/ Tech: ARMINDA (06/05/24 1025)    Vial Series: 5    VIAL 1  [place]: Vial 1 mix 0.2 cc of #: concentrate  Mold Mix: Vial 1 mix 0.2 cc of #: concentrate  Trichophyton: Vial 1 mix 0.2 cc of #: concentrate  Dust Mite Mix: Vial 1 mix 0.2 cc of #: concentrate  Dog: Vial 1 mix 0.2 cc of #: concentrate  Cat: Vial 1 mix 0.2 cc of #: concentrate  Feather: Vial 1 mix 0.2 cc of #: concentrate  Vial 1 Additions  Antigen Total: 1.4 cc  Glycerine: 0  Dilutent: 3.6 cc  TOTAL: 5           Moise Flores  6/5/2024  10:26 CDT

## 2024-06-24 ENCOUNTER — OFFICE VISIT (OUTPATIENT)
Dept: NEUROLOGY | Age: 54
End: 2024-06-24
Payer: COMMERCIAL

## 2024-06-24 VITALS
SYSTOLIC BLOOD PRESSURE: 114 MMHG | BODY MASS INDEX: 28.71 KG/M2 | DIASTOLIC BLOOD PRESSURE: 79 MMHG | HEIGHT: 62 IN | WEIGHT: 156 LBS | HEART RATE: 92 BPM

## 2024-06-24 DIAGNOSIS — G43.719 INTRACTABLE CHRONIC MIGRAINE WITHOUT AURA AND WITHOUT STATUS MIGRAINOSUS: Primary | ICD-10-CM

## 2024-06-24 DIAGNOSIS — G62.9 NEUROPATHY: ICD-10-CM

## 2024-06-24 DIAGNOSIS — Z86.59 HISTORY OF ANXIETY: ICD-10-CM

## 2024-06-24 DIAGNOSIS — Z86.39 HISTORY OF DIABETES MELLITUS: ICD-10-CM

## 2024-06-24 PROCEDURE — 99203 OFFICE O/P NEW LOW 30 MIN: CPT | Performed by: PSYCHIATRY & NEUROLOGY

## 2024-06-24 PROCEDURE — 3074F SYST BP LT 130 MM HG: CPT | Performed by: PSYCHIATRY & NEUROLOGY

## 2024-06-24 PROCEDURE — 3078F DIAST BP <80 MM HG: CPT | Performed by: PSYCHIATRY & NEUROLOGY

## 2024-06-24 RX ORDER — PREGABALIN 50 MG/1
100 CAPSULE ORAL 2 TIMES DAILY
Qty: 60 CAPSULE | Refills: 0 | Status: SHIPPED | OUTPATIENT
Start: 2024-06-24 | End: 2024-07-24

## 2024-06-24 RX ORDER — TIRZEPATIDE 5 MG/.5ML
5 INJECTION, SOLUTION SUBCUTANEOUS WEEKLY
COMMUNITY
Start: 2024-04-02 | End: 2024-09-30

## 2024-06-24 NOTE — PROGRESS NOTES
REVIEW OF SYSTEMS    Constitutional: []Fever []Sweat []Chills [] Recent Injury [x] Denies all unless marked  HEENT:[]Headache  [] Head Injury/Hearing Loss  [] Sore Throat  [] Ear Ache/Dizziness  [x] Denies all unless marked  Spine:  [] Neck pain  [] Back pain  [] Sciaticia  [x] Denies all unless marked  Cardiovascular:[]Heart Disease []Chest Pain [] Palpitations  [x] Denies all unless marked  Pulmonary: []Shortness of Breath []Cough   [x] Denies all unless marke  Gastrointestinal: []Nausea  []Vomiting  []Abdominal Pain  []Constipation  []Diarrhea  []Dark Bloody Stools  [x] Denies all unless marked  Psychiatric/Behavioral:[] Depression [] Anxiety [x] Denies all unless marked  Genitourinary:   [] Frequency  [] Urgency  [] Incontinence [] Pain with Urination  [x] Denies all unless marked  Extremities: []Pain  []Swelling  [x] Denies all unless marked  Musculoskeletal: [] Muscle Pain  [] Joint Pain  [] Arthritis [] Muscle Cramps [] Muscle Twitches  [x] Denies all unless marked  Sleep: [] Insomnia [] Snoring [] Restless Legs [] Sleep Apnea  [] Daytime Sleepiness  [x] Denies all unless marked  Skin:[] Rash [] Skin Discoloration [x] Denies all unless marked   Neurological: []Visual Disturbance/Memory Loss [] Loss of Balance [] Slurred Speech/Weakness [] Seizures  [] Vertigo/Dizziness [x] Denies all unless marked       
noted.  Respiration- chest wall appears symmetric, good expansion,   normal effort without use of accessory muscles  Cardiovascular- RRR  Musculoskeletal - no significant wasting of muscles noted, no bony deformities, gait no gross ataxia  Extremities-no clubbing, cyanosis or edema  Skin - warm, dry, and intact.  No rash, erythema, or pallor.  Psychiatric - mood, affect, and behavior appear normal.      Neurological exam  Awake, alert, fluent oriented x 3 appropriate affect  Attention and concentration appear appropriate  Recent and remote memory appears unremarkable  Speech normal without dysarthria  No clear issues with language of fund of knowledge    Cranial Nerve Exam   CN II- Visual fields grossly unremarkable. VA adequate.   CN III, IV,VI- PERRLA, EOMI, No nystagmus, conjugate eye movements, no ptosis  CN VII-no facial asymmetry  CN VIII-Hearing intact   CN IX and X- Palate elevates in midline  CN XI-good shoulder shrug  CN XII-Tongue midline with no fasciculations or fibrillations    Motor Exam  V/V throughout upper and lower extremities bilaterally, no cogwheeling, normal tone    Sensory Exam decreased vibration and pinprick to the ankle    Reflexes 1+ and symmetric..  No Babinski sign.    Tremors- no tremors in hands or head noted    Gait  Normal base and speed  No ataxia. No Romberg sign    Coordination  Finger to nose and EMMIE-unremarkable    Lab Results   Component Value Date    [id number] 522 05/27/2016     Lab Results   Component Value Date    WBC 12.4 (H) 03/01/2024    HGB 12.0 03/01/2024    HCT 36.4 (L) 03/01/2024    MCV 89.4 03/01/2024     03/01/2024     Lab Results   Component Value Date     (L) 02/20/2024    K 4.8 02/20/2024     02/20/2024    CO2 19 (L) 02/20/2024    BUN 13 02/20/2024    CREATININE 0.9 02/20/2024    GLUCOSE 168 (H) 02/20/2024    CALCIUM 9.3 02/20/2024    PROT 7.9 02/20/2011    BILITOT 0.8 02/20/2024    ALKPHOS 97 02/20/2024    AST 28 02/20/2024    ALT 15

## 2024-06-25 ENCOUNTER — TELEPHONE (OUTPATIENT)
Dept: NEUROLOGY | Age: 54
End: 2024-06-25

## 2024-06-25 ENCOUNTER — PATIENT MESSAGE (OUTPATIENT)
Dept: NEUROLOGY | Age: 54
End: 2024-06-25

## 2024-06-25 NOTE — TELEPHONE ENCOUNTER
Jose M,    Our office has been trying to reach you to let you know that your Botox has been scheduled for August 7, 2024 at 9:00 am with Dr. Ren. You will need to arrive at least 45 min to 1 hr before your appointment time. You will need to go to Outpatient Registration located in the [facility] to register. Please bring your photo id, insurance card, and list of medications with you to your appointment. If this appointment does not work please let us know. Thank you

## 2024-06-25 NOTE — TELEPHONE ENCOUNTER
Tried calling patient with Botox appointment date and time, and unable to leave message, have mailed letter and sent my chart message.

## 2024-06-26 ENCOUNTER — CLINICAL SUPPORT (OUTPATIENT)
Dept: OTOLARYNGOLOGY | Facility: CLINIC | Age: 54
End: 2024-06-26
Payer: COMMERCIAL

## 2024-06-26 DIAGNOSIS — J30.9 ALLERGIC RHINITIS, UNSPECIFIED SEASONALITY, UNSPECIFIED TRIGGER: ICD-10-CM

## 2024-06-26 PROCEDURE — 95115 IMMUNOTHERAPY ONE INJECTION: CPT | Performed by: EMERGENCY MEDICINE

## 2024-06-28 DIAGNOSIS — G62.9 NEUROPATHY: ICD-10-CM

## 2024-06-28 RX ORDER — PREGABALIN 50 MG/1
50 CAPSULE ORAL 2 TIMES DAILY
Qty: 60 CAPSULE | Refills: 3 | Status: SHIPPED | OUTPATIENT
Start: 2024-06-28 | End: 2024-07-28

## 2024-06-28 NOTE — TELEPHONE ENCOUNTER
Mine, pharmacist from [facility] called stating that the last script had conflicting directions. Please sign new script.      Requested Prescriptions     Pending Prescriptions Disp Refills    pregabalin (LYRICA) 50 MG capsule 60 capsule 3     Sig: Take 1 capsule by mouth 2 times daily for 30 days. Max Daily Amount: 100 mg

## 2024-07-03 ENCOUNTER — CLINICAL SUPPORT (OUTPATIENT)
Dept: OTOLARYNGOLOGY | Facility: CLINIC | Age: 54
End: 2024-07-03
Payer: COMMERCIAL

## 2024-07-03 DIAGNOSIS — J30.9 ALLERGIC RHINITIS, UNSPECIFIED SEASONALITY, UNSPECIFIED TRIGGER: ICD-10-CM

## 2024-07-10 ENCOUNTER — CLINICAL SUPPORT (OUTPATIENT)
Dept: OTOLARYNGOLOGY | Facility: CLINIC | Age: 54
End: 2024-07-10
Payer: COMMERCIAL

## 2024-07-10 DIAGNOSIS — J30.9 ALLERGIC RHINITIS, UNSPECIFIED SEASONALITY, UNSPECIFIED TRIGGER: ICD-10-CM

## 2024-07-10 PROCEDURE — 95115 IMMUNOTHERAPY ONE INJECTION: CPT | Performed by: NURSE PRACTITIONER

## 2024-07-17 ENCOUNTER — CLINICAL SUPPORT (OUTPATIENT)
Dept: OTOLARYNGOLOGY | Facility: CLINIC | Age: 54
End: 2024-07-17
Payer: COMMERCIAL

## 2024-07-17 DIAGNOSIS — J30.9 ALLERGIC RHINITIS, UNSPECIFIED SEASONALITY, UNSPECIFIED TRIGGER: ICD-10-CM

## 2024-07-24 ENCOUNTER — CLINICAL SUPPORT (OUTPATIENT)
Dept: OTOLARYNGOLOGY | Facility: CLINIC | Age: 54
End: 2024-07-24
Payer: COMMERCIAL

## 2024-07-24 DIAGNOSIS — J30.9 ALLERGIC RHINITIS, UNSPECIFIED SEASONALITY, UNSPECIFIED TRIGGER: ICD-10-CM

## 2024-07-31 ENCOUNTER — CLINICAL SUPPORT (OUTPATIENT)
Dept: OTOLARYNGOLOGY | Facility: CLINIC | Age: 54
End: 2024-07-31
Payer: COMMERCIAL

## 2024-07-31 DIAGNOSIS — J30.9 ALLERGIC RHINITIS, UNSPECIFIED SEASONALITY, UNSPECIFIED TRIGGER: ICD-10-CM

## 2024-07-31 PROCEDURE — 95115 IMMUNOTHERAPY ONE INJECTION: CPT | Performed by: EMERGENCY MEDICINE

## 2024-08-14 DIAGNOSIS — G43.111 INTRACTABLE MIGRAINE WITH AURA WITH STATUS MIGRAINOSUS: ICD-10-CM

## 2024-08-14 RX ORDER — VALPROIC ACID 250 MG/1
250 CAPSULE, LIQUID FILLED ORAL 4 TIMES DAILY
Qty: 120 CAPSULE | Refills: 0 | Status: SHIPPED | OUTPATIENT
Start: 2024-08-14 | End: 2024-09-13

## 2024-08-14 NOTE — TELEPHONE ENCOUNTER
Jose M MITZI Osorio called to request a refill on her medication.      Last office visit : 5/16/2024   Next office visit : 11/19/2024     Requested Prescriptions     Pending Prescriptions Disp Refills    valproic acid (DEPAKENE) 250 MG capsule 120 capsule 0     Sig: Take 1 capsule by mouth 4 times daily            Monika Strong LPN

## 2024-08-21 ENCOUNTER — CLINICAL SUPPORT (OUTPATIENT)
Dept: OTOLARYNGOLOGY | Facility: CLINIC | Age: 54
End: 2024-08-21
Payer: COMMERCIAL

## 2024-08-21 DIAGNOSIS — J30.9 ALLERGIC RHINITIS, UNSPECIFIED SEASONALITY, UNSPECIFIED TRIGGER: ICD-10-CM

## 2024-08-21 RX ORDER — EPINEPHRINE 0.3 MG/.3ML
0.3 INJECTION SUBCUTANEOUS AS NEEDED
Qty: 1 EACH | Refills: 3 | Status: SHIPPED | OUTPATIENT
Start: 2024-08-21

## 2024-09-11 ENCOUNTER — CLINICAL SUPPORT (OUTPATIENT)
Dept: OTOLARYNGOLOGY | Facility: CLINIC | Age: 54
End: 2024-09-11
Payer: COMMERCIAL

## 2024-09-11 DIAGNOSIS — J30.9 ALLERGIC RHINITIS, UNSPECIFIED SEASONALITY, UNSPECIFIED TRIGGER: ICD-10-CM

## 2024-09-18 ENCOUNTER — CLINICAL SUPPORT (OUTPATIENT)
Dept: OTOLARYNGOLOGY | Facility: CLINIC | Age: 54
End: 2024-09-18
Payer: COMMERCIAL

## 2024-09-18 DIAGNOSIS — J30.9 ALLERGIC RHINITIS, UNSPECIFIED SEASONALITY, UNSPECIFIED TRIGGER: ICD-10-CM

## 2024-09-18 DIAGNOSIS — G43.111 INTRACTABLE MIGRAINE WITH AURA WITH STATUS MIGRAINOSUS: ICD-10-CM

## 2024-09-19 ENCOUNTER — CLINICAL SUPPORT NO REQUIREMENTS (OUTPATIENT)
Dept: OTOLARYNGOLOGY | Facility: CLINIC | Age: 54
End: 2024-09-19
Payer: COMMERCIAL

## 2024-09-19 DIAGNOSIS — J30.9 ALLERGIC RHINITIS, UNSPECIFIED SEASONALITY, UNSPECIFIED TRIGGER: Primary | ICD-10-CM

## 2024-09-19 RX ORDER — VALPROIC ACID 250 MG/1
250 CAPSULE, LIQUID FILLED ORAL 4 TIMES DAILY
Qty: 120 CAPSULE | Refills: 1 | Status: SHIPPED | OUTPATIENT
Start: 2024-09-19

## 2024-09-25 ENCOUNTER — CLINICAL SUPPORT (OUTPATIENT)
Dept: OTOLARYNGOLOGY | Facility: CLINIC | Age: 54
End: 2024-09-25
Payer: COMMERCIAL

## 2024-09-25 DIAGNOSIS — J30.9 ALLERGIC RHINITIS, UNSPECIFIED SEASONALITY, UNSPECIFIED TRIGGER: ICD-10-CM

## 2024-10-02 ENCOUNTER — CLINICAL SUPPORT (OUTPATIENT)
Dept: OTOLARYNGOLOGY | Facility: CLINIC | Age: 54
End: 2024-10-02
Payer: COMMERCIAL

## 2024-10-02 DIAGNOSIS — J30.9 ALLERGIC RHINITIS, UNSPECIFIED SEASONALITY, UNSPECIFIED TRIGGER: ICD-10-CM

## 2024-10-02 PROCEDURE — 95115 IMMUNOTHERAPY ONE INJECTION: CPT | Performed by: EMERGENCY MEDICINE

## 2024-10-15 ENCOUNTER — OFFICE VISIT (OUTPATIENT)
Dept: OTOLARYNGOLOGY | Facility: CLINIC | Age: 54
End: 2024-10-15
Payer: COMMERCIAL

## 2024-10-15 VITALS — WEIGHT: 162 LBS | HEIGHT: 62 IN | TEMPERATURE: 98 F | BODY MASS INDEX: 29.81 KG/M2

## 2024-10-15 DIAGNOSIS — J30.9 ALLERGIC RHINITIS, UNSPECIFIED SEASONALITY, UNSPECIFIED TRIGGER: Primary | ICD-10-CM

## 2024-10-15 DIAGNOSIS — J32.9 CHRONIC SINUSITIS, UNSPECIFIED LOCATION: ICD-10-CM

## 2024-10-15 RX ORDER — FLUCONAZOLE 150 MG/1
150 TABLET ORAL ONCE
Qty: 1 TABLET | Refills: 0 | Status: SHIPPED | OUTPATIENT
Start: 2024-10-15 | End: 2024-10-15

## 2024-10-15 NOTE — PROGRESS NOTES
EMILY Brown [facility]NT [facility] EAR NOSE & THROAT  [address], [address]  [place] [address]  Fax [phone]  Phone [phone]      Visit Type: FOLLOW UP   Chief Complaint   Patient presents with    Allergic Rhinitis           HPI  She presents for a follow up evaluation. She has had a recent flair up of ear fullness, ear pressure, and fluid on the ear, nasal congestion and sinus pressure. The symptoms are localized to the right side. The symptoms severity was described as : mild to moderate The symptoms have been : relatively constant for the last several weeks The symptoms are aggravated by  allergy and weather change. There have been no factors that have improved the symptoms.      Past Medical History:   Diagnosis Date    Carpal tunnel syndrome on right 2/3/2020    Diabetes     GERD (gastroesophageal reflux disease)     Hyperlipidemia     Hypertension     Hypertension associated with diabetes 12/4/2019    Hypothyroidism due to Hashimoto's thyroiditis 12/4/2019    Mixed diabetic hyperlipidemia associated with type 2 diabetes mellitus 12/4/2019    Type 2 diabetes mellitus with hyperglycemia, with long-term current use of insulin 12/4/2019       History reviewed. No pertinent surgical history.    Family History: Her family history is not on file.     Social History: She  reports that she has never smoked. She has never used smokeless tobacco. She reports that she does not drink alcohol and does not use drugs.    Home Medications:  Blood Glucose Monitoring Suppl, DULoxetine, Dexcom G6 Sensor, Dexcom G6 Transmitter, EPINEPHrine, Insulin Degludec, Insulin Lispro (1 Unit Dial), Pen Needles, Plecanatide, Prucalopride Succinate, Tirzepatide, Vitamin D, amoxicillin-clavulanate, aspirin, atorvastatin, clonazePAM, cyclobenzaprine, famotidine, fexofenadine, fluconazole, glucose blood, glucose monitor, insulin aspart, levothyroxine, lubiprostone,  mometasone, montelukast, omeprazole, onetouch ultrasoft, quinapril, spironolactone, topiramate, traZODone, and vitamin B-6    Allergies:  She is allergic to loratadine-pseudoephedrine er, meperidine, and percocet [oxycodone-acetaminophen].       Vital Signs:   Temp:  [98 °F (36.7 °C)] 98 °F (36.7 °C)  ENT Physical Exam  Constitutional  Appearance: patient appears well-developed, well-nourished and well-groomed,  Communication/Voice: communication appropriate for developmental age; vocal quality normal;  Head and Face  Palpation: sinus tenderness present;  Ear  Hearing: intact;  Auricles: bilateral auricles normal;  Ear Canals: bilateral ear canals normal;  Tympanic Membranes: bilateral tympanic membranes with effusion; serous effusions present;           Result Review       RESULTS REVIEW    I have reviewed the patients old records in the chart.        Assessment & Plan  Allergic rhinitis, unspecified seasonality, unspecified trigger    Chronic sinusitis, unspecified location           New Medications Ordered This Visit   Medications    amoxicillin-clavulanate (AUGMENTIN) 875-125 MG per tablet     Sig: Take 1 tablet by mouth Every 12 (Twelve) Hours for 10 days.     Dispense:  20 tablet     Refill:  0    fluconazole (Diflucan) 150 MG tablet     Sig: Take 1 tablet by mouth 1 (One) Time for 1 dose.     Dispense:  1 tablet     Refill:  0     She reports augmentin resolved her symptoms in the spring, she requests this again.  She will call if this does not resolve her symptoms this time.   Return in about 6 months (around 4/15/2025) for Follow up with EMILY Brown.        Electronically signed by EMILY Brown 10/15/24 2:07 PM CDT.

## 2024-10-16 ENCOUNTER — CLINICAL SUPPORT (OUTPATIENT)
Dept: OTOLARYNGOLOGY | Facility: CLINIC | Age: 54
End: 2024-10-16
Payer: COMMERCIAL

## 2024-10-16 DIAGNOSIS — J30.9 ALLERGIC RHINITIS, UNSPECIFIED SEASONALITY, UNSPECIFIED TRIGGER: ICD-10-CM

## 2024-10-23 ENCOUNTER — CLINICAL SUPPORT (OUTPATIENT)
Dept: OTOLARYNGOLOGY | Facility: CLINIC | Age: 54
End: 2024-10-23
Payer: COMMERCIAL

## 2024-10-23 DIAGNOSIS — J30.9 ALLERGIC RHINITIS, UNSPECIFIED SEASONALITY, UNSPECIFIED TRIGGER: ICD-10-CM

## 2024-10-30 ENCOUNTER — CLINICAL SUPPORT (OUTPATIENT)
Dept: OTOLARYNGOLOGY | Facility: CLINIC | Age: 54
End: 2024-10-30
Payer: COMMERCIAL

## 2024-10-30 DIAGNOSIS — J30.9 ALLERGIC RHINITIS, UNSPECIFIED SEASONALITY, UNSPECIFIED TRIGGER: ICD-10-CM

## 2024-11-06 ENCOUNTER — CLINICAL SUPPORT (OUTPATIENT)
Dept: OTOLARYNGOLOGY | Facility: CLINIC | Age: 54
End: 2024-11-06
Payer: COMMERCIAL

## 2024-11-06 DIAGNOSIS — J30.9 ALLERGIC RHINITIS, UNSPECIFIED SEASONALITY, UNSPECIFIED TRIGGER: ICD-10-CM

## 2024-11-08 DIAGNOSIS — Z79.4 TYPE 2 DIABETES MELLITUS WITHOUT COMPLICATION, WITH LONG-TERM CURRENT USE OF INSULIN (HCC): ICD-10-CM

## 2024-11-08 DIAGNOSIS — E11.9 TYPE 2 DIABETES MELLITUS WITHOUT COMPLICATION, WITH LONG-TERM CURRENT USE OF INSULIN (HCC): ICD-10-CM

## 2024-11-08 DIAGNOSIS — E03.9 ACQUIRED HYPOTHYROIDISM: ICD-10-CM

## 2024-11-08 DIAGNOSIS — Z13.0 SCREENING FOR DEFICIENCY ANEMIA: ICD-10-CM

## 2024-11-08 DIAGNOSIS — E78.1 HYPERTRIGLYCERIDEMIA: ICD-10-CM

## 2024-11-08 LAB
ALBUMIN SERPL-MCNC: 4.3 G/DL (ref 3.5–5.2)
ALP SERPL-CCNC: 82 U/L (ref 35–104)
ALT SERPL-CCNC: 14 U/L (ref 5–33)
ANION GAP SERPL CALCULATED.3IONS-SCNC: 12 MMOL/L (ref 7–19)
AST SERPL-CCNC: 26 U/L (ref 5–32)
BASOPHILS # BLD: 0.1 K/UL (ref 0–0.2)
BASOPHILS NFR BLD: 0.8 % (ref 0–1)
BILIRUB SERPL-MCNC: 0.7 MG/DL (ref 0.2–1.2)
BUN SERPL-MCNC: 12 MG/DL (ref 6–20)
CALCIUM SERPL-MCNC: 9.4 MG/DL (ref 8.6–10)
CHLORIDE SERPL-SCNC: 103 MMOL/L (ref 98–111)
CHOLEST SERPL-MCNC: 85 MG/DL (ref 0–199)
CO2 SERPL-SCNC: 25 MMOL/L (ref 22–29)
CREAT SERPL-MCNC: 0.9 MG/DL (ref 0.5–0.9)
CREAT UR-MCNC: 226.2 MG/DL (ref 28–217)
EOSINOPHIL # BLD: 0.3 K/UL (ref 0–0.6)
EOSINOPHIL NFR BLD: 3 % (ref 0–5)
ERYTHROCYTE [DISTWIDTH] IN BLOOD BY AUTOMATED COUNT: 12.4 % (ref 11.5–14.5)
GLUCOSE SERPL-MCNC: 115 MG/DL (ref 70–99)
HBA1C MFR BLD: 6.7 % (ref 4–5.6)
HCT VFR BLD AUTO: 39 % (ref 37–47)
HDLC SERPL-MCNC: 32 MG/DL (ref 40–60)
HGB BLD-MCNC: 12.6 G/DL (ref 12–16)
IMM GRANULOCYTES # BLD: 0 K/UL
LDLC SERPL CALC-MCNC: 28 MG/DL
LYMPHOCYTES # BLD: 3.2 K/UL (ref 1.1–4.5)
LYMPHOCYTES NFR BLD: 37.9 % (ref 20–40)
MCH RBC QN AUTO: 28 PG (ref 27–31)
MCHC RBC AUTO-ENTMCNC: 32.3 G/DL (ref 33–37)
MCV RBC AUTO: 86.7 FL (ref 81–99)
MICROALBUMIN UR-MCNC: 2.1 MG/DL (ref 0–1.99)
MICROALBUMIN/CREAT UR-RTO: 9.3 MG/G
MONOCYTES # BLD: 0.4 K/UL (ref 0–0.9)
MONOCYTES NFR BLD: 5.2 % (ref 0–10)
NEUTROPHILS # BLD: 4.4 K/UL (ref 1.5–7.5)
NEUTS SEG NFR BLD: 52.9 % (ref 50–65)
PLATELET # BLD AUTO: 179 K/UL (ref 130–400)
PMV BLD AUTO: 10.1 FL (ref 9.4–12.3)
POTASSIUM SERPL-SCNC: 4.9 MMOL/L (ref 3.5–5)
PROT SERPL-MCNC: 7.7 G/DL (ref 6.4–8.3)
RBC # BLD AUTO: 4.5 M/UL (ref 4.2–5.4)
SODIUM SERPL-SCNC: 140 MMOL/L (ref 136–145)
TRIGL SERPL-MCNC: 125 MG/DL (ref 0–149)
TSH SERPL DL<=0.005 MIU/L-ACNC: 0.89 UIU/ML (ref 0.27–4.2)
WBC # BLD AUTO: 8.4 K/UL (ref 4.8–10.8)

## 2024-11-13 ENCOUNTER — CLINICAL SUPPORT (OUTPATIENT)
Dept: OTOLARYNGOLOGY | Facility: CLINIC | Age: 54
End: 2024-11-13
Payer: COMMERCIAL

## 2024-11-13 DIAGNOSIS — J30.9 ALLERGIC RHINITIS, UNSPECIFIED SEASONALITY, UNSPECIFIED TRIGGER: ICD-10-CM

## 2024-11-18 PROBLEM — L08.9 SKIN INFECTION: Status: RESOLVED | Noted: 2018-11-14 | Resolved: 2024-11-18

## 2024-11-18 PROBLEM — N39.0 ACUTE UPPER URINARY TRACT INFECTION: Status: RESOLVED | Noted: 2022-12-22 | Resolved: 2024-11-18

## 2024-11-18 PROBLEM — T46.4X5A ACE-INHIBITOR COUGH: Status: RESOLVED | Noted: 2024-03-01 | Resolved: 2024-11-18

## 2024-11-18 PROBLEM — E11.29 TYPE 2 DIABETES MELLITUS WITH DIABETIC MICROALBUMINURIA, WITH LONG-TERM CURRENT USE OF INSULIN (HCC): Status: ACTIVE | Noted: 2019-12-04

## 2024-11-18 PROBLEM — R05.8 ACE-INHIBITOR COUGH: Status: RESOLVED | Noted: 2024-03-01 | Resolved: 2024-11-18

## 2024-11-18 PROBLEM — E11.9 TYPE 2 DIABETES MELLITUS WITHOUT COMPLICATION, WITH LONG-TERM CURRENT USE OF INSULIN (HCC): Status: RESOLVED | Noted: 2017-08-16 | Resolved: 2024-11-18

## 2024-11-18 PROBLEM — Z79.4 TYPE 2 DIABETES MELLITUS WITHOUT COMPLICATION, WITH LONG-TERM CURRENT USE OF INSULIN (HCC): Status: RESOLVED | Noted: 2017-08-16 | Resolved: 2024-11-18

## 2024-11-18 PROBLEM — Z79.4 TYPE 2 DIABETES MELLITUS WITH DIABETIC MICROALBUMINURIA, WITH LONG-TERM CURRENT USE OF INSULIN (HCC): Status: ACTIVE | Noted: 2019-12-04

## 2024-11-18 PROBLEM — R80.9 TYPE 2 DIABETES MELLITUS WITH DIABETIC MICROALBUMINURIA, WITH LONG-TERM CURRENT USE OF INSULIN (HCC): Status: ACTIVE | Noted: 2019-12-04

## 2024-11-19 ENCOUNTER — OFFICE VISIT (OUTPATIENT)
Dept: PRIMARY CARE CLINIC | Age: 54
End: 2024-11-19

## 2024-11-19 VITALS
DIASTOLIC BLOOD PRESSURE: 74 MMHG | RESPIRATION RATE: 18 BRPM | WEIGHT: 154 LBS | BODY MASS INDEX: 28.34 KG/M2 | SYSTOLIC BLOOD PRESSURE: 114 MMHG | HEIGHT: 62 IN | TEMPERATURE: 97.4 F | HEART RATE: 109 BPM | OXYGEN SATURATION: 97 %

## 2024-11-19 DIAGNOSIS — G89.29 CHRONIC LEFT SHOULDER PAIN: ICD-10-CM

## 2024-11-19 DIAGNOSIS — G43.111 INTRACTABLE MIGRAINE WITH AURA WITH STATUS MIGRAINOSUS: ICD-10-CM

## 2024-11-19 DIAGNOSIS — E11.29 TYPE 2 DIABETES MELLITUS WITH DIABETIC MICROALBUMINURIA, WITH LONG-TERM CURRENT USE OF INSULIN (HCC): ICD-10-CM

## 2024-11-19 DIAGNOSIS — R80.9 TYPE 2 DIABETES MELLITUS WITH DIABETIC MICROALBUMINURIA, WITH LONG-TERM CURRENT USE OF INSULIN (HCC): ICD-10-CM

## 2024-11-19 DIAGNOSIS — M25.512 CHRONIC LEFT SHOULDER PAIN: ICD-10-CM

## 2024-11-19 DIAGNOSIS — Z12.31 ENCOUNTER FOR SCREENING MAMMOGRAM FOR BREAST CANCER: Primary | ICD-10-CM

## 2024-11-19 DIAGNOSIS — F41.8 DEPRESSION WITH ANXIETY: ICD-10-CM

## 2024-11-19 DIAGNOSIS — E11.42 DIABETIC POLYNEUROPATHY ASSOCIATED WITH TYPE 2 DIABETES MELLITUS (HCC): ICD-10-CM

## 2024-11-19 DIAGNOSIS — E06.3 HYPOTHYROIDISM DUE TO HASHIMOTO'S THYROIDITIS: ICD-10-CM

## 2024-11-19 DIAGNOSIS — Z79.4 TYPE 2 DIABETES MELLITUS WITH DIABETIC MICROALBUMINURIA, WITH LONG-TERM CURRENT USE OF INSULIN (HCC): ICD-10-CM

## 2024-11-19 DIAGNOSIS — E78.2 MIXED HYPERLIPIDEMIA: ICD-10-CM

## 2024-11-19 DIAGNOSIS — K21.9 GERD WITHOUT ESOPHAGITIS: ICD-10-CM

## 2024-11-19 DIAGNOSIS — E78.1 HYPERTRIGLYCERIDEMIA: ICD-10-CM

## 2024-11-19 PROBLEM — E66.812 CLASS 2 OBESITY DUE TO EXCESS CALORIES WITHOUT SERIOUS COMORBIDITY WITH BODY MASS INDEX (BMI) OF 36.0 TO 36.9 IN ADULT: Status: RESOLVED | Noted: 2019-12-19 | Resolved: 2024-11-19

## 2024-11-19 PROBLEM — J31.0 CHRONIC RHINITIS: Status: RESOLVED | Noted: 2022-08-23 | Resolved: 2024-11-19

## 2024-11-19 PROBLEM — F41.9 ANXIETY: Status: RESOLVED | Noted: 2019-12-13 | Resolved: 2024-11-19

## 2024-11-19 PROBLEM — J32.1 CHRONIC FRONTAL SINUSITIS: Status: RESOLVED | Noted: 2021-10-18 | Resolved: 2024-11-19

## 2024-11-19 PROBLEM — Z22.322 MRSA (METHICILLIN RESISTANT STAPH AUREUS) CULTURE POSITIVE: Status: RESOLVED | Noted: 2019-12-13 | Resolved: 2024-11-19

## 2024-11-19 PROBLEM — E66.09 CLASS 2 OBESITY DUE TO EXCESS CALORIES WITHOUT SERIOUS COMORBIDITY WITH BODY MASS INDEX (BMI) OF 36.0 TO 36.9 IN ADULT: Status: RESOLVED | Noted: 2019-12-19 | Resolved: 2024-11-19

## 2024-11-19 PROBLEM — R11.2 NAUSEA & VOMITING: Status: RESOLVED | Noted: 2023-09-01 | Resolved: 2024-11-19

## 2024-11-19 PROBLEM — R09.81 SINUS CONGESTION: Status: RESOLVED | Noted: 2022-04-26 | Resolved: 2024-11-19

## 2024-11-19 RX ORDER — TELMISARTAN 20 MG/1
10 TABLET ORAL DAILY
Qty: 45 TABLET | Refills: 1 | Status: SHIPPED | OUTPATIENT
Start: 2024-11-19 | End: 2025-05-18

## 2024-11-19 ASSESSMENT — ENCOUNTER SYMPTOMS
CHEST TIGHTNESS: 0
SINUS PAIN: 0
CONSTIPATION: 0
BLOOD IN STOOL: 0
WHEEZING: 0
COUGH: 0
RHINORRHEA: 0
DIARRHEA: 0
TROUBLE SWALLOWING: 0
NAUSEA: 0
ABDOMINAL PAIN: 0
VOMITING: 0
BACK PAIN: 0
SHORTNESS OF BREATH: 0

## 2024-11-19 NOTE — PROGRESS NOTES
Jose M Osorio ( 1970) is a 54 y.o. female,  Established , here for evaluation of the following chief complaint(s).  6 Month Follow-Up      Patient was encouraged and advised to be compliant with all  medications leads an active lifestyle and promote maintaining a healthy weight, encouraged not to use cigarettes, laboratory results discussed and reviewed with patient's during this visit       Assessment & Plan  Encounter for screening mammogram for breast cancer       Mammogram ordered    Orders:    [facility] DIGITAL SCREEN W OR WO CAD BILATERAL; Future    Type 2 diabetes mellitus with diabetic microalbuminuria, with long-term current use of insulin (HCC)   Chronic, at goal (stable), continue current treatment plan, will start Micardis 20 mg, 1/2 tablet daily due to incraes microlabumin  Lab Results   Component Value Date    LABA1C 6.7 (H) 2024    LABA1C 6.8 2024    LABA1C 9.7 (H) 2024     Lab Results   Component Value Date    GLUF 82 2019    MALBCR 9.3 2024    CREATININE 0.9 2024        Date of last diabetic eye exam: 2024     Orders:    telmisartan (MICARDIS) 20 MG tablet; Take 0.5 tablets by mouth daily    Microalbumin / Creatinine Urine Ratio; Future    Chronic left shoulder pain   Chronic, worsening (exacerbation), requesting a referral to Dr Valentin    Orders:    John Buck MD, Orthopedic Surgery, [place]    GERD without esophagitis   Chronic, at goal (stable), continue current treatment plan         Diabetic polyneuropathy associated with type 2 diabetes mellitus (HCC)   Monitored by specialist- no acute findings meriting change in the plan         Hypothyroidism due to Hashimoto's thyroiditis   Chronic, at goal (stable), continue current treatment plan         Intractable migraine with aura with status migrainosus   Monitored by specialist- no acute findings meriting change in the plan         Depression with anxiety   Chronic, at goal (stable),

## 2024-11-19 NOTE — ASSESSMENT & PLAN NOTE
Chronic, at goal (stable), continue current treatment plan, will start Micardis 20 mg, 1/2 tablet daily due to incraes microlabumin  Lab Results   Component Value Date    LABA1C 6.7 (H) 11/08/2024    LABA1C 6.8 05/16/2024    LABA1C 9.7 (H) 01/02/2024     Lab Results   Component Value Date    GLUF 82 05/24/2019    MALBCR 9.3 11/08/2024    CREATININE 0.9 11/08/2024        Date of last diabetic eye exam: 6/12/2024     Orders:    telmisartan (MICARDIS) 20 MG tablet; Take 0.5 tablets by mouth daily    Microalbumin / Creatinine Urine Ratio; Future

## 2024-11-20 ENCOUNTER — CLINICAL SUPPORT (OUTPATIENT)
Dept: OTOLARYNGOLOGY | Facility: CLINIC | Age: 54
End: 2024-11-20
Payer: COMMERCIAL

## 2024-11-20 DIAGNOSIS — J30.9 ALLERGIC RHINITIS, UNSPECIFIED SEASONALITY, UNSPECIFIED TRIGGER: ICD-10-CM

## 2024-11-25 DIAGNOSIS — I10 ESSENTIAL HYPERTENSION: ICD-10-CM

## 2024-11-25 DIAGNOSIS — G43.111 INTRACTABLE MIGRAINE WITH AURA WITH STATUS MIGRAINOSUS: ICD-10-CM

## 2024-11-25 DIAGNOSIS — E03.9 HYPOTHYROIDISM, UNSPECIFIED TYPE: ICD-10-CM

## 2024-11-25 DIAGNOSIS — F51.01 PRIMARY INSOMNIA: ICD-10-CM

## 2024-11-25 DIAGNOSIS — E78.5 HYPERLIPIDEMIA, UNSPECIFIED HYPERLIPIDEMIA TYPE: ICD-10-CM

## 2024-11-25 DIAGNOSIS — G62.9 NEUROPATHY: ICD-10-CM

## 2024-11-25 DIAGNOSIS — Z76.0 MEDICATION REFILL: ICD-10-CM

## 2024-11-25 RX ORDER — SPIRONOLACTONE 50 MG/1
50 TABLET, FILM COATED ORAL DAILY
Qty: 90 TABLET | Refills: 1 | Status: SHIPPED | OUTPATIENT
Start: 2024-11-25

## 2024-11-25 RX ORDER — ATORVASTATIN CALCIUM 20 MG/1
20 TABLET, FILM COATED ORAL DAILY
Qty: 90 TABLET | Refills: 1 | Status: SHIPPED | OUTPATIENT
Start: 2024-11-25

## 2024-11-25 RX ORDER — DULOXETIN HYDROCHLORIDE 30 MG/1
30 CAPSULE, DELAYED RELEASE ORAL EVERY MORNING
Qty: 90 CAPSULE | Refills: 1 | Status: SHIPPED | OUTPATIENT
Start: 2024-11-25

## 2024-11-25 RX ORDER — LEVOTHYROXINE SODIUM 137 UG/1
137 TABLET ORAL DAILY
Qty: 90 TABLET | Refills: 1 | Status: SHIPPED | OUTPATIENT
Start: 2024-11-25

## 2024-11-25 RX ORDER — DULOXETIN HYDROCHLORIDE 60 MG/1
CAPSULE, DELAYED RELEASE ORAL DAILY
Qty: 90 CAPSULE | Refills: 1 | Status: SHIPPED | OUTPATIENT
Start: 2024-11-25

## 2024-11-25 RX ORDER — FAMOTIDINE 20 MG/1
20 TABLET, FILM COATED ORAL 2 TIMES DAILY
Qty: 180 TABLET | Refills: 1 | Status: SHIPPED | OUTPATIENT
Start: 2024-11-25

## 2024-11-25 RX ORDER — TRAZODONE HYDROCHLORIDE 100 MG/1
100 TABLET ORAL NIGHTLY
Qty: 90 TABLET | Refills: 1 | Status: SHIPPED | OUTPATIENT
Start: 2024-11-25

## 2024-11-25 RX ORDER — VALPROIC ACID 250 MG/1
250 CAPSULE ORAL 4 TIMES DAILY
Qty: 120 CAPSULE | Refills: 5 | Status: SHIPPED | OUTPATIENT
Start: 2024-11-25

## 2024-11-25 NOTE — TELEPHONE ENCOUNTER
Jose M Osorio called to request a refill on her medication.      Last office visit : 11/19/2024   Next office visit : 5/20/2025     Requested Prescriptions     Pending Prescriptions Disp Refills    DULoxetine (CYMBALTA) 30 MG extended release capsule [Pharmacy Med Name: DULOXETINE HCL DR 30 MG CAP] 30 capsule 5     Sig: TAKE 1 CAPSULE BY MOUTH EVERY DAY IN THE MORNING    DULoxetine (CYMBALTA) 60 MG extended release capsule [Pharmacy Med Name: DULOXETINE HCL DR 60 MG CAP] 90 capsule 1     Sig: TAKE 1 CAPSULE BY MOUTH EVERY DAY    atorvastatin (LIPITOR) 20 MG tablet [Pharmacy Med Name: ATORVASTATIN 20 MG TABLET] 90 tablet 1     Sig: TAKE 1 TABLET BY MOUTH EVERY DAY    famotidine (PEPCID) 20 MG tablet [Pharmacy Med Name: FAMOTIDINE 20 MG TABLET] 180 tablet 1     Sig: TAKE 1 TABLET BY MOUTH TWICE A DAY    levothyroxine (SYNTHROID) 137 MCG tablet [Pharmacy Med Name: LEVOTHYROXINE 137 MCG TABLET] 90 tablet 1     Sig: TAKE 1 TABLET BY MOUTH EVERY DAY    traZODone (DESYREL) 100 MG tablet [Pharmacy Med Name: TRAZODONE 100 MG TABLET] 90 tablet 1     Sig: TAKE 1 TABLET BY MOUTH EVERY DAY AT NIGHT    spironolactone (ALDACTONE) 50 MG tablet [Pharmacy Med Name: SPIRONOLACTONE 50 MG TABLET] 90 tablet 1     Sig: TAKE 1 TABLET BY MOUTH EVERY DAY    valproic acid (DEPAKENE) 250 MG capsule [Pharmacy Med Name: VALPROIC ACID 250 MG CAPSULE] 120 capsule 1     Sig: TAKE 1 CAPSULE BY MOUTH FOUR TIMES A DAY            Monika Strong LPN

## 2024-12-04 ENCOUNTER — CLINICAL SUPPORT (OUTPATIENT)
Dept: OTOLARYNGOLOGY | Facility: CLINIC | Age: 54
End: 2024-12-04
Payer: COMMERCIAL

## 2024-12-04 DIAGNOSIS — J30.9 ALLERGIC RHINITIS, UNSPECIFIED SEASONALITY, UNSPECIFIED TRIGGER: ICD-10-CM

## 2024-12-11 ENCOUNTER — CLINICAL SUPPORT (OUTPATIENT)
Dept: OTOLARYNGOLOGY | Facility: CLINIC | Age: 54
End: 2024-12-11
Payer: COMMERCIAL

## 2024-12-11 ENCOUNTER — CLINICAL SUPPORT NO REQUIREMENTS (OUTPATIENT)
Dept: OTOLARYNGOLOGY | Facility: CLINIC | Age: 54
End: 2024-12-11
Payer: COMMERCIAL

## 2024-12-11 DIAGNOSIS — J30.9 ALLERGIC RHINITIS, UNSPECIFIED SEASONALITY, UNSPECIFIED TRIGGER: Primary | ICD-10-CM

## 2024-12-11 DIAGNOSIS — J30.9 ALLERGIC RHINITIS, UNSPECIFIED SEASONALITY, UNSPECIFIED TRIGGER: ICD-10-CM

## 2024-12-11 NOTE — PROGRESS NOTES
Moise Flores   Allergy Injection Mix Note    A immunotherapy injection vial was mixed using standard protocol under sterile conditions.     Mixing Nurse/ Tech: ARMINDA (12/11/24 1424)    Vial Series: 5    VIAL 1  [place]: Vial 1 mix 0.2 cc of #: concentrate  Mold Mix: Vial 1 mix 0.2 cc of #: concentrate  Trichophyton: Vial 1 mix 0.2 cc of #: concentrate  Dust Mite Mix: Vial 1 mix 0.2 cc of #: concentrate  Dog: Vial 1 mix 0.2 cc of #: concentrate  Cat: Vial 1 mix 0.2 cc of #: concentrate  Feather: Vial 1 mix 0.2 cc of #: concentrate  Vial 1 Additions  Antigen Total: 1.4 cc  Glycerine: 0  Dilutent: 3.6 cc  TOTAL: 5           Moise Flores  12/11/2024  14:24 CST

## 2024-12-16 ENCOUNTER — OFFICE VISIT (OUTPATIENT)
Age: 54
End: 2024-12-16
Payer: COMMERCIAL

## 2024-12-16 VITALS — BODY MASS INDEX: 27.64 KG/M2 | HEIGHT: 62 IN | WEIGHT: 150.2 LBS

## 2024-12-16 DIAGNOSIS — M75.02 ADHESIVE CAPSULITIS OF LEFT SHOULDER ASSOCIATED WITH TYPE 2 DIABETES MELLITUS (HCC): Primary | ICD-10-CM

## 2024-12-16 DIAGNOSIS — E11.618 ADHESIVE CAPSULITIS OF LEFT SHOULDER ASSOCIATED WITH TYPE 2 DIABETES MELLITUS (HCC): Primary | ICD-10-CM

## 2024-12-16 PROCEDURE — 99213 OFFICE O/P EST LOW 20 MIN: CPT | Performed by: ORTHOPAEDIC SURGERY

## 2024-12-16 PROCEDURE — 3044F HG A1C LEVEL LT 7.0%: CPT | Performed by: ORTHOPAEDIC SURGERY

## 2024-12-16 NOTE — PROGRESS NOTES
Claritin-d 12 hour [loratadine-pseudoephedrine er], Demerol hcl [meperidine], and Percocet [oxycodone-acetaminophen]    Social History:   Social History     Occupational History    Occupation: [employer] Employee   Tobacco Use    Smoking status: Never    Smokeless tobacco: Never   Vaping Use    Vaping status: Never Used   Substance and Sexual Activity    Alcohol use: Never    Drug use: No    Sexual activity: Not Currently     Partners: Female        Family History:   Family History   Problem Relation Age of Onset    High Blood Pressure Mother     Other Mother         second hand smoker    Brain Cancer Mother 59    High Blood Pressure Father     High Cholesterol Father     Colon Polyps Father     Other Father         smoker    Diabetes Sister         type 1, from alcohol    High Blood Pressure Sister     High Blood Pressure Brother     Diabetes Brother         from alcohol    Hypertension Brother     Colon Cancer Paternal Grandfather     No Known Problems Daughter     No Known Problems Son     No Known Problems Son     No Known Problems Son     Colon Cancer Maternal Aunt     Colon Cancer Paternal Uncle 70    Prostate Cancer Paternal Uncle 65    High Cholesterol Maternal Cousin     Esophageal Cancer Neg Hx     Liver Cancer Neg Hx     Liver Disease Neg Hx     Rectal Cancer Neg Hx     Stomach Cancer Neg Hx        Review of Systems  System  Neg/Pos  Details  Constitutional  Negative  Chills, Fatigue, Fever and Night Sweats  Respiratory  Negative  Chest Pain, Cough and Dyspnea  Cardio   Negative  Leg Swelling  GI   Negative  Abdominal Pain, Constipation, Nausea and Vomiting     Negative  Urinary Incontinence   Endocrine  Negative  Weight Gain and Weight Loss  MS   Negative  Except as noted in HPI and Chief Complaint    PHYSICAL EXAM:    Vitals:   Vitals:    12/16/24 1359   Weight: 68.1 kg (150 lb 3.2 oz)   Height: 1.575 m (5' 2\")     General:  Appears stated age, no distress.  Orientation:

## 2024-12-18 ENCOUNTER — CLINICAL SUPPORT (OUTPATIENT)
Dept: OTOLARYNGOLOGY | Facility: CLINIC | Age: 54
End: 2024-12-18
Payer: COMMERCIAL

## 2024-12-18 DIAGNOSIS — J30.9 ALLERGIC RHINITIS, UNSPECIFIED SEASONALITY, UNSPECIFIED TRIGGER: ICD-10-CM

## 2025-01-02 ENCOUNTER — CLINICAL SUPPORT (OUTPATIENT)
Dept: OTOLARYNGOLOGY | Facility: CLINIC | Age: 55
End: 2025-01-02
Payer: COMMERCIAL

## 2025-01-02 DIAGNOSIS — J30.9 ALLERGIC RHINITIS, UNSPECIFIED SEASONALITY, UNSPECIFIED TRIGGER: ICD-10-CM

## 2025-01-02 PROCEDURE — 95115 IMMUNOTHERAPY ONE INJECTION: CPT | Performed by: EMERGENCY MEDICINE

## 2025-01-15 ENCOUNTER — CLINICAL SUPPORT (OUTPATIENT)
Dept: OTOLARYNGOLOGY | Facility: CLINIC | Age: 55
End: 2025-01-15
Payer: COMMERCIAL

## 2025-01-15 DIAGNOSIS — J30.9 ALLERGIC RHINITIS, UNSPECIFIED SEASONALITY, UNSPECIFIED TRIGGER: ICD-10-CM

## 2025-01-22 ENCOUNTER — CLINICAL SUPPORT (OUTPATIENT)
Dept: OTOLARYNGOLOGY | Facility: CLINIC | Age: 55
End: 2025-01-22
Payer: COMMERCIAL

## 2025-01-22 DIAGNOSIS — J30.9 ALLERGIC RHINITIS, UNSPECIFIED SEASONALITY, UNSPECIFIED TRIGGER: ICD-10-CM

## 2025-01-22 NOTE — PROGRESS NOTES
I have reviewed the notes, assessments, and/or procedures performed by Evy Thapa, I concur with her/his documentation of Yon Ochoa.

## 2025-01-26 ENCOUNTER — HOSPITAL ENCOUNTER (EMERGENCY)
Age: 55
Discharge: HOME | End: 2025-01-26
Payer: COMMERCIAL

## 2025-01-26 ENCOUNTER — APPOINTMENT (OUTPATIENT)
Dept: GENERAL RADIOLOGY | Age: 55
End: 2025-01-26
Payer: COMMERCIAL

## 2025-01-26 VITALS
OXYGEN SATURATION: 99 % | TEMPERATURE: 97.6 F | BODY MASS INDEX: 27.25 KG/M2 | HEART RATE: 115 BPM | RESPIRATION RATE: 18 BRPM | SYSTOLIC BLOOD PRESSURE: 132 MMHG | WEIGHT: 149 LBS | DIASTOLIC BLOOD PRESSURE: 86 MMHG

## 2025-01-26 DIAGNOSIS — M25.512 ACUTE PAIN OF LEFT SHOULDER: Primary | ICD-10-CM

## 2025-01-26 PROCEDURE — 73030 X-RAY EXAM OF SHOULDER: CPT

## 2025-01-26 PROCEDURE — 6360000002 HC RX W HCPCS: Performed by: NURSE PRACTITIONER

## 2025-01-26 PROCEDURE — 6370000000 HC RX 637 (ALT 250 FOR IP): Performed by: NURSE PRACTITIONER

## 2025-01-26 PROCEDURE — 99284 EMERGENCY DEPT VISIT MOD MDM: CPT

## 2025-01-26 PROCEDURE — 96372 THER/PROPH/DIAG INJ SC/IM: CPT

## 2025-01-26 RX ORDER — KETOROLAC TROMETHAMINE 30 MG/ML
15 INJECTION, SOLUTION INTRAMUSCULAR; INTRAVENOUS ONCE
Status: COMPLETED | OUTPATIENT
Start: 2025-01-26 | End: 2025-01-26

## 2025-01-26 RX ORDER — HYDROCODONE BITARTRATE AND ACETAMINOPHEN 10; 325 MG/1; MG/1
1 TABLET ORAL EVERY 6 HOURS PRN
Status: DISCONTINUED | OUTPATIENT
Start: 2025-01-26 | End: 2025-01-26 | Stop reason: HOSPADM

## 2025-01-26 RX ORDER — ACETAMINOPHEN 325 MG/1
650 TABLET ORAL ONCE
Status: COMPLETED | OUTPATIENT
Start: 2025-01-26 | End: 2025-01-26

## 2025-01-26 RX ADMIN — KETOROLAC TROMETHAMINE 15 MG: 30 INJECTION, SOLUTION INTRAMUSCULAR at 15:39

## 2025-01-26 RX ADMIN — ACETAMINOPHEN 650 MG: 325 TABLET ORAL at 15:39

## 2025-01-26 RX ADMIN — HYDROCODONE BITARTRATE AND ACETAMINOPHEN 1 TABLET: 10; 325 TABLET ORAL at 14:46

## 2025-01-26 NOTE — ED PROVIDER NOTES
famotidine (PEPCID) 20 MG tablet TAKE 1 TABLET BY MOUTH TWICE A DAY, Disp-180 tablet, R-1Normal      levothyroxine (SYNTHROID) 137 MCG tablet TAKE 1 TABLET BY MOUTH EVERY DAY, Disp-90 tablet, R-1Normal      traZODone (DESYREL) 100 MG tablet TAKE 1 TABLET BY MOUTH EVERY DAY AT NIGHT, Disp-90 tablet, R-1Normal      spironolactone (ALDACTONE) 50 MG tablet TAKE 1 TABLET BY MOUTH EVERY DAY, Disp-90 tablet, R-1Normal      valproic acid (DEPAKENE) 250 MG capsule TAKE 1 CAPSULE BY MOUTH FOUR TIMES A DAY, Disp-120 capsule, R-5Normal      telmisartan (MICARDIS) 20 MG tablet Take 0.5 tablets by mouth daily, Disp-45 tablet, R-1Normal      pregabalin (LYRICA) 50 MG capsule Take 1 capsule by mouth 2 times daily for 30 days. Max Daily Amount: 100 mg, Disp-60 capsule, R-3Normal      clonazePAM (KLONOPIN) 0.5 MG tablet TAKE 1 TABLET BY MOUTH DAILY AS NEEDED., Disp-30 tablet, R-0Normal      TRESIBA FLEXTOUCH 200 UNIT/ML SOPN ADMINISTER 60 UNITS UNDER THE SKIN EVERY MORNING, Disp-12 mL, R-0Normal      fexofenadine (ALLERGY RELIEF) 180 MG tablet Take 1 tablet by mouth every morning, Disp-90 tablet, R-3Normal      omeprazole (PRILOSEC) 20 MG delayed release capsule TAKE 1 CAPSULE BY MOUTH DAILY FIRST THING IN THE MORNING ON AN EMPTY STOMACH, Disp-90 capsule, R-3Normal      cyclobenzaprine (FLEXERIL) 10 MG tablet TAKE 1 TABLET BY MOUTH TWICE DAILY AS NEEDED FOR MUSCLE SPASMS, Disp-60 tablet, R-0Patient must have appt prior to additional refills.Normal      Continuous Blood Gluc Sensor (FREESTYLE MARJORIE 14 DAY SENSOR) MISC 1 box by Does not apply route every 30 days DX 11.9 one box every 14 days, Disp-1 each, R-5Normal      insulin lispro (HUMALOG) 100 UNIT/ML SOLN injection vial Inject into the skin 3 times daily (before meals)Historical Med      azelastine (ASTELIN) 0.1 % nasal spray 1 spray by Nasal route 2 times daily Use in each nostril as directed, Disp-60 mL, R-1Normal      lubiprostone (AMITIZA) 24 MCG capsule Take 1 capsule

## 2025-01-27 ENCOUNTER — TELEPHONE (OUTPATIENT)
Age: 55
End: 2025-01-27

## 2025-01-27 DIAGNOSIS — G62.9 NEUROPATHY: ICD-10-CM

## 2025-01-27 NOTE — TELEPHONE ENCOUNTER
Attempted to call patient but went straight to voicemail and the mail box is full so unable to leave a message for patient to call back.

## 2025-01-27 NOTE — TELEPHONE ENCOUNTER
Patient is needing to speak to someone regarding her shoulder. She states she went to ER yesterday. She needs to speak to clinic regarding a note

## 2025-01-28 ENCOUNTER — OFFICE VISIT (OUTPATIENT)
Age: 55
End: 2025-01-28
Payer: COMMERCIAL

## 2025-01-28 VITALS — WEIGHT: 149 LBS | BODY MASS INDEX: 27.42 KG/M2 | HEIGHT: 62 IN

## 2025-01-28 DIAGNOSIS — S46.012A TRAUMATIC COMPLETE TEAR OF LEFT ROTATOR CUFF, INITIAL ENCOUNTER: Primary | ICD-10-CM

## 2025-01-28 PROCEDURE — 99213 OFFICE O/P EST LOW 20 MIN: CPT | Performed by: PHYSICIAN ASSISTANT

## 2025-01-28 RX ORDER — PREGABALIN 50 MG/1
50 CAPSULE ORAL 2 TIMES DAILY
Qty: 60 CAPSULE | Refills: 3 | Status: SHIPPED | OUTPATIENT
Start: 2025-01-28 | End: 2025-02-27

## 2025-01-28 NOTE — TELEPHONE ENCOUNTER
Requested Prescriptions     Pending Prescriptions Disp Refills    pregabalin (LYRICA) 50 MG capsule [Pharmacy Med Name: PREGABALIN 50 MG CAPSULE] 60 capsule 3     Sig: Take 1 capsule by mouth 2 times daily for 30 days. Max Daily Amount: 100 mg       Last Office Visit:  6/24/2024  Next Office Visit:  Visit date not found  Last Medication Refill:  6/28/24 with 3 rf   Michael up to date:  1/28/25    *RX updated to reflect   1/28/25  fill date*

## 2025-01-28 NOTE — TELEPHONE ENCOUNTER
Patient was seen today in clinic by Will Veras. I did not know this until after I called and left my message today as I was trying to get in contact with patient in regards to her call to the office yesterday so not sure if her needs were handled at the appointment today.

## 2025-01-28 NOTE — PROGRESS NOTES
Orthopaedic Clinic Note    NAME:  Jose M Osorio   : 1970  MRN: [id number]      2025     CHIEF COMPLAINT:  Left shoulder pain      HISTORY OF PRESENT ILLNESS:   Jose M is a 54 y.o. female who presents to the office for evaluation and treatment of the left shoulder.   Patient is currently being seen by Dr Conner and undergoing PT due to adhesive capsulitis of the left shoulder. She had a new injury to the left shoulder at work on 2025.  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 A 50 pound box of frozen chicken fell onto her shoulder.  Since that time, she has had severe pain and decreased range of motion.  She has attempted to resume her PT but it is causing increased pain for her.       Past Medical History:        Diagnosis Date    Anxiety     Bacteremia 10/17/2019    Bandemia 10/16/2019    Class 2 obesity due to excess calories without serious comorbidity with body mass index (BMI) of 36.0 to 36.9 in adult 2019    Depression     Diabetes mellitus (HCC)     Ganglion cyst     GERD (gastroesophageal reflux disease)     Heartburn 10/28/2020    Hyperglycemia 2017    Hyperlipidemia     Hypertension     Hypothyroidism     Lower abdominal pain 2019    Neuropathy 10/16/2019    Obstructive sleep apnea     no machine    Osteoarthritis     Skin ulcer of flank with fat layer exposed (HCC) 2020    Staph infection 2018       Past Surgical History:        Procedure Laterality Date    APPENDECTOMY      as a 1st grader    CARPAL TUNNEL RELEASE       SECTION      , ,     CHOLECYSTECTOMY, LAPAROSCOPIC          COLONOSCOPY      \"more than 15 + yrs ago\" PER PT RECALL    COLONOSCOPY N/A 2019    Dr Cabrera-Internal hemorrhoids-Grade 1, suboptimal prep, 3 yr recall    COLONOSCOPY N/A 10/31/2023    Dr Cabrera, int hem Gr 1, perianal tag, 3 year recall    HYSTERECTOMY, TOTAL ABDOMINAL (CERVIX REMOVED)      May 2012, withOUT Ophorectomy

## 2025-01-29 ENCOUNTER — CLINICAL SUPPORT (OUTPATIENT)
Dept: OTOLARYNGOLOGY | Facility: CLINIC | Age: 55
End: 2025-01-29
Payer: COMMERCIAL

## 2025-01-29 DIAGNOSIS — J30.9 ALLERGIC RHINITIS, UNSPECIFIED SEASONALITY, UNSPECIFIED TRIGGER: ICD-10-CM

## 2025-02-11 ENCOUNTER — TELEPHONE (OUTPATIENT)
Dept: OTOLARYNGOLOGY | Facility: CLINIC | Age: 55
End: 2025-02-11
Payer: COMMERCIAL

## 2025-02-11 NOTE — TELEPHONE ENCOUNTER
Nurse contacted patient to make allergy injection.  Patient confirmed to come in at 2pm on 2/12/25.

## 2025-02-11 NOTE — TELEPHONE ENCOUNTER
HUB UNABLE TO WARM TRANSFER    Caller: Yon Ochoa    Relationship to patient: Self    Best call back number: [phone] (home)       Chief complaint: ALLERGY SHOT    Type of visit: INJECTION    Requested date: PREFER WEDNESDAY     If rescheduling, when is the original appointment:      Additional notes:

## 2025-02-12 ENCOUNTER — CLINICAL SUPPORT (OUTPATIENT)
Dept: OTOLARYNGOLOGY | Facility: CLINIC | Age: 55
End: 2025-02-12
Payer: COMMERCIAL

## 2025-02-12 DIAGNOSIS — J30.9 ALLERGIC RHINITIS, UNSPECIFIED SEASONALITY, UNSPECIFIED TRIGGER: ICD-10-CM

## 2025-02-17 ENCOUNTER — HOSPITAL ENCOUNTER (OUTPATIENT)
Dept: MRI IMAGING | Age: 55
Discharge: HOME | End: 2025-02-17
Payer: COMMERCIAL

## 2025-02-17 DIAGNOSIS — S46.012A TRAUMATIC COMPLETE TEAR OF LEFT ROTATOR CUFF, INITIAL ENCOUNTER: ICD-10-CM

## 2025-02-17 PROCEDURE — 73221 MRI JOINT UPR EXTREM W/O DYE: CPT

## 2025-02-18 ENCOUNTER — CLINICAL SUPPORT (OUTPATIENT)
Dept: OTOLARYNGOLOGY | Facility: CLINIC | Age: 55
End: 2025-02-18
Payer: COMMERCIAL

## 2025-02-18 DIAGNOSIS — J30.9 ALLERGIC RHINITIS, UNSPECIFIED SEASONALITY, UNSPECIFIED TRIGGER: ICD-10-CM

## 2025-02-26 ENCOUNTER — OFFICE VISIT (OUTPATIENT)
Age: 55
End: 2025-02-26
Payer: COMMERCIAL

## 2025-02-26 ENCOUNTER — CLINICAL SUPPORT (OUTPATIENT)
Dept: OTOLARYNGOLOGY | Facility: CLINIC | Age: 55
End: 2025-02-26
Payer: COMMERCIAL

## 2025-02-26 VITALS — HEIGHT: 62 IN | WEIGHT: 149 LBS | BODY MASS INDEX: 27.42 KG/M2

## 2025-02-26 DIAGNOSIS — M75.02 ADHESIVE CAPSULITIS OF LEFT SHOULDER: Primary | ICD-10-CM

## 2025-02-26 DIAGNOSIS — J30.9 ALLERGIC RHINITIS, UNSPECIFIED SEASONALITY, UNSPECIFIED TRIGGER: ICD-10-CM

## 2025-02-26 PROCEDURE — 99213 OFFICE O/P EST LOW 20 MIN: CPT | Performed by: PHYSICIAN ASSISTANT

## 2025-02-26 RX ORDER — MELOXICAM 15 MG/1
15 TABLET ORAL DAILY
Qty: 30 TABLET | Refills: 3 | Status: SHIPPED | OUTPATIENT
Start: 2025-02-26

## 2025-02-26 RX ORDER — METHOCARBAMOL 500 MG/1
500 TABLET, FILM COATED ORAL 3 TIMES DAILY
Qty: 30 TABLET | Refills: 0 | Status: SHIPPED | OUTPATIENT
Start: 2025-02-26 | End: 2025-03-08

## 2025-02-26 NOTE — PROGRESS NOTES
Orthopaedic Clinic Note-Established Patient     NAME:  Jose M Osorio   : 1970  MRN: [id number]      2025     CHIEF COMPLAINT:  Left shoulder pain      HISTORY OF PRESENT ILLNESS:   Jose M is a 55 y.o. female who presents to the office for evaluation and treatment of the left shoulder.   Patient is currently being seen by Dr Conner and undergoing PT due to adhesive capsulitis of the left shoulder. She had a new injury to the left shoulder at work on 2025.  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  Since that time, she has had severe pain and decreased range of motion.  She has attempted to resume her PT but it is causing increased pain for her.     I first saw the patient on 2025.  An MRI was ordered at that visit.  She is here today to go over results.      Past Medical History:        Diagnosis Date    Anxiety     Bacteremia 10/17/2019    Bandemia 10/16/2019    Class 2 obesity due to excess calories without serious comorbidity with body mass index (BMI) of 36.0 to 36.9 in adult 2019    Depression     Diabetes mellitus (HCC)     Ganglion cyst     GERD (gastroesophageal reflux disease)     Heartburn 10/28/2020    Hyperglycemia 2017    Hyperlipidemia     Hypertension     Hypothyroidism     Lower abdominal pain 2019    Neuropathy 10/16/2019    Obstructive sleep apnea     no machine    Osteoarthritis     Skin ulcer of flank with fat layer exposed (HCC) 2020    Staph infection 2018       Past Surgical History:        Procedure Laterality Date    APPENDECTOMY      as a 1st grader    CARPAL TUNNEL RELEASE       SECTION      , ,     CHOLECYSTECTOMY, LAPAROSCOPIC          COLONOSCOPY      \"more than 15 + yrs ago\" PER PT RECALL    COLONOSCOPY N/A 2019    Dr Cabrera-Internal hemorrhoids-Grade 1, suboptimal prep, 3 yr recall    COLONOSCOPY N/A 10/31/2023

## 2025-03-04 ENCOUNTER — E-VISIT (OUTPATIENT)
Dept: PRIMARY CARE CLINIC | Age: 55
End: 2025-03-04
Payer: COMMERCIAL

## 2025-03-04 DIAGNOSIS — N30.90 CYSTITIS: Primary | ICD-10-CM

## 2025-03-04 PROCEDURE — 99422 OL DIG E/M SVC 11-20 MIN: CPT | Performed by: INTERNAL MEDICINE

## 2025-03-05 ENCOUNTER — CLINICAL SUPPORT (OUTPATIENT)
Dept: OTOLARYNGOLOGY | Facility: CLINIC | Age: 55
End: 2025-03-05
Payer: COMMERCIAL

## 2025-03-05 DIAGNOSIS — J30.9 ALLERGIC RHINITIS, UNSPECIFIED SEASONALITY, UNSPECIFIED TRIGGER: ICD-10-CM

## 2025-03-05 PROBLEM — N30.90 CYSTITIS: Status: ACTIVE | Noted: 2025-03-05

## 2025-03-05 RX ORDER — NITROFURANTOIN 25; 75 MG/1; MG/1
100 CAPSULE ORAL 2 TIMES DAILY
Qty: 10 CAPSULE | Refills: 0 | Status: SHIPPED | OUTPATIENT
Start: 2025-03-05 | End: 2025-03-10

## 2025-03-05 NOTE — PROGRESS NOTES
Jose M Osorio (1970) initiated an asynchronous digital communication through [employer].    HPI: per patient questionnaire     Exam: not applicable    Diagnoses and all orders for this visit:  Diagnoses and all orders for this visit:    Cystitis  -     nitrofurantoin, macrocrystal-monohydrate, (MACROBID) 100 MG capsule; Take 1 capsule by mouth 2 times daily for 5 days          Time: EV2 - 11-20 minutes were spent on the digital evaluation and management of this patient.     Claudia Valdez MD

## 2025-03-05 NOTE — ASSESSMENT & PLAN NOTE
Acute condition, new, empirically treat with Macrobid probably cystitis patient can take Tylenol as needed for pain, especially when she has bladder pain

## 2025-03-12 ENCOUNTER — CLINICAL SUPPORT (OUTPATIENT)
Dept: OTOLARYNGOLOGY | Facility: CLINIC | Age: 55
End: 2025-03-12
Payer: COMMERCIAL

## 2025-03-12 DIAGNOSIS — J30.9 ALLERGIC RHINITIS, UNSPECIFIED SEASONALITY, UNSPECIFIED TRIGGER: ICD-10-CM

## 2025-03-25 DIAGNOSIS — J30.9 ALLERGIC RHINITIS, UNSPECIFIED SEASONALITY, UNSPECIFIED TRIGGER: ICD-10-CM

## 2025-03-25 RX ORDER — EPINEPHRINE 0.3 MG/.3ML
0.3 INJECTION SUBCUTANEOUS AS NEEDED
Qty: 1 EACH | Refills: 3 | Status: SHIPPED | OUTPATIENT
Start: 2025-03-25

## 2025-03-26 ENCOUNTER — CLINICAL SUPPORT (OUTPATIENT)
Dept: OTOLARYNGOLOGY | Facility: CLINIC | Age: 55
End: 2025-03-26
Payer: COMMERCIAL

## 2025-03-26 DIAGNOSIS — J30.9 ALLERGIC RHINITIS, UNSPECIFIED SEASONALITY, UNSPECIFIED TRIGGER: Primary | ICD-10-CM

## 2025-03-26 NOTE — PROGRESS NOTES
Moise Flores   Allergy Injection Note:    Yon Ochoa presents for an immunotherapy injection. The site of the injection was cleansed with an alcohol swab. Serum was injected into the site after pulling back on the plunger to prevent intravascular injection. After the injection and was instructed to wait in the allergy waiting area for 30 minutes. There was no problems with the injection.    Allergy Shot Questionnaire  Injection nurse/assistant: TAS  Have you had increased asthma symptoms in past week?: no  Have you had increased allergy symptoms in the last week?: no  Have you had a cold, respiratory tract infection or flu like symptoms in the past 2 weeks?: no  Did you have any problems within 12 hours of the last injection?: no  Are you on any new medications/ eye drops?: no  Are you on any beta blockers?: no  If female, are you pregnant?: no  I have confirmed the name and birth date on my allergy vial. : yes  Epipen available?: yes  Epipen Lot Number: [id number]  Epipen Expiration Date: 4/2026  Number of allergy injections given: 1     Injection Details:  Vial 1   Vial 1 Expiration Date: 03/17/26  Vial 1 Series: 5  Shot type: escalation, maintenance (Newly Formatted Allergy Serum Compound)  Vial 1 Dose (mL): 0.05 Vial test  Vial 1 Location: Left upper arm  Vial 1 Vial Test Reaction (in mm): 7          Moise Flores  3/26/2025  15:11 CDT

## 2025-04-03 NOTE — PROGRESS NOTES
Orthopaedic Clinic Note-Established Patient     NAME:  Jose M Osorio   : 1970  MRN: [id number]      2025     CHIEF COMPLAINT:  Left shoulder pain      HISTORY OF PRESENT ILLNESS:   Jose M is a 55 y.o. female who presents to the office for evaluation and treatment of the left shoulder.   Patient is currently being seen by Dr Conner and undergoing PT due to adhesive capsulitis of the left shoulder. She had a new injury to the left shoulder at work on 2025.  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      At her most recent visit, we went over MRI results which did not show acute significant finding.  Her exam was concerning for adhesive capsulitis.  She was started on meloxicam and PT. she does feel significant improvement so far.  However, she is still having some decreased range of motion and pain with movement    Past Medical History:        Diagnosis Date    Anxiety     Bacteremia 10/17/2019    Bandemia 10/16/2019    Class 2 obesity due to excess calories without serious comorbidity with body mass index (BMI) of 36.0 to 36.9 in adult 2019    Depression     Diabetes mellitus (HCC)     Ganglion cyst     GERD (gastroesophageal reflux disease)     Heartburn 10/28/2020    Hyperglycemia 2017    Hyperlipidemia     Hypertension     Hypothyroidism     Lower abdominal pain 2019    Neuropathy 10/16/2019    Obstructive sleep apnea     no machine    Osteoarthritis     Skin ulcer of flank with fat layer exposed (HCC) 2020    Staph infection 2018       Past Surgical History:        Procedure Laterality Date    APPENDECTOMY      as a 1st grader    CARPAL TUNNEL RELEASE       SECTION      , ,     CHOLECYSTECTOMY, LAPAROSCOPIC          COLONOSCOPY      \"more than 15 + yrs ago\" PER PT RECALL    COLONOSCOPY N/A 2019    Dr Cabrera-Internal hemorrhoids-Grade 1, suboptimal prep, 3 yr

## 2025-04-09 ENCOUNTER — CLINICAL SUPPORT (OUTPATIENT)
Dept: OTOLARYNGOLOGY | Facility: CLINIC | Age: 55
End: 2025-04-09
Payer: COMMERCIAL

## 2025-04-09 ENCOUNTER — OFFICE VISIT (OUTPATIENT)
Age: 55
End: 2025-04-09
Payer: COMMERCIAL

## 2025-04-09 VITALS — BODY MASS INDEX: 27.25 KG/M2 | HEIGHT: 62 IN

## 2025-04-09 DIAGNOSIS — J30.9 ALLERGIC RHINITIS, UNSPECIFIED SEASONALITY, UNSPECIFIED TRIGGER: Primary | ICD-10-CM

## 2025-04-09 DIAGNOSIS — M75.02 ADHESIVE CAPSULITIS OF LEFT SHOULDER: Primary | ICD-10-CM

## 2025-04-09 PROCEDURE — 99213 OFFICE O/P EST LOW 20 MIN: CPT | Performed by: PHYSICIAN ASSISTANT

## 2025-04-09 RX ORDER — TIRZEPATIDE 10 MG/.5ML
INJECTION, SOLUTION SUBCUTANEOUS
COMMUNITY
Start: 2025-03-26

## 2025-04-09 RX ORDER — PROCHLORPERAZINE 25 MG/1
SUPPOSITORY RECTAL
COMMUNITY
Start: 2025-01-27

## 2025-04-09 RX ORDER — PEN NEEDLE, DIABETIC 32GX 5/32"
NEEDLE, DISPOSABLE MISCELLANEOUS
COMMUNITY
Start: 2025-03-05

## 2025-04-09 RX ORDER — INSULIN ASPART 100 [IU]/ML
INJECTION, SOLUTION INTRAVENOUS; SUBCUTANEOUS
COMMUNITY
Start: 2025-03-05

## 2025-04-09 RX ORDER — MELOXICAM 15 MG/1
15 TABLET ORAL DAILY
Qty: 30 TABLET | Refills: 3 | Status: SHIPPED | OUTPATIENT
Start: 2025-04-09

## 2025-04-09 RX ORDER — EPINEPHRINE 0.3 MG/.3ML
INJECTION SUBCUTANEOUS
COMMUNITY
Start: 2025-03-25

## 2025-04-09 NOTE — PROGRESS NOTES
I have reviewed this documentation regarding Allergy procedure. I concur with the documentation of Yon Ochoa.    Rodolfo Smith Jr, MD  4/9/2025  16:22 CDT

## 2025-04-09 NOTE — PROGRESS NOTES
Moise Flores   Allergy Injection Note:    Yon Ochoa presents for an immunotherapy injection. The site of the injection was cleansed with an alcohol swab. Serum was injected into the site after pulling back on the plunger to prevent intravascular injection. After the injection and was instructed to wait in the allergy waiting area for 30 minutes. There was no problems with the injection.    Allergy Shot Questionnaire  Injection nurse/assistant: TAS  Have you had increased asthma symptoms in past week?: no  Have you had increased allergy symptoms in the last week?: no  Have you had a cold, respiratory tract infection or flu like symptoms in the past 2 weeks?: no  Did you have any problems within 12 hours of the last injection?: no  Are you on any new medications/ eye drops?: no  Are you on any beta blockers?: no  If female, are you pregnant?: no  I have confirmed the name and birth date on my allergy vial. : yes  Epipen available?: yes  Epipen Lot Number: [id number]  Epipen Expiration Date: 4/2026  Number of allergy injections given: 1     Injection Details:  Vial 1   Vial 1 Expiration Date: 03/17/26  Vial 1 Series: 5  Shot type: escalation, maintenance  Vial 1 Dose (mL): 0.1  Vial 1 Location: Right upper arm  Vial 1 Reaction (in mm): <5          Moise Flores  4/9/2025  14:20 CDT

## 2025-04-15 ENCOUNTER — OFFICE VISIT (OUTPATIENT)
Dept: OTOLARYNGOLOGY | Facility: CLINIC | Age: 55
End: 2025-04-15
Payer: COMMERCIAL

## 2025-04-15 VITALS
WEIGHT: 162 LBS | HEART RATE: 108 BPM | RESPIRATION RATE: 20 BRPM | SYSTOLIC BLOOD PRESSURE: 108 MMHG | TEMPERATURE: 98 F | HEIGHT: 62 IN | BODY MASS INDEX: 29.81 KG/M2 | DIASTOLIC BLOOD PRESSURE: 63 MMHG

## 2025-04-15 DIAGNOSIS — J32.9 CHRONIC SINUSITIS, UNSPECIFIED LOCATION: ICD-10-CM

## 2025-04-15 RX ORDER — OMEPRAZOLE 20 MG/1
20 CAPSULE, DELAYED RELEASE ORAL DAILY
COMMUNITY
Start: 2025-01-27

## 2025-04-15 RX ORDER — PREGABALIN 50 MG/1
50 CAPSULE ORAL 2 TIMES DAILY
COMMUNITY
Start: 2025-03-05

## 2025-04-15 RX ORDER — FEXOFENADINE HCL 180 MG/1
180 TABLET ORAL DAILY
Qty: 30 TABLET | Refills: 11 | Status: SHIPPED | OUTPATIENT
Start: 2025-04-15

## 2025-04-15 RX ORDER — MOMETASONE FUROATE MONOHYDRATE 50 UG/1
2 SPRAY, METERED NASAL DAILY
Qty: 1 EACH | Refills: 6 | Status: SHIPPED | OUTPATIENT
Start: 2025-04-15

## 2025-04-15 RX ORDER — MONTELUKAST SODIUM 10 MG/1
10 TABLET ORAL NIGHTLY
Qty: 30 TABLET | Refills: 11 | Status: SHIPPED | OUTPATIENT
Start: 2025-04-15

## 2025-04-15 RX ORDER — AZELASTINE 1 MG/ML
2 SPRAY, METERED NASAL 2 TIMES DAILY
Qty: 30 ML | Refills: 11 | Status: SHIPPED | OUTPATIENT
Start: 2025-04-15

## 2025-04-15 RX ORDER — CLARITHROMYCIN 500 MG/1
500 TABLET ORAL EVERY 12 HOURS SCHEDULED
Qty: 40 TABLET | Refills: 0 | Status: SHIPPED | OUTPATIENT
Start: 2025-04-15 | End: 2025-05-05

## 2025-04-15 RX ORDER — FLUCONAZOLE 150 MG/1
150 TABLET ORAL ONCE
Qty: 1 TABLET | Refills: 1 | Status: SHIPPED | OUTPATIENT
Start: 2025-04-15 | End: 2025-04-15

## 2025-04-15 RX ORDER — METHOCARBAMOL 500 MG/1
1 TABLET, FILM COATED ORAL 3 TIMES DAILY
COMMUNITY
Start: 2025-02-26

## 2025-04-15 RX ORDER — ATORVASTATIN CALCIUM 20 MG/1
1 TABLET, FILM COATED ORAL DAILY
COMMUNITY
Start: 2023-10-29

## 2025-04-15 NOTE — PROGRESS NOTES
EMILY Brown  [facility]  [address], [address]  [facility] [address]  Fax [phone]  Phone [phone]      Visit Type: FOLLOW UP   Chief Complaint   Patient presents with    Follow-up     6 MONTH           HPI      History of Present Illness  The patient is a 55-year-old female who presents for follow-up of sinus headache and allergies.    She reports experiencing sinus headaches, which she attributes to pollen exposure. These symptoms have been present for approximately one month. She has not taken any antibiotics or steroids recently. She also notes that her  has been mowing the lawn, which triggers her symptoms of watery eyes and clear nasal discharge. She expresses frustration at her inability to engage in outdoor activities due to these symptoms. Her current medication regimen includes fexofenadine, montelukast, Nasonex, and Astelin. She has been using nasal sprays and antihistamines as part of her treatment plan. She has resumed her injections one week ago after a brief discontinuation.    Supplemental Information  She is diabetic.    MEDICATIONS  Current: Fexofenadine, montelukast, Nasonex, Astelin, atorvastatin    Results      Past Medical History:   Diagnosis Date    Carpal tunnel syndrome on right 2/3/2020    Diabetes     GERD (gastroesophageal reflux disease)     Hyperlipidemia     Hypertension     Hypertension associated with diabetes 12/4/2019    Hypothyroidism due to Hashimoto's thyroiditis 12/4/2019    Mixed diabetic hyperlipidemia associated with type 2 diabetes mellitus 12/4/2019    Type 2 diabetes mellitus with hyperglycemia, with long-term current use of insulin 12/4/2019       History reviewed. No pertinent surgical history.    Family History: Her family history is not on file.     Social History: She  reports that she has never smoked. She has never used smokeless tobacco. She reports that  she does not drink alcohol and does not use drugs.    Home Medications:  Blood Glucose Monitoring Suppl, DULoxetine, Dexcom G6 Sensor, Dexcom G6 Transmitter, EPINEPHrine, Insulin Degludec, Insulin Lispro (1 Unit Dial), Pen Needles, Plecanatide, Prucalopride Succinate, Tirzepatide, Vitamin D, aspirin, atorvastatin, azelastine, clarithromycin, clonazePAM, cyclobenzaprine, famotidine, fexofenadine, fluconazole, glucose blood, glucose monitor, insulin aspart, levothyroxine, lubiprostone, methocarbamol, mometasone, montelukast, omeprazole, onetouch ultrasoft, pregabalin, quinapril, spironolactone, topiramate, traZODone, and vitamin B-6    Allergies:  She is allergic to loratadine-pseudoephedrine er, meperidine, and percocet [oxycodone-acetaminophen].       Vital Signs:   Temp:  [98 °F (36.7 °C)] 98 °F (36.7 °C)  Heart Rate:  [108] 108  Resp:  [20] 20  BP: (108)/(63) 108/63  ENT Physical Exam  Constitutional  Appearance: patient appears well-developed, well-nourished and well-groomed,  Communication/Voice: communication appropriate for developmental age; vocal quality normal;  Head and Face  Appearance: head appears normal, face appears normal and face appears atraumatic;  Palpation: facial palpation normal;  Salivary: glands normal;  Ear  Hearing: intact;  Auricles: right auricle normal; left auricle normal;  External Mastoids: right external mastoid normal; left external mastoid normal;  Ear Canals: right ear canal normal; left ear canal normal;  Tympanic Membranes: right tympanic membrane normal; left tympanic membrane normal;  Nose  External Nose: nasal discharge visible;  Internal Nose: bilateral intranasal mucosa edematous; bilateral inferior turbinates with hypertrophy;  Oral Cavity/Oropharynx  Lips: normal;  Teeth: normal;  Gums: gingiva normal;  Tongue: normal;  Oral mucosa: normal;  Hard palate: normal;  Neck  Neck: neck normal; neck palpation normal;  Respiratory  Inspection: breathing unlabored; normal  breathing rate;  Cardiovascular  Inspection: extremities are warm and well perfused;  Lymphatic  Palpation: lymph nodes normal;       Physical Exam                 Assessment & Plan  Chronic sinusitis, unspecified location       Assessment & Plan  1. Sinus headache.  She reports experiencing sinus headaches for the past month, likely due to pollen exposure. She has been using nasal sprays and antihistamines but needs a refill for her medications. An antibiotic has been prescribed to address any potential bacterial infection. Additionally, a yeast infection pill has been provided as a precautionary measure against potential side effects of the antibiotic. She is advised to temporarily discontinue the use of atorvastatin while on the antibiotic course.    2. Allergies.  She is currently on fexofenadine (Allegra), montelukast (Singulair), and Nasonex for her allergies. She reports that her symptoms worsen with exposure to grass pollen. Refills for these medications have been provided to manage her symptoms effectively.    Follow-up  The patient will follow up in 3 months.         New Medications Ordered This Visit   Medications    montelukast (SINGULAIR) 10 MG tablet     Sig: Take 1 tablet by mouth Every Night.     Dispense:  30 tablet     Refill:  11    fexofenadine (ALLEGRA) 180 MG tablet     Sig: Take 1 tablet by mouth Daily.     Dispense:  30 tablet     Refill:  11    azelastine (ASTELIN) 0.1 % nasal spray     Sig: Administer 2 sprays into the nostril(s) as directed by provider 2 (Two) Times a Day. Use in each nostril as directed     Dispense:  30 mL     Refill:  11    mometasone (NASONEX) 50 MCG/ACT nasal spray     Sig: Administer 2 sprays into the nostril(s) as directed by provider Daily.     Dispense:  1 each     Refill:  6    clarithromycin (BIAXIN) 500 MG tablet     Sig: Take 1 tablet by mouth Every 12 (Twelve) Hours for 20 days.     Dispense:  40 tablet     Refill:  0     10 day course with 1 refill for a  total of 20 days of antibiotics     fluconazole (Diflucan) 150 MG tablet     Sig: Take 1 tablet by mouth 1 (One) Time for 1 dose.     Dispense:  1 tablet     Refill:  1       Return in about 3 months (around 7/15/2025) for Follow up with EMILY Brown.        Electronically signed by EMILY Brown 04/15/25 2:00 PM CDT.     Patient or patient representative verbalized consent for the use of Ambient Listening during the visit with  EMILY Brown for chart documentation. 4/15/2025  14:00 CDT

## 2025-04-16 ENCOUNTER — CLINICAL SUPPORT (OUTPATIENT)
Dept: OTOLARYNGOLOGY | Facility: CLINIC | Age: 55
End: 2025-04-16
Payer: COMMERCIAL

## 2025-04-16 DIAGNOSIS — J30.9 ALLERGIC RHINITIS, UNSPECIFIED SEASONALITY, UNSPECIFIED TRIGGER: Primary | ICD-10-CM

## 2025-04-16 NOTE — PROGRESS NOTES
Moise Flores   Allergy Injection Note:    Yon Ochoa presents for an immunotherapy injection. The site of the injection was cleansed with an alcohol swab. Serum was injected into the site after pulling back on the plunger to prevent intravascular injection. After the injection and was instructed to wait in the allergy waiting area for 30 minutes. There was no problems with the injection.    Allergy Shot Questionnaire  Injection nurse/assistant: TAS  Have you had increased asthma symptoms in past week?: no  Have you had increased allergy symptoms in the last week?: no  Have you had a cold, respiratory tract infection or flu like symptoms in the past 2 weeks?: no  Did you have any problems within 12 hours of the last injection?: no  Are you on any new medications/ eye drops?: no  Are you on any beta blockers?: no  If female, are you pregnant?: no  I have confirmed the name and birth date on my allergy vial. : yes  Epipen available?: yes  Epipen Lot Number: [id number]  Epipen Expiration Date: 4/2026  Number of allergy injections given: 1     Injection Details:  Vial 1   Vial 1 Expiration Date: 03/17/26  Vial 1 Series: 5  Shot type: escalation, maintenance  Vial 1 Dose (mL): 0.2  Vial 1 Location: Left upper arm  Vial 1 Reaction (in mm): <5          Moise Flores  4/16/2025  14:11 CDT

## 2025-04-16 NOTE — PROGRESS NOTES
I have reviewed the notes, assessments, and/or procedures performed by Moise Flores, I concur with her/his documentation of Yon Ochoa.  Lorelei Steven, APRN

## 2025-04-23 ENCOUNTER — CLINICAL SUPPORT (OUTPATIENT)
Dept: OTOLARYNGOLOGY | Facility: CLINIC | Age: 55
End: 2025-04-23
Payer: COMMERCIAL

## 2025-04-23 DIAGNOSIS — J30.9 ALLERGIC RHINITIS, UNSPECIFIED SEASONALITY, UNSPECIFIED TRIGGER: Primary | ICD-10-CM

## 2025-04-23 NOTE — PROGRESS NOTES
Moise Flores   Allergy Injection Note:    Yon Ochoa presents for an immunotherapy injection. The site of the injection was cleansed with an alcohol swab. Serum was injected into the site after pulling back on the plunger to prevent intravascular injection. After the injection and was instructed to wait in the allergy waiting area for 30 minutes. There was no problems with the injection.    Allergy Shot Questionnaire  Injection nurse/assistant: TAS  Have you had increased asthma symptoms in past week?: no  Have you had increased allergy symptoms in the last week?: no  Have you had a cold, respiratory tract infection or flu like symptoms in the past 2 weeks?: no  Did you have any problems within 12 hours of the last injection?: no  Are you on any new medications/ eye drops?: no  Are you on any beta blockers?: no  If female, are you pregnant?: no  I have confirmed the name and birth date on my allergy vial. : yes  Epipen available?: yes  Epipen Lot Number: [id number]  Epipen Expiration Date: 4/2026  Number of allergy injections given: 1     Injection Details:  Vial 1   Vial 1 Expiration Date: 03/17/26  Vial 1 Series: 5  Shot type: escalation, maintenance  Vial 1 Dose (mL): 0.3  Vial 1 Location: Right upper arm  Vial 1 Reaction (in mm): <5          Moise Flores  4/23/2025  14:01 CDT

## 2025-04-24 DIAGNOSIS — G62.9 NEUROPATHY: ICD-10-CM

## 2025-04-24 RX ORDER — DULOXETIN HYDROCHLORIDE 60 MG/1
CAPSULE, DELAYED RELEASE ORAL DAILY
Qty: 90 CAPSULE | Refills: 1 | Status: SHIPPED | OUTPATIENT
Start: 2025-04-24

## 2025-04-24 RX ORDER — DULOXETIN HYDROCHLORIDE 30 MG/1
30 CAPSULE, DELAYED RELEASE ORAL EVERY MORNING
Qty: 90 CAPSULE | Refills: 1 | Status: SHIPPED | OUTPATIENT
Start: 2025-04-24

## 2025-04-30 ENCOUNTER — CLINICAL SUPPORT (OUTPATIENT)
Dept: OTOLARYNGOLOGY | Facility: CLINIC | Age: 55
End: 2025-04-30
Payer: COMMERCIAL

## 2025-04-30 DIAGNOSIS — J30.9 ALLERGIC RHINITIS, UNSPECIFIED SEASONALITY, UNSPECIFIED TRIGGER: Primary | ICD-10-CM

## 2025-04-30 NOTE — PROGRESS NOTES
Moise Flores   Allergy Injection Note:    Yon Ochoa presents for an immunotherapy injection. The site of the injection was cleansed with an alcohol swab. Serum was injected into the site after pulling back on the plunger to prevent intravascular injection. After the injection and was instructed to wait in the allergy waiting area for 30 minutes. There was no problems with the injection.    Allergy Shot Questionnaire  Injection nurse/assistant: TAS  Have you had increased asthma symptoms in past week?: no  Have you had increased allergy symptoms in the last week?: no  Have you had a cold, respiratory tract infection or flu like symptoms in the past 2 weeks?: no  Did you have any problems within 12 hours of the last injection?: no  Are you on any new medications/ eye drops?: no  Are you on any beta blockers?: no  If female, are you pregnant?: no  I have confirmed the name and birth date on my allergy vial. : yes  Epipen available?: yes  Epipen Lot Number: [id number]  Epipen Expiration Date: 4/2026  Number of allergy injections given: 1     Injection Details:  Vial 1   Vial 1 Expiration Date: 03/17/26  Vial 1 Series: 5  Shot type: escalation, maintenance  Vial 1 Dose (mL): 0.4  Vial 1 Location: Left upper arm  Vial 1 Reaction (in mm): <5          Moise Flores  4/30/2025  15:56 CDT

## 2025-05-01 DIAGNOSIS — R12 HEARTBURN: ICD-10-CM

## 2025-05-02 RX ORDER — OMEPRAZOLE 20 MG/1
CAPSULE, DELAYED RELEASE ORAL
Qty: 90 CAPSULE | Refills: 3 | Status: SHIPPED | OUTPATIENT
Start: 2025-05-02

## 2025-05-02 NOTE — TELEPHONE ENCOUNTER
05- The pharmacy has requested a refill on your current medication-Omeprazole.  If regular medications are being prescribed our Providers prefers that patients have a yearly follow up apt.     You last apt was on 01- with LEEROY VICK.  Last no show was on 03- with Millicent VICK     Scheduled 05-     Routed to CT NICANOR

## 2025-05-07 ENCOUNTER — CLINICAL SUPPORT (OUTPATIENT)
Dept: OTOLARYNGOLOGY | Facility: CLINIC | Age: 55
End: 2025-05-07
Payer: COMMERCIAL

## 2025-05-07 DIAGNOSIS — J30.9 ALLERGIC RHINITIS, UNSPECIFIED SEASONALITY, UNSPECIFIED TRIGGER: Primary | ICD-10-CM

## 2025-05-07 NOTE — PROGRESS NOTES
Moise Flores   Allergy Injection Note:    Yon Ochoa presents for an immunotherapy injection. The site of the injection was cleansed with an alcohol swab. Serum was injected into the site after pulling back on the plunger to prevent intravascular injection. After the injection and was instructed to wait in the allergy waiting area for 30 minutes. There was no problems with the injection.    Allergy Shot Questionnaire  Injection nurse/assistant: TAS  Have you had increased asthma symptoms in past week?: no  Have you had increased allergy symptoms in the last week?: no  Have you had a cold, respiratory tract infection or flu like symptoms in the past 2 weeks?: no  Did you have any problems within 12 hours of the last injection?: no  Are you on any new medications/ eye drops?: no  Are you on any beta blockers?: no  If female, are you pregnant?: no  I have confirmed the name and birth date on my allergy vial. : yes  Epipen available?: yes  Epipen Lot Number: 1/31/2026  Epipen Expiration Date: [id number]  Number of allergy injections given: 1     Injection Details:  Vial 1   Vial 1 Expiration Date: 03/17/26  Vial 1 Series: 5  Shot type: escalation, maintenance  Vial 1 Dose (mL): 0.5  Vial 1 Location: Right upper arm  Vial 1 Reaction (in mm): <5          Moise Flores  5/7/2025  14:57 CDT

## 2025-05-12 DIAGNOSIS — Z79.4 TYPE 2 DIABETES MELLITUS WITH DIABETIC MICROALBUMINURIA, WITH LONG-TERM CURRENT USE OF INSULIN (HCC): ICD-10-CM

## 2025-05-12 DIAGNOSIS — R80.9 TYPE 2 DIABETES MELLITUS WITH DIABETIC MICROALBUMINURIA, WITH LONG-TERM CURRENT USE OF INSULIN (HCC): ICD-10-CM

## 2025-05-12 DIAGNOSIS — E03.9 ACQUIRED HYPOTHYROIDISM: ICD-10-CM

## 2025-05-12 DIAGNOSIS — E11.29 TYPE 2 DIABETES MELLITUS WITH DIABETIC MICROALBUMINURIA, WITH LONG-TERM CURRENT USE OF INSULIN (HCC): ICD-10-CM

## 2025-05-12 DIAGNOSIS — Z13.0 SCREENING FOR DEFICIENCY ANEMIA: ICD-10-CM

## 2025-05-12 DIAGNOSIS — Z79.4 TYPE 2 DIABETES MELLITUS WITHOUT COMPLICATION, WITH LONG-TERM CURRENT USE OF INSULIN (HCC): ICD-10-CM

## 2025-05-12 DIAGNOSIS — E11.9 TYPE 2 DIABETES MELLITUS WITHOUT COMPLICATION, WITH LONG-TERM CURRENT USE OF INSULIN (HCC): ICD-10-CM

## 2025-05-12 DIAGNOSIS — E78.1 HYPERTRIGLYCERIDEMIA: ICD-10-CM

## 2025-05-12 LAB
ALBUMIN SERPL-MCNC: 4.3 G/DL (ref 3.5–5.2)
ALP SERPL-CCNC: 75 U/L (ref 35–104)
ALT SERPL-CCNC: 10 U/L (ref 10–35)
ANION GAP SERPL CALCULATED.3IONS-SCNC: 13 MMOL/L (ref 8–16)
AST SERPL-CCNC: 26 U/L (ref 10–35)
BASOPHILS # BLD: 0 K/UL (ref 0–0.2)
BASOPHILS NFR BLD: 0 % (ref 0–1)
BILIRUB SERPL-MCNC: 0.3 MG/DL (ref 0.2–1.2)
BUN SERPL-MCNC: 17 MG/DL (ref 6–20)
CALCIUM SERPL-MCNC: 9.5 MG/DL (ref 8.6–10)
CHLORIDE SERPL-SCNC: 99 MMOL/L (ref 98–107)
CHOLEST SERPL-MCNC: 173 MG/DL (ref 0–199)
CO2 SERPL-SCNC: 22 MMOL/L (ref 22–29)
CREAT SERPL-MCNC: 0.9 MG/DL (ref 0.5–0.9)
CREAT UR-MCNC: 157 MG/DL (ref 28–217)
EOSINOPHIL # BLD: 0.44 K/UL (ref 0–0.6)
EOSINOPHIL NFR BLD: 4 % (ref 0–5)
ERYTHROCYTE [DISTWIDTH] IN BLOOD BY AUTOMATED COUNT: 13.3 % (ref 11.5–14.5)
GLUCOSE SERPL-MCNC: 149 MG/DL (ref 70–99)
HBA1C MFR BLD: 7.2 % (ref 4–5.6)
HCT VFR BLD AUTO: 39 % (ref 37–47)
HDLC SERPL-MCNC: 33 MG/DL (ref 40–60)
HGB BLD-MCNC: 12.8 G/DL (ref 12–16)
IMM GRANULOCYTES # BLD: 0 K/UL
LDLC SERPL CALC-MCNC: 67 MG/DL
LYMPHOCYTES # BLD: 5.2 K/UL (ref 1.1–4.5)
LYMPHOCYTES NFR BLD: 47 % (ref 20–40)
MCH RBC QN AUTO: 28.8 PG (ref 27–31)
MCHC RBC AUTO-ENTMCNC: 32.8 G/DL (ref 33–37)
MCV RBC AUTO: 87.6 FL (ref 81–99)
MICROALBUMIN UR-MCNC: 1.42 MG/DL (ref 0–1.99)
MICROALBUMIN/CREAT UR-RTO: 9 MG/G (ref 0–29)
MONOCYTES # BLD: 0.9 K/UL (ref 0–0.9)
MONOCYTES NFR BLD: 8 % (ref 0–10)
NEUTROPHILS # BLD: 4.6 K/UL (ref 1.5–7.5)
NEUTS SEG NFR BLD: 41 % (ref 50–65)
PLATELET # BLD AUTO: 206 K/UL (ref 130–400)
PLATELET SLIDE REVIEW: ADEQUATE
PMV BLD AUTO: 9.7 FL (ref 9.4–12.3)
POTASSIUM SERPL-SCNC: 4.3 MMOL/L (ref 3.5–5.1)
PROT SERPL-MCNC: 7.6 G/DL (ref 6.4–8.3)
RBC # BLD AUTO: 4.45 M/UL (ref 4.2–5.4)
SODIUM SERPL-SCNC: 134 MMOL/L (ref 136–145)
STOMATOCYTES BLD QL SMEAR: ABNORMAL
T4 FREE SERPL-MCNC: 1.16 NG/DL (ref 0.93–1.7)
TRIGL SERPL-MCNC: 363 MG/DL (ref 0–149)
TSH SERPL DL<=0.005 MIU/L-ACNC: 5.31 UIU/ML (ref 0.27–4.2)
WBC # BLD AUTO: 11.1 K/UL (ref 4.8–10.8)

## 2025-05-13 ENCOUNTER — RESULTS FOLLOW-UP (OUTPATIENT)
Dept: PRIMARY CARE CLINIC | Age: 55
End: 2025-05-13

## 2025-05-13 NOTE — RESULT ENCOUNTER NOTE
White count elevated similar to previous events  Patient's triglyceride is very elevated at 363 and her cholesterol went up to if she taking cholesterol-lowering medicine  TSH slightly elevated all this results will be discussed during her visit

## 2025-05-14 ENCOUNTER — CLINICAL SUPPORT (OUTPATIENT)
Dept: OTOLARYNGOLOGY | Facility: CLINIC | Age: 55
End: 2025-05-14
Payer: COMMERCIAL

## 2025-05-14 DIAGNOSIS — J30.9 ALLERGIC RHINITIS, UNSPECIFIED SEASONALITY, UNSPECIFIED TRIGGER: Primary | ICD-10-CM

## 2025-05-14 NOTE — PROGRESS NOTES
[facility]thopa[facility] Clinic Note-Established Patient     NAME:  Jose M Osorio   : 1970  MRN: [id number]      5/15/2025     CHIEF COMPLAINT:  Left shoulder pain      HISTORY OF PRESENT ILLNESS:   Jose M is a 55 y.o. female who presents to the office for evaluation and treatment of the left shoulder.   Patient is currently being seen by Dr Conner and undergoing PT due to adhesive capsulitis of the left shoulder. She had a new injury to the left shoulder at work on 2025.  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 A 50 pound box of frozen chicken fell onto her shoulder.  She does have MRI which was negative for significant acute finding.  Her exam is consistently been concerning for adhesive capsulitis.    Since her last visit, she has continued PT and meloxicam as she was seeing improvement.  But her symptoms had not resolved.  Today, she states she feels like she has progressed much farther and is much closer to her baseline.  She still does have occasional soreness and pulling sensation but it is nothing like when she started.    Past Medical History:        Diagnosis Date    Anxiety     Bacteremia 10/17/2019    Bandemia 10/16/2019    Class 2 obesity due to excess calories without serious comorbidity with body mass index (BMI) of 36.0 to 36.9 in adult 2019    Depression     Diabetes mellitus (HCC)     Ganglion cyst     GERD (gastroesophageal reflux disease)     Heartburn 10/28/2020    Hyperglycemia 2017    Hyperlipidemia     Hypertension     Hypothyroidism     Lower abdominal pain 2019    Neuropathy 10/16/2019    Obstructive sleep apnea     no machine    Osteoarthritis     Skin ulcer of flank with fat layer exposed (HCC) 2020    Staph infection 2018       Past Surgical History:        Procedure Laterality Date    APPENDECTOMY      as a 1st grader    CARPAL TUNNEL RELEASE       SECTION      , ,     CHOLECYSTECTOMY, LAPAROSCOPIC          COLONOSCOPY

## 2025-05-14 NOTE — PROGRESS NOTES
Moise Flores   Allergy Injection Note:    Yon Ochoa presents for an immunotherapy injection. The site of the injection was cleansed with an alcohol swab. Serum was injected into the site after pulling back on the plunger to prevent intravascular injection. After the injection and was instructed to wait in the allergy waiting area for 30 minutes. There was no problems with the injection.    Allergy Shot Questionnaire  Injection nurse/assistant: TAS  Have you had increased asthma symptoms in past week?: no  Have you had increased allergy symptoms in the last week?: no  Have you had a cold, respiratory tract infection or flu like symptoms in the past 2 weeks?: no  Did you have any problems within 12 hours of the last injection?: no  Are you on any new medications/ eye drops?: no  Are you on any beta blockers?: no  If female, are you pregnant?: no  I have confirmed the name and birth date on my allergy vial. : yes  Epipen available?: yes  Epipen Lot Number: 1/31/2026  Epipen Expiration Date: [id number]  Number of allergy injections given: 1     Injection Details:  Vial 1   Vial 1 Expiration Date: 03/17/26  Vial 1 Series: 5  Shot type: escalation, maintenance  Vial 1 Dose (mL): 0.5  Vial 1 Location: Left upper arm  Vial 1 Reaction (in mm): <5          Moise Flores  5/14/2025  15:15 CDT

## 2025-05-15 ENCOUNTER — OFFICE VISIT (OUTPATIENT)
Age: 55
End: 2025-05-15
Payer: COMMERCIAL

## 2025-05-15 VITALS — BODY MASS INDEX: 27.42 KG/M2 | HEIGHT: 62 IN | WEIGHT: 149 LBS

## 2025-05-15 DIAGNOSIS — M75.02 ADHESIVE CAPSULITIS OF LEFT SHOULDER: Primary | ICD-10-CM

## 2025-05-15 PROCEDURE — 99213 OFFICE O/P EST LOW 20 MIN: CPT | Performed by: PHYSICIAN ASSISTANT

## 2025-05-19 NOTE — ASSESSMENT & PLAN NOTE
Chronic at goal        Patient has a history of uncontrolled Diabetes, a history of hypoglycemia, . Patient is on long acting Insulin nightly, and is on pre-meal coverage with a sliding scale, warranting frequent monitoring of blood glucose and multiple adjustment of insulin dosages dailyusing a CGM.

## 2025-05-19 NOTE — PROGRESS NOTES
part of ongoing care related to Jose M Osorio    (Time Documentation Optional [id number])    An electronic signaturewaas used to authenticate this note  -Claudia Valdez MD on 5/20/2025 at 5:48 PM

## 2025-05-20 ENCOUNTER — OFFICE VISIT (OUTPATIENT)
Dept: PRIMARY CARE CLINIC | Age: 55
End: 2025-05-20
Payer: COMMERCIAL

## 2025-05-20 VITALS
BODY MASS INDEX: 28.34 KG/M2 | RESPIRATION RATE: 17 BRPM | OXYGEN SATURATION: 99 % | TEMPERATURE: 97.2 F | DIASTOLIC BLOOD PRESSURE: 72 MMHG | HEART RATE: 75 BPM | SYSTOLIC BLOOD PRESSURE: 102 MMHG | WEIGHT: 154 LBS | HEIGHT: 62 IN

## 2025-05-20 DIAGNOSIS — R51.9 WORSENING HEADACHES: ICD-10-CM

## 2025-05-20 DIAGNOSIS — Z13.1 DIABETES MELLITUS SCREENING: ICD-10-CM

## 2025-05-20 DIAGNOSIS — R80.9 TYPE 2 DIABETES MELLITUS WITH DIABETIC MICROALBUMINURIA, WITH LONG-TERM CURRENT USE OF INSULIN (HCC): ICD-10-CM

## 2025-05-20 DIAGNOSIS — Z51.81 MEDICATION MONITORING ENCOUNTER: ICD-10-CM

## 2025-05-20 DIAGNOSIS — E06.3 HYPOTHYROIDISM DUE TO HASHIMOTO'S THYROIDITIS: ICD-10-CM

## 2025-05-20 DIAGNOSIS — Z12.31 ENCOUNTER FOR SCREENING MAMMOGRAM FOR BREAST CANCER: ICD-10-CM

## 2025-05-20 DIAGNOSIS — K21.9 GERD WITHOUT ESOPHAGITIS: ICD-10-CM

## 2025-05-20 DIAGNOSIS — E11.29 TYPE 2 DIABETES MELLITUS WITH DIABETIC MICROALBUMINURIA, WITH LONG-TERM CURRENT USE OF INSULIN (HCC): ICD-10-CM

## 2025-05-20 DIAGNOSIS — K59.04 CHRONIC IDIOPATHIC CONSTIPATION: ICD-10-CM

## 2025-05-20 DIAGNOSIS — E11.42 DIABETIC POLYNEUROPATHY ASSOCIATED WITH TYPE 2 DIABETES MELLITUS (HCC): Primary | ICD-10-CM

## 2025-05-20 DIAGNOSIS — Z79.4 TYPE 2 DIABETES MELLITUS WITH DIABETIC MICROALBUMINURIA, WITH LONG-TERM CURRENT USE OF INSULIN (HCC): ICD-10-CM

## 2025-05-20 DIAGNOSIS — E78.2 MIXED HYPERLIPIDEMIA: ICD-10-CM

## 2025-05-20 DIAGNOSIS — F41.8 DEPRESSION WITH ANXIETY: ICD-10-CM

## 2025-05-20 DIAGNOSIS — G43.109 MIGRAINE WITH AURA AND WITHOUT STATUS MIGRAINOSUS, NOT INTRACTABLE: ICD-10-CM

## 2025-05-20 PROBLEM — K59.09 CHRONIC CONSTIPATION: Status: RESOLVED | Noted: 2019-01-22 | Resolved: 2025-05-20

## 2025-05-20 LAB
ALCOHOL URINE: NORMAL
AMPHETAMINE SCREEN URINE: NORMAL
BARBITURATE SCREEN URINE: NORMAL
BENZODIAZEPINE SCREEN, URINE: NORMAL
BUPRENORPHINE URINE: NORMAL
COCAINE METABOLITE SCREEN URINE: NORMAL
FENTANYL SCREEN, URINE: NORMAL
GABAPENTIN SCREEN, URINE: NORMAL
MDMA, URINE: NORMAL
METHADONE SCREEN, URINE: NORMAL
METHAMPHETAMINE, URINE: NORMAL
OPIATE SCREEN URINE: NORMAL
OXYCODONE SCREEN URINE: NORMAL
PHENCYCLIDINE SCREEN URINE: NORMAL
PROPOXYPHENE SCREEN, URINE: NORMAL
SYNTHETIC CANNABINOIDS(K2) SCREEN, URINE: NORMAL
THC SCREEN, URINE: NORMAL
TRAMADOL SCREEN URINE: NORMAL
TRICYCLIC ANTIDEPRESSANTS, UR: NORMAL

## 2025-05-20 PROCEDURE — 99214 OFFICE O/P EST MOD 30 MIN: CPT | Performed by: INTERNAL MEDICINE

## 2025-05-20 PROCEDURE — 3051F HG A1C>EQUAL 7.0%<8.0%: CPT | Performed by: INTERNAL MEDICINE

## 2025-05-20 PROCEDURE — 80305 DRUG TEST PRSMV DIR OPT OBS: CPT | Performed by: INTERNAL MEDICINE

## 2025-05-20 SDOH — ECONOMIC STABILITY: FOOD INSECURITY: WITHIN THE PAST 12 MONTHS, THE FOOD YOU BOUGHT JUST DIDN'T LAST AND YOU DIDN'T HAVE MONEY TO GET MORE.: NEVER TRUE

## 2025-05-20 SDOH — ECONOMIC STABILITY: FOOD INSECURITY: WITHIN THE PAST 12 MONTHS, YOU WORRIED THAT YOUR FOOD WOULD RUN OUT BEFORE YOU GOT MONEY TO BUY MORE.: NEVER TRUE

## 2025-05-20 ASSESSMENT — PATIENT HEALTH QUESTIONNAIRE - PHQ9
1. LITTLE INTEREST OR PLEASURE IN DOING THINGS: NOT AT ALL
4. FEELING TIRED OR HAVING LITTLE ENERGY: SEVERAL DAYS
6. FEELING BAD ABOUT YOURSELF - OR THAT YOU ARE A FAILURE OR HAVE LET YOURSELF OR YOUR FAMILY DOWN: NOT AT ALL
5. POOR APPETITE OR OVEREATING: NOT AT ALL
SUM OF ALL RESPONSES TO PHQ QUESTIONS 1-9: 1
8. MOVING OR SPEAKING SO SLOWLY THAT OTHER PEOPLE COULD HAVE NOTICED. OR THE OPPOSITE, BEING SO FIGETY OR RESTLESS THAT YOU HAVE BEEN MOVING AROUND A LOT MORE THAN USUAL: NOT AT ALL
SUM OF ALL RESPONSES TO PHQ QUESTIONS 1-9: 1
SUM OF ALL RESPONSES TO PHQ QUESTIONS 1-9: 1
2. FEELING DOWN, DEPRESSED OR HOPELESS: NOT AT ALL
10. IF YOU CHECKED OFF ANY PROBLEMS, HOW DIFFICULT HAVE THESE PROBLEMS MADE IT FOR YOU TO DO YOUR WORK, TAKE CARE OF THINGS AT HOME, OR GET ALONG WITH OTHER PEOPLE: NOT DIFFICULT AT ALL
3. TROUBLE FALLING OR STAYING ASLEEP: NOT AT ALL
SUM OF ALL RESPONSES TO PHQ QUESTIONS 1-9: 1
7. TROUBLE CONCENTRATING ON THINGS, SUCH AS READING THE NEWSPAPER OR WATCHING TELEVISION: NOT AT ALL
9. THOUGHTS THAT YOU WOULD BE BETTER OFF DEAD, OR OF HURTING YOURSELF: NOT AT ALL

## 2025-05-20 ASSESSMENT — ENCOUNTER SYMPTOMS
WHEEZING: 0
COUGH: 0
RHINORRHEA: 0
SHORTNESS OF BREATH: 0
VOMITING: 0
ABDOMINAL PAIN: 0
CONSTIPATION: 0
DIARRHEA: 0
BACK PAIN: 0
NAUSEA: 0
CHEST TIGHTNESS: 0
SINUS PAIN: 0
TROUBLE SWALLOWING: 0
BLOOD IN STOOL: 0

## 2025-05-20 NOTE — ASSESSMENT & PLAN NOTE
Chronic, worsening (exacerbation), changes made today: Continue current treatment plan will order a CT to evaluate since family history of head brain tumor is present

## 2025-05-21 ENCOUNTER — CLINICAL SUPPORT (OUTPATIENT)
Dept: OTOLARYNGOLOGY | Facility: CLINIC | Age: 55
End: 2025-05-21
Payer: COMMERCIAL

## 2025-05-21 DIAGNOSIS — J30.9 ALLERGIC RHINITIS, UNSPECIFIED SEASONALITY, UNSPECIFIED TRIGGER: Primary | ICD-10-CM

## 2025-05-21 NOTE — PROGRESS NOTES
Moise Flores   Allergy Injection Note:    Yon Ochoa presents for an immunotherapy injection. The site of the injection was cleansed with an alcohol swab. Serum was injected into the site after pulling back on the plunger to prevent intravascular injection. After the injection and was instructed to wait in the allergy waiting area for 30 minutes. There was no problems with the injection.    Allergy Shot Questionnaire  Injection nurse/assistant: TAS  Have you had increased asthma symptoms in past week?: no  Have you had increased allergy symptoms in the last week?: no  Have you had a cold, respiratory tract infection or flu like symptoms in the past 2 weeks?: no  Did you have any problems within 12 hours of the last injection?: no  Are you on any new medications/ eye drops?: no  Are you on any beta blockers?: no  If female, are you pregnant?: no  I have confirmed the name and birth date on my allergy vial. : yes  Epipen available?: yes  Epipen Lot Number: 1/31/2026  Epipen Expiration Date: [id number]  Number of allergy injections given: 1     Injection Details:  Vial 1   Vial 1 Expiration Date: 03/17/26  Vial 1 Series: 5  Shot type: escalation, maintenance  Vial 1 Dose (mL): 0.5  Vial 1 Location: Right upper arm  Vial 1 Reaction (in mm): <5          Moise Flores  5/21/2025  14:32 CDT

## 2025-06-04 ENCOUNTER — CLINICAL SUPPORT (OUTPATIENT)
Dept: OTOLARYNGOLOGY | Facility: CLINIC | Age: 55
End: 2025-06-04
Payer: COMMERCIAL

## 2025-06-04 DIAGNOSIS — J30.9 ALLERGIC RHINITIS, UNSPECIFIED SEASONALITY, UNSPECIFIED TRIGGER: Primary | ICD-10-CM

## 2025-06-11 ENCOUNTER — CLINICAL SUPPORT (OUTPATIENT)
Dept: OTOLARYNGOLOGY | Facility: CLINIC | Age: 55
End: 2025-06-11
Payer: COMMERCIAL

## 2025-06-11 DIAGNOSIS — J30.9 ALLERGIC RHINITIS, UNSPECIFIED SEASONALITY, UNSPECIFIED TRIGGER: Primary | ICD-10-CM

## 2025-06-11 NOTE — PROGRESS NOTES
Moise Flores   Allergy Injection Note:    Yon Ochoa presents for an immunotherapy injection. The site of the injection was cleansed with an alcohol swab. Serum was injected into the site after pulling back on the plunger to prevent intravascular injection. After the injection and was instructed to wait in the allergy waiting area for 30 minutes. There was no problems with the injection.    Allergy Shot Questionnaire  Injection nurse/assistant: TAS  Have you had increased asthma symptoms in past week?: no  Have you had increased allergy symptoms in the last week?: no  Have you had a cold, respiratory tract infection or flu like symptoms in the past 2 weeks?: no  Did you have any problems within 12 hours of the last injection?: no  Are you on any new medications/ eye drops?: no  Are you on any beta blockers?: no  If female, are you pregnant?: no  I have confirmed the name and birth date on my allergy vial. : yes  Epipen available?: yes  Epipen Lot Number: 1/31/2026  Epipen Expiration Date: [id number]  Number of allergy injections given: 1     Injection Details:  Vial 1   Vial 1 Expiration Date: 03/17/26  Vial 1 Series: 5  Shot type: escalation, maintenance  Vial 1 Dose (mL): 0.5  Vial 1 Location: Right upper arm  Vial 1 Reaction (in mm): <5          Moise Flores  6/11/2025  11:01 CDT

## 2025-06-18 ENCOUNTER — CLINICAL SUPPORT (OUTPATIENT)
Dept: OTOLARYNGOLOGY | Facility: CLINIC | Age: 55
End: 2025-06-18
Payer: COMMERCIAL

## 2025-06-18 DIAGNOSIS — J30.9 ALLERGIC RHINITIS, UNSPECIFIED SEASONALITY, UNSPECIFIED TRIGGER: Primary | ICD-10-CM

## 2025-06-18 NOTE — PROGRESS NOTES
Moise Flores   Allergy Injection Note:    Yon Ochoa presents for an immunotherapy injection. The site of the injection was cleansed with an alcohol swab. Serum was injected into the site after pulling back on the plunger to prevent intravascular injection. After the injection and was instructed to wait in the allergy waiting area for 30 minutes. There was no problems with the injection.    Allergy Shot Questionnaire  Injection nurse/assistant: TAS  Have you had increased asthma symptoms in past week?: no  Have you had increased allergy symptoms in the last week?: no  Have you had a cold, respiratory tract infection or flu like symptoms in the past 2 weeks?: no  Did you have any problems within 12 hours of the last injection?: no  Are you on any new medications/ eye drops?: no  Are you on any beta blockers?: no  If female, are you pregnant?: no  I have confirmed the name and birth date on my allergy vial. : yes  Epipen available?: yes  Epipen Lot Number: 1/31/2026  Epipen Expiration Date: [id number]  Number of allergy injections given: 1     Injection Details:  Vial 1   Vial 1 Expiration Date: 03/17/26  Vial 1 Series: 5  Shot type: escalation, maintenance  Vial 1 Dose (mL): 0.5  Vial 1 Location: Left upper arm  Vial 1 Reaction (in mm): <5          Moise Flores  6/18/2025  10:28 CDT

## 2025-06-24 ENCOUNTER — HOSPITAL ENCOUNTER (OUTPATIENT)
Dept: WOMENS IMAGING | Age: 55
Discharge: HOME | End: 2025-06-24
Attending: INTERNAL MEDICINE
Payer: COMMERCIAL

## 2025-06-24 ENCOUNTER — HOSPITAL ENCOUNTER (OUTPATIENT)
Dept: CT IMAGING | Age: 55
Discharge: HOME | End: 2025-06-24
Attending: INTERNAL MEDICINE
Payer: COMMERCIAL

## 2025-06-24 ENCOUNTER — RESULTS FOLLOW-UP (OUTPATIENT)
Dept: PRIMARY CARE CLINIC | Age: 55
End: 2025-06-24

## 2025-06-24 VITALS — HEIGHT: 62 IN | WEIGHT: 152 LBS | BODY MASS INDEX: 27.97 KG/M2

## 2025-06-24 DIAGNOSIS — Z12.31 ENCOUNTER FOR SCREENING MAMMOGRAM FOR BREAST CANCER: ICD-10-CM

## 2025-06-24 DIAGNOSIS — R51.9 WORSENING HEADACHES: ICD-10-CM

## 2025-06-24 LAB
CREAT SERPL-MCNC: 0.9 MG/DL (ref 0.3–1.3)
PERFORMED ON: NORMAL

## 2025-06-24 PROCEDURE — 82565 ASSAY OF CREATININE: CPT

## 2025-06-24 PROCEDURE — 77063 BREAST TOMOSYNTHESIS BI: CPT

## 2025-06-24 PROCEDURE — 6360000004 HC RX CONTRAST MEDICATION: Performed by: INTERNAL MEDICINE

## 2025-06-24 PROCEDURE — 70470 CT HEAD/BRAIN W/O & W/DYE: CPT

## 2025-06-24 RX ORDER — IOPAMIDOL 755 MG/ML
75 INJECTION, SOLUTION INTRAVASCULAR
Status: COMPLETED | OUTPATIENT
Start: 2025-06-24 | End: 2025-06-24

## 2025-06-24 RX ADMIN — IOPAMIDOL 75 ML: 755 INJECTION, SOLUTION INTRAVENOUS at 13:44

## 2025-06-29 DIAGNOSIS — I10 ESSENTIAL HYPERTENSION: ICD-10-CM

## 2025-06-29 DIAGNOSIS — E03.9 HYPOTHYROIDISM, UNSPECIFIED TYPE: ICD-10-CM

## 2025-06-29 DIAGNOSIS — F51.01 PRIMARY INSOMNIA: ICD-10-CM

## 2025-06-29 DIAGNOSIS — Z76.0 MEDICATION REFILL: ICD-10-CM

## 2025-06-29 DIAGNOSIS — R80.9 TYPE 2 DIABETES MELLITUS WITH DIABETIC MICROALBUMINURIA, WITH LONG-TERM CURRENT USE OF INSULIN (HCC): ICD-10-CM

## 2025-06-29 DIAGNOSIS — E11.29 TYPE 2 DIABETES MELLITUS WITH DIABETIC MICROALBUMINURIA, WITH LONG-TERM CURRENT USE OF INSULIN (HCC): ICD-10-CM

## 2025-06-29 DIAGNOSIS — Z79.4 TYPE 2 DIABETES MELLITUS WITH DIABETIC MICROALBUMINURIA, WITH LONG-TERM CURRENT USE OF INSULIN (HCC): ICD-10-CM

## 2025-06-29 DIAGNOSIS — E78.5 HYPERLIPIDEMIA, UNSPECIFIED HYPERLIPIDEMIA TYPE: ICD-10-CM

## 2025-06-30 RX ORDER — LEVOTHYROXINE SODIUM 137 UG/1
137 TABLET ORAL DAILY
Qty: 90 TABLET | Refills: 1 | Status: SHIPPED | OUTPATIENT
Start: 2025-06-30

## 2025-06-30 RX ORDER — TELMISARTAN 20 MG/1
10 TABLET ORAL DAILY
Qty: 45 TABLET | Refills: 1 | Status: SHIPPED | OUTPATIENT
Start: 2025-06-30

## 2025-06-30 RX ORDER — TRAZODONE HYDROCHLORIDE 100 MG/1
100 TABLET ORAL NIGHTLY
Qty: 90 TABLET | Refills: 1 | Status: SHIPPED | OUTPATIENT
Start: 2025-06-30

## 2025-06-30 RX ORDER — FAMOTIDINE 20 MG/1
20 TABLET, FILM COATED ORAL 2 TIMES DAILY
Qty: 180 TABLET | Refills: 1 | Status: SHIPPED | OUTPATIENT
Start: 2025-06-30

## 2025-06-30 RX ORDER — ATORVASTATIN CALCIUM 20 MG/1
20 TABLET, FILM COATED ORAL DAILY
Qty: 90 TABLET | Refills: 1 | Status: SHIPPED | OUTPATIENT
Start: 2025-06-30

## 2025-06-30 RX ORDER — SPIRONOLACTONE 50 MG/1
50 TABLET, FILM COATED ORAL DAILY
Qty: 90 TABLET | Refills: 1 | Status: SHIPPED | OUTPATIENT
Start: 2025-06-30

## 2025-06-30 NOTE — TELEPHONE ENCOUNTER
Jose M Osorio called to request a refill on her medication.      Last office visit : 5/20/2025   Next office visit : 11/25/2025     Requested Prescriptions     Signed Prescriptions Disp Refills    famotidine (PEPCID) 20 MG tablet 180 tablet 1     Sig: TAKE 1 TABLET BY MOUTH TWICE A DAY     Authorizing Provider: BRANDON RHODES     Ordering User: RITA HACKETT    spironolactone (ALDACTONE) 50 MG tablet 90 tablet 1     Sig: TAKE 1 TABLET BY MOUTH EVERY DAY     Authorizing Provider: BRANDON RHODES     Ordering User: RITA HACKETT    telmisartan (MICARDIS) 20 MG tablet 45 tablet 1     Sig: TAKE 1/2 TABLET BY MOUTH DAILY     Authorizing Provider: BRANDON RHODES     Ordering User: RITA HACKETT    traZODone (DESYREL) 100 MG tablet 90 tablet 1     Sig: TAKE 1 TABLET BY MOUTH EVERY DAY AT NIGHT     Authorizing Provider: BRANDON RHODES     Ordering User: RITA HACKETT    levothyroxine (SYNTHROID) 137 MCG tablet 90 tablet 1     Sig: TAKE 1 TABLET BY MOUTH EVERY DAY     Authorizing Provider: BRANDON RHODES     Ordering User: RITA HACKETT    atorvastatin (LIPITOR) 20 MG tablet 90 tablet 1     Sig: TAKE 1 TABLET BY MOUTH EVERY DAY     Authorizing Provider: BRANDON RHODES     Ordering User: RITA HACKETT LPN

## 2025-07-02 ENCOUNTER — CLINICAL SUPPORT (OUTPATIENT)
Dept: OTOLARYNGOLOGY | Facility: CLINIC | Age: 55
End: 2025-07-02
Payer: COMMERCIAL

## 2025-07-02 DIAGNOSIS — J30.9 ALLERGIC RHINITIS, UNSPECIFIED SEASONALITY, UNSPECIFIED TRIGGER: Primary | ICD-10-CM

## 2025-07-02 NOTE — PROGRESS NOTES
I have reviewed the notes, assessments, and/or procedures performed by Moise Flores, I concur with her/his documentation of Yon Ochoa.   Dameon Santiago, APRN

## 2025-07-02 NOTE — PROGRESS NOTES
Moise Flores   Allergy Injection Note:    Yon Ochoa presents for an immunotherapy injection. The site of the injection was cleansed with an alcohol swab. Serum was injected into the site after pulling back on the plunger to prevent intravascular injection. After the injection and was instructed to wait in the allergy waiting area for 30 minutes. There was no problems with the injection.    Allergy Shot Questionnaire  Injection nurse/assistant: TAS  Have you had increased asthma symptoms in past week?: no  Have you had increased allergy symptoms in the last week?: no  Have you had a cold, respiratory tract infection or flu like symptoms in the past 2 weeks?: no  Did you have any problems within 12 hours of the last injection?: no  Are you on any new medications/ eye drops?: no  Are you on any beta blockers?: no  If female, are you pregnant?: no  I have confirmed the name and birth date on my allergy vial. : yes  Epipen available?: yes  Epipen Lot Number: 1/31/2026  Epipen Expiration Date: [id number]  Number of allergy injections given: 1     Injection Details:  Vial 1   Vial 1 Expiration Date: 03/17/26  Vial 1 Series: 5  Shot type: escalation, maintenance  Vial 1 Dose (mL): 0.5  Vial 1 Location: Right upper arm  Vial 1 Reaction (in mm): <5          Moise Flores  7/2/2025  13:45 CDT

## 2025-07-16 ENCOUNTER — CLINICAL SUPPORT (OUTPATIENT)
Dept: OTOLARYNGOLOGY | Facility: CLINIC | Age: 55
End: 2025-07-16
Payer: COMMERCIAL

## 2025-07-16 DIAGNOSIS — J30.9 ALLERGIC RHINITIS, UNSPECIFIED SEASONALITY, UNSPECIFIED TRIGGER: Primary | ICD-10-CM

## 2025-07-16 NOTE — PROGRESS NOTES
Moise Flores   Allergy Injection Note:    Yon Ochoa presents for an immunotherapy injection. The site of the injection was cleansed with an alcohol swab. Serum was injected into the site after pulling back on the plunger to prevent intravascular injection. After the injection and was instructed to wait in the allergy waiting area for 30 minutes. There was no problems with the injection.    Allergy Shot Questionnaire  Injection nurse/assistant: TAS  Have you had increased asthma symptoms in past week?: no  Have you had increased allergy symptoms in the last week?: no  Have you had a cold, respiratory tract infection or flu like symptoms in the past 2 weeks?: no  Did you have any problems within 12 hours of the last injection?: no  Are you on any new medications/ eye drops?: no  Are you on any beta blockers?: no  If female, are you pregnant?: no  I have confirmed the name and birth date on my allergy vial. : yes  Epipen available?: yes  Epipen Lot Number: [id number]  Epipen Expiration Date: [id number]  Number of allergy injections given: 1     Injection Details:  Vial 1   Vial 1 Expiration Date: 03/17/26  Vial 1 Series: 5  Shot type: escalation, maintenance  Vial 1 Dose (mL): 0.5  Vial 1 Location: Left upper arm  Vial 1 Reaction (in mm): <5          Moise Flores  7/16/2025  14:17 CDT

## 2025-07-21 ENCOUNTER — OFFICE VISIT (OUTPATIENT)
Dept: OTOLARYNGOLOGY | Facility: CLINIC | Age: 55
End: 2025-07-21
Payer: COMMERCIAL

## 2025-07-21 VITALS — HEIGHT: 62 IN | WEIGHT: 162 LBS | BODY MASS INDEX: 29.81 KG/M2

## 2025-07-21 DIAGNOSIS — J32.9 CHRONIC SINUSITIS, UNSPECIFIED LOCATION: Primary | ICD-10-CM

## 2025-07-21 DIAGNOSIS — J30.9 ALLERGIC RHINITIS, UNSPECIFIED SEASONALITY, UNSPECIFIED TRIGGER: ICD-10-CM

## 2025-07-21 RX ORDER — MELOXICAM 15 MG/1
1 TABLET ORAL DAILY
COMMUNITY
Start: 2025-04-22

## 2025-07-21 NOTE — PROGRESS NOTES
[facility]  [address], [address]  [facility] [facility] [address]  Fax [phone]  Phone [phone]      Visit Type: FOLLOW UP   Chief Complaint   Patient presents with    Chronic sinusitis     Pt states she is having continuous allergy issues           HISTORY OBTAINED FROM: patient  HPI  She presents for a follow up evaluation. She has had continued problems with nasal congestion, repeated sinusitis, sinus pressure, allergy, and current immunotherapy. Patient last CT in 2022, essentially unremarkable.  Patient has been allergy tested, undergone immunotherapy, she is still having to take daily antihistamines, singulair, flonase, and astelin.  She has been treated with multiple rounds of antibiotics.  We have avoided  steroids as much as possible as patient is diabetic.      Past Medical History:   Diagnosis Date    Carpal tunnel syndrome on right 2/3/2020    Diabetes     GERD (gastroesophageal reflux disease)     Hyperlipidemia     Hypertension     Hypertension associated with diabetes 12/4/2019    Hypothyroidism due to Hashimoto's thyroiditis 12/4/2019    Mixed diabetic hyperlipidemia associated with type 2 diabetes mellitus 12/4/2019    Type 2 diabetes mellitus with hyperglycemia, with long-term current use of insulin 12/4/2019       History reviewed. No pertinent surgical history.    Family History: Her family history is not on file.     Social History: She  reports that she has never smoked. She has never used smokeless tobacco. She reports that she does not drink alcohol and does not use drugs.    Home Medications:  Blood Glucose Monitoring Suppl, DULoxetine, Dexcom G6 Sensor, Dexcom G6 Transmitter, EPINEPHrine, Insulin Degludec, Insulin Lispro (1 Unit Dial), Pen Needles, Plecanatide, Prucalopride Succinate, Tirzepatide, Vitamin D, aspirin, atorvastatin, azelastine, clonazePAM, cyclobenzaprine, famotidine,  fexofenadine, glucose blood, glucose monitor, insulin aspart, levothyroxine, lubiprostone, meloxicam, methocarbamol, mometasone, montelukast, omeprazole, onetouch ultrasoft, pregabalin, quinapril, spironolactone, topiramate, traZODone, and vitamin B-6    Allergies:  She is allergic to loratadine-pseudoephedrine er, meperidine, and percocet [oxycodone-acetaminophen].       Vital Signs:      ENT Physical Exam  Constitutional  Appearance: patient appears well-developed, well-nourished and well-groomed,  Communication/Voice: communication appropriate for developmental age; vocal quality normal;  Head and Face  Appearance: head appears normal, face appears normal and face appears atraumatic;  Palpation: sinus tenderness present;  Salivary: glands normal;  Ear  Hearing: intact;  Auricles: right auricle normal; left auricle normal;  External Mastoids: right external mastoid normal; left external mastoid normal;  Ear Canals: right ear canal normal; left ear canal normal;  Tympanic Membranes: right tympanic membrane normal; left tympanic membrane normal;  Nose  External Nose: nares patent bilaterally;  Internal Nose: bilateral intranasal mucosa edematous; bilateral inferior turbinates with hypertrophy;  Oral Cavity/Oropharynx  Lips: normal;  Teeth: normal;  Gums: gingiva normal;  Tongue: normal;  Oral mucosa: normal;  Hard palate: normal;  Neck  Neck: neck normal;  Respiratory  Inspection: breathing unlabored; normal breathing rate;  Cardiovascular  Inspection: extremities are warm and well perfused;  Lymphatic  Palpation: lymph nodes normal;           Result Review       RESULTS REVIEW    I have reviewed the patients old records in the chart.   The following results/records were reviewed:   CT Sinus Without Contrast (01/21/2022 16:02)          Assessment & Plan  Chronic sinusitis, unspecified location    Allergic rhinitis, unspecified seasonality, unspecified trigger         Continue nasal sprays as previously  prescribed.  We will obtain updated CT and have her follow up with Dr. Swan to discuss possible surgical interventions if indicated.   Return in about 6 weeks (around 9/1/2025) for Follow up with Dr. Swan, with CT sinus.        Electronically signed by EMILY Brown 07/21/25 10:14 AM CDT.

## 2025-07-30 ENCOUNTER — CLINICAL SUPPORT (OUTPATIENT)
Dept: OTOLARYNGOLOGY | Facility: CLINIC | Age: 55
End: 2025-07-30
Payer: COMMERCIAL

## 2025-07-30 DIAGNOSIS — J30.9 ALLERGIC RHINITIS, UNSPECIFIED SEASONALITY, UNSPECIFIED TRIGGER: Primary | ICD-10-CM

## 2025-07-30 NOTE — PROGRESS NOTES
Moise Flores   Allergy Injection Note:    Yon Ochoa presents for an immunotherapy injection. The site of the injection was cleansed with an alcohol swab. Serum was injected into the site after pulling back on the plunger to prevent intravascular injection. After the injection and was instructed to wait in the allergy waiting area for 30 minutes. There was no problems with the injection.    Allergy Shot Questionnaire  Injection nurse/assistant: TAS  Have you had increased asthma symptoms in past week?: no  Have you had increased allergy symptoms in the last week?: no  Have you had a cold, respiratory tract infection or flu like symptoms in the past 2 weeks?: no  Did you have any problems within 12 hours of the last injection?: no  Are you on any new medications/ eye drops?: no  Are you on any beta blockers?: no  If female, are you pregnant?: no  I have confirmed the name and birth date on my allergy vial. : yes  Epipen available?: yes  Epipen Lot Number: [id number]  Epipen Expiration Date: [id number]  Number of allergy injections given: 1     Injection Details:  Vial 1   Vial 1 Expiration Date: 03/17/26  Vial 1 Series: 5  Shot type: escalation, maintenance  Vial 1 Dose (mL): 0.5  Vial 1 Location: Right upper arm  Vial 1 Reaction (in mm): <5          Moise Flores  7/30/2025  10:10 CDT

## (undated) DEVICE — IMMOBILIZER SLING: Brand: DEROYAL

## (undated) DEVICE — CANNULA NSL AD L7FT DIV O2 CO2 W/ M LUERLOCK TRMPT CONN

## (undated) DEVICE — ADAPTER CLEANING PORPOISE CLEANING

## (undated) DEVICE — BRUSH ENDOSCP 2 END CHN HEDGEHOG

## (undated) DEVICE — GLOVE SURG SZ 8 L12IN FNGR THK79MIL GRN LTX FREE

## (undated) DEVICE — UNDERGLOVE SURG SZ 8 FNGR THK0.21MIL GRN LTX BEAD CUF

## (undated) DEVICE — GLOVE SURG SZ 8 L12IN FNGR THK13MIL BRN LTX SYN POLYMER W

## (undated) DEVICE — GLOVE SURG SZ 85 L12IN FNGR THK79MIL GRN LTX FREE

## (undated) DEVICE — ENDO KIT,LOURDES HOSPITAL: Brand: MEDLINE INDUSTRIES, INC.

## (undated) DEVICE — SYRINGE MED 10ML POLYPR LUERSLIP TIP FLAT TOP W/O SFTY DISP

## (undated) DEVICE — IMMOBILIZER ORTH CONTACT CLOSURE STRP LG 18X9 IN PROCARE

## (undated) DEVICE — AIRWAY CIRCUIT: Brand: DEROYAL

## (undated) DEVICE — FORCEPS BX L240CM JAW DIA2.4MM ORNG L CAP W/ NDL DISP RAD

## (undated) DEVICE — SPONGE ENDOSCP CLN BS INF PREVENTION KOALA

## (undated) DEVICE — HYPODERMIC SAFETY NEEDLE: Brand: MAGELLAN

## (undated) DEVICE — GLOVE SURG SZ 75 CRM LTX FREE POLYISOPRENE POLYMER BEAD ANTI

## (undated) DEVICE — 20 ML PHARMACY TRAY,LUER-LOCK TIP: Brand: MONOJECT

## (undated) DEVICE — AMBU AURAONCE U SIZE 3: Brand: AURAONCE

## (undated) DEVICE — GLOVE SURG SZ 85 CRM LTX FREE POLYISOPRENE POLYMER BEAD ANTI

## (undated) DEVICE — CHLORAPREP 26ML ORANGE

## (undated) DEVICE — SINGLE PORT MANIFOLD: Brand: NEPTUNE 2